# Patient Record
Sex: MALE | Race: WHITE | NOT HISPANIC OR LATINO | URBAN - METROPOLITAN AREA
[De-identification: names, ages, dates, MRNs, and addresses within clinical notes are randomized per-mention and may not be internally consistent; named-entity substitution may affect disease eponyms.]

---

## 2017-01-05 ENCOUNTER — INPATIENT (INPATIENT)
Facility: HOSPITAL | Age: 77
LOS: 6 days | Discharge: NURSING, REHAB OR LONG-TERM CARE | DRG: 177 | End: 2017-01-12
Attending: HOSPITALIST | Admitting: HOSPITALIST
Payer: COMMERCIAL

## 2017-01-05 VITALS
SYSTOLIC BLOOD PRESSURE: 133 MMHG | TEMPERATURE: 101 F | OXYGEN SATURATION: 100 % | DIASTOLIC BLOOD PRESSURE: 91 MMHG | HEART RATE: 99 BPM | RESPIRATION RATE: 17 BRPM

## 2017-01-05 DIAGNOSIS — I63.9 CEREBRAL INFARCTION, UNSPECIFIED: ICD-10-CM

## 2017-01-05 DIAGNOSIS — R33.9 RETENTION OF URINE, UNSPECIFIED: ICD-10-CM

## 2017-01-05 DIAGNOSIS — F03.90 UNSPECIFIED DEMENTIA, UNSPECIFIED SEVERITY, WITHOUT BEHAVIORAL DISTURBANCE, PSYCHOTIC DISTURBANCE, MOOD DISTURBANCE, AND ANXIETY: ICD-10-CM

## 2017-01-05 DIAGNOSIS — E87.0 HYPEROSMOLALITY AND HYPERNATREMIA: ICD-10-CM

## 2017-01-05 DIAGNOSIS — Z29.9 ENCOUNTER FOR PROPHYLACTIC MEASURES, UNSPECIFIED: ICD-10-CM

## 2017-01-05 DIAGNOSIS — A41.9 SEPSIS, UNSPECIFIED ORGANISM: ICD-10-CM

## 2017-01-05 DIAGNOSIS — K59.00 CONSTIPATION, UNSPECIFIED: ICD-10-CM

## 2017-01-05 DIAGNOSIS — I48.91 UNSPECIFIED ATRIAL FIBRILLATION: ICD-10-CM

## 2017-01-05 DIAGNOSIS — I10 ESSENTIAL (PRIMARY) HYPERTENSION: ICD-10-CM

## 2017-01-05 DIAGNOSIS — N39.0 URINARY TRACT INFECTION, SITE NOT SPECIFIED: ICD-10-CM

## 2017-01-05 DIAGNOSIS — J18.9 PNEUMONIA, UNSPECIFIED ORGANISM: ICD-10-CM

## 2017-01-05 LAB
ALBUMIN SERPL ELPH-MCNC: 1.9 G/DL — LOW (ref 3.3–5)
ALP SERPL-CCNC: 63 U/L — SIGNIFICANT CHANGE UP (ref 40–120)
ALT FLD-CCNC: 36 U/L — SIGNIFICANT CHANGE UP (ref 12–78)
ANION GAP SERPL CALC-SCNC: 7 MMOL/L — SIGNIFICANT CHANGE UP (ref 5–17)
APPEARANCE UR: CLEAR — SIGNIFICANT CHANGE UP
APTT BLD: 28 SEC — SIGNIFICANT CHANGE UP (ref 27.5–37.4)
AST SERPL-CCNC: 35 U/L — SIGNIFICANT CHANGE UP (ref 15–37)
BASE EXCESS BLDA CALC-SCNC: 1.6 MMOL/L — SIGNIFICANT CHANGE UP (ref -2–2)
BILIRUB SERPL-MCNC: 0.6 MG/DL — SIGNIFICANT CHANGE UP (ref 0.2–1.2)
BILIRUB UR-MCNC: NEGATIVE — SIGNIFICANT CHANGE UP
BLOOD GAS COMMENTS ARTERIAL: SIGNIFICANT CHANGE UP
BUN SERPL-MCNC: 35 MG/DL — HIGH (ref 7–23)
CALCIUM SERPL-MCNC: 8.9 MG/DL — SIGNIFICANT CHANGE UP (ref 8.5–10.1)
CHLORIDE SERPL-SCNC: 120 MMOL/L — HIGH (ref 96–108)
CK MB BLD-MCNC: 0.3 % — SIGNIFICANT CHANGE UP (ref 0–3.5)
CK MB CFR SERPL CALC: 0.6 NG/ML — SIGNIFICANT CHANGE UP (ref 0–3.6)
CK SERPL-CCNC: 193 U/L — SIGNIFICANT CHANGE UP (ref 26–308)
CO2 SERPL-SCNC: 27 MMOL/L — SIGNIFICANT CHANGE UP (ref 22–31)
COLOR SPEC: YELLOW — SIGNIFICANT CHANGE UP
CREAT SERPL-MCNC: 1.2 MG/DL — SIGNIFICANT CHANGE UP (ref 0.5–1.3)
DIFF PNL FLD: ABNORMAL
EOSINOPHIL NFR BLD AUTO: 3 % — SIGNIFICANT CHANGE UP (ref 0–6)
GLUCOSE SERPL-MCNC: 130 MG/DL — HIGH (ref 70–99)
GLUCOSE UR QL: NEGATIVE — SIGNIFICANT CHANGE UP
HCO3 BLDA-SCNC: 26 MMOL/L — SIGNIFICANT CHANGE UP (ref 23–27)
HCT VFR BLD CALC: 50.6 % — HIGH (ref 39–50)
HGB BLD-MCNC: 16.3 G/DL — SIGNIFICANT CHANGE UP (ref 13–17)
HOROWITZ INDEX BLDA+IHG-RTO: 100 — SIGNIFICANT CHANGE UP
INR BLD: 1.7 RATIO — HIGH (ref 0.88–1.16)
KETONES UR-MCNC: NEGATIVE — SIGNIFICANT CHANGE UP
LACTATE SERPL-SCNC: 1.4 MMOL/L — SIGNIFICANT CHANGE UP (ref 0.7–2)
LEUKOCYTE ESTERASE UR-ACNC: ABNORMAL
LYMPHOCYTES # BLD AUTO: 6 % — LOW (ref 13–44)
MCHC RBC-ENTMCNC: 31.6 PG — SIGNIFICANT CHANGE UP (ref 27–34)
MCHC RBC-ENTMCNC: 32.2 GM/DL — SIGNIFICANT CHANGE UP (ref 32–36)
MCV RBC AUTO: 98.1 FL — SIGNIFICANT CHANGE UP (ref 80–100)
MONOCYTES NFR BLD AUTO: 1 % — SIGNIFICANT CHANGE UP (ref 1–9)
NEUTROPHILS NFR BLD AUTO: 90 % — HIGH (ref 43–77)
NITRITE UR-MCNC: NEGATIVE — SIGNIFICANT CHANGE UP
PCO2 BLDA: 34 MMHG — SIGNIFICANT CHANGE UP (ref 32–46)
PH BLDA: 7.48 — HIGH (ref 7.35–7.45)
PH UR: 5 — SIGNIFICANT CHANGE UP (ref 4.8–8)
PLATELET # BLD AUTO: 210 K/UL — SIGNIFICANT CHANGE UP (ref 150–400)
PO2 BLDA: 331 MMHG — HIGH (ref 74–108)
POTASSIUM SERPL-MCNC: 4.3 MMOL/L — SIGNIFICANT CHANGE UP (ref 3.5–5.3)
POTASSIUM SERPL-SCNC: 4.3 MMOL/L — SIGNIFICANT CHANGE UP (ref 3.5–5.3)
PROT SERPL-MCNC: 5.9 G/DL — LOW (ref 6–8.3)
PROT UR-MCNC: 25 MG/DL
PROTHROM AB SERPL-ACNC: 19 SEC — HIGH (ref 10–13.1)
RAPID RVP RESULT: SIGNIFICANT CHANGE UP
RBC # BLD: 5.16 M/UL — SIGNIFICANT CHANGE UP (ref 4.2–5.8)
RBC # FLD: 11.9 % — SIGNIFICANT CHANGE UP (ref 10.3–14.5)
SAO2 % BLDA: 100 % — HIGH (ref 92–96)
SODIUM SERPL-SCNC: 154 MMOL/L — HIGH (ref 135–145)
SP GR SPEC: 1.02 — SIGNIFICANT CHANGE UP (ref 1.01–1.02)
TROPONIN I SERPL-MCNC: 0.02 NG/ML — SIGNIFICANT CHANGE UP (ref 0.01–0.04)
UROBILINOGEN FLD QL: NEGATIVE — SIGNIFICANT CHANGE UP
WBC # BLD: 11.6 K/UL — HIGH (ref 3.8–10.5)
WBC # FLD AUTO: 11.6 K/UL — HIGH (ref 3.8–10.5)

## 2017-01-05 PROCEDURE — 70450 CT HEAD/BRAIN W/O DYE: CPT | Mod: 26

## 2017-01-05 PROCEDURE — 99285 EMERGENCY DEPT VISIT HI MDM: CPT

## 2017-01-05 PROCEDURE — 71010: CPT | Mod: 26

## 2017-01-05 PROCEDURE — 99223 1ST HOSP IP/OBS HIGH 75: CPT | Mod: GC

## 2017-01-05 PROCEDURE — 93010 ELECTROCARDIOGRAM REPORT: CPT

## 2017-01-05 PROCEDURE — 71250 CT THORAX DX C-: CPT | Mod: 26

## 2017-01-05 RX ORDER — VANCOMYCIN HCL 1 G
1000 VIAL (EA) INTRAVENOUS ONCE
Qty: 0 | Refills: 0 | Status: COMPLETED | OUTPATIENT
Start: 2017-01-05 | End: 2017-01-05

## 2017-01-05 RX ORDER — METOPROLOL TARTRATE 50 MG
200 TABLET ORAL DAILY
Qty: 0 | Refills: 0 | Status: DISCONTINUED | OUTPATIENT
Start: 2017-01-05 | End: 2017-01-12

## 2017-01-05 RX ORDER — VANCOMYCIN HCL 1 G
1000 VIAL (EA) INTRAVENOUS EVERY 12 HOURS
Qty: 0 | Refills: 0 | Status: DISCONTINUED | OUTPATIENT
Start: 2017-01-05 | End: 2017-01-08

## 2017-01-05 RX ORDER — AMLODIPINE BESYLATE 2.5 MG/1
5 TABLET ORAL DAILY
Qty: 0 | Refills: 0 | Status: DISCONTINUED | OUTPATIENT
Start: 2017-01-05 | End: 2017-01-06

## 2017-01-05 RX ORDER — WARFARIN SODIUM 2.5 MG/1
3 TABLET ORAL DAILY
Qty: 0 | Refills: 0 | Status: DISCONTINUED | OUTPATIENT
Start: 2017-01-05 | End: 2017-01-06

## 2017-01-05 RX ORDER — SODIUM CHLORIDE 9 MG/ML
1000 INJECTION INTRAMUSCULAR; INTRAVENOUS; SUBCUTANEOUS ONCE
Qty: 0 | Refills: 0 | Status: COMPLETED | OUTPATIENT
Start: 2017-01-05 | End: 2017-01-05

## 2017-01-05 RX ORDER — PIPERACILLIN AND TAZOBACTAM 4; .5 G/20ML; G/20ML
3.38 INJECTION, POWDER, LYOPHILIZED, FOR SOLUTION INTRAVENOUS EVERY 8 HOURS
Qty: 0 | Refills: 0 | Status: DISCONTINUED | OUTPATIENT
Start: 2017-01-06 | End: 2017-01-10

## 2017-01-05 RX ORDER — ACETAMINOPHEN 500 MG
650 TABLET ORAL ONCE
Qty: 0 | Refills: 0 | Status: COMPLETED | OUTPATIENT
Start: 2017-01-05 | End: 2017-01-05

## 2017-01-05 RX ORDER — PIPERACILLIN AND TAZOBACTAM 4; .5 G/20ML; G/20ML
3.38 INJECTION, POWDER, LYOPHILIZED, FOR SOLUTION INTRAVENOUS ONCE
Qty: 0 | Refills: 0 | Status: COMPLETED | OUTPATIENT
Start: 2017-01-05 | End: 2017-01-05

## 2017-01-05 RX ORDER — DOCUSATE SODIUM 100 MG
100 CAPSULE ORAL THREE TIMES A DAY
Qty: 0 | Refills: 0 | Status: DISCONTINUED | OUTPATIENT
Start: 2017-01-06 | End: 2017-01-12

## 2017-01-05 RX ADMIN — SODIUM CHLORIDE 1000 MILLILITER(S): 9 INJECTION INTRAMUSCULAR; INTRAVENOUS; SUBCUTANEOUS at 17:24

## 2017-01-05 RX ADMIN — Medication 250 MILLIGRAM(S): at 19:15

## 2017-01-05 RX ADMIN — PIPERACILLIN AND TAZOBACTAM 200 GRAM(S): 4; .5 INJECTION, POWDER, LYOPHILIZED, FOR SOLUTION INTRAVENOUS at 17:24

## 2017-01-05 RX ADMIN — SODIUM CHLORIDE 1000 MILLILITER(S): 9 INJECTION INTRAMUSCULAR; INTRAVENOUS; SUBCUTANEOUS at 19:16

## 2017-01-05 RX ADMIN — Medication 650 MILLIGRAM(S): at 17:24

## 2017-01-05 NOTE — ED ADULT NURSE REASSESSMENT NOTE - NS ED NURSE REASSESS COMMENT FT1
patient resting, responsive to voice. V/S stable, awaiting bed at this time. Seen by hospitalist for admission.

## 2017-01-05 NOTE — ED PROVIDER NOTE - PROGRESS NOTE DETAILS
discussed case with Dr. Nance, does not think this is anything neurological, wants patient worked up for sepsis discussed case with Dr. Briones discussed case with Dr. Briones waiting for coags and cmp before admitting discussed case with Dr. Reyes, will admit

## 2017-01-05 NOTE — ED ADULT NURSE NOTE - OBJECTIVE STATEMENT
pt comes in via ems or an unresponsive episode at [facility], wife states that her  was not acting like himself all day band during dinner his eyes rolled in his head and he stopped responding top her-- she states that staff plasced an AED on him and sent him to the er-- on arrival pt had response to pain but would not respond to touch or verbal stimuli

## 2017-01-05 NOTE — H&P ADULT. - PMH
Atrial fibrillation    Cerebrovascular accident    Constipation    Dementia    Hypertension    Urinary retention

## 2017-01-05 NOTE — H&P ADULT. - PROBLEM SELECTOR PLAN 4
-?enema induced  -Hold IVF for now 2/2 hypernatremia , pt dose not appears to be dehydrated will sign out to AM team to reevaluated for IVF management.  -repeat cmp am

## 2017-01-05 NOTE — H&P ADULT. - PROBLEM SELECTOR PLAN 9
-fall precaution/ out of bed with assistant  -necator thick pure liquid   -aspiration precaution   -SCD for DVT PPX  -swallow and speech eval  -PT eval -c/w Regulo

## 2017-01-05 NOTE — ED ADULT NURSE REASSESSMENT NOTE - NS ED NURSE REASSESS COMMENT FT1
pt responsive top pain;; responding to verbal stimult--- O2 decreased to 4lnc-- labs heamolized-- lab at bedside to redraw

## 2017-01-05 NOTE — H&P ADULT. - ASSESSMENT
76 y.o M w/PMH of afib( on coumadin , HTN, recent CVA(post CVA underline aphasia and body weakness) , urinery retention( with chronic gore), dementia was sent with EMS by [facility] 2/2 unresponsiveness and lethargy a/w ?HCAP vs aspiration PNA and UTI

## 2017-01-05 NOTE — H&P ADULT. - PROBLEM SELECTOR PLAN 6
-c/w home toprol and coumadine   -repeat AC panel in AM -c/w home toprol and coumadin  -repeat AC panel in AM

## 2017-01-05 NOTE — H&P ADULT. - PROBLEM SELECTOR PLAN 7
-colace for PPX -c/w home toprol, coumadine, zetia, and lotrel  -no equivalent for zetia and lotrel, will sign out to AM team to bring the home zetia and lotrel for pharmacy to verify.  -amlodipine 5mg, consider to d/c amlodipine after pt 's home lotrel was verified

## 2017-01-05 NOTE — H&P ADULT. - HISTORY OF PRESENT ILLNESS
76 y.o M w/PMH of afib, HTN, recent CVA(post CVA underline aphasia and body weakness , urinery retention( with chronic gore), dementia 76 y.o M w/PMH of afib( on coumadin , HTN, recent CVA(post CVA underline aphasia and body weakness) , urinery retention( with chronic gore), dementia was sent with EMS by [facility] 2/2 unresponsiveness and lethargy. Pt had CVA at 12/2016 and was admitted to [facility]. D/C at 12/20/2016 and was sent to [facility] for post stoke rehab. Pt had aphasia and body weakness s/p the stoke. Per Pt 's wife and ED nurse, pt had baseline of answering yes or no questions in [facility]. Pt was not tolerate the food in [facility] and had severe constipation and was giving multiple enemas. Pt  was not able to walk s/p the CVA. Today, at 3:55pm, pt was found to be unresponsiveness to pain stimuli and sternal rub and lethargy in [facility] and pt was sent to [facility] ED by EMS. Upon examination, Pt 's wife had already left, and pt was not able to provide history. Attempt to call pt's wife, no one picking up. The history was provided by [facility] and ED nurse who speeak with the wife befoe     In ED, pt had fever at 101, with other vital signs wnl. Leukocytosis at 11.6. Hypernatremia at 154 and UA + for moderate leukocyte Esterase and many bacteria. CT head negative for acute infract. CXR and chest CT area consistent with possible Lt lower lob PNA. Pt was unresponsiveness upon arrival later improved to responsiveness to pain and verbal stimuli per ED nurser. Dr. Nance (Neuro)saw PT at ED and does not think pt 's symptom was neurological related. Pt received 1xzosyn, 1x vanco, 1x tylenol, 2x bolus ns. 76 y.o M w/PMH of afib( on coumadin) , HTN, recent CVA(post CVA underline aphasia and body weakness) , urinery retention( with chronic gore), dementia was sent with EMS by [facility] 2/2 unresponsiveness and lethargy. Pt had CVA at 12/2016 and was admitted to [facility]. D/C at 12/20/2016 and was sent to [facility] for post stoke rehab. Pt had aphasia and body weakness s/p the stoke. Per Pt 's wife and ED nurse, pt had baseline of answering yes or no questions in [facility]. Pt was not tolerate the food in [facility] and had severe constipation and was giving multiple enemas. Pt  was not able to walk s/p the CVA. Today, at 3:55pm, pt was found to be unresponsiveness to pain stimuli and sternal rub and lethargy in [facility] and pt was sent to [facility] ED by EMS. Upon examination, Pt 's wife had already left, and pt was not able to provide history. Attempt to call pt's wife, no one picking up. The history was provided by [facility] and ED nurse who speeak with the wife befoe     In ED, pt had fever at 101, with other vital signs wnl. Leukocytosis at 11.6. Hypernatremia at 154 and UA + for moderate leukocyte Esterase and many bacteria. CT head negative for acute infract. CXR and chest CT area consistent with possible Lt lower lob PNA. Pt was unresponsiveness upon arrival later improved to responsiveness to pain and verbal stimuli per ED nurser. Dr. Nance (Neuro)saw PT at ED and does not think pt 's symptom was neurological related. Pt received 1xzosyn, 1x vanco, 1x tylenol, 2x bolus ns.

## 2017-01-05 NOTE — H&P ADULT. - PROBLEM SELECTOR PLAN 1
-improved through ED course not meeting SIRS criteria now  -admit to Tele  -c/w zosyn and vanvo  -Hold IVF for now 2/2 hypernatremia , pt dose not appears to be dehydrated will sign out to AM team to reevaluated for IVF management.  -Tylenol PRN for fever  -AM CBC/ CMP  -BCX and UCX pending -improved through ED course not meeting SIRS criteria now  -admit to Tele  -c/w zosyn and vanco  -Hold IVF for now 2/2 hypernatremia , pt dose not appears to be dehydrated will sign out to AM team to reevaluated for IVF management.  -Tylenol PRN for fever  -AM CBC/ CMP  -BCX and UCX pending

## 2017-01-05 NOTE — ED PROVIDER NOTE - CARE PLAN
Principal Discharge DX:	Sepsis, due to unspecified organism Principal Discharge DX:	Sepsis, due to unspecified organism  Secondary Diagnosis:	Pneumonia, unspecified organism Principal Discharge DX:	Sepsis, due to unspecified organism  Secondary Diagnosis:	Pneumonia, unspecified organism  Secondary Diagnosis:	Urinary tract infection, site unspecified

## 2017-01-05 NOTE — H&P ADULT. - PROBLEM SELECTOR PLAN 5
-c/w home toprol and coumadine coumadine -c/w home toprol, coumadine, zetia, and lotrel  -no equivalent for zetia and lotrel, will sign out to AM team to bring the home zetia and lotrel for pharmacy to verify.  -amlodipine 5mg, consider to d/c amlodipine after pt 's home lotrel was verified

## 2017-01-05 NOTE — H&P ADULT. - RS GEN PE MLT RESP DETAILS PC
breath sounds equal/no rales/respirations non-labored/airway patent/no intercostal retractions/no wheezes/normal/no rhonchi

## 2017-01-05 NOTE — H&P ADULT. - PROBLEM SELECTOR PLAN 10
-fall precaution/ out of bed with assistant  -necator pure liquid dysphagia diet  -aspiration precaution   -SCD for DVT PPX  -swallow and speech eval  -PT eval

## 2017-01-06 LAB
ALBUMIN SERPL ELPH-MCNC: 2.1 G/DL — LOW (ref 3.3–5)
ALP SERPL-CCNC: 66 U/L — SIGNIFICANT CHANGE UP (ref 40–120)
ALT FLD-CCNC: 31 U/L — SIGNIFICANT CHANGE UP (ref 12–78)
ANION GAP SERPL CALC-SCNC: 6 MMOL/L — SIGNIFICANT CHANGE UP (ref 5–17)
APTT BLD: 33.6 SEC — SIGNIFICANT CHANGE UP (ref 27.5–37.4)
AST SERPL-CCNC: 33 U/L — SIGNIFICANT CHANGE UP (ref 15–37)
BASOPHILS # BLD AUTO: 0.1 K/UL — SIGNIFICANT CHANGE UP (ref 0–0.2)
BASOPHILS NFR BLD AUTO: 0.8 % — SIGNIFICANT CHANGE UP (ref 0–2)
BILIRUB SERPL-MCNC: 0.6 MG/DL — SIGNIFICANT CHANGE UP (ref 0.2–1.2)
BUN SERPL-MCNC: 34 MG/DL — HIGH (ref 7–23)
CALCIUM SERPL-MCNC: 9.4 MG/DL — SIGNIFICANT CHANGE UP (ref 8.5–10.1)
CHLORIDE SERPL-SCNC: 118 MMOL/L — HIGH (ref 96–108)
CO2 SERPL-SCNC: 29 MMOL/L — SIGNIFICANT CHANGE UP (ref 22–31)
CREAT SERPL-MCNC: 1.1 MG/DL — SIGNIFICANT CHANGE UP (ref 0.5–1.3)
CULTURE RESULTS: SIGNIFICANT CHANGE UP
EOSINOPHIL # BLD AUTO: 0.1 K/UL — SIGNIFICANT CHANGE UP (ref 0–0.5)
EOSINOPHIL NFR BLD AUTO: 0.8 % — SIGNIFICANT CHANGE UP (ref 0–6)
GLUCOSE SERPL-MCNC: 96 MG/DL — SIGNIFICANT CHANGE UP (ref 70–99)
GRAM STN FLD: SIGNIFICANT CHANGE UP
HCT VFR BLD CALC: 41.5 % — SIGNIFICANT CHANGE UP (ref 39–50)
HGB BLD-MCNC: 13.7 G/DL — SIGNIFICANT CHANGE UP (ref 13–17)
INR BLD: 1.68 RATIO — HIGH (ref 0.88–1.16)
LYMPHOCYTES # BLD AUTO: 1 K/UL — SIGNIFICANT CHANGE UP (ref 1–3.3)
LYMPHOCYTES # BLD AUTO: 11.7 % — LOW (ref 13–44)
MCHC RBC-ENTMCNC: 31.7 PG — SIGNIFICANT CHANGE UP (ref 27–34)
MCHC RBC-ENTMCNC: 32.9 GM/DL — SIGNIFICANT CHANGE UP (ref 32–36)
MCV RBC AUTO: 96.3 FL — SIGNIFICANT CHANGE UP (ref 80–100)
MONOCYTES # BLD AUTO: 0.5 K/UL — SIGNIFICANT CHANGE UP (ref 0–0.9)
MONOCYTES NFR BLD AUTO: 5.6 % — SIGNIFICANT CHANGE UP (ref 1–9)
NEUTROPHILS # BLD AUTO: 6.6 K/UL — SIGNIFICANT CHANGE UP (ref 1.8–7.4)
NEUTROPHILS NFR BLD AUTO: 80.9 % — HIGH (ref 43–77)
PLATELET # BLD AUTO: 170 K/UL — SIGNIFICANT CHANGE UP (ref 150–400)
POTASSIUM SERPL-MCNC: 4.3 MMOL/L — SIGNIFICANT CHANGE UP (ref 3.5–5.3)
POTASSIUM SERPL-SCNC: 4.3 MMOL/L — SIGNIFICANT CHANGE UP (ref 3.5–5.3)
PROT SERPL-MCNC: 6.3 G/DL — SIGNIFICANT CHANGE UP (ref 6–8.3)
PROTHROM AB SERPL-ACNC: 18.8 SEC — HIGH (ref 10–13.1)
RBC # BLD: 4.31 M/UL — SIGNIFICANT CHANGE UP (ref 4.2–5.8)
RBC # FLD: 12 % — SIGNIFICANT CHANGE UP (ref 10.3–14.5)
SODIUM SERPL-SCNC: 153 MMOL/L — HIGH (ref 135–145)
SPECIMEN SOURCE: SIGNIFICANT CHANGE UP
WBC # BLD: 8.2 K/UL — SIGNIFICANT CHANGE UP (ref 3.8–10.5)
WBC # FLD AUTO: 8.2 K/UL — SIGNIFICANT CHANGE UP (ref 3.8–10.5)

## 2017-01-06 PROCEDURE — 99233 SBSQ HOSP IP/OBS HIGH 50: CPT

## 2017-01-06 PROCEDURE — 93010 ELECTROCARDIOGRAM REPORT: CPT

## 2017-01-06 RX ORDER — ENOXAPARIN SODIUM 100 MG/ML
40 INJECTION SUBCUTANEOUS ONCE
Qty: 0 | Refills: 0 | Status: COMPLETED | OUTPATIENT
Start: 2017-01-06 | End: 2017-01-06

## 2017-01-06 RX ORDER — WARFARIN SODIUM 2.5 MG/1
3 TABLET ORAL DAILY
Qty: 0 | Refills: 0 | Status: DISCONTINUED | OUTPATIENT
Start: 2017-01-06 | End: 2017-01-06

## 2017-01-06 RX ORDER — SODIUM CHLORIDE 9 MG/ML
1000 INJECTION, SOLUTION INTRAVENOUS
Qty: 0 | Refills: 0 | Status: DISCONTINUED | OUTPATIENT
Start: 2017-01-06 | End: 2017-01-06

## 2017-01-06 RX ORDER — ENOXAPARIN SODIUM 100 MG/ML
65 INJECTION SUBCUTANEOUS
Qty: 0 | Refills: 0 | Status: DISCONTINUED | OUTPATIENT
Start: 2017-01-06 | End: 2017-01-08

## 2017-01-06 RX ORDER — ENOXAPARIN SODIUM 100 MG/ML
40 INJECTION SUBCUTANEOUS DAILY
Qty: 0 | Refills: 0 | Status: DISCONTINUED | OUTPATIENT
Start: 2017-01-06 | End: 2017-01-06

## 2017-01-06 RX ORDER — EZETIMIBE 10 MG/1
10 TABLET ORAL DAILY
Qty: 0 | Refills: 0 | Status: DISCONTINUED | OUTPATIENT
Start: 2017-01-06 | End: 2017-01-12

## 2017-01-06 RX ORDER — ACETAMINOPHEN 500 MG
650 TABLET ORAL EVERY 6 HOURS
Qty: 0 | Refills: 0 | Status: DISCONTINUED | OUTPATIENT
Start: 2017-01-06 | End: 2017-01-12

## 2017-01-06 RX ORDER — ENOXAPARIN SODIUM 100 MG/ML
65 INJECTION SUBCUTANEOUS
Qty: 0 | Refills: 0 | Status: DISCONTINUED | OUTPATIENT
Start: 2017-01-06 | End: 2017-01-06

## 2017-01-06 RX ORDER — SODIUM CHLORIDE 9 MG/ML
1000 INJECTION, SOLUTION INTRAVENOUS
Qty: 0 | Refills: 0 | Status: DISCONTINUED | OUTPATIENT
Start: 2017-01-06 | End: 2017-01-07

## 2017-01-06 RX ADMIN — Medication 200 MILLIGRAM(S): at 05:57

## 2017-01-06 RX ADMIN — ENOXAPARIN SODIUM 65 MILLIGRAM(S): 100 INJECTION SUBCUTANEOUS at 17:53

## 2017-01-06 RX ADMIN — SODIUM CHLORIDE 75 MILLILITER(S): 9 INJECTION, SOLUTION INTRAVENOUS at 10:23

## 2017-01-06 RX ADMIN — PIPERACILLIN AND TAZOBACTAM 25 GRAM(S): 4; .5 INJECTION, POWDER, LYOPHILIZED, FOR SOLUTION INTRAVENOUS at 19:07

## 2017-01-06 RX ADMIN — Medication 250 MILLIGRAM(S): at 17:52

## 2017-01-06 RX ADMIN — ENOXAPARIN SODIUM 40 MILLIGRAM(S): 100 INJECTION SUBCUTANEOUS at 02:53

## 2017-01-06 RX ADMIN — Medication 100 MILLIGRAM(S): at 21:48

## 2017-01-06 RX ADMIN — Medication 100 MILLIGRAM(S): at 13:22

## 2017-01-06 RX ADMIN — AMLODIPINE BESYLATE 5 MILLIGRAM(S): 2.5 TABLET ORAL at 05:57

## 2017-01-06 RX ADMIN — Medication 250 MILLIGRAM(S): at 05:57

## 2017-01-06 RX ADMIN — PIPERACILLIN AND TAZOBACTAM 25 GRAM(S): 4; .5 INJECTION, POWDER, LYOPHILIZED, FOR SOLUTION INTRAVENOUS at 02:02

## 2017-01-06 RX ADMIN — SODIUM CHLORIDE 75 MILLILITER(S): 9 INJECTION, SOLUTION INTRAVENOUS at 12:56

## 2017-01-06 RX ADMIN — PIPERACILLIN AND TAZOBACTAM 25 GRAM(S): 4; .5 INJECTION, POWDER, LYOPHILIZED, FOR SOLUTION INTRAVENOUS at 09:19

## 2017-01-06 RX ADMIN — Medication 100 MILLIGRAM(S): at 05:57

## 2017-01-06 NOTE — SWALLOW BEDSIDE ASSESSMENT ADULT - ORAL PHASE
Decreased anterior-posterior movement of the bolus Delayed oral transit time/Decreased anterior-posterior movement of the bolus

## 2017-01-06 NOTE — DIETITIAN INITIAL EVALUATION ADULT. - PROBLEM SELECTOR PLAN 7
-c/w home toprol, coumadine, zetia, and lotrel  -no equivalent for zetia and lotrel, will sign out to AM team to bring the home zetia and lotrel for pharmacy to verify.  -amlodipine 5mg, consider to d/c amlodipine after pt 's home lotrel was verified

## 2017-01-06 NOTE — PHYSICAL THERAPY INITIAL EVALUATION ADULT - PERTINENT HX OF CURRENT PROBLEM, REHAB EVAL
Pt had a stroke 12/16  and was at [facility] at [facility] before he became unresponsive and transferred to [place].. Pt diagnosed with UTI, sepsis and PNA Pt had a stroke 12/16 1st went to [facility] and then was at [facility] at [facility] before he became unresponsive and transferred to [place].. Pt diagnosed with UTI, sepsis and PNA, h/o dementia

## 2017-01-06 NOTE — DIETITIAN INITIAL EVALUATION ADULT. - OTHER INFO
Unable to interview patient due to altered mental status post CVA. Pt admitted from [facility] rehab . Pt with PNA, septic. As per EMR, pt not tolerating food well at rehab with severe constipation and multiple enemas. Pt on coumadin, but not addition to medication regimen. +Bowel sounds. No edema or skin breakage. Pt seen with breakfast tray at bedside, food cut up but not eaten at all. Awaiting SLP evaluation 1/7 for further instruction in regards to diet. Pt presents with MOLST but no directive for TF. Consider TF if intake does not improve and patient is not cleared for oral diet by SLP

## 2017-01-06 NOTE — SWALLOW BEDSIDE ASSESSMENT ADULT - ASR SWALLOW ASPIRATION MONITOR
cough/change of breathing pattern/position upright (90Y)/pneumonia/fever/oral hygiene/upper respiratory infection/throat clearing/gurgly voice

## 2017-01-06 NOTE — PHYSICAL THERAPY INITIAL EVALUATION ADULT - ADDITIONAL COMMENTS
According to  ( from [facility] that pt attends) who was visiting. Pt was walking 70 feet with assist . Pt not very verbal at this time.

## 2017-01-06 NOTE — DIETITIAN INITIAL EVALUATION ADULT. - PROBLEM SELECTOR PLAN 1
-improved through ED course not meeting SIRS criteria now  -admit to Tele  -c/w zosyn and vanco  -Hold IVF for now 2/2 hypernatremia , pt dose not appears to be dehydrated will sign out to AM team to reevaluated for IVF management.  -Tylenol PRN for fever  -AM CBC/ CMP  -BCX and UCX pending

## 2017-01-06 NOTE — PATIENT PROFILE ADULT. - FALL HARM RISK
bones(Osteoporosis,prev fx,steroid use,metastatic bone ca/coagulation(Bleeding disorder R/T clinical cond/anti-coags)

## 2017-01-06 NOTE — PHYSICAL THERAPY INITIAL EVALUATION ADULT - CRITERIA FOR SKILLED THERAPEUTIC INTERVENTIONS
risk reduction/prevention/rehab potential risk reduction/prevention/rehab potential/therapy frequency

## 2017-01-06 NOTE — PATIENT PROFILE ADULT. - NS PRO REFERRAL CMGT
Lacks capacity and has no surrogate decision maker/Medical equipment needed/Significantly altered cognitive/functional ability

## 2017-01-06 NOTE — DIETITIAN INITIAL EVALUATION ADULT. - FACTORS AFF FOOD INTAKE
As per EMR, pt with CVA 12/2016 & persistent constipation (received multiple enemas in rehab)/change in mental status/difficulty feeding self/persistent constipation

## 2017-01-06 NOTE — SWALLOW BEDSIDE ASSESSMENT ADULT - COMMENTS
77 y/o male with recent h/o CVA (12/2016) admitted from rehab after being found unresponsive, found to have PNA - HCAP vs Asp. Pt seen for clinical swallow eval, A+Ox0. Pt with oral-pharyngeal dysphagia with reduced oral grading, bite reflex, significant oral holding with weak lingual manipulation of bolus, suspected premature loss and swallow delay, laryngeal excursion WFL.

## 2017-01-06 NOTE — PHYSICAL THERAPY INITIAL EVALUATION ADULT - MUSCLE TONE ASSESSMENT, REHAB EVAL
moderately increased tone/bilateral upper extremities moderately increased tone/distally more then proximally/bilateral upper extremities

## 2017-01-06 NOTE — DIETITIAN INITIAL EVALUATION ADULT. - PHYSICAL APPEARANCE
Pt appears thin; No height recorded at this time and pt unable to interview due to mental status post CVA; BMI 21.9 at estimated height of 5'10"

## 2017-01-07 LAB
ANION GAP SERPL CALC-SCNC: 8 MMOL/L — SIGNIFICANT CHANGE UP (ref 5–17)
BUN SERPL-MCNC: 21 MG/DL — SIGNIFICANT CHANGE UP (ref 7–23)
CALCIUM SERPL-MCNC: 9.3 MG/DL — SIGNIFICANT CHANGE UP (ref 8.5–10.1)
CHLORIDE SERPL-SCNC: 112 MMOL/L — HIGH (ref 96–108)
CO2 SERPL-SCNC: 31 MMOL/L — SIGNIFICANT CHANGE UP (ref 22–31)
CREAT SERPL-MCNC: 1.1 MG/DL — SIGNIFICANT CHANGE UP (ref 0.5–1.3)
GLUCOSE SERPL-MCNC: 91 MG/DL — SIGNIFICANT CHANGE UP (ref 70–99)
GRAM STN FLD: SIGNIFICANT CHANGE UP
GRAM STN FLD: SIGNIFICANT CHANGE UP
HCT VFR BLD CALC: 42.2 % — SIGNIFICANT CHANGE UP (ref 39–50)
HGB BLD-MCNC: 13.8 G/DL — SIGNIFICANT CHANGE UP (ref 13–17)
INR BLD: 2.59 RATIO — HIGH (ref 0.88–1.16)
MAGNESIUM SERPL-MCNC: 2.5 MG/DL — HIGH (ref 1.8–2.4)
MCHC RBC-ENTMCNC: 31.6 PG — SIGNIFICANT CHANGE UP (ref 27–34)
MCHC RBC-ENTMCNC: 32.7 GM/DL — SIGNIFICANT CHANGE UP (ref 32–36)
MCV RBC AUTO: 96.7 FL — SIGNIFICANT CHANGE UP (ref 80–100)
PHOSPHATE SERPL-MCNC: 2.2 MG/DL — LOW (ref 2.5–4.5)
PLATELET # BLD AUTO: 164 K/UL — SIGNIFICANT CHANGE UP (ref 150–400)
POTASSIUM SERPL-MCNC: 3.2 MMOL/L — LOW (ref 3.5–5.3)
POTASSIUM SERPL-SCNC: 3.2 MMOL/L — LOW (ref 3.5–5.3)
PROTHROM AB SERPL-ACNC: 29 SEC — HIGH (ref 10–13.1)
RBC # BLD: 4.37 M/UL — SIGNIFICANT CHANGE UP (ref 4.2–5.8)
RBC # FLD: 11.4 % — SIGNIFICANT CHANGE UP (ref 10.3–14.5)
SODIUM SERPL-SCNC: 151 MMOL/L — HIGH (ref 135–145)
VANCOMYCIN TROUGH SERPL-MCNC: 16.6 UG/ML — SIGNIFICANT CHANGE UP (ref 10–20)
WBC # BLD: 7.4 K/UL — SIGNIFICANT CHANGE UP (ref 3.8–10.5)
WBC # FLD AUTO: 7.4 K/UL — SIGNIFICANT CHANGE UP (ref 3.8–10.5)

## 2017-01-07 PROCEDURE — 99233 SBSQ HOSP IP/OBS HIGH 50: CPT

## 2017-01-07 RX ORDER — POTASSIUM CHLORIDE 20 MEQ
40 PACKET (EA) ORAL ONCE
Qty: 0 | Refills: 0 | Status: COMPLETED | OUTPATIENT
Start: 2017-01-07 | End: 2017-01-07

## 2017-01-07 RX ORDER — DEXTROSE MONOHYDRATE, SODIUM CHLORIDE, AND POTASSIUM CHLORIDE 50; .745; 4.5 G/1000ML; G/1000ML; G/1000ML
1000 INJECTION, SOLUTION INTRAVENOUS
Qty: 0 | Refills: 0 | Status: DISCONTINUED | OUTPATIENT
Start: 2017-01-07 | End: 2017-01-09

## 2017-01-07 RX ORDER — WARFARIN SODIUM 2.5 MG/1
3 TABLET ORAL ONCE
Qty: 0 | Refills: 0 | Status: COMPLETED | OUTPATIENT
Start: 2017-01-07 | End: 2017-01-07

## 2017-01-07 RX ORDER — WARFARIN SODIUM 2.5 MG/1
5 TABLET ORAL ONCE
Qty: 0 | Refills: 0 | Status: DISCONTINUED | OUTPATIENT
Start: 2017-01-07 | End: 2017-01-07

## 2017-01-07 RX ORDER — SODIUM,POTASSIUM PHOSPHATES 278-250MG
1 POWDER IN PACKET (EA) ORAL
Qty: 0 | Refills: 0 | Status: COMPLETED | OUTPATIENT
Start: 2017-01-07 | End: 2017-01-09

## 2017-01-07 RX ADMIN — Medication 100 MILLIGRAM(S): at 05:49

## 2017-01-07 RX ADMIN — PIPERACILLIN AND TAZOBACTAM 25 GRAM(S): 4; .5 INJECTION, POWDER, LYOPHILIZED, FOR SOLUTION INTRAVENOUS at 23:44

## 2017-01-07 RX ADMIN — Medication 100 MILLIGRAM(S): at 13:32

## 2017-01-07 RX ADMIN — ENOXAPARIN SODIUM 65 MILLIGRAM(S): 100 INJECTION SUBCUTANEOUS at 05:49

## 2017-01-07 RX ADMIN — ENOXAPARIN SODIUM 65 MILLIGRAM(S): 100 INJECTION SUBCUTANEOUS at 17:39

## 2017-01-07 RX ADMIN — Medication 1 TABLET(S): at 21:16

## 2017-01-07 RX ADMIN — Medication 200 MILLIGRAM(S): at 05:49

## 2017-01-07 RX ADMIN — DEXTROSE MONOHYDRATE, SODIUM CHLORIDE, AND POTASSIUM CHLORIDE 75 MILLILITER(S): 50; .745; 4.5 INJECTION, SOLUTION INTRAVENOUS at 18:32

## 2017-01-07 RX ADMIN — PIPERACILLIN AND TAZOBACTAM 25 GRAM(S): 4; .5 INJECTION, POWDER, LYOPHILIZED, FOR SOLUTION INTRAVENOUS at 00:26

## 2017-01-07 RX ADMIN — Medication 40 MILLIEQUIVALENT(S): at 11:55

## 2017-01-07 RX ADMIN — Medication 250 MILLIGRAM(S): at 05:49

## 2017-01-07 RX ADMIN — PIPERACILLIN AND TAZOBACTAM 25 GRAM(S): 4; .5 INJECTION, POWDER, LYOPHILIZED, FOR SOLUTION INTRAVENOUS at 21:16

## 2017-01-07 RX ADMIN — SODIUM CHLORIDE 75 MILLILITER(S): 9 INJECTION, SOLUTION INTRAVENOUS at 13:35

## 2017-01-07 RX ADMIN — PIPERACILLIN AND TAZOBACTAM 25 GRAM(S): 4; .5 INJECTION, POWDER, LYOPHILIZED, FOR SOLUTION INTRAVENOUS at 10:50

## 2017-01-07 RX ADMIN — Medication 250 MILLIGRAM(S): at 17:39

## 2017-01-07 RX ADMIN — EZETIMIBE 10 MILLIGRAM(S): 10 TABLET ORAL at 11:55

## 2017-01-07 RX ADMIN — WARFARIN SODIUM 3 MILLIGRAM(S): 2.5 TABLET ORAL at 21:16

## 2017-01-07 RX ADMIN — Medication 100 MILLIGRAM(S): at 21:16

## 2017-01-07 RX ADMIN — Medication 1 TABLET(S): at 17:39

## 2017-01-08 LAB
ANION GAP SERPL CALC-SCNC: 8 MMOL/L — SIGNIFICANT CHANGE UP (ref 5–17)
BUN SERPL-MCNC: 17 MG/DL — SIGNIFICANT CHANGE UP (ref 7–23)
CALCIUM SERPL-MCNC: 9.2 MG/DL — SIGNIFICANT CHANGE UP (ref 8.5–10.1)
CHLORIDE SERPL-SCNC: 110 MMOL/L — HIGH (ref 96–108)
CO2 SERPL-SCNC: 27 MMOL/L — SIGNIFICANT CHANGE UP (ref 22–31)
CREAT SERPL-MCNC: 1.1 MG/DL — SIGNIFICANT CHANGE UP (ref 0.5–1.3)
CULTURE RESULTS: SIGNIFICANT CHANGE UP
GLUCOSE SERPL-MCNC: 86 MG/DL — SIGNIFICANT CHANGE UP (ref 70–99)
INR BLD: 2.68 RATIO — HIGH (ref 0.88–1.16)
PHOSPHATE SERPL-MCNC: 2.9 MG/DL — SIGNIFICANT CHANGE UP (ref 2.5–4.5)
POTASSIUM SERPL-MCNC: 3.5 MMOL/L — SIGNIFICANT CHANGE UP (ref 3.5–5.3)
POTASSIUM SERPL-SCNC: 3.5 MMOL/L — SIGNIFICANT CHANGE UP (ref 3.5–5.3)
PROTHROM AB SERPL-ACNC: 30.1 SEC — HIGH (ref 10–13.1)
SODIUM SERPL-SCNC: 145 MMOL/L — SIGNIFICANT CHANGE UP (ref 135–145)
SPECIMEN SOURCE: SIGNIFICANT CHANGE UP

## 2017-01-08 PROCEDURE — 99233 SBSQ HOSP IP/OBS HIGH 50: CPT

## 2017-01-08 RX ORDER — WARFARIN SODIUM 2.5 MG/1
1 TABLET ORAL ONCE
Qty: 0 | Refills: 0 | Status: COMPLETED | OUTPATIENT
Start: 2017-01-08 | End: 2017-01-08

## 2017-01-08 RX ORDER — TOBRAMYCIN 0.3 %
1 DROPS OPHTHALMIC (EYE) THREE TIMES A DAY
Qty: 0 | Refills: 0 | Status: DISCONTINUED | OUTPATIENT
Start: 2017-01-08 | End: 2017-01-12

## 2017-01-08 RX ORDER — WARFARIN SODIUM 2.5 MG/1
3 TABLET ORAL ONCE
Qty: 0 | Refills: 0 | Status: DISCONTINUED | OUTPATIENT
Start: 2017-01-08 | End: 2017-01-08

## 2017-01-08 RX ADMIN — Medication 1 TABLET(S): at 11:48

## 2017-01-08 RX ADMIN — Medication 100 MILLIGRAM(S): at 05:13

## 2017-01-08 RX ADMIN — PIPERACILLIN AND TAZOBACTAM 25 GRAM(S): 4; .5 INJECTION, POWDER, LYOPHILIZED, FOR SOLUTION INTRAVENOUS at 08:16

## 2017-01-08 RX ADMIN — EZETIMIBE 10 MILLIGRAM(S): 10 TABLET ORAL at 11:48

## 2017-01-08 RX ADMIN — Medication 200 MILLIGRAM(S): at 05:13

## 2017-01-08 RX ADMIN — DEXTROSE MONOHYDRATE, SODIUM CHLORIDE, AND POTASSIUM CHLORIDE 75 MILLILITER(S): 50; .745; 4.5 INJECTION, SOLUTION INTRAVENOUS at 11:09

## 2017-01-08 RX ADMIN — WARFARIN SODIUM 1 MILLIGRAM(S): 2.5 TABLET ORAL at 23:06

## 2017-01-08 RX ADMIN — Medication 250 MILLIGRAM(S): at 17:47

## 2017-01-08 RX ADMIN — Medication 1 TABLET(S): at 08:16

## 2017-01-08 RX ADMIN — Medication 100 MILLIGRAM(S): at 20:57

## 2017-01-08 RX ADMIN — Medication 100 MILLIGRAM(S): at 13:03

## 2017-01-08 RX ADMIN — ENOXAPARIN SODIUM 65 MILLIGRAM(S): 100 INJECTION SUBCUTANEOUS at 05:13

## 2017-01-08 RX ADMIN — Medication 1 TABLET(S): at 20:56

## 2017-01-08 RX ADMIN — PIPERACILLIN AND TAZOBACTAM 25 GRAM(S): 4; .5 INJECTION, POWDER, LYOPHILIZED, FOR SOLUTION INTRAVENOUS at 20:56

## 2017-01-08 RX ADMIN — Medication 250 MILLIGRAM(S): at 05:13

## 2017-01-08 RX ADMIN — Medication 1 DROP(S): at 20:56

## 2017-01-08 RX ADMIN — Medication 1 TABLET(S): at 17:47

## 2017-01-09 LAB
ANION GAP SERPL CALC-SCNC: 7 MMOL/L — SIGNIFICANT CHANGE UP (ref 5–17)
BUN SERPL-MCNC: 20 MG/DL — SIGNIFICANT CHANGE UP (ref 7–23)
CALCIUM SERPL-MCNC: 8.8 MG/DL — SIGNIFICANT CHANGE UP (ref 8.5–10.1)
CHLORIDE SERPL-SCNC: 107 MMOL/L — SIGNIFICANT CHANGE UP (ref 96–108)
CO2 SERPL-SCNC: 31 MMOL/L — SIGNIFICANT CHANGE UP (ref 22–31)
CREAT SERPL-MCNC: 1.6 MG/DL — HIGH (ref 0.5–1.3)
GLUCOSE SERPL-MCNC: 100 MG/DL — HIGH (ref 70–99)
HCT VFR BLD CALC: 41.4 % — SIGNIFICANT CHANGE UP (ref 39–50)
HGB BLD-MCNC: 14.4 G/DL — SIGNIFICANT CHANGE UP (ref 13–17)
INR BLD: 2.25 RATIO — HIGH (ref 0.88–1.16)
MCHC RBC-ENTMCNC: 32.2 PG — SIGNIFICANT CHANGE UP (ref 27–34)
MCHC RBC-ENTMCNC: 34.8 GM/DL — SIGNIFICANT CHANGE UP (ref 32–36)
MCV RBC AUTO: 92.6 FL — SIGNIFICANT CHANGE UP (ref 80–100)
PLATELET # BLD AUTO: 169 K/UL — SIGNIFICANT CHANGE UP (ref 150–400)
POTASSIUM SERPL-MCNC: 4 MMOL/L — SIGNIFICANT CHANGE UP (ref 3.5–5.3)
POTASSIUM SERPL-SCNC: 4 MMOL/L — SIGNIFICANT CHANGE UP (ref 3.5–5.3)
PROTHROM AB SERPL-ACNC: 25.2 SEC — HIGH (ref 10–13.1)
RBC # BLD: 4.47 M/UL — SIGNIFICANT CHANGE UP (ref 4.2–5.8)
RBC # FLD: 11.6 % — SIGNIFICANT CHANGE UP (ref 10.3–14.5)
SODIUM SERPL-SCNC: 145 MMOL/L — SIGNIFICANT CHANGE UP (ref 135–145)
WBC # BLD: 8.2 K/UL — SIGNIFICANT CHANGE UP (ref 3.8–10.5)
WBC # FLD AUTO: 8.2 K/UL — SIGNIFICANT CHANGE UP (ref 3.8–10.5)

## 2017-01-09 PROCEDURE — 99232 SBSQ HOSP IP/OBS MODERATE 35: CPT | Mod: GC

## 2017-01-09 RX ORDER — SODIUM CHLORIDE 9 MG/ML
1000 INJECTION, SOLUTION INTRAVENOUS
Qty: 0 | Refills: 0 | Status: DISCONTINUED | OUTPATIENT
Start: 2017-01-09 | End: 2017-01-10

## 2017-01-09 RX ORDER — WARFARIN SODIUM 2.5 MG/1
2 TABLET ORAL ONCE
Qty: 0 | Refills: 0 | Status: COMPLETED | OUTPATIENT
Start: 2017-01-09 | End: 2017-01-09

## 2017-01-09 RX ORDER — INFLUENZA VIRUS VACCINE 15; 15; 15; 15 UG/.5ML; UG/.5ML; UG/.5ML; UG/.5ML
0.5 SUSPENSION INTRAMUSCULAR ONCE
Qty: 0 | Refills: 0 | Status: COMPLETED | OUTPATIENT
Start: 2017-01-09 | End: 2017-01-09

## 2017-01-09 RX ADMIN — Medication 100 MILLIGRAM(S): at 15:13

## 2017-01-09 RX ADMIN — PIPERACILLIN AND TAZOBACTAM 25 GRAM(S): 4; .5 INJECTION, POWDER, LYOPHILIZED, FOR SOLUTION INTRAVENOUS at 01:21

## 2017-01-09 RX ADMIN — Medication 200 MILLIGRAM(S): at 05:48

## 2017-01-09 RX ADMIN — Medication 1 DROP(S): at 05:48

## 2017-01-09 RX ADMIN — Medication 1 TABLET(S): at 15:13

## 2017-01-09 RX ADMIN — PIPERACILLIN AND TAZOBACTAM 25 GRAM(S): 4; .5 INJECTION, POWDER, LYOPHILIZED, FOR SOLUTION INTRAVENOUS at 16:59

## 2017-01-09 RX ADMIN — Medication 1 DROP(S): at 15:12

## 2017-01-09 RX ADMIN — Medication 100 MILLIGRAM(S): at 21:14

## 2017-01-09 RX ADMIN — Medication 1 DROP(S): at 21:14

## 2017-01-09 RX ADMIN — SODIUM CHLORIDE 75 MILLILITER(S): 9 INJECTION, SOLUTION INTRAVENOUS at 15:36

## 2017-01-09 RX ADMIN — DEXTROSE MONOHYDRATE, SODIUM CHLORIDE, AND POTASSIUM CHLORIDE 75 MILLILITER(S): 50; .745; 4.5 INJECTION, SOLUTION INTRAVENOUS at 01:21

## 2017-01-09 RX ADMIN — PIPERACILLIN AND TAZOBACTAM 25 GRAM(S): 4; .5 INJECTION, POWDER, LYOPHILIZED, FOR SOLUTION INTRAVENOUS at 09:26

## 2017-01-09 RX ADMIN — WARFARIN SODIUM 2 MILLIGRAM(S): 2.5 TABLET ORAL at 21:14

## 2017-01-09 RX ADMIN — Medication 100 MILLIGRAM(S): at 05:47

## 2017-01-09 RX ADMIN — EZETIMIBE 10 MILLIGRAM(S): 10 TABLET ORAL at 12:26

## 2017-01-09 RX ADMIN — Medication 1 TABLET(S): at 10:18

## 2017-01-10 LAB
-  AMPICILLIN/SULBACTAM: SIGNIFICANT CHANGE UP
-  CEFAZOLIN: SIGNIFICANT CHANGE UP
-  CIPROFLOXACIN: SIGNIFICANT CHANGE UP
-  CLINDAMYCIN: SIGNIFICANT CHANGE UP
-  ERYTHROMYCIN: SIGNIFICANT CHANGE UP
-  GENTAMICIN: SIGNIFICANT CHANGE UP
-  LEVOFLOXACIN: SIGNIFICANT CHANGE UP
-  MOXIFLOXACIN(AEROBIC): SIGNIFICANT CHANGE UP
-  OXACILLIN: SIGNIFICANT CHANGE UP
-  PENICILLIN: SIGNIFICANT CHANGE UP
-  RIFAMPIN: SIGNIFICANT CHANGE UP
-  TETRACYCLINE: SIGNIFICANT CHANGE UP
-  TRIMETHOPRIM/SULFAMETHOXAZOLE: SIGNIFICANT CHANGE UP
-  VANCOMYCIN: SIGNIFICANT CHANGE UP
ANION GAP SERPL CALC-SCNC: 5 MMOL/L — SIGNIFICANT CHANGE UP (ref 5–17)
BUN SERPL-MCNC: 23 MG/DL — SIGNIFICANT CHANGE UP (ref 7–23)
CALCIUM SERPL-MCNC: 8.7 MG/DL — SIGNIFICANT CHANGE UP (ref 8.5–10.1)
CHLORIDE SERPL-SCNC: 107 MMOL/L — SIGNIFICANT CHANGE UP (ref 96–108)
CHLORIDE UR-SCNC: 41 MMOL/L — SIGNIFICANT CHANGE UP
CO2 SERPL-SCNC: 31 MMOL/L — SIGNIFICANT CHANGE UP (ref 22–31)
CREAT ?TM UR-MCNC: 30 MG/DL — SIGNIFICANT CHANGE UP
CREAT SERPL-MCNC: 1.5 MG/DL — HIGH (ref 0.5–1.3)
CULTURE RESULTS: SIGNIFICANT CHANGE UP
GLUCOSE SERPL-MCNC: 85 MG/DL — SIGNIFICANT CHANGE UP (ref 70–99)
HCT VFR BLD CALC: 43.8 % — SIGNIFICANT CHANGE UP (ref 39–50)
HGB BLD-MCNC: 14.4 G/DL — SIGNIFICANT CHANGE UP (ref 13–17)
INR BLD: 1.56 RATIO — HIGH (ref 0.88–1.16)
MCHC RBC-ENTMCNC: 31.3 PG — SIGNIFICANT CHANGE UP (ref 27–34)
MCHC RBC-ENTMCNC: 32.8 GM/DL — SIGNIFICANT CHANGE UP (ref 32–36)
MCV RBC AUTO: 95.6 FL — SIGNIFICANT CHANGE UP (ref 80–100)
METHOD TYPE: SIGNIFICANT CHANGE UP
ORGANISM # SPEC MICROSCOPIC CNT: SIGNIFICANT CHANGE UP
ORGANISM # SPEC MICROSCOPIC CNT: SIGNIFICANT CHANGE UP
PLATELET # BLD AUTO: 170 K/UL — SIGNIFICANT CHANGE UP (ref 150–400)
POTASSIUM SERPL-MCNC: 3.9 MMOL/L — SIGNIFICANT CHANGE UP (ref 3.5–5.3)
POTASSIUM SERPL-SCNC: 3.9 MMOL/L — SIGNIFICANT CHANGE UP (ref 3.5–5.3)
PROT ?TM UR-MCNC: 8 MG/DL — SIGNIFICANT CHANGE UP (ref 0–12)
PROT/CREAT UR-RTO: 0.3 RATIO — HIGH (ref 0–0.2)
PROTHROM AB SERPL-ACNC: 17.4 SEC — HIGH (ref 10–13.1)
RBC # BLD: 4.58 M/UL — SIGNIFICANT CHANGE UP (ref 4.2–5.8)
RBC # FLD: 11.7 % — SIGNIFICANT CHANGE UP (ref 10.3–14.5)
SODIUM SERPL-SCNC: 143 MMOL/L — SIGNIFICANT CHANGE UP (ref 135–145)
SODIUM UR-SCNC: 62 MMOL/L — SIGNIFICANT CHANGE UP
SPECIMEN SOURCE: SIGNIFICANT CHANGE UP
WBC # BLD: 6.8 K/UL — SIGNIFICANT CHANGE UP (ref 3.8–10.5)
WBC # FLD AUTO: 6.8 K/UL — SIGNIFICANT CHANGE UP (ref 3.8–10.5)

## 2017-01-10 PROCEDURE — 99232 SBSQ HOSP IP/OBS MODERATE 35: CPT

## 2017-01-10 RX ORDER — WARFARIN SODIUM 2.5 MG/1
2 TABLET ORAL ONCE
Qty: 0 | Refills: 0 | Status: COMPLETED | OUTPATIENT
Start: 2017-01-10 | End: 2017-01-10

## 2017-01-10 RX ADMIN — EZETIMIBE 10 MILLIGRAM(S): 10 TABLET ORAL at 11:49

## 2017-01-10 RX ADMIN — Medication 1 DROP(S): at 05:36

## 2017-01-10 RX ADMIN — WARFARIN SODIUM 2 MILLIGRAM(S): 2.5 TABLET ORAL at 21:39

## 2017-01-10 RX ADMIN — Medication 1 DROP(S): at 21:39

## 2017-01-10 RX ADMIN — Medication 1 DROP(S): at 14:55

## 2017-01-10 RX ADMIN — Medication 100 MILLIGRAM(S): at 14:55

## 2017-01-10 RX ADMIN — Medication 100 MILLIGRAM(S): at 21:39

## 2017-01-10 RX ADMIN — PIPERACILLIN AND TAZOBACTAM 25 GRAM(S): 4; .5 INJECTION, POWDER, LYOPHILIZED, FOR SOLUTION INTRAVENOUS at 00:54

## 2017-01-10 RX ADMIN — Medication 100 MILLIGRAM(S): at 05:36

## 2017-01-10 RX ADMIN — Medication 200 MILLIGRAM(S): at 05:36

## 2017-01-10 RX ADMIN — Medication 1 TABLET(S): at 17:03

## 2017-01-10 RX ADMIN — PIPERACILLIN AND TAZOBACTAM 25 GRAM(S): 4; .5 INJECTION, POWDER, LYOPHILIZED, FOR SOLUTION INTRAVENOUS at 08:51

## 2017-01-11 LAB
ANION GAP SERPL CALC-SCNC: 9 MMOL/L — SIGNIFICANT CHANGE UP (ref 5–17)
BUN SERPL-MCNC: 24 MG/DL — HIGH (ref 7–23)
CALCIUM SERPL-MCNC: 8.9 MG/DL — SIGNIFICANT CHANGE UP (ref 8.5–10.1)
CHLORIDE SERPL-SCNC: 110 MMOL/L — HIGH (ref 96–108)
CO2 SERPL-SCNC: 24 MMOL/L — SIGNIFICANT CHANGE UP (ref 22–31)
CREAT SERPL-MCNC: 1.2 MG/DL — SIGNIFICANT CHANGE UP (ref 0.5–1.3)
EOSINOPHIL NFR URNS MANUAL: NEGATIVE — SIGNIFICANT CHANGE UP
GLUCOSE SERPL-MCNC: 90 MG/DL — SIGNIFICANT CHANGE UP (ref 70–99)
HCT VFR BLD CALC: 39.9 % — SIGNIFICANT CHANGE UP (ref 39–50)
HGB BLD-MCNC: 13.5 G/DL — SIGNIFICANT CHANGE UP (ref 13–17)
INR BLD: 1.45 RATIO — HIGH (ref 0.88–1.16)
MCHC RBC-ENTMCNC: 30.9 PG — SIGNIFICANT CHANGE UP (ref 27–34)
MCHC RBC-ENTMCNC: 33.8 GM/DL — SIGNIFICANT CHANGE UP (ref 32–36)
MCV RBC AUTO: 91.3 FL — SIGNIFICANT CHANGE UP (ref 80–100)
PLATELET # BLD AUTO: 185 K/UL — SIGNIFICANT CHANGE UP (ref 150–400)
POTASSIUM SERPL-MCNC: 3.9 MMOL/L — SIGNIFICANT CHANGE UP (ref 3.5–5.3)
POTASSIUM SERPL-SCNC: 3.9 MMOL/L — SIGNIFICANT CHANGE UP (ref 3.5–5.3)
PROTHROM AB SERPL-ACNC: 16.1 SEC — HIGH (ref 10–13.1)
RBC # BLD: 4.37 M/UL — SIGNIFICANT CHANGE UP (ref 4.2–5.8)
RBC # FLD: 11.4 % — SIGNIFICANT CHANGE UP (ref 10.3–14.5)
SODIUM SERPL-SCNC: 143 MMOL/L — SIGNIFICANT CHANGE UP (ref 135–145)
WBC # BLD: 8.5 K/UL — SIGNIFICANT CHANGE UP (ref 3.8–10.5)
WBC # FLD AUTO: 8.5 K/UL — SIGNIFICANT CHANGE UP (ref 3.8–10.5)

## 2017-01-11 PROCEDURE — 99232 SBSQ HOSP IP/OBS MODERATE 35: CPT

## 2017-01-11 RX ORDER — ENOXAPARIN SODIUM 100 MG/ML
65 INJECTION SUBCUTANEOUS
Qty: 0 | Refills: 0 | COMMUNITY
Start: 2017-01-11

## 2017-01-11 RX ORDER — WARFARIN SODIUM 2.5 MG/1
1 TABLET ORAL
Qty: 0 | Refills: 0 | COMMUNITY
Start: 2017-01-11

## 2017-01-11 RX ORDER — TOBRAMYCIN 0.3 %
1 DROPS OPHTHALMIC (EYE)
Qty: 0 | Refills: 0 | COMMUNITY
Start: 2017-01-11

## 2017-01-11 RX ORDER — DOCUSATE SODIUM 100 MG
1 CAPSULE ORAL
Qty: 0 | Refills: 0 | COMMUNITY
Start: 2017-01-11

## 2017-01-11 RX ORDER — ENOXAPARIN SODIUM 100 MG/ML
65 INJECTION SUBCUTANEOUS
Qty: 0 | Refills: 0 | Status: DISCONTINUED | OUTPATIENT
Start: 2017-01-11 | End: 2017-01-12

## 2017-01-11 RX ORDER — WARFARIN SODIUM 2.5 MG/1
4 TABLET ORAL ONCE
Qty: 0 | Refills: 0 | Status: COMPLETED | OUTPATIENT
Start: 2017-01-11 | End: 2017-01-11

## 2017-01-11 RX ORDER — ACETAMINOPHEN 500 MG
2 TABLET ORAL
Qty: 0 | Refills: 0 | COMMUNITY
Start: 2017-01-11

## 2017-01-11 RX ADMIN — ENOXAPARIN SODIUM 65 MILLIGRAM(S): 100 INJECTION SUBCUTANEOUS at 18:59

## 2017-01-11 RX ADMIN — Medication 100 MILLIGRAM(S): at 21:17

## 2017-01-11 RX ADMIN — Medication 1 TABLET(S): at 18:59

## 2017-01-11 RX ADMIN — Medication 1 DROP(S): at 21:18

## 2017-01-11 RX ADMIN — Medication 100 MILLIGRAM(S): at 05:37

## 2017-01-11 RX ADMIN — Medication 100 MILLIGRAM(S): at 13:25

## 2017-01-11 RX ADMIN — Medication 1 TABLET(S): at 05:37

## 2017-01-11 RX ADMIN — Medication 1 DROP(S): at 13:25

## 2017-01-11 RX ADMIN — WARFARIN SODIUM 4 MILLIGRAM(S): 2.5 TABLET ORAL at 22:36

## 2017-01-11 RX ADMIN — EZETIMIBE 10 MILLIGRAM(S): 10 TABLET ORAL at 11:53

## 2017-01-11 RX ADMIN — Medication 1 DROP(S): at 05:37

## 2017-01-11 NOTE — DISCHARGE NOTE ADULT - MEDICATION SUMMARY - MEDICATIONS TO TAKE
I will START or STAY ON the medications listed below when I get home from the hospital:    acetaminophen 325 mg oral tablet  -- 2 tab(s) by mouth every 6 hours, As needed, For Temp greater than 38 C (100.4 F)  -- Indication: For fever    enoxaparin  -- 65 milligram(s) subcutaneous 2 times a day. Stop once INR therapeutic  -- Indication: For Atrial fibrillation    Coumadin 3 mg oral tablet  -- 1 tab(s) by mouth once a day.  -- Indication: For Atrial fibrillation    Zetia 10 mg oral tablet  -- 2 tab(s) by mouth once a day  -- Indication: For Hyperliipidemia    Lotrel 5 mg-20 mg oral capsule  -- 1 cap(s) by mouth once a day  -- Indication: For Hypertension    Toprol- mg oral tablet, extended release  -- 1 tab(s) by mouth once a day  -- Indication: For Hypertension    docusate sodium 100 mg oral capsule  -- 1 cap(s) by mouth 3 times a day  -- Indication: For Constipation    tobramycin 0.3% ophthalmic solution  -- 1 drop(s) to each affected eye 3 times a day  -- Indication: For Home medications    amoxicillin-clavulanate 875 mg-125 mg oral tablet  -- 1 tab(s) by mouth 2 times a day  -- Indication: For Pneumonia, unspecified organism I will START or STAY ON the medications listed below when I get home from the hospital:    acetaminophen 325 mg oral tablet  -- 2 tab(s) by mouth every 6 hours, As needed, For Temp greater than 38 C (100.4 F)  -- Indication: For fever    Coumadin 3 mg oral tablet  -- 1 tab(s) by mouth once a day.  -- Indication: For Atrial fibrillation    enoxaparin  -- 65 milligram(s) subcutaneous 2 times a day. Stop once INR therapeutic with goal of 2-3.  -- Indication: For Atrial fibrillation    Zetia 10 mg oral tablet  -- 2 tab(s) by mouth once a day  -- Indication: For Hyperliipidemia    Lotrel 5 mg-20 mg oral capsule  -- 1 cap(s) by mouth once a day  -- Indication: For Hypertension    Toprol- mg oral tablet, extended release  -- 1 tab(s) by mouth once a day  -- Indication: For Hypertension    docusate sodium 100 mg oral capsule  -- 1 cap(s) by mouth 3 times a day, As Needed - for congestion, for constipation   -- Indication: For Constipation    tobramycin 0.3% ophthalmic solution  -- 1 drop(s) to each affected eye 3 times a day  -- Indication: For Home medications    amoxicillin-clavulanate 875 mg-125 mg oral tablet  -- 1 tab(s) by mouth 2 times a day  -- Indication: For Pneumonia, unspecified organism

## 2017-01-11 NOTE — DISCHARGE NOTE ADULT - NS AS DC FOLLOWUP STROKE INST
Coumadin/Warfarin Coumadin/Warfarin/Influenza vaccination (VIS Pub Date: August 19, 2014)/Smoking Cessation

## 2017-01-11 NOTE — DISCHARGE NOTE ADULT - CARE PLAN
Principal Discharge DX:	Pneumonia, unspecified organism  Goal:	Continue on Augmentin  Instructions for follow-up, activity and diet:	Acute.   Continue Augmentin 875mg BID for 4 more days (with total of 5 days).  Follow up with PCP, Dr Krueger, within 3 days.  Secondary Diagnosis:	Bacteremia  Instructions for follow-up, activity and diet:	Blood clx staph gram postive. Most likely contamination.  Continue on Augmentin 875mg for 4 more days.  Secondary Diagnosis:	Hypernatremia  Instructions for follow-up, activity and diet:	Acute, now resolved. Na+ 143.  Follow up with nephrology  Secondary Diagnosis:	Dysphagia  Instructions for follow-up, activity and diet:	Continue on dysphagia 1 purred nectar consistency fluid diet.  Secondary Diagnosis:	Atrial fibrillation  Instructions for follow-up, activity and diet:	Chronic.  INR subtherapeutic at 1.45 today.   Continue Lovenox 65mg BID until INR therapeutic with goal of 2-3.  Give 4mg coumadin today.    Continue to check INR to determine dosing of coumadin every.  INR goal 2-3.  Pt usual dose is Coumadin 3mg.  Secondary Diagnosis:	Cerebrovascular accident  Instructions for follow-up, activity and diet:	History of CVA in 12/2016 with residual aphasia, body weakness, and urinary retention with chronic gore.   Continue on dysphagia diet.  Continue gore for urinary retention.  Continue zetia.  Secondary Diagnosis:	Hypertension  Instructions for follow-up, activity and diet:	Chronic.  Continue lotrel with hold parameter of SBP <110 and metoprolol with hold paramenters of SBP <110 and HR  <60. Principal Discharge DX:	Pneumonia, unspecified organism  Goal:	Continue on Augmentin  Instructions for follow-up, activity and diet:	Acute.   Continue Augmentin 875mg BID for 4 more days (with total of 5 days).  Continue aspiration precautions.  Follow up with PCP, Dr Krueger, within 3 days.  Secondary Diagnosis:	Bacteremia  Instructions for follow-up, activity and diet:	Blood clx staph gram postive. Most likely contamination.  Continue on Augmentin 875mg for 4 more days.  Secondary Diagnosis:	Hypernatremia  Instructions for follow-up, activity and diet:	Acute, now resolved. Na+ 143.  Secondary Diagnosis:	Dysphagia  Instructions for follow-up, activity and diet:	Continue on dysphagia 1 purred nectar consistency fluid diet.  Secondary Diagnosis:	Atrial fibrillation  Instructions for follow-up, activity and diet:	Chronic.  INR subtherapeutic at 1.45 today.   Continue Lovenox 65mg BID until INR therapeutic with goal of 2-3.  Give 4mg coumadin today.    Continue to check INR to determine dosing of coumadin every.  INR goal 2-3.  Pt usual dose is Coumadin 3mg.  Secondary Diagnosis:	Cerebrovascular accident  Instructions for follow-up, activity and diet:	History of CVA in 12/2016 with residual aphasia, body weakness, and urinary retention with chronic gore.   Continue on dysphagia diet.  Continue gore for urinary retention.  Continue zetia.  Secondary Diagnosis:	Hypertension  Instructions for follow-up, activity and diet:	Chronic.  Continue lotrel with hold parameter of SBP <110 and metoprolol with hold paramenters of SBP <110 and HR  <60. Principal Discharge DX:	Pneumonia, unspecified organism  Goal:	Continue on Augmentin  Instructions for follow-up, activity and diet:	Acute.   Continue Augmentin 875mg BID for 4 more days (with total of 5 days).  Continue aspiration precautions.  Follow up with PCP, Dr Krueger, within 3 days.  Secondary Diagnosis:	Bacteremia  Instructions for follow-up, activity and diet:	Blood clx staph gram postive. Most likely contamination.  Continue on Augmentin 875mg for 4 more days.  Secondary Diagnosis:	Hypernatremia  Instructions for follow-up, activity and diet:	Acute, now resolved. Na+ 143.  Secondary Diagnosis:	Dysphagia  Instructions for follow-up, activity and diet:	Continue on dysphagia 1 purred nectar consistency fluid diet.  Secondary Diagnosis:	Atrial fibrillation  Instructions for follow-up, activity and diet:	Chronic.  INR subtherapeutic at 1.45 today.   Continue Lovenox 65mg BID until INR therapeutic with goal of 2-3.  Given 4mg coumadin at hospital today.  Continue to check INR to determine dosing of coumadin every.  INR goal 2-3.  Pt usual dose is Coumadin 3mg.  Secondary Diagnosis:	Cerebrovascular accident  Instructions for follow-up, activity and diet:	History of CVA in 12/2016 with residual aphasia, body weakness, and urinary retention with chronic gore.   Continue on dysphagia diet.  Continue gore for urinary retention.  Continue zetia.  Secondary Diagnosis:	Hypertension  Instructions for follow-up, activity and diet:	Chronic.  Continue lotrel with hold parameter of SBP <110 and metoprolol with hold paramenters of SBP <110 and HR  <60. Principal Discharge DX:	Pneumonia, unspecified organism  Goal:	Continue on Augmentin  Instructions for follow-up, activity and diet:	Acute.   Continue Augmentin 875mg BID for 3 more days (with total of 5 days).  Continue aspiration precautions.  Follow up with PCP, Dr Krueger, within 3 days.  Secondary Diagnosis:	Bacteremia  Instructions for follow-up, activity and diet:	Blood clx staph gram positive. Most likely contamination.  Continue on Augmentin 875mg for 4 more days.  Secondary Diagnosis:	Hypernatremia  Instructions for follow-up, activity and diet:	Acute, now resolved. Na+ 143.  Secondary Diagnosis:	Dysphagia  Instructions for follow-up, activity and diet:	Continue on dysphagia 1 purred nectar consistency fluid diet.  Secondary Diagnosis:	Atrial fibrillation  Instructions for follow-up, activity and diet:	Chronic.  INR subtherapeutic at 1.45 today.   Continue Lovenox 65mg BID until INR therapeutic with goal of 2-3.  Given 4mg coumadin at hospital today.  Continue to check INR to determine dosing of coumadin every.  INR goal 2-3.  Pt usual dose is Coumadin 3mg.  Secondary Diagnosis:	Cerebrovascular accident  Instructions for follow-up, activity and diet:	History of CVA in 12/2016 with residual aphasia, body weakness, and urinary retention with chronic gore.   Continue on dysphagia diet.  Continue gore for urinary retention.  Continue zetia.  Secondary Diagnosis:	Hypertension  Instructions for follow-up, activity and diet:	Chronic.  Continue lotrel with hold parameter of SBP <110 and metoprolol with hold paramenters of SBP <110 and HR  <60. Principal Discharge DX:	Pneumonia, unspecified organism  Goal:	Continue on Augmentin  Instructions for follow-up, activity and diet:	Acute.   Continue Augmentin 875mg BID for 3 more days (with total of 5 days).  Continue aspiration precautions.  Follow up with PCP, Dr Krueger, within 3 days.  Secondary Diagnosis:	Bacteremia  Instructions for follow-up, activity and diet:	Blood clx staph gram positive. Most likely contamination.  Continue on Augmentin 875mg for 4 more days.  Secondary Diagnosis:	Hypernatremia  Instructions for follow-up, activity and diet:	Acute, now resolved. Na+ 143.  Secondary Diagnosis:	Dysphagia  Instructions for follow-up, activity and diet:	Continue on dysphagia 1 purred nectar consistency fluid diet.  Secondary Diagnosis:	Atrial fibrillation  Instructions for follow-up, activity and diet:	Chronic.  INR subtherapeutic.  Continue Lovenox 65mg BID until INR therapeutic with goal of 2-3.  Given 4mg coumadin at hospital today.  Continue to check INR to determine dosing of coumadin every.  INR goal 2-3.  Pt usual dose is Coumadin 3mg.  Secondary Diagnosis:	Cerebrovascular accident  Instructions for follow-up, activity and diet:	History of CVA in 12/2016 with residual aphasia, body weakness, and urinary retention with chronic gore.   Continue on dysphagia diet.  Continue gore for urinary retention.  Continue zetia.  Secondary Diagnosis:	Hypertension  Instructions for follow-up, activity and diet:	Chronic.  Continue lotrel with hold parameter of SBP <110 and metoprolol with hold paramenters of SBP <110 and HR  <60. Principal Discharge DX:	Pneumonia, unspecified organism  Goal:	Continue on Augmentin  Instructions for follow-up, activity and diet:	Acute.   Continue Augmentin 875mg BID for 3 more days (with total of 5 days).  Continue aspiration precautions.  Follow up with PCP, Dr Krueger, within 3 days.  Secondary Diagnosis:	Bacteremia  Instructions for follow-up, activity and diet:	Blood clx staph gram positive. Most likely contamination.  Continue on Augmentin 875mg for 4 more days.  Secondary Diagnosis:	Hypernatremia  Instructions for follow-up, activity and diet:	Acute, now resolved. Na+ 143.  Secondary Diagnosis:	Dysphagia  Instructions for follow-up, activity and diet:	Continue on dysphagia 1 purred nectar consistency fluid diet.  Secondary Diagnosis:	Atrial fibrillation  Instructions for follow-up, activity and diet:	Chronic.  INR subtherapeutic at 1.39.  Continue Lovenox 65mg BID until INR therapeutic with goal of 2-3.  Pt given 5mg coumadin at hospital today.  Continue to check INR to determine dosing of coumadin every.  INR goal 2-3.  Pt usual dose is Coumadin 3mg.  Secondary Diagnosis:	Cerebrovascular accident  Instructions for follow-up, activity and diet:	History of CVA in 12/2016 with residual aphasia, body weakness, and urinary retention with chronic gore.   Continue on dysphagia diet.  Continue gore for urinary retention.  Continue zetia.  Secondary Diagnosis:	Hypertension  Instructions for follow-up, activity and diet:	Chronic.  Continue lotrel with hold parameter of SBP <110 and metoprolol with hold paramenters of SBP <110 and HR  <60.

## 2017-01-11 NOTE — DISCHARGE NOTE ADULT - NS AS DC VTE INSTRUCTION
Coumadin/Warfarin - Potential for adverse drug reactions and interactions/Coumadin/Warfarin - Follow-up monitoring.../Coumadin/Warfarin - Compliance.../Coumadin/Warfarin - Dietary Advice...

## 2017-01-11 NOTE — DISCHARGE NOTE ADULT - PATIENT PORTAL LINK FT
“You can access the [employer] Patient Portal, offered by [facility], by registering with the following website: [email or web address]”

## 2017-01-11 NOTE — DISCHARGE NOTE ADULT - PLAN OF CARE
Continue on Augmentin Acute.   Continue Augmentin 875mg BID for 4 more days (with total of 5 days).  Follow up with PCP, Dr Krueger, within 3 days. Blood clx staph gram postive. Most likely contamination.  Continue on Augmentin 875mg for 4 more days. Acute, now resolved. Na+ 143.  Follow up with nephrology Continue on dysphagia 1 purred nectar consistency fluid diet. Chronic.  INR subtherapeutic at 1.45 today.   Continue Lovenox 65mg BID until INR therapeutic with goal of 2-3.  Give 4mg coumadin today.    Continue to check INR to determine dosing of coumadin every.  INR goal 2-3.  Pt usual dose is Coumadin 3mg. 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   Continue on dysphagia diet.  Continue gore for urinary retention.  Continue zetia. Chronic.  Continue lotrel with hold parameter of SBP <110 and metoprolol with hold paramenters of SBP <110 and HR  <60. Acute.   Continue Augmentin 875mg BID for 4 more days (with total of 5 days).  Continue aspiration precautions.  Follow up with PCP, Dr Krueger, within 3 days. Acute, now resolved. Na+ 143. Chronic.  INR subtherapeutic at 1.45 today.   Continue Lovenox 65mg BID until INR therapeutic with goal of 2-3.  Given 4mg coumadin at hospital today.  Continue to check INR to determine dosing of coumadin every.  INR goal 2-3.  Pt usual dose is Coumadin 3mg. Blood clx staph gram positive. Most likely contamination.  Continue on Augmentin 875mg for 4 more days. Acute.   Continue Augmentin 875mg BID for 3 more days (with total of 5 days).  Continue aspiration precautions.  Follow up with PCP, Dr Krueger, within 3 days. Chronic.  INR subtherapeutic.  Continue Lovenox 65mg BID until INR therapeutic with goal of 2-3.  Given 4mg coumadin at hospital today.  Continue to check INR to determine dosing of coumadin every.  INR goal 2-3.  Pt usual dose is Coumadin 3mg. Chronic.  INR subtherapeutic at 1.39.  Continue Lovenox 65mg BID until INR therapeutic with goal of 2-3.  Pt given 5mg coumadin at hospital today.  Continue to check INR to determine dosing of coumadin every.  INR goal 2-3.  Pt usual dose is Coumadin 3mg.

## 2017-01-11 NOTE — DISCHARGE NOTE ADULT - HOSPITAL COURSE
76 y.o M w/PMH of afib on coumadin, HTN, recent CVA with residual aphsia and body weakness, urinery retention with chronic gore, dementia was sent with EMS by [facility] 2/2 unresponsiveness and lethargy. Pt had CVA at 12/2016 and was admitted to [facility]. D/C at 12/20/2016 and was sent to [facility] for post stoke rehab. Pt had aphasia and body weakness s/p the stoke. Per Pt 's wife and ED nurse, pt had baseline of answering yes or no questions in [facility]. Pt was not tolerate the food in [facility] and had severe constipation and was giving multiple enemas. Pt  was not able to walk s/p the CVA. Today, at 3:55pm, pt was found to be unresponsiveness to pain stimuli and sternal rub and lethargy in [facility] and pt was sent to [facility] ED by EMS. Upon examination, Pt 's wife had already left, and pt was not able to provide history. Attempt to call pt's wife, no one picking up. The history was provided by Charts and ED nurse who speeak with the wife before     In ED, pt had fever at 101, with other vital signs wnl. Leukocytosis at 11.6. Hypernatremia at 154 and UA + for moderate leukocyte Esterase and many bacteria. CT head negative for acute infract. CXR and chest CT area consistent with possible Lt lower lob PNA. Pt was unresponsiveness upon arrival later improved to responsiveness to pain and verbal stimuli per ED nurser. Dr. Nance (Neuro)saw PT at ED and does not think pt 's symptom was neurological related. Pt received 1xzosyn, 1x vanco, 1x tylenol, 2x bolus ns.     As per speech and swallow, keep pt NPO at this time and can crush medications into apple sauce. Will reevaluate in 1-2 days to see if pt is more alert.  As pt can take swollow coumadin, pt given full dose lovenox for A.fib until reeval from speech and swallow.  Pt is hypernatremic at 154 and started on D5 Half normal saline. Hyperkalemic at 3.2 and fluids changed to D5W with potassium.  Pt more alert after receiving fluids and put on a dysphagia I diet.  Pt at baseline and answering yes/no questions. Pt able to resume Coumadin.  Na+ improved and fluids changed to D5 half normal saline.  Pt continued on IV abx for PNA likely aspiration PNA v HAP.  Urine clx negative. Pt does not have UTI.  Blood clx + for gram positive cocci in clusters.  Pt continued on zosyn.  Blood clx most likely contaminated.  Discontinued IV abx and changed to augmentin 875mg for 5 days.  Pt has bacteria conjunctiva in L eye and put on tobramycin eye drops.      Pt seen and examined at bedside.  Pt at baseline with yes/no answers. 76 y.o M w/PMH of afib on coumadin, HTN, recent CVA with residual aphsia and body weakness, urinery retention with chronic gore, dementia was sent with EMS by [facility] 2/2 unresponsiveness and lethargy. Pt had CVA at 12/2016 and was admitted to [facility]. D/C at 12/20/2016 and was sent to [facility] for post stoke rehab. Pt had aphasia and body weakness s/p the stoke. Per Pt 's wife and ED nurse, pt had baseline of answering yes or no questions in [facility]. Pt was not tolerate the food in [facility] and had severe constipation and was giving multiple enemas. Pt  was not able to walk s/p the CVA. Today, at 3:55pm, pt was found to be unresponsiveness to pain stimuli and sternal rub and lethargy in [facility] and pt was sent to [facility] ED by EMS. Upon examination, Pt 's wife had already left, and pt was not able to provide history. Attempt to call pt's wife, no one picking up. The history was provided by Charts and ED nurse who speeak with the wife before     In ED, pt had fever at 101, with other vital signs wnl. Leukocytosis at 11.6. Hypernatremia at 154 and UA + for moderate leukocyte Esterase and many bacteria. CT head negative for acute infract. CXR and chest CT area consistent with possible Lt lower lob PNA. Pt was unresponsiveness upon arrival later improved to responsiveness to pain and verbal stimuli per ED nurser. Dr. Nance (Neuro)saw PT at ED and does not think pt 's symptom was neurological related. Pt received 1xzosyn, 1x vanco, 1x tylenol, 2x bolus ns.     As per speech and swallow, keep pt NPO at this time and can crush medications into apple sauce. Will reevaluate in 1-2 days to see if pt is more alert.  As pt can take swollow coumadin, pt given full dose lovenox for A.fib until reeval from speech and swallow.  Pt is hypernatremic at 154 and started on D5 Half normal saline. Hyperkalemic at 3.2 and fluids changed to D5W with potassium.  Pt more alert after receiving fluids and put on a dysphagia I diet.  Pt at baseline and answering yes/no questions. Pt able to resume Coumadin.  Na+ improved and fluids changed to D5 half normal saline.  Pt continued on IV abx for PNA likely aspiration PNA v HAP.  Urine clx negative. Pt does not have UTI.  Blood clx + for gram positive cocci in clusters.  Pt continued on zosyn.  Blood clx most likely contaminated.  Discontinued IV abx and changed to augmentin 875mg for 5 days.  Pt has bacteria conjunctiva in L eye and put on tobramycin eye drops.      Pt seen and examined at bedside.  Pt at baseline with yes/no answers.  No complaints. Wife at baseline and states pt is more alert today.  PE Vitals: 98.3F, HR 61, 100/75, RR17, O2 97% on RA  Gen: NAD, baseline yes/no answers  Cardio: +S1/S2  Resp: CTA B/L   Abd: mild distension, NT, +BS  Ext: Trance edema L>R    Pt is medically optimized for discharge to [facility]. 76 y.o M w/PMH of afib on coumadin, HTN, recent CVA with residual aphsia and body weakness, urinery retention with chronic gore, dementia was sent with EMS by [facility] 2/2 unresponsiveness and lethargy. Pt had CVA at 12/2016 and was admitted to [facility]. D/C at 12/20/2016 and was sent to [facility] for post stoke rehab. Pt had aphasia and body weakness s/p the stoke. Per Pt 's wife and ED nurse, pt had baseline of answering yes or no questions in [facility]. Pt was not tolerate the food in [facility] and had severe constipation and was giving multiple enemas. Pt  was not able to walk s/p the CVA. Today, at 3:55pm, pt was found to be unresponsiveness to pain stimuli and sternal rub and lethargy in [facility] and pt was sent to [facility] ED by EMS. Upon examination, Pt 's wife had already left, and pt was not able to provide history. Attempt to call pt's wife, no one picking up. The history was provided by Charts and ED nurse who speeak with the wife before     In ED, pt had fever at 101, with other vital signs wnl. Leukocytosis at 11.6. Hypernatremia at 154 and UA + for moderate leukocyte Esterase and many bacteria. CT head negative for acute infract. CXR and chest CT area consistent with possible Lt lower lob PNA. Pt was unresponsiveness upon arrival later improved to responsiveness to pain and verbal stimuli per ED nurser. Dr. Nance (Neuro)saw PT at ED and does not think pt 's symptom was neurological related. Pt received 1xzosyn, 1x vanco, 1x tylenol, 2x bolus ns.     As per speech and swallow, keep pt NPO at this time and can crush medications into apple sauce. Will reevaluate in 1-2 days to see if pt is more alert.  As pt can take swollow coumadin, pt given full dose lovenox for A.fib until reeval from speech and swallow.  Pt is hypernatremic at 154 and started on D5 Half normal saline. Hyperkalemic at 3.2 and fluids changed to D5W with potassium.  Pt more alert after receiving fluids and put on a dysphagia I diet.  Pt at baseline and answering yes/no questions. Pt able to resume Coumadin.  Na+ improved and fluids changed to D5 half normal saline.  Pt continued on IV abx for PNA likely aspiration PNA v HAP.  Urine clx negative. Pt does not have UTI.  Blood clx + for gram positive cocci in clusters.  Pt continued on zosyn.  Blood clx most likely contaminated.  Discontinued IV abx and changed to augmentin 875mg for 5 days.  Pt has bacteria conjunctiva in L eye and put on tobramycin eye drops.  INR subtheraputic.  Pt started on full dose AC lovenox BID until reach INR goal 2-3.  Pt given coumadin 5mg today before discharge.    Pt seen and examined at bedside.  Pt at baseline with yes/no answers.  No complaints. Wife at baseline and states pt is more alert today.  PE Vitals: 98.1, HR 64, 102/71, R 17, 95%O2 on RA  Gen: NAD, baseline yes/no answers  Cardio: +S1/S2  Resp: CTA B/L   Abd: soft, NT, +BS  Ext: Trance edema    Pt is medically optimized for discharge to [facility].

## 2017-01-11 NOTE — DISCHARGE NOTE ADULT - ADDITIONAL INSTRUCTIONS
Continue Augmentin 875mg BID for 4 more days (with total of 5 days) for PNA. Continue aspiration precautions.  INR subtherapeutic at 1.45 today. Continue Lovenox 65mg BID until INR therapeutic with goal of 2-3. Give 4mg coumadin today.  Continue to check INR to determine dosing of coumadin every.  INR goal 2-3.  Pt usual dose is Coumadin 3mg. Follow up with PCP, Dr Krueger, within 2 days.  Continue tobramycin 0.3% TID in L eye for bacterial conjunctivitis until redness in eye has improved.   Continue colace 100mg TID with meals PRN for constipation. Continue Augmentin 875mg BID for 4 more days (with total of 5 days) for PNA. Continue aspiration precautions.  INR subtherapeutic at 1.45 today. Continue Lovenox 65mg BID until INR therapeutic with goal of 2-3. Give 4mg coumadin today.  Continue to check INR to determine dosing of coumadin every.  INR goal 2-3.  Pt usual dose is Coumadin 3mg. Follow up with PCP, Dr Krueger, within 2 days.  Continue tobramycin 0.3% TID in L eye for bacterial conjunctivitis until redness in eye has improved.   Continue colace 100mg TID with meals PRN for constipation. Monitor BMs.    If pt experience any CP, SOB, worsening of any symptoms, come back to ER. Continue Augmentin 875mg BID for 3 more days (with total of 5 days) for PNA. Continue aspiration precautions.  INR subtherapeutic at 1.45 today. Continue Lovenox 65mg BID until INR therapeutic with goal of 2-3. Give 4mg coumadin today.  Continue to check INR to determine dosing of coumadin every.  INR goal 2-3.  Pt usual dose is Coumadin 3mg. Follow up with PCP, Dr Krueger, within 2 days.  Continue tobramycin 0.3% TID in L eye for bacterial conjunctivitis until redness in eye has improved.   Continue colace 100mg TID with meals PRN for constipation. Monitor BMs.    If pt experience any CP, SOB, worsening of any symptoms, come back to ER. Continue Augmentin 875mg BID for 3 more days (with total of 5 days) for PNA. Continue aspiration precautions.  INR subtherapeutic at 1.39 today. Continue Lovenox 65mg BID until INR therapeutic with goal of 2-3. Pt was given 5mg coumadin at hospital today.  Continue to check INR to determine dosing of coumadin every.  INR goal 2-3.  Pt usual dose is Coumadin 3mg. Follow up with PCP, Dr Krueger, within 2 days.  Continue tobramycin 0.3% TID in L eye for bacterial conjunctivitis until redness in eye has improved.   Continue colace 100mg TID with meals PRN for constipation. Monitor BMs.    If pt experience any CP, SOB, worsening of any symptoms, come back to ER.

## 2017-01-12 VITALS
HEART RATE: 64 BPM | TEMPERATURE: 98 F | OXYGEN SATURATION: 95 % | SYSTOLIC BLOOD PRESSURE: 102 MMHG | DIASTOLIC BLOOD PRESSURE: 71 MMHG | RESPIRATION RATE: 17 BRPM

## 2017-01-12 LAB
INR BLD: 1.39 RATIO — HIGH (ref 0.88–1.16)
PROTHROM AB SERPL-ACNC: 15.5 SEC — HIGH (ref 10–13.1)

## 2017-01-12 PROCEDURE — 80048 BASIC METABOLIC PNL TOTAL CA: CPT

## 2017-01-12 PROCEDURE — 97530 THERAPEUTIC ACTIVITIES: CPT

## 2017-01-12 PROCEDURE — 97116 GAIT TRAINING THERAPY: CPT

## 2017-01-12 PROCEDURE — 99285 EMERGENCY DEPT VISIT HI MDM: CPT | Mod: 25

## 2017-01-12 PROCEDURE — 80053 COMPREHEN METABOLIC PANEL: CPT

## 2017-01-12 PROCEDURE — 82553 CREATINE MB FRACTION: CPT

## 2017-01-12 PROCEDURE — 96372 THER/PROPH/DIAG INJ SC/IM: CPT | Mod: XU

## 2017-01-12 PROCEDURE — 80202 ASSAY OF VANCOMYCIN: CPT

## 2017-01-12 PROCEDURE — 83735 ASSAY OF MAGNESIUM: CPT

## 2017-01-12 PROCEDURE — 84100 ASSAY OF PHOSPHORUS: CPT

## 2017-01-12 PROCEDURE — 96367 TX/PROPH/DG ADDL SEQ IV INF: CPT

## 2017-01-12 PROCEDURE — 96365 THER/PROPH/DIAG IV INF INIT: CPT

## 2017-01-12 PROCEDURE — 84145 PROCALCITONIN (PCT): CPT

## 2017-01-12 PROCEDURE — 84484 ASSAY OF TROPONIN QUANT: CPT

## 2017-01-12 PROCEDURE — 71250 CT THORAX DX C-: CPT

## 2017-01-12 PROCEDURE — 84156 ASSAY OF PROTEIN URINE: CPT

## 2017-01-12 PROCEDURE — 87486 CHLMYD PNEUM DNA AMP PROBE: CPT

## 2017-01-12 PROCEDURE — 85730 THROMBOPLASTIN TIME PARTIAL: CPT

## 2017-01-12 PROCEDURE — 87798 DETECT AGENT NOS DNA AMP: CPT

## 2017-01-12 PROCEDURE — 87205 SMEAR GRAM STAIN: CPT

## 2017-01-12 PROCEDURE — 36600 WITHDRAWAL OF ARTERIAL BLOOD: CPT

## 2017-01-12 PROCEDURE — 87633 RESP VIRUS 12-25 TARGETS: CPT

## 2017-01-12 PROCEDURE — 87186 SC STD MICRODIL/AGAR DIL: CPT

## 2017-01-12 PROCEDURE — 93005 ELECTROCARDIOGRAM TRACING: CPT

## 2017-01-12 PROCEDURE — 81001 URINALYSIS AUTO W/SCOPE: CPT

## 2017-01-12 PROCEDURE — 85610 PROTHROMBIN TIME: CPT

## 2017-01-12 PROCEDURE — 99239 HOSP IP/OBS DSCHRG MGMT >30: CPT

## 2017-01-12 PROCEDURE — 96366 THER/PROPH/DIAG IV INF ADDON: CPT

## 2017-01-12 PROCEDURE — 85027 COMPLETE CBC AUTOMATED: CPT

## 2017-01-12 PROCEDURE — 87086 URINE CULTURE/COLONY COUNT: CPT

## 2017-01-12 PROCEDURE — 82436 ASSAY OF URINE CHLORIDE: CPT

## 2017-01-12 PROCEDURE — 97163 PT EVAL HIGH COMPLEX 45 MIN: CPT

## 2017-01-12 PROCEDURE — 83605 ASSAY OF LACTIC ACID: CPT

## 2017-01-12 PROCEDURE — 70450 CT HEAD/BRAIN W/O DYE: CPT

## 2017-01-12 PROCEDURE — 87581 M.PNEUMON DNA AMP PROBE: CPT

## 2017-01-12 PROCEDURE — 82550 ASSAY OF CK (CPK): CPT

## 2017-01-12 PROCEDURE — 71045 X-RAY EXAM CHEST 1 VIEW: CPT

## 2017-01-12 PROCEDURE — 84300 ASSAY OF URINE SODIUM: CPT

## 2017-01-12 PROCEDURE — 87040 BLOOD CULTURE FOR BACTERIA: CPT

## 2017-01-12 PROCEDURE — 82803 BLOOD GASES ANY COMBINATION: CPT

## 2017-01-12 PROCEDURE — 97110 THERAPEUTIC EXERCISES: CPT

## 2017-01-12 RX ORDER — WARFARIN SODIUM 2.5 MG/1
5 TABLET ORAL ONCE
Qty: 0 | Refills: 0 | Status: COMPLETED | OUTPATIENT
Start: 2017-01-12 | End: 2017-01-12

## 2017-01-12 RX ADMIN — WARFARIN SODIUM 5 MILLIGRAM(S): 2.5 TABLET ORAL at 13:58

## 2017-01-12 RX ADMIN — ENOXAPARIN SODIUM 65 MILLIGRAM(S): 100 INJECTION SUBCUTANEOUS at 05:09

## 2017-01-12 RX ADMIN — Medication 200 MILLIGRAM(S): at 05:11

## 2017-01-12 RX ADMIN — Medication 1 DROP(S): at 05:08

## 2017-01-12 RX ADMIN — Medication 100 MILLIGRAM(S): at 05:10

## 2017-01-12 RX ADMIN — EZETIMIBE 10 MILLIGRAM(S): 10 TABLET ORAL at 12:02

## 2017-01-12 RX ADMIN — Medication 1 TABLET(S): at 05:09

## 2017-01-13 LAB
CULTURE RESULTS: SIGNIFICANT CHANGE UP
SPECIMEN SOURCE: SIGNIFICANT CHANGE UP

## 2017-01-17 DIAGNOSIS — J69.0 PNEUMONITIS DUE TO INHALATION OF FOOD AND VOMIT: ICD-10-CM

## 2017-01-17 DIAGNOSIS — R33.9 RETENTION OF URINE, UNSPECIFIED: ICD-10-CM

## 2017-01-17 DIAGNOSIS — I69.359 HEMIPLEGIA AND HEMIPARESIS FOLLOWING CEREBRAL INFARCTION AFFECTING UNSPECIFIED SIDE: ICD-10-CM

## 2017-01-17 DIAGNOSIS — Z28.21 IMMUNIZATION NOT CARRIED OUT BECAUSE OF PATIENT REFUSAL: ICD-10-CM

## 2017-01-17 DIAGNOSIS — K59.00 CONSTIPATION, UNSPECIFIED: ICD-10-CM

## 2017-01-17 DIAGNOSIS — H10.89 OTHER CONJUNCTIVITIS: ICD-10-CM

## 2017-01-17 DIAGNOSIS — E86.0 DEHYDRATION: ICD-10-CM

## 2017-01-17 DIAGNOSIS — N17.9 ACUTE KIDNEY FAILURE, UNSPECIFIED: ICD-10-CM

## 2017-01-17 DIAGNOSIS — I69.320 APHASIA FOLLOWING CEREBRAL INFARCTION: ICD-10-CM

## 2017-01-17 DIAGNOSIS — I48.91 UNSPECIFIED ATRIAL FIBRILLATION: ICD-10-CM

## 2017-01-17 DIAGNOSIS — E87.0 HYPEROSMOLALITY AND HYPERNATREMIA: ICD-10-CM

## 2017-01-17 DIAGNOSIS — Z79.01 LONG TERM (CURRENT) USE OF ANTICOAGULANTS: ICD-10-CM

## 2017-01-17 DIAGNOSIS — E87.6 HYPOKALEMIA: ICD-10-CM

## 2017-01-17 DIAGNOSIS — R13.10 DYSPHAGIA, UNSPECIFIED: ICD-10-CM

## 2017-01-17 DIAGNOSIS — I10 ESSENTIAL (PRIMARY) HYPERTENSION: ICD-10-CM

## 2017-01-17 DIAGNOSIS — F03.90 UNSPECIFIED DEMENTIA, UNSPECIFIED SEVERITY, WITHOUT BEHAVIORAL DISTURBANCE, PSYCHOTIC DISTURBANCE, MOOD DISTURBANCE, AND ANXIETY: ICD-10-CM

## 2017-01-17 DIAGNOSIS — I69.391 DYSPHAGIA FOLLOWING CEREBRAL INFARCTION: ICD-10-CM

## 2017-01-17 DIAGNOSIS — G93.41 METABOLIC ENCEPHALOPATHY: ICD-10-CM

## 2017-02-15 ENCOUNTER — INPATIENT (INPATIENT)
Facility: HOSPITAL | Age: 77
LOS: 6 days | Discharge: NURSING, REHAB OR LONG-TERM CARE | DRG: 698 | End: 2017-02-22
Attending: INTERNAL MEDICINE | Admitting: INTERNAL MEDICINE
Payer: COMMERCIAL

## 2017-02-15 VITALS
HEART RATE: 78 BPM | RESPIRATION RATE: 16 BRPM | OXYGEN SATURATION: 96 % | TEMPERATURE: 97 F | DIASTOLIC BLOOD PRESSURE: 69 MMHG | SYSTOLIC BLOOD PRESSURE: 93 MMHG

## 2017-02-15 DIAGNOSIS — F19.90 OTHER PSYCHOACTIVE SUBSTANCE USE, UNSPECIFIED, UNCOMPLICATED: ICD-10-CM

## 2017-02-15 DIAGNOSIS — R41.82 ALTERED MENTAL STATUS, UNSPECIFIED: ICD-10-CM
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
## 2017-02-15 DIAGNOSIS — N39.0 URINARY TRACT INFECTION, SITE NOT SPECIFIED: ICD-10-CM

## 2017-02-15 PROBLEM — F03.90 UNSPECIFIED DEMENTIA, UNSPECIFIED SEVERITY, WITHOUT BEHAVIORAL DISTURBANCE, PSYCHOTIC DISTURBANCE, MOOD DISTURBANCE, AND ANXIETY: Chronic | Status: ACTIVE | Noted: 2017-01-05

## 2017-02-15 PROBLEM — K59.00 CONSTIPATION, UNSPECIFIED: Chronic | Status: ACTIVE | Noted: 2017-01-05

## 2017-02-15 PROBLEM — R33.9 RETENTION OF URINE, UNSPECIFIED: Chronic | Status: ACTIVE | Noted: 2017-01-05

## 2017-02-15 PROBLEM — I63.9 CEREBRAL INFARCTION, UNSPECIFIED: Chronic | Status: ACTIVE | Noted: 2017-01-05

## 2017-02-15 LAB
ALBUMIN SERPL ELPH-MCNC: 2.8 G/DL — LOW (ref 3.3–5)
ALP SERPL-CCNC: 58 U/L — SIGNIFICANT CHANGE UP (ref 40–120)
ALT FLD-CCNC: 26 U/L — SIGNIFICANT CHANGE UP (ref 12–78)
ANION GAP SERPL CALC-SCNC: 9 MMOL/L — SIGNIFICANT CHANGE UP (ref 5–17)
APPEARANCE UR: CLEAR — SIGNIFICANT CHANGE UP
APTT BLD: 44.4 SEC — HIGH (ref 27.5–37.4)
AST SERPL-CCNC: 22 U/L — SIGNIFICANT CHANGE UP (ref 15–37)
BACTERIA # UR AUTO: ABNORMAL
BASOPHILS # BLD AUTO: 0 K/UL — SIGNIFICANT CHANGE UP (ref 0–0.2)
BASOPHILS NFR BLD AUTO: 0.3 % — SIGNIFICANT CHANGE UP (ref 0–2)
BILIRUB SERPL-MCNC: 0.7 MG/DL — SIGNIFICANT CHANGE UP (ref 0.2–1.2)
BILIRUB UR-MCNC: ABNORMAL
BUN SERPL-MCNC: 21 MG/DL — SIGNIFICANT CHANGE UP (ref 7–23)
CALCIUM SERPL-MCNC: 10.1 MG/DL — SIGNIFICANT CHANGE UP (ref 8.5–10.1)
CHLORIDE SERPL-SCNC: 103 MMOL/L — SIGNIFICANT CHANGE UP (ref 96–108)
CO2 SERPL-SCNC: 28 MMOL/L — SIGNIFICANT CHANGE UP (ref 22–31)
COD CRY URNS QL: ABNORMAL
COLOR SPEC: YELLOW — SIGNIFICANT CHANGE UP
CREAT SERPL-MCNC: 1.1 MG/DL — SIGNIFICANT CHANGE UP (ref 0.5–1.3)
DIFF PNL FLD: ABNORMAL
EOSINOPHIL # BLD AUTO: 0 K/UL — SIGNIFICANT CHANGE UP (ref 0–0.5)
EOSINOPHIL NFR BLD AUTO: 0.5 % — SIGNIFICANT CHANGE UP (ref 0–6)
EPI CELLS # UR: SIGNIFICANT CHANGE UP
GLUCOSE SERPL-MCNC: 128 MG/DL — HIGH (ref 70–99)
GLUCOSE UR QL: NEGATIVE — SIGNIFICANT CHANGE UP
HCT VFR BLD CALC: 41.4 % — SIGNIFICANT CHANGE UP (ref 39–50)
HGB BLD-MCNC: 13.7 G/DL — SIGNIFICANT CHANGE UP (ref 13–17)
INR BLD: 4.7 RATIO — HIGH (ref 0.88–1.16)
KETONES UR-MCNC: NEGATIVE — SIGNIFICANT CHANGE UP
LACTATE SERPL-SCNC: 1.9 MMOL/L — SIGNIFICANT CHANGE UP (ref 0.7–2)
LEUKOCYTE ESTERASE UR-ACNC: ABNORMAL
LYMPHOCYTES # BLD AUTO: 0.9 K/UL — LOW (ref 1–3.3)
LYMPHOCYTES # BLD AUTO: 11.2 % — LOW (ref 13–44)
MCHC RBC-ENTMCNC: 30.9 PG — SIGNIFICANT CHANGE UP (ref 27–34)
MCHC RBC-ENTMCNC: 33.1 GM/DL — SIGNIFICANT CHANGE UP (ref 32–36)
MCV RBC AUTO: 93.4 FL — SIGNIFICANT CHANGE UP (ref 80–100)
MONOCYTES # BLD AUTO: 0.7 K/UL — SIGNIFICANT CHANGE UP (ref 0–0.9)
MONOCYTES NFR BLD AUTO: 8.7 % — SIGNIFICANT CHANGE UP (ref 1–9)
NEUTROPHILS # BLD AUTO: 6.5 K/UL — SIGNIFICANT CHANGE UP (ref 1.8–7.4)
NEUTROPHILS NFR BLD AUTO: 79.2 % — HIGH (ref 43–77)
NITRITE UR-MCNC: POSITIVE
PH UR: 6 — SIGNIFICANT CHANGE UP (ref 4.8–8)
PLATELET # BLD AUTO: 255 K/UL — SIGNIFICANT CHANGE UP (ref 150–400)
POTASSIUM SERPL-MCNC: 4.2 MMOL/L — SIGNIFICANT CHANGE UP (ref 3.5–5.3)
POTASSIUM SERPL-SCNC: 4.2 MMOL/L — SIGNIFICANT CHANGE UP (ref 3.5–5.3)
PROT SERPL-MCNC: 7.8 G/DL — SIGNIFICANT CHANGE UP (ref 6–8.3)
PROT UR-MCNC: 25 MG/DL
PROTHROM AB SERPL-ACNC: 53 SEC — HIGH (ref 10–13.1)
RBC # BLD: 4.43 M/UL — SIGNIFICANT CHANGE UP (ref 4.2–5.8)
RBC # FLD: 12.3 % — SIGNIFICANT CHANGE UP (ref 10.3–14.5)
RBC CASTS # UR COMP ASSIST: ABNORMAL /HPF (ref 0–4)
SODIUM SERPL-SCNC: 140 MMOL/L — SIGNIFICANT CHANGE UP (ref 135–145)
SP GR SPEC: 1.02 — SIGNIFICANT CHANGE UP (ref 1.01–1.02)
TSH SERPL-MCNC: 2.07 UIU/ML — SIGNIFICANT CHANGE UP (ref 0.36–3.74)
UROBILINOGEN FLD QL: 4
WBC # BLD: 8.2 K/UL — SIGNIFICANT CHANGE UP (ref 3.8–10.5)
WBC # FLD AUTO: 8.2 K/UL — SIGNIFICANT CHANGE UP (ref 3.8–10.5)
WBC UR QL: >50

## 2017-02-15 PROCEDURE — 70450 CT HEAD/BRAIN W/O DYE: CPT | Mod: 26

## 2017-02-15 PROCEDURE — 93010 ELECTROCARDIOGRAM REPORT: CPT

## 2017-02-15 PROCEDURE — 99285 EMERGENCY DEPT VISIT HI MDM: CPT

## 2017-02-15 PROCEDURE — 71010: CPT | Mod: 26

## 2017-02-15 RX ORDER — CEFTRIAXONE 500 MG/1
1 INJECTION, POWDER, FOR SOLUTION INTRAMUSCULAR; INTRAVENOUS ONCE
Qty: 0 | Refills: 0 | Status: COMPLETED | OUTPATIENT
Start: 2017-02-15 | End: 2017-02-15

## 2017-02-15 RX ORDER — ACETAMINOPHEN 500 MG
650 TABLET ORAL EVERY 6 HOURS
Qty: 0 | Refills: 0 | Status: DISCONTINUED | OUTPATIENT
Start: 2017-02-15 | End: 2017-02-22

## 2017-02-15 RX ORDER — METOPROLOL TARTRATE 50 MG
25 TABLET ORAL DAILY
Qty: 0 | Refills: 0 | Status: DISCONTINUED | OUTPATIENT
Start: 2017-02-15 | End: 2017-02-17

## 2017-02-15 RX ORDER — CEFTRIAXONE 500 MG/1
1 INJECTION, POWDER, FOR SOLUTION INTRAMUSCULAR; INTRAVENOUS EVERY 24 HOURS
Qty: 0 | Refills: 0 | Status: COMPLETED | OUTPATIENT
Start: 2017-02-15 | End: 2017-02-19

## 2017-02-15 RX ORDER — ASPIRIN/CALCIUM CARB/MAGNESIUM 324 MG
325 TABLET ORAL ONCE
Qty: 0 | Refills: 0 | Status: COMPLETED | OUTPATIENT
Start: 2017-02-15 | End: 2017-02-15

## 2017-02-15 RX ORDER — SODIUM CHLORIDE 9 MG/ML
1000 INJECTION INTRAMUSCULAR; INTRAVENOUS; SUBCUTANEOUS
Qty: 0 | Refills: 0 | Status: COMPLETED | OUTPATIENT
Start: 2017-02-15 | End: 2017-02-15

## 2017-02-15 RX ORDER — SIMVASTATIN 20 MG/1
10 TABLET, FILM COATED ORAL AT BEDTIME
Qty: 0 | Refills: 0 | Status: DISCONTINUED | OUTPATIENT
Start: 2017-02-15 | End: 2017-02-22

## 2017-02-15 RX ORDER — ASPIRIN/CALCIUM CARB/MAGNESIUM 324 MG
81 TABLET ORAL DAILY
Qty: 0 | Refills: 0 | Status: DISCONTINUED | OUTPATIENT
Start: 2017-02-15 | End: 2017-02-17

## 2017-02-15 RX ADMIN — Medication 325 MILLIGRAM(S): at 19:15

## 2017-02-15 RX ADMIN — SODIUM CHLORIDE 1000 MILLILITER(S): 9 INJECTION INTRAMUSCULAR; INTRAVENOUS; SUBCUTANEOUS at 15:45

## 2017-02-15 RX ADMIN — SODIUM CHLORIDE 1000 MILLILITER(S): 9 INJECTION INTRAMUSCULAR; INTRAVENOUS; SUBCUTANEOUS at 16:45

## 2017-02-15 RX ADMIN — CEFTRIAXONE 100 GRAM(S): 500 INJECTION, POWDER, FOR SOLUTION INTRAMUSCULAR; INTRAVENOUS at 21:10

## 2017-02-15 NOTE — H&P ADULT. - ASSESSMENT
75 yo M p/w found inc lethargic today, some inc confusion from baseline. NO recent trauma / fall . NO evid of pain. No vomiting / diarrhea. Pt unable to give hx. No other hx avail.

## 2017-02-15 NOTE — ED ADULT NURSE NOTE - NIH STROKE SCALE: 5A. MOTOR ARM, LEFT, QM
(1) Drift; limb holds 90 (or 45) degrees, but drifts down before full 10 seconds; does not hit bed or other support

## 2017-02-15 NOTE — ED PROVIDER NOTE - CONSTITUTIONAL, MLM
normal... Well appearing, well nourished, awake, alert, oriented to person only and in no apparent distress.

## 2017-02-15 NOTE — ED ADULT NURSE NOTE - NIH STROKE SCALE: 9. BEST LANGUAGE, QM
(2) Severe aphasia; all communication is through fragmentary expression; great need for inference, questioning, and guessing by the listener.  Range of information that can be exchanged is limited; listener carries burden of communication.  Examiner cannot identify materials provided from patient response. (1) Mild-to-moderate aphasia; some obvious loss of fluency or facility of comprehension, w/o significant limitation on ideas expressed or form of expression. Reduction of speech and/or comprehension, however, makes conversation about provided material difficult or impossible.  For example, in conversation about provided materials, examiner can identify picture or naming card content from patient's response.

## 2017-02-15 NOTE — ED PROVIDER NOTE - CARE PLAN
Principal Discharge DX:	Altered mental status, unspecified altered mental status type Principal Discharge DX:	Altered mental status, unspecified altered mental status type  Goal:	ro cva

## 2017-02-15 NOTE — ED PROVIDER NOTE - PROGRESS NOTE DETAILS
Pt seen by Dr Nance, agree with admit, MRI in am. Dr Flex moncada. Dw Dr JACKIE Mccord, will see pt to admit.

## 2017-02-15 NOTE — ED ADULT NURSE NOTE - OBJECTIVE STATEMENT
76 year old male presents to the ED with c/o altered mental status. PT's wife states that she found him unresponsive following his nap. Pt was not responding to her and pale. Pt has a PMH of CVA x 2, most recent in december of last year. Pt just got out of rehab last week. PT lethargic, inappropriate speech, PERRLA, grimaces to pain, respirations even and unlabored. No abdominal guarding noted. No abdominal distention noted. Pt's wife states that his last BM was 2/9/17. Pt has a gore catheter in place for urinary retention. Pt was supposed to have it removed tomorrow.

## 2017-02-15 NOTE — ED ADULT NURSE NOTE - FALL HARM RISK
other/age(85 years old or older)/bones(Osteoporosis,prev fx,steroid use,metastatic bone ca/coagulation(Bleeding disorder R/T clinical cond/anti-coags)

## 2017-02-15 NOTE — ED PROVIDER NOTE - OBJECTIVE STATEMENT
77 yo M p/w found inc lethargic today, some inc confusion from baseline. NO recent trauma / fall . NO evid of pain. No vomiting / diarrhea. Pt unable to give hx. No other hx avail.

## 2017-02-15 NOTE — H&P ADULT. - PROBLEM SELECTOR PLAN 1
admit  admit to tele  aspirin  neuro check q4h  MRI/MRA brain  carotid u/s  2d echo  lipid profile  physical therapy  speech and swallow eval  neuro consult

## 2017-02-16 LAB
ANION GAP SERPL CALC-SCNC: 9 MMOL/L — SIGNIFICANT CHANGE UP (ref 5–17)
BUN SERPL-MCNC: 17 MG/DL — SIGNIFICANT CHANGE UP (ref 7–23)
CALCIUM SERPL-MCNC: 9.8 MG/DL — SIGNIFICANT CHANGE UP (ref 8.5–10.1)
CHLORIDE SERPL-SCNC: 108 MMOL/L — SIGNIFICANT CHANGE UP (ref 96–108)
CHOLEST SERPL-MCNC: 129 MG/DL — SIGNIFICANT CHANGE UP (ref 10–199)
CO2 SERPL-SCNC: 25 MMOL/L — SIGNIFICANT CHANGE UP (ref 22–31)
CREAT SERPL-MCNC: 0.71 MG/DL — SIGNIFICANT CHANGE UP (ref 0.5–1.3)
GLUCOSE SERPL-MCNC: 87 MG/DL — SIGNIFICANT CHANGE UP (ref 70–99)
HCT VFR BLD CALC: 35.3 % — LOW (ref 39–50)
HDLC SERPL-MCNC: 36 MG/DL — LOW (ref 40–125)
HGB BLD-MCNC: 12 G/DL — LOW (ref 13–17)
INR BLD: 5.55 RATIO — CRITICAL HIGH (ref 0.88–1.16)
LIPID PNL WITH DIRECT LDL SERPL: 81 MG/DL — SIGNIFICANT CHANGE UP
MAGNESIUM SERPL-MCNC: 2.4 MG/DL — SIGNIFICANT CHANGE UP (ref 1.8–2.4)
MCHC RBC-ENTMCNC: 30.8 PG — SIGNIFICANT CHANGE UP (ref 27–34)
MCHC RBC-ENTMCNC: 33.9 GM/DL — SIGNIFICANT CHANGE UP (ref 32–36)
MCV RBC AUTO: 91 FL — SIGNIFICANT CHANGE UP (ref 80–100)
PLATELET # BLD AUTO: 209 K/UL — SIGNIFICANT CHANGE UP (ref 150–400)
POTASSIUM SERPL-MCNC: 3.6 MMOL/L — SIGNIFICANT CHANGE UP (ref 3.5–5.3)
POTASSIUM SERPL-SCNC: 3.6 MMOL/L — SIGNIFICANT CHANGE UP (ref 3.5–5.3)
PROTHROM AB SERPL-ACNC: 62.7 SEC — HIGH (ref 10–13.1)
RBC # BLD: 3.88 M/UL — LOW (ref 4.2–5.8)
RBC # FLD: 12.1 % — SIGNIFICANT CHANGE UP (ref 10.3–14.5)
SODIUM SERPL-SCNC: 142 MMOL/L — SIGNIFICANT CHANGE UP (ref 135–145)
T3 SERPL-MCNC: 97 NG/DL — SIGNIFICANT CHANGE UP (ref 80–200)
T4 AB SER-ACNC: 8 UG/DL — SIGNIFICANT CHANGE UP (ref 4.6–12)
TOTAL CHOLESTEROL/HDL RATIO MEASUREMENT: 3.6 RATIO — SIGNIFICANT CHANGE UP (ref 3.4–9.6)
TRIGL SERPL-MCNC: 60 MG/DL — SIGNIFICANT CHANGE UP (ref 10–149)
WBC # BLD: 5.8 K/UL — SIGNIFICANT CHANGE UP (ref 3.8–10.5)
WBC # FLD AUTO: 5.8 K/UL — SIGNIFICANT CHANGE UP (ref 3.8–10.5)

## 2017-02-16 PROCEDURE — 93880 EXTRACRANIAL BILAT STUDY: CPT | Mod: 26

## 2017-02-16 PROCEDURE — 70551 MRI BRAIN STEM W/O DYE: CPT | Mod: 26

## 2017-02-16 RX ORDER — DOCUSATE SODIUM 100 MG
100 CAPSULE ORAL THREE TIMES A DAY
Qty: 0 | Refills: 0 | Status: DISCONTINUED | OUTPATIENT
Start: 2017-02-16 | End: 2017-02-22

## 2017-02-16 RX ORDER — POLYETHYLENE GLYCOL 3350 17 G/17G
17 POWDER, FOR SOLUTION ORAL
Qty: 0 | Refills: 0 | Status: DISCONTINUED | OUTPATIENT
Start: 2017-02-16 | End: 2017-02-17

## 2017-02-16 RX ORDER — FINASTERIDE 5 MG/1
5 TABLET, FILM COATED ORAL DAILY
Qty: 0 | Refills: 0 | Status: DISCONTINUED | OUTPATIENT
Start: 2017-02-16 | End: 2017-02-22

## 2017-02-16 RX ORDER — TAMSULOSIN HYDROCHLORIDE 0.4 MG/1
0.4 CAPSULE ORAL AT BEDTIME
Qty: 0 | Refills: 0 | Status: DISCONTINUED | OUTPATIENT
Start: 2017-02-16 | End: 2017-02-18

## 2017-02-16 RX ORDER — SENNA PLUS 8.6 MG/1
2 TABLET ORAL AT BEDTIME
Qty: 0 | Refills: 0 | Status: DISCONTINUED | OUTPATIENT
Start: 2017-02-16 | End: 2017-02-22

## 2017-02-16 RX ADMIN — POLYETHYLENE GLYCOL 3350 17 GRAM(S): 17 POWDER, FOR SOLUTION ORAL at 14:22

## 2017-02-16 RX ADMIN — SENNA PLUS 2 TABLET(S): 8.6 TABLET ORAL at 21:53

## 2017-02-16 RX ADMIN — TAMSULOSIN HYDROCHLORIDE 0.4 MILLIGRAM(S): 0.4 CAPSULE ORAL at 21:53

## 2017-02-16 RX ADMIN — SIMVASTATIN 10 MILLIGRAM(S): 20 TABLET, FILM COATED ORAL at 21:53

## 2017-02-16 RX ADMIN — Medication 100 MILLIGRAM(S): at 14:21

## 2017-02-16 RX ADMIN — CEFTRIAXONE 100 GRAM(S): 500 INJECTION, POWDER, FOR SOLUTION INTRAMUSCULAR; INTRAVENOUS at 21:42

## 2017-02-16 RX ADMIN — SIMVASTATIN 10 MILLIGRAM(S): 20 TABLET, FILM COATED ORAL at 00:01

## 2017-02-16 RX ADMIN — Medication 25 MILLIGRAM(S): at 09:55

## 2017-02-16 RX ADMIN — Medication 81 MILLIGRAM(S): at 12:53

## 2017-02-16 RX ADMIN — FINASTERIDE 5 MILLIGRAM(S): 5 TABLET, FILM COATED ORAL at 14:21

## 2017-02-16 RX ADMIN — Medication 100 MILLIGRAM(S): at 21:53

## 2017-02-16 NOTE — DISCHARGE NOTE ADULT - MEDICATION SUMMARY - MEDICATIONS TO TAKE
I will START or STAY ON the medications listed below when I get home from the hospital:    finasteride 5 mg oral tablet  -- 1 tab(s) by mouth once a day  -- Indication: For Prostate    acetaminophen 325 mg oral tablet  -- 2 tab(s) by mouth every 6 hours, As needed, Mild Pain (1 - 3)  -- Indication: For Pain    aspirin 81 mg oral delayed release tablet  -- 1 tab(s) by mouth once a day  -- Indication: For Supplement    tamsulosin 0.4 mg oral capsule  -- 1 cap(s) by mouth once a day (at bedtime)  -- Indication: For prostate    Coumadin 3 mg oral tablet  -- 1 tab(s) by mouth once a day (at bedtime)  -- Indication: For A fib    Zetia 10 mg oral tablet  -- 1 tab(s) by mouth once a day  -- Indication: For cholesterol    haloperidol  -- 1 milligram(s) intramuscular once a day (at bedtime), As Needed, agitation  -- Indication: For Agitation    metoprolol tartrate 25 mg oral tablet  -- 1 tab(s) by mouth every 12 hours  -- Indication: For high bp    cefuroxime 500 mg oral tablet  -- 1 tab(s) by mouth every 12 hours x 7 days  -- Indication: For UTI (urinary tract infection)    docusate sodium 100 mg oral capsule  -- 1 cap(s) by mouth 3 times a day  -- Indication: For constipation    polyethylene glycol 3350 oral powder for reconstitution  -- 17 gram(s) by mouth once a day  -- Indication: For constipation    senna oral tablet  -- 2 tab(s) by mouth once a day (at bedtime)  -- Indication: For constipation    lactobacillus acidophilus oral capsule  -- 1 cap(s) by mouth 3 times a day  -- Indication: For supplement

## 2017-02-16 NOTE — DISCHARGE NOTE ADULT - CARE PLAN
Principal Discharge DX:	UTI (urinary tract infection)  Goal:	.  Instructions for follow-up, activity and diet:	finish course of antibiotics  Secondary Diagnosis:	Atrial fibrillation  Instructions for follow-up, activity and diet:	dose coumadin daily

## 2017-02-16 NOTE — DISCHARGE NOTE ADULT - NS AS DC VTE INSTRUCTION
Coumadin/Warfarin - Potential for adverse drug reactions and interactions/Coumadin/Warfarin - Dietary Advice.../Coumadin/Warfarin - Follow-up monitoring.../Coumadin/Warfarin - Compliance...

## 2017-02-16 NOTE — PROVIDER CONTACT NOTE (OTHER) - ACTION/TREATMENT ORDERED:
Dr. castellanos was notified and he will contact Dr. Morrison (Cardiologist) for further evaluation.

## 2017-02-16 NOTE — DISCHARGE NOTE ADULT - CARE PROVIDER_API CALL
Lisa Trimble ([facility]), Internal Medicine  [address] [place] [address]  Phone: [phone]  Fax: [phone]    Coy Monroe), Cardiovascular Disease; Nuclear Cardiology  [address] [place] [address]  Phone: [phone]  Fax: [phone]

## 2017-02-16 NOTE — SWALLOW BEDSIDE ASSESSMENT ADULT - ORAL PREPARATORY PHASE
Reduced oral grading/Decreased mastication ability Refuses to accept bolus into oral cavity/Reduced oral grading/Decreased mastication ability

## 2017-02-16 NOTE — SWALLOW BEDSIDE ASSESSMENT ADULT - ORAL PHASE
Decreased anterior-posterior movement of the bolus/Within functional limits/Delayed oral transit time Decreased anterior-posterior movement of the bolus/Delayed oral transit time/Lingual stasis

## 2017-02-16 NOTE — SWALLOW BEDSIDE ASSESSMENT ADULT - SWALLOW EVAL: RECOMMENDED FEEDING/EATING TECHNIQUES
check mouth frequently for oral residue/pocketing/position upright (90 degrees)/alternate food with liquid/oral hygiene/maintain upright posture during/after eating for 30 mins/allow for swallow between intakes/crush medication (when feasible)

## 2017-02-16 NOTE — DISCHARGE NOTE ADULT - HOSPITAL COURSE
76 M came with sudden change in mental status at home (unresponsive). CT x 2 negative. MRI negative for CVA. + catheter associated UTI (e coli). ECHO: EF 55-60%. Failed voiding trial. Going to rehab with gore and po abx. Also had an episode of vasovagal syncope in hospital from low BP. Lopressor dose decreased. Patient has underlying vascular dementia, may be worsening, with acute metabolic encephalopathy from UTI (which improved). Carotid u/s: no TESSA.

## 2017-02-16 NOTE — DISCHARGE NOTE ADULT - NS AS DC STROKE ED MATERIALS
advanced age is a risk factor for Stroke/Stroke Warning Signs and Symptoms/Call 911 for Stroke/Risk Factors for Stroke/Prescribed Medications/Need for Followup After Discharge/Stroke Education Booklet advanced age is a risk factor for Stroke

## 2017-02-16 NOTE — DISCHARGE NOTE ADULT - MEDICATION SUMMARY - MEDICATIONS TO STOP TAKING
I will STOP taking the medications listed below when I get home from the hospital:    Toprol- mg oral tablet, extended release  -- 1 tab(s) by mouth once a day    Lotrel 5 mg-20 mg oral capsule  -- 1 cap(s) by mouth once a day    acetaminophen 325 mg oral tablet  -- 2 tab(s) by mouth every 6 hours, As needed, For Temp greater than 38 C (100.4 F)    tobramycin 0.3% ophthalmic solution  -- 1 drop(s) to each affected eye 3 times a day    enoxaparin  -- 65 milligram(s) subcutaneous 2 times a day. Stop once INR therapeutic with goal of 2-3.    amoxicillin-clavulanate 875 mg-125 mg oral tablet  -- 1 tab(s) by mouth 2 times a day    Toprol-XL 50 mg oral tablet, extended release  -- 1 tab(s) by mouth once a day

## 2017-02-16 NOTE — SWALLOW BEDSIDE ASSESSMENT ADULT - COMMENTS
77 yo M p/w found inc lethargic today, some inc confusion from baseline. NO recent trauma / fall . NO evid of pain. No vomiting / diarrhea. Pt unable to give hx. No other hx avail.  Pt c reduced mastication of solids and soft solids c delayed a-p lingual transport c timely trigger of swallow

## 2017-02-16 NOTE — DIETITIAN INITIAL EVALUATION ADULT. - OTHER INFO
Pt states that he has been tolerating diet, no complaints (eating well, pta). Speech and swallow eval completed today and rx dysphagia 2 nectar thick liquids.

## 2017-02-17 LAB
-  AMIKACIN: SIGNIFICANT CHANGE UP
-  AMPICILLIN/SULBACTAM: SIGNIFICANT CHANGE UP
-  AMPICILLIN: SIGNIFICANT CHANGE UP
-  AZTREONAM: SIGNIFICANT CHANGE UP
-  CEFAZOLIN: SIGNIFICANT CHANGE UP
-  CEFEPIME: SIGNIFICANT CHANGE UP
-  CEFOXITIN: SIGNIFICANT CHANGE UP
-  CEFTAZIDIME: SIGNIFICANT CHANGE UP
-  CEFTRIAXONE: SIGNIFICANT CHANGE UP
-  CIPROFLOXACIN: SIGNIFICANT CHANGE UP
-  ERTAPENEM: SIGNIFICANT CHANGE UP
-  GENTAMICIN: SIGNIFICANT CHANGE UP
-  IMIPENEM: SIGNIFICANT CHANGE UP
-  LEVOFLOXACIN: SIGNIFICANT CHANGE UP
-  MEROPENEM: SIGNIFICANT CHANGE UP
-  NITROFURANTOIN: SIGNIFICANT CHANGE UP
-  PIPERACILLIN/TAZOBACTAM: SIGNIFICANT CHANGE UP
-  TOBRAMYCIN: SIGNIFICANT CHANGE UP
-  TRIMETHOPRIM/SULFAMETHOXAZOLE: SIGNIFICANT CHANGE UP
ANION GAP SERPL CALC-SCNC: 9 MMOL/L — SIGNIFICANT CHANGE UP (ref 5–17)
BUN SERPL-MCNC: 18 MG/DL — SIGNIFICANT CHANGE UP (ref 7–23)
CALCIUM SERPL-MCNC: 10.1 MG/DL — SIGNIFICANT CHANGE UP (ref 8.5–10.1)
CHLORIDE SERPL-SCNC: 102 MMOL/L — SIGNIFICANT CHANGE UP (ref 96–108)
CK SERPL-CCNC: 26 U/L — SIGNIFICANT CHANGE UP (ref 26–308)
CO2 SERPL-SCNC: 29 MMOL/L — SIGNIFICANT CHANGE UP (ref 22–31)
CREAT SERPL-MCNC: 0.95 MG/DL — SIGNIFICANT CHANGE UP (ref 0.5–1.3)
CULTURE RESULTS: SIGNIFICANT CHANGE UP
GLUCOSE SERPL-MCNC: 90 MG/DL — SIGNIFICANT CHANGE UP (ref 70–99)
HCT VFR BLD CALC: 37.8 % — LOW (ref 39–50)
HGB BLD-MCNC: 12.5 G/DL — LOW (ref 13–17)
INR BLD: 2.83 RATIO — HIGH (ref 0.88–1.16)
MAGNESIUM SERPL-MCNC: 2.3 MG/DL — SIGNIFICANT CHANGE UP (ref 1.8–2.4)
MCHC RBC-ENTMCNC: 30.8 PG — SIGNIFICANT CHANGE UP (ref 27–34)
MCHC RBC-ENTMCNC: 33 GM/DL — SIGNIFICANT CHANGE UP (ref 32–36)
MCV RBC AUTO: 93.4 FL — SIGNIFICANT CHANGE UP (ref 80–100)
METHOD TYPE: SIGNIFICANT CHANGE UP
ORGANISM # SPEC MICROSCOPIC CNT: SIGNIFICANT CHANGE UP
ORGANISM # SPEC MICROSCOPIC CNT: SIGNIFICANT CHANGE UP
PLATELET # BLD AUTO: 216 K/UL — SIGNIFICANT CHANGE UP (ref 150–400)
POTASSIUM SERPL-MCNC: 3.9 MMOL/L — SIGNIFICANT CHANGE UP (ref 3.5–5.3)
POTASSIUM SERPL-SCNC: 3.9 MMOL/L — SIGNIFICANT CHANGE UP (ref 3.5–5.3)
PROTHROM AB SERPL-ACNC: 31.7 SEC — HIGH (ref 10–13.1)
RBC # BLD: 4.04 M/UL — LOW (ref 4.2–5.8)
RBC # FLD: 12.4 % — SIGNIFICANT CHANGE UP (ref 10.3–14.5)
SODIUM SERPL-SCNC: 140 MMOL/L — SIGNIFICANT CHANGE UP (ref 135–145)
SPECIMEN SOURCE: SIGNIFICANT CHANGE UP
TROPONIN I SERPL-MCNC: <.015 NG/ML — SIGNIFICANT CHANGE UP (ref 0.01–0.04)
TSH SERPL-MCNC: 1.49 UIU/ML — SIGNIFICANT CHANGE UP (ref 0.36–3.74)
WBC # BLD: 5.4 K/UL — SIGNIFICANT CHANGE UP (ref 3.8–10.5)
WBC # FLD AUTO: 5.4 K/UL — SIGNIFICANT CHANGE UP (ref 3.8–10.5)

## 2017-02-17 RX ORDER — WARFARIN SODIUM 2.5 MG/1
1 TABLET ORAL ONCE
Qty: 0 | Refills: 0 | Status: COMPLETED | OUTPATIENT
Start: 2017-02-17 | End: 2017-02-17

## 2017-02-17 RX ORDER — METOPROLOL TARTRATE 50 MG
25 TABLET ORAL
Qty: 0 | Refills: 0 | Status: DISCONTINUED | OUTPATIENT
Start: 2017-02-17 | End: 2017-02-19

## 2017-02-17 RX ORDER — POLYETHYLENE GLYCOL 3350 17 G/17G
17 POWDER, FOR SOLUTION ORAL DAILY
Qty: 0 | Refills: 0 | Status: DISCONTINUED | OUTPATIENT
Start: 2017-02-17 | End: 2017-02-22

## 2017-02-17 RX ORDER — SODIUM CHLORIDE 9 MG/ML
1000 INJECTION INTRAMUSCULAR; INTRAVENOUS; SUBCUTANEOUS
Qty: 0 | Refills: 0 | Status: DISCONTINUED | OUTPATIENT
Start: 2017-02-17 | End: 2017-02-22

## 2017-02-17 RX ORDER — ASPIRIN/CALCIUM CARB/MAGNESIUM 324 MG
81 TABLET ORAL DAILY
Qty: 0 | Refills: 0 | Status: DISCONTINUED | OUTPATIENT
Start: 2017-02-17 | End: 2017-02-22

## 2017-02-17 RX ADMIN — SIMVASTATIN 10 MILLIGRAM(S): 20 TABLET, FILM COATED ORAL at 22:08

## 2017-02-17 RX ADMIN — WARFARIN SODIUM 1 MILLIGRAM(S): 2.5 TABLET ORAL at 22:08

## 2017-02-17 RX ADMIN — Medication 25 MILLIGRAM(S): at 05:32

## 2017-02-17 RX ADMIN — CEFTRIAXONE 100 GRAM(S): 500 INJECTION, POWDER, FOR SOLUTION INTRAMUSCULAR; INTRAVENOUS at 22:05

## 2017-02-17 RX ADMIN — Medication 25 MILLIGRAM(S): at 17:58

## 2017-02-17 RX ADMIN — Medication 100 MILLIGRAM(S): at 22:08

## 2017-02-17 RX ADMIN — POLYETHYLENE GLYCOL 3350 17 GRAM(S): 17 POWDER, FOR SOLUTION ORAL at 13:20

## 2017-02-17 RX ADMIN — SODIUM CHLORIDE 80 MILLILITER(S): 9 INJECTION INTRAMUSCULAR; INTRAVENOUS; SUBCUTANEOUS at 22:04

## 2017-02-17 RX ADMIN — SENNA PLUS 2 TABLET(S): 8.6 TABLET ORAL at 22:08

## 2017-02-17 RX ADMIN — SODIUM CHLORIDE 80 MILLILITER(S): 9 INJECTION INTRAMUSCULAR; INTRAVENOUS; SUBCUTANEOUS at 11:40

## 2017-02-17 RX ADMIN — Medication 81 MILLIGRAM(S): at 11:40

## 2017-02-17 RX ADMIN — FINASTERIDE 5 MILLIGRAM(S): 5 TABLET, FILM COATED ORAL at 11:40

## 2017-02-17 RX ADMIN — Medication 100 MILLIGRAM(S): at 05:32

## 2017-02-17 RX ADMIN — POLYETHYLENE GLYCOL 3350 17 GRAM(S): 17 POWDER, FOR SOLUTION ORAL at 05:32

## 2017-02-17 RX ADMIN — TAMSULOSIN HYDROCHLORIDE 0.4 MILLIGRAM(S): 0.4 CAPSULE ORAL at 22:08

## 2017-02-17 NOTE — PHYSICAL THERAPY INITIAL EVALUATION ADULT - PERTINENT HX OF CURRENT PROBLEM, REHAB EVAL
77 y/o male adm 2/15 with altered mental status and lethargy. CT head: no acute findings, + chronic lacunar infarcts. MRI brain: no acute findings, + atrophy of mid brain. Carotid dopplers: negative stenosis.

## 2017-02-17 NOTE — PHYSICAL THERAPY INITIAL EVALUATION ADULT - ADDITIONAL COMMENTS
Pt lives with his spouse in a house, no steps. Pt was d/c home for 1 week from rehab. Pt ambulates minimal distances with rolling walker and assist and spouse assist for all ADLs. Information obtained from spouse at bedside. Pt is a poor historian.

## 2017-02-18 LAB
INR BLD: 1.94 RATIO — HIGH (ref 0.88–1.16)
PROTHROM AB SERPL-ACNC: 21.7 SEC — HIGH (ref 10–13.1)

## 2017-02-18 RX ORDER — CEFUROXIME AXETIL 250 MG
500 TABLET ORAL EVERY 12 HOURS
Qty: 0 | Refills: 0 | Status: DISCONTINUED | OUTPATIENT
Start: 2017-02-20 | End: 2017-02-22

## 2017-02-18 RX ORDER — WARFARIN SODIUM 2.5 MG/1
2 TABLET ORAL ONCE
Qty: 0 | Refills: 0 | Status: COMPLETED | OUTPATIENT
Start: 2017-02-18 | End: 2017-02-18

## 2017-02-18 RX ORDER — TAMSULOSIN HYDROCHLORIDE 0.4 MG/1
0.8 CAPSULE ORAL AT BEDTIME
Qty: 0 | Refills: 0 | Status: DISCONTINUED | OUTPATIENT
Start: 2017-02-18 | End: 2017-02-21

## 2017-02-18 RX ORDER — HALOPERIDOL DECANOATE 100 MG/ML
1 INJECTION INTRAMUSCULAR EVERY 6 HOURS
Qty: 0 | Refills: 0 | Status: DISCONTINUED | OUTPATIENT
Start: 2017-02-18 | End: 2017-02-21

## 2017-02-18 RX ADMIN — FINASTERIDE 5 MILLIGRAM(S): 5 TABLET, FILM COATED ORAL at 11:59

## 2017-02-18 RX ADMIN — Medication 25 MILLIGRAM(S): at 17:53

## 2017-02-18 RX ADMIN — WARFARIN SODIUM 2 MILLIGRAM(S): 2.5 TABLET ORAL at 22:02

## 2017-02-18 RX ADMIN — POLYETHYLENE GLYCOL 3350 17 GRAM(S): 17 POWDER, FOR SOLUTION ORAL at 11:59

## 2017-02-18 RX ADMIN — TAMSULOSIN HYDROCHLORIDE 0.8 MILLIGRAM(S): 0.4 CAPSULE ORAL at 22:02

## 2017-02-18 RX ADMIN — Medication 100 MILLIGRAM(S): at 05:01

## 2017-02-18 RX ADMIN — SIMVASTATIN 10 MILLIGRAM(S): 20 TABLET, FILM COATED ORAL at 22:02

## 2017-02-18 RX ADMIN — Medication 25 MILLIGRAM(S): at 05:01

## 2017-02-18 RX ADMIN — HALOPERIDOL DECANOATE 1 MILLIGRAM(S): 100 INJECTION INTRAMUSCULAR at 18:28

## 2017-02-18 RX ADMIN — CEFTRIAXONE 100 GRAM(S): 500 INJECTION, POWDER, FOR SOLUTION INTRAMUSCULAR; INTRAVENOUS at 20:18

## 2017-02-18 RX ADMIN — Medication 100 MILLIGRAM(S): at 13:18

## 2017-02-18 RX ADMIN — SENNA PLUS 2 TABLET(S): 8.6 TABLET ORAL at 22:02

## 2017-02-18 RX ADMIN — Medication 81 MILLIGRAM(S): at 12:00

## 2017-02-18 RX ADMIN — Medication 100 MILLIGRAM(S): at 22:02

## 2017-02-19 LAB
INR BLD: 1.51 RATIO — HIGH (ref 0.88–1.16)
PROTHROM AB SERPL-ACNC: 16.8 SEC — HIGH (ref 10–13.1)

## 2017-02-19 RX ORDER — METOPROLOL TARTRATE 50 MG
25 TABLET ORAL THREE TIMES A DAY
Qty: 0 | Refills: 0 | Status: DISCONTINUED | OUTPATIENT
Start: 2017-02-19 | End: 2017-02-20

## 2017-02-19 RX ORDER — WARFARIN SODIUM 2.5 MG/1
4 TABLET ORAL ONCE
Qty: 0 | Refills: 0 | Status: COMPLETED | OUTPATIENT
Start: 2017-02-19 | End: 2017-02-19

## 2017-02-19 RX ORDER — LACTOBACILLUS ACIDOPHILUS 100MM CELL
1 CAPSULE ORAL
Qty: 0 | Refills: 0 | Status: DISCONTINUED | OUTPATIENT
Start: 2017-02-19 | End: 2017-02-22

## 2017-02-19 RX ADMIN — Medication 25 MILLIGRAM(S): at 06:43

## 2017-02-19 RX ADMIN — CEFTRIAXONE 100 GRAM(S): 500 INJECTION, POWDER, FOR SOLUTION INTRAMUSCULAR; INTRAVENOUS at 21:45

## 2017-02-19 RX ADMIN — FINASTERIDE 5 MILLIGRAM(S): 5 TABLET, FILM COATED ORAL at 12:59

## 2017-02-19 RX ADMIN — Medication 100 MILLIGRAM(S): at 13:02

## 2017-02-19 RX ADMIN — SENNA PLUS 2 TABLET(S): 8.6 TABLET ORAL at 22:25

## 2017-02-19 RX ADMIN — TAMSULOSIN HYDROCHLORIDE 0.8 MILLIGRAM(S): 0.4 CAPSULE ORAL at 22:25

## 2017-02-19 RX ADMIN — Medication 1 TABLET(S): at 17:46

## 2017-02-19 RX ADMIN — POLYETHYLENE GLYCOL 3350 17 GRAM(S): 17 POWDER, FOR SOLUTION ORAL at 12:58

## 2017-02-19 RX ADMIN — Medication 25 MILLIGRAM(S): at 17:46

## 2017-02-19 RX ADMIN — SIMVASTATIN 10 MILLIGRAM(S): 20 TABLET, FILM COATED ORAL at 22:25

## 2017-02-19 RX ADMIN — WARFARIN SODIUM 4 MILLIGRAM(S): 2.5 TABLET ORAL at 22:24

## 2017-02-19 RX ADMIN — Medication 100 MILLIGRAM(S): at 22:24

## 2017-02-19 RX ADMIN — Medication 100 MILLIGRAM(S): at 06:43

## 2017-02-19 RX ADMIN — Medication 1 TABLET(S): at 12:59

## 2017-02-19 RX ADMIN — Medication 81 MILLIGRAM(S): at 12:59

## 2017-02-20 LAB
ANION GAP SERPL CALC-SCNC: 7 MMOL/L — SIGNIFICANT CHANGE UP (ref 5–17)
BUN SERPL-MCNC: 18 MG/DL — SIGNIFICANT CHANGE UP (ref 7–23)
CALCIUM SERPL-MCNC: 9.2 MG/DL — SIGNIFICANT CHANGE UP (ref 8.5–10.1)
CHLORIDE SERPL-SCNC: 107 MMOL/L — SIGNIFICANT CHANGE UP (ref 96–108)
CK SERPL-CCNC: 44 U/L — SIGNIFICANT CHANGE UP (ref 26–308)
CO2 SERPL-SCNC: 26 MMOL/L — SIGNIFICANT CHANGE UP (ref 22–31)
CREAT SERPL-MCNC: 1.1 MG/DL — SIGNIFICANT CHANGE UP (ref 0.5–1.3)
CULTURE RESULTS: SIGNIFICANT CHANGE UP
CULTURE RESULTS: SIGNIFICANT CHANGE UP
GLUCOSE SERPL-MCNC: 133 MG/DL — HIGH (ref 70–99)
HCT VFR BLD CALC: 39.2 % — SIGNIFICANT CHANGE UP (ref 39–50)
HGB BLD-MCNC: 13 G/DL — SIGNIFICANT CHANGE UP (ref 13–17)
INR BLD: 1.44 RATIO — HIGH (ref 0.88–1.16)
MAGNESIUM SERPL-MCNC: 2.3 MG/DL — SIGNIFICANT CHANGE UP (ref 1.8–2.4)
MCHC RBC-ENTMCNC: 30.7 PG — SIGNIFICANT CHANGE UP (ref 27–34)
MCHC RBC-ENTMCNC: 33.1 GM/DL — SIGNIFICANT CHANGE UP (ref 32–36)
MCV RBC AUTO: 92.6 FL — SIGNIFICANT CHANGE UP (ref 80–100)
PHOSPHATE SERPL-MCNC: 2.3 MG/DL — LOW (ref 2.5–4.5)
PLATELET # BLD AUTO: 214 K/UL — SIGNIFICANT CHANGE UP (ref 150–400)
POTASSIUM SERPL-MCNC: 4.4 MMOL/L — SIGNIFICANT CHANGE UP (ref 3.5–5.3)
POTASSIUM SERPL-SCNC: 4.4 MMOL/L — SIGNIFICANT CHANGE UP (ref 3.5–5.3)
PROTHROM AB SERPL-ACNC: 16 SEC — HIGH (ref 10–13.1)
RBC # BLD: 4.23 M/UL — SIGNIFICANT CHANGE UP (ref 4.2–5.8)
RBC # FLD: 12.2 % — SIGNIFICANT CHANGE UP (ref 10.3–14.5)
SODIUM SERPL-SCNC: 140 MMOL/L — SIGNIFICANT CHANGE UP (ref 135–145)
SPECIMEN SOURCE: SIGNIFICANT CHANGE UP
SPECIMEN SOURCE: SIGNIFICANT CHANGE UP
TROPONIN I SERPL-MCNC: <.015 NG/ML — SIGNIFICANT CHANGE UP (ref 0.01–0.04)
WBC # BLD: 5.7 K/UL — SIGNIFICANT CHANGE UP (ref 3.8–10.5)
WBC # FLD AUTO: 5.7 K/UL — SIGNIFICANT CHANGE UP (ref 3.8–10.5)

## 2017-02-20 PROCEDURE — 93010 ELECTROCARDIOGRAM REPORT: CPT

## 2017-02-20 PROCEDURE — 70450 CT HEAD/BRAIN W/O DYE: CPT | Mod: 26

## 2017-02-20 RX ORDER — METOPROLOL TARTRATE 50 MG
25 TABLET ORAL EVERY 12 HOURS
Qty: 0 | Refills: 0 | Status: DISCONTINUED | OUTPATIENT
Start: 2017-02-20 | End: 2017-02-22

## 2017-02-20 RX ORDER — WARFARIN SODIUM 2.5 MG/1
5 TABLET ORAL ONCE
Qty: 0 | Refills: 0 | Status: COMPLETED | OUTPATIENT
Start: 2017-02-20 | End: 2017-02-20

## 2017-02-20 RX ORDER — SODIUM CHLORIDE 9 MG/ML
1000 INJECTION INTRAMUSCULAR; INTRAVENOUS; SUBCUTANEOUS
Qty: 0 | Refills: 0 | Status: DISCONTINUED | OUTPATIENT
Start: 2017-02-20 | End: 2017-02-22

## 2017-02-20 RX ORDER — SODIUM CHLORIDE 9 MG/ML
500 INJECTION INTRAMUSCULAR; INTRAVENOUS; SUBCUTANEOUS ONCE
Qty: 0 | Refills: 0 | Status: COMPLETED | OUTPATIENT
Start: 2017-02-20 | End: 2017-02-20

## 2017-02-20 RX ADMIN — SODIUM CHLORIDE 75 MILLILITER(S): 9 INJECTION INTRAMUSCULAR; INTRAVENOUS; SUBCUTANEOUS at 13:45

## 2017-02-20 RX ADMIN — WARFARIN SODIUM 5 MILLIGRAM(S): 2.5 TABLET ORAL at 21:14

## 2017-02-20 RX ADMIN — Medication 1 TABLET(S): at 17:53

## 2017-02-20 RX ADMIN — Medication 81 MILLIGRAM(S): at 13:45

## 2017-02-20 RX ADMIN — Medication 100 MILLIGRAM(S): at 13:45

## 2017-02-20 RX ADMIN — SIMVASTATIN 10 MILLIGRAM(S): 20 TABLET, FILM COATED ORAL at 21:14

## 2017-02-20 RX ADMIN — Medication 1 TABLET(S): at 13:45

## 2017-02-20 RX ADMIN — SENNA PLUS 2 TABLET(S): 8.6 TABLET ORAL at 21:14

## 2017-02-20 RX ADMIN — FINASTERIDE 5 MILLIGRAM(S): 5 TABLET, FILM COATED ORAL at 13:45

## 2017-02-20 RX ADMIN — Medication 100 MILLIGRAM(S): at 07:04

## 2017-02-20 RX ADMIN — Medication 500 MILLIGRAM(S): at 07:03

## 2017-02-20 RX ADMIN — Medication 100 MILLIGRAM(S): at 21:15

## 2017-02-20 RX ADMIN — TAMSULOSIN HYDROCHLORIDE 0.8 MILLIGRAM(S): 0.4 CAPSULE ORAL at 21:15

## 2017-02-20 RX ADMIN — SODIUM CHLORIDE 1000 MILLILITER(S): 9 INJECTION INTRAMUSCULAR; INTRAVENOUS; SUBCUTANEOUS at 13:44

## 2017-02-20 RX ADMIN — Medication 25 MILLIGRAM(S): at 07:04

## 2017-02-20 RX ADMIN — Medication 500 MILLIGRAM(S): at 17:53

## 2017-02-20 RX ADMIN — POLYETHYLENE GLYCOL 3350 17 GRAM(S): 17 POWDER, FOR SOLUTION ORAL at 14:00

## 2017-02-20 RX ADMIN — Medication 25 MILLIGRAM(S): at 17:53

## 2017-02-21 LAB
INR BLD: 1.59 RATIO — HIGH (ref 0.88–1.16)
PROTHROM AB SERPL-ACNC: 17.7 SEC — HIGH (ref 10–13.1)

## 2017-02-21 RX ORDER — WARFARIN SODIUM 2.5 MG/1
5 TABLET ORAL ONCE
Qty: 0 | Refills: 0 | Status: COMPLETED | OUTPATIENT
Start: 2017-02-21 | End: 2017-02-21

## 2017-02-21 RX ORDER — HALOPERIDOL DECANOATE 100 MG/ML
1 INJECTION INTRAMUSCULAR AT BEDTIME
Qty: 0 | Refills: 0 | Status: DISCONTINUED | OUTPATIENT
Start: 2017-02-21 | End: 2017-02-22

## 2017-02-21 RX ORDER — TAMSULOSIN HYDROCHLORIDE 0.4 MG/1
0.4 CAPSULE ORAL AT BEDTIME
Qty: 0 | Refills: 0 | Status: DISCONTINUED | OUTPATIENT
Start: 2017-02-21 | End: 2017-02-22

## 2017-02-21 RX ADMIN — FINASTERIDE 5 MILLIGRAM(S): 5 TABLET, FILM COATED ORAL at 11:49

## 2017-02-21 RX ADMIN — SODIUM CHLORIDE 75 MILLILITER(S): 9 INJECTION INTRAMUSCULAR; INTRAVENOUS; SUBCUTANEOUS at 17:28

## 2017-02-21 RX ADMIN — SODIUM CHLORIDE 75 MILLILITER(S): 9 INJECTION INTRAMUSCULAR; INTRAVENOUS; SUBCUTANEOUS at 03:44

## 2017-02-21 RX ADMIN — Medication 81 MILLIGRAM(S): at 11:49

## 2017-02-21 RX ADMIN — POLYETHYLENE GLYCOL 3350 17 GRAM(S): 17 POWDER, FOR SOLUTION ORAL at 11:49

## 2017-02-21 RX ADMIN — TAMSULOSIN HYDROCHLORIDE 0.4 MILLIGRAM(S): 0.4 CAPSULE ORAL at 21:32

## 2017-02-21 RX ADMIN — Medication 25 MILLIGRAM(S): at 17:28

## 2017-02-21 RX ADMIN — SIMVASTATIN 10 MILLIGRAM(S): 20 TABLET, FILM COATED ORAL at 21:32

## 2017-02-21 RX ADMIN — Medication 25 MILLIGRAM(S): at 05:20

## 2017-02-21 RX ADMIN — Medication 1 TABLET(S): at 17:28

## 2017-02-21 RX ADMIN — WARFARIN SODIUM 5 MILLIGRAM(S): 2.5 TABLET ORAL at 21:32

## 2017-02-21 RX ADMIN — Medication 500 MILLIGRAM(S): at 05:20

## 2017-02-21 RX ADMIN — Medication 100 MILLIGRAM(S): at 05:20

## 2017-02-21 RX ADMIN — SENNA PLUS 2 TABLET(S): 8.6 TABLET ORAL at 21:32

## 2017-02-21 RX ADMIN — Medication 100 MILLIGRAM(S): at 21:32

## 2017-02-21 RX ADMIN — Medication 1 TABLET(S): at 11:49

## 2017-02-21 RX ADMIN — Medication 1 TABLET(S): at 08:37

## 2017-02-21 RX ADMIN — Medication 500 MILLIGRAM(S): at 17:28

## 2017-02-22 VITALS
RESPIRATION RATE: 16 BRPM | HEART RATE: 82 BPM | TEMPERATURE: 98 F | DIASTOLIC BLOOD PRESSURE: 79 MMHG | OXYGEN SATURATION: 68 % | SYSTOLIC BLOOD PRESSURE: 115 MMHG

## 2017-02-22 LAB
INR BLD: 1.8 RATIO — HIGH (ref 0.88–1.16)
PROTHROM AB SERPL-ACNC: 20.1 SEC — HIGH (ref 10–13.1)

## 2017-02-22 PROCEDURE — 82553 CREATINE MB FRACTION: CPT

## 2017-02-22 PROCEDURE — 84480 ASSAY TRIIODOTHYRONINE (T3): CPT

## 2017-02-22 PROCEDURE — 87040 BLOOD CULTURE FOR BACTERIA: CPT

## 2017-02-22 PROCEDURE — 87086 URINE CULTURE/COLONY COUNT: CPT

## 2017-02-22 PROCEDURE — 96365 THER/PROPH/DIAG IV INF INIT: CPT

## 2017-02-22 PROCEDURE — 85610 PROTHROMBIN TIME: CPT

## 2017-02-22 PROCEDURE — 85027 COMPLETE CBC AUTOMATED: CPT

## 2017-02-22 PROCEDURE — 94760 N-INVAS EAR/PLS OXIMETRY 1: CPT

## 2017-02-22 PROCEDURE — 97161 PT EVAL LOW COMPLEX 20 MIN: CPT

## 2017-02-22 PROCEDURE — 80061 LIPID PANEL: CPT

## 2017-02-22 PROCEDURE — 83605 ASSAY OF LACTIC ACID: CPT

## 2017-02-22 PROCEDURE — 82550 ASSAY OF CK (CPK): CPT

## 2017-02-22 PROCEDURE — 93306 TTE W/DOPPLER COMPLETE: CPT

## 2017-02-22 PROCEDURE — 70551 MRI BRAIN STEM W/O DYE: CPT

## 2017-02-22 PROCEDURE — 85730 THROMBOPLASTIN TIME PARTIAL: CPT

## 2017-02-22 PROCEDURE — 93880 EXTRACRANIAL BILAT STUDY: CPT

## 2017-02-22 PROCEDURE — 93005 ELECTROCARDIOGRAM TRACING: CPT

## 2017-02-22 PROCEDURE — 71045 X-RAY EXAM CHEST 1 VIEW: CPT

## 2017-02-22 PROCEDURE — 84436 ASSAY OF TOTAL THYROXINE: CPT

## 2017-02-22 PROCEDURE — 80048 BASIC METABOLIC PNL TOTAL CA: CPT

## 2017-02-22 PROCEDURE — 87186 SC STD MICRODIL/AGAR DIL: CPT

## 2017-02-22 PROCEDURE — 70450 CT HEAD/BRAIN W/O DYE: CPT

## 2017-02-22 PROCEDURE — 80053 COMPREHEN METABOLIC PANEL: CPT

## 2017-02-22 PROCEDURE — 81001 URINALYSIS AUTO W/SCOPE: CPT

## 2017-02-22 PROCEDURE — 97116 GAIT TRAINING THERAPY: CPT

## 2017-02-22 PROCEDURE — 84443 ASSAY THYROID STIM HORMONE: CPT

## 2017-02-22 PROCEDURE — 97530 THERAPEUTIC ACTIVITIES: CPT

## 2017-02-22 PROCEDURE — 84100 ASSAY OF PHOSPHORUS: CPT

## 2017-02-22 PROCEDURE — 84484 ASSAY OF TROPONIN QUANT: CPT

## 2017-02-22 PROCEDURE — 83735 ASSAY OF MAGNESIUM: CPT

## 2017-02-22 PROCEDURE — 99285 EMERGENCY DEPT VISIT HI MDM: CPT | Mod: 25

## 2017-02-22 RX ORDER — TAMSULOSIN HYDROCHLORIDE 0.4 MG/1
1 CAPSULE ORAL
Qty: 0 | Refills: 0 | DISCHARGE
Start: 2017-02-22

## 2017-02-22 RX ORDER — FINASTERIDE 5 MG/1
1 TABLET, FILM COATED ORAL
Qty: 0 | Refills: 0 | DISCHARGE
Start: 2017-02-22

## 2017-02-22 RX ORDER — METOPROLOL TARTRATE 50 MG
1 TABLET ORAL
Qty: 0 | Refills: 0 | COMMUNITY

## 2017-02-22 RX ORDER — POLYETHYLENE GLYCOL 3350 17 G/17G
17 POWDER, FOR SOLUTION ORAL
Qty: 0 | Refills: 0 | COMMUNITY
Start: 2017-02-22

## 2017-02-22 RX ORDER — METOPROLOL TARTRATE 50 MG
1 TABLET ORAL
Qty: 0 | Refills: 0 | COMMUNITY
Start: 2017-02-22

## 2017-02-22 RX ORDER — ASPIRIN/CALCIUM CARB/MAGNESIUM 324 MG
1 TABLET ORAL
Qty: 0 | Refills: 0 | COMMUNITY
Start: 2017-02-22

## 2017-02-22 RX ORDER — HALOPERIDOL DECANOATE 100 MG/ML
1 INJECTION INTRAMUSCULAR
Qty: 0 | Refills: 0 | COMMUNITY
Start: 2017-02-22

## 2017-02-22 RX ORDER — SENNA PLUS 8.6 MG/1
2 TABLET ORAL
Qty: 0 | Refills: 0 | COMMUNITY
Start: 2017-02-22

## 2017-02-22 RX ORDER — LACTOBACILLUS ACIDOPHILUS 100MM CELL
1 CAPSULE ORAL
Qty: 0 | Refills: 0 | COMMUNITY
Start: 2017-02-22

## 2017-02-22 RX ORDER — METOPROLOL TARTRATE 50 MG
50 TABLET ORAL
Qty: 0 | Refills: 0 | COMMUNITY
Start: 2017-02-22

## 2017-02-22 RX ORDER — ACETAMINOPHEN 500 MG
2 TABLET ORAL
Qty: 0 | Refills: 0 | COMMUNITY
Start: 2017-02-22

## 2017-02-22 RX ORDER — DOCUSATE SODIUM 100 MG
1 CAPSULE ORAL
Qty: 0 | Refills: 0 | COMMUNITY
Start: 2017-02-22

## 2017-02-22 RX ORDER — CEFUROXIME AXETIL 250 MG
1 TABLET ORAL
Qty: 0 | Refills: 0 | COMMUNITY
Start: 2017-02-22

## 2017-02-22 RX ADMIN — FINASTERIDE 5 MILLIGRAM(S): 5 TABLET, FILM COATED ORAL at 11:43

## 2017-02-22 RX ADMIN — Medication 500 MILLIGRAM(S): at 05:05

## 2017-02-22 RX ADMIN — Medication 100 MILLIGRAM(S): at 05:05

## 2017-02-22 RX ADMIN — POLYETHYLENE GLYCOL 3350 17 GRAM(S): 17 POWDER, FOR SOLUTION ORAL at 11:43

## 2017-02-22 RX ADMIN — Medication 1 TABLET(S): at 11:43

## 2017-02-22 RX ADMIN — SODIUM CHLORIDE 75 MILLILITER(S): 9 INJECTION INTRAMUSCULAR; INTRAVENOUS; SUBCUTANEOUS at 05:05

## 2017-02-22 RX ADMIN — Medication 81 MILLIGRAM(S): at 11:43

## 2017-02-22 RX ADMIN — Medication 1 TABLET(S): at 08:25

## 2017-02-22 RX ADMIN — Medication 25 MILLIGRAM(S): at 05:05

## 2017-02-28 DIAGNOSIS — R33.9 RETENTION OF URINE, UNSPECIFIED: ICD-10-CM

## 2017-02-28 DIAGNOSIS — I10 ESSENTIAL (PRIMARY) HYPERTENSION: ICD-10-CM

## 2017-02-28 DIAGNOSIS — F01.50 VASCULAR DEMENTIA WITHOUT BEHAVIORAL DISTURBANCE: ICD-10-CM

## 2017-02-28 DIAGNOSIS — E86.0 DEHYDRATION: ICD-10-CM

## 2017-02-28 DIAGNOSIS — I48.91 UNSPECIFIED ATRIAL FIBRILLATION: ICD-10-CM

## 2017-02-28 DIAGNOSIS — G93.41 METABOLIC ENCEPHALOPATHY: ICD-10-CM

## 2017-02-28 DIAGNOSIS — R01.1 CARDIAC MURMUR, UNSPECIFIED: ICD-10-CM

## 2017-02-28 DIAGNOSIS — R13.10 DYSPHAGIA, UNSPECIFIED: ICD-10-CM

## 2017-02-28 DIAGNOSIS — I69.320 APHASIA FOLLOWING CEREBRAL INFARCTION: ICD-10-CM

## 2017-02-28 DIAGNOSIS — T83.511A INFECTION AND INFLAMMATORY REACTION DUE TO INDWELLING URETHRAL CATHETER, INITIAL ENCOUNTER: ICD-10-CM

## 2017-02-28 DIAGNOSIS — Z79.01 LONG TERM (CURRENT) USE OF ANTICOAGULANTS: ICD-10-CM

## 2017-02-28 DIAGNOSIS — B96.20 UNSPECIFIED ESCHERICHIA COLI [E. COLI] AS THE CAUSE OF DISEASES CLASSIFIED ELSEWHERE: ICD-10-CM

## 2017-02-28 DIAGNOSIS — Z91.81 HISTORY OF FALLING: ICD-10-CM

## 2017-02-28 DIAGNOSIS — Z79.891 LONG TERM (CURRENT) USE OF OPIATE ANALGESIC: ICD-10-CM

## 2017-02-28 DIAGNOSIS — N39.0 URINARY TRACT INFECTION, SITE NOT SPECIFIED: ICD-10-CM

## 2017-02-28 DIAGNOSIS — D68.9 COAGULATION DEFECT, UNSPECIFIED: ICD-10-CM

## 2017-02-28 DIAGNOSIS — K59.00 CONSTIPATION, UNSPECIFIED: ICD-10-CM

## 2017-02-28 DIAGNOSIS — Z87.891 PERSONAL HISTORY OF NICOTINE DEPENDENCE: ICD-10-CM

## 2017-02-28 DIAGNOSIS — R55 SYNCOPE AND COLLAPSE: ICD-10-CM

## 2017-02-28 DIAGNOSIS — Z79.2 LONG TERM (CURRENT) USE OF ANTIBIOTICS: ICD-10-CM

## 2017-02-28 DIAGNOSIS — N40.0 BENIGN PROSTATIC HYPERPLASIA WITHOUT LOWER URINARY TRACT SYMPTOMS: ICD-10-CM
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
## 2017-03-02 DIAGNOSIS — T45.515A ADVERSE EFFECT OF ANTICOAGULANTS, INITIAL ENCOUNTER: ICD-10-CM

## 2017-11-28 ENCOUNTER — INPATIENT (INPATIENT)
Facility: HOSPITAL | Age: 77
LOS: 1 days | Discharge: HOME | DRG: 312 | End: 2017-11-30
Attending: FAMILY MEDICINE | Admitting: INTERNAL MEDICINE
Payer: COMMERCIAL

## 2017-11-28 VITALS
RESPIRATION RATE: 16 BRPM | OXYGEN SATURATION: 94 % | HEART RATE: 68 BPM | WEIGHT: 160.06 LBS | DIASTOLIC BLOOD PRESSURE: 93 MMHG | TEMPERATURE: 98 F | SYSTOLIC BLOOD PRESSURE: 128 MMHG

## 2017-11-28 DIAGNOSIS — R55 SYNCOPE AND COLLAPSE: ICD-10-CM

## 2017-11-28 DIAGNOSIS — R33.9 RETENTION OF URINE, UNSPECIFIED: ICD-10-CM

## 2017-11-28 DIAGNOSIS — G45.9 TRANSIENT CEREBRAL ISCHEMIC ATTACK, UNSPECIFIED: ICD-10-CM

## 2017-11-28 DIAGNOSIS — F03.90 UNSPECIFIED DEMENTIA WITHOUT BEHAVIORAL DISTURBANCE: ICD-10-CM

## 2017-11-28 DIAGNOSIS — N40.0 BENIGN PROSTATIC HYPERPLASIA WITHOUT LOWER URINARY TRACT SYMPTOMS: ICD-10-CM

## 2017-11-28 DIAGNOSIS — Z29.9 ENCOUNTER FOR PROPHYLACTIC MEASURES, UNSPECIFIED: ICD-10-CM

## 2017-11-28 DIAGNOSIS — I48.91 UNSPECIFIED ATRIAL FIBRILLATION: ICD-10-CM

## 2017-11-28 DIAGNOSIS — I10 ESSENTIAL (PRIMARY) HYPERTENSION: ICD-10-CM

## 2017-11-28 LAB
ANION GAP SERPL CALC-SCNC: 7 MMOL/L — SIGNIFICANT CHANGE UP (ref 5–17)
APTT BLD: 23.3 SEC — LOW (ref 27.5–37.4)
BUN SERPL-MCNC: 21 MG/DL — SIGNIFICANT CHANGE UP (ref 7–23)
CALCIUM SERPL-MCNC: 9.4 MG/DL — SIGNIFICANT CHANGE UP (ref 8.5–10.1)
CHLORIDE SERPL-SCNC: 108 MMOL/L — SIGNIFICANT CHANGE UP (ref 96–108)
CO2 SERPL-SCNC: 26 MMOL/L — SIGNIFICANT CHANGE UP (ref 22–31)
CREAT SERPL-MCNC: 1.1 MG/DL — SIGNIFICANT CHANGE UP (ref 0.5–1.3)
GLUCOSE SERPL-MCNC: 107 MG/DL — HIGH (ref 70–99)
HCT VFR BLD CALC: 48.2 % — SIGNIFICANT CHANGE UP (ref 39–50)
HGB BLD-MCNC: 16.4 G/DL — SIGNIFICANT CHANGE UP (ref 13–17)
INR BLD: 2.08 RATIO — HIGH (ref 0.88–1.16)
MCHC RBC-ENTMCNC: 32.4 PG — SIGNIFICANT CHANGE UP (ref 27–34)
MCHC RBC-ENTMCNC: 34 GM/DL — SIGNIFICANT CHANGE UP (ref 32–36)
MCV RBC AUTO: 95.3 FL — SIGNIFICANT CHANGE UP (ref 80–100)
PLATELET # BLD AUTO: 110 K/UL — LOW (ref 150–400)
POTASSIUM SERPL-MCNC: 4 MMOL/L — SIGNIFICANT CHANGE UP (ref 3.5–5.3)
POTASSIUM SERPL-SCNC: 4 MMOL/L — SIGNIFICANT CHANGE UP (ref 3.5–5.3)
PROTHROM AB SERPL-ACNC: 23 SEC — HIGH (ref 9.8–12.7)
RBC # BLD: 5.05 M/UL — SIGNIFICANT CHANGE UP (ref 4.2–5.8)
RBC # FLD: 12.3 % — SIGNIFICANT CHANGE UP (ref 10.3–14.5)
SODIUM SERPL-SCNC: 141 MMOL/L — SIGNIFICANT CHANGE UP (ref 135–145)
WBC # BLD: 7.2 K/UL — SIGNIFICANT CHANGE UP (ref 3.8–10.5)
WBC # FLD AUTO: 7.2 K/UL — SIGNIFICANT CHANGE UP (ref 3.8–10.5)

## 2017-11-28 PROCEDURE — 99285 EMERGENCY DEPT VISIT HI MDM: CPT

## 2017-11-28 PROCEDURE — 99223 1ST HOSP IP/OBS HIGH 75: CPT | Mod: AI,GC

## 2017-11-28 PROCEDURE — 93010 ELECTROCARDIOGRAM REPORT: CPT

## 2017-11-28 PROCEDURE — 70450 CT HEAD/BRAIN W/O DYE: CPT | Mod: 26

## 2017-11-28 RX ORDER — SODIUM CHLORIDE 9 MG/ML
1000 INJECTION INTRAMUSCULAR; INTRAVENOUS; SUBCUTANEOUS ONCE
Qty: 0 | Refills: 0 | Status: COMPLETED | OUTPATIENT
Start: 2017-11-28 | End: 2017-11-28

## 2017-11-28 RX ORDER — TAMSULOSIN HYDROCHLORIDE 0.4 MG/1
0.4 CAPSULE ORAL AT BEDTIME
Qty: 0 | Refills: 0 | Status: DISCONTINUED | OUTPATIENT
Start: 2017-11-28 | End: 2017-11-28

## 2017-11-28 RX ORDER — SERTRALINE 25 MG/1
50 TABLET, FILM COATED ORAL DAILY
Qty: 0 | Refills: 0 | Status: DISCONTINUED | OUTPATIENT
Start: 2017-11-28 | End: 2017-11-29

## 2017-11-28 RX ORDER — WARFARIN SODIUM 2.5 MG/1
3 TABLET ORAL ONCE
Qty: 0 | Refills: 0 | Status: COMPLETED | OUTPATIENT
Start: 2017-11-28 | End: 2017-11-28

## 2017-11-28 RX ORDER — FINASTERIDE 5 MG/1
5 TABLET, FILM COATED ORAL DAILY
Qty: 0 | Refills: 0 | Status: DISCONTINUED | OUTPATIENT
Start: 2017-11-28 | End: 2017-11-30

## 2017-11-28 RX ORDER — SENNA PLUS 8.6 MG/1
2 TABLET ORAL AT BEDTIME
Qty: 0 | Refills: 0 | Status: DISCONTINUED | OUTPATIENT
Start: 2017-11-28 | End: 2017-11-30

## 2017-11-28 RX ORDER — METOPROLOL TARTRATE 50 MG
25 TABLET ORAL EVERY 12 HOURS
Qty: 0 | Refills: 0 | Status: DISCONTINUED | OUTPATIENT
Start: 2017-11-28 | End: 2017-11-30

## 2017-11-28 RX ORDER — FINASTERIDE 5 MG/1
1 TABLET, FILM COATED ORAL
Qty: 0 | Refills: 0 | COMMUNITY

## 2017-11-28 RX ORDER — WARFARIN SODIUM 2.5 MG/1
1 TABLET ORAL
Qty: 0 | Refills: 0 | COMMUNITY

## 2017-11-28 RX ORDER — TAMSULOSIN HYDROCHLORIDE 0.4 MG/1
0.4 CAPSULE ORAL AT BEDTIME
Qty: 0 | Refills: 0 | Status: DISCONTINUED | OUTPATIENT
Start: 2017-11-28 | End: 2017-11-30

## 2017-11-28 RX ADMIN — WARFARIN SODIUM 3 MILLIGRAM(S): 2.5 TABLET ORAL at 23:17

## 2017-11-28 RX ADMIN — TAMSULOSIN HYDROCHLORIDE 0.4 MILLIGRAM(S): 0.4 CAPSULE ORAL at 23:17

## 2017-11-28 RX ADMIN — SODIUM CHLORIDE 2000 MILLILITER(S): 9 INJECTION INTRAMUSCULAR; INTRAVENOUS; SUBCUTANEOUS at 16:01

## 2017-11-28 NOTE — H&P ADULT - PROBLEM SELECTOR PLAN 2
pt has hx of multiple TIAs   unclear if this is the etiology of current incident  f/u neuro (Goyo) recs

## 2017-11-28 NOTE — H&P ADULT - HISTORY OF PRESENT ILLNESS
77y M PMH Afib (on coumadin), CVA (2012, 2016), dementia, HTN, urinary retention, and multiple TIAs presents after witnessed syncopal episode lasting <1 minute.  Per wife, home health aid was with patient at time of incident.  Saw eyes start to roll back in head, pt became pale and unresponsive. Aid reported that pt wasn't breathing so started chest compressions, at which time pt regained consciousness.  HHA denies head trauma or prodromal symptoms.  Wife feels pt is still slightly lethargic and altered.  History limited 2/2 pt's baseline mental status.  Unable to obtain ROS.      ED Course:  Vitals on presentation: T97.8F, HR 68, /93, RR 16, SpO2 94% on RA.  EKG: A fib @ 80 bpm.  CBC, CMP unremarkable.  INR 2.08.  CT Head: no hemorrhage, contusion, or infarction.  Pt received NS 1L bolus x1.

## 2017-11-28 NOTE — ED ADULT NURSE NOTE - OBJECTIVE STATEMENT
pt to er by ambulance sent for evaluation family states pt had syncapal episode today at home witnessed by aide upon arrival is awake and alert resp even unlabored placed on cardiac monitor ekg done iv started 20 angio right arm wife at bedside with pt

## 2017-11-28 NOTE — H&P ADULT - PROBLEM SELECTOR PLAN 6
per wife,  is a "5" on dementia scale per his psychiatrist  pt still slightly altered DVT PPx: pt is therapeutically anticoagulated on warfarin

## 2017-11-28 NOTE — H&P ADULT - PROBLEM SELECTOR PLAN 5
chronic, stable  continue home finasteride, tamsulosin per wife,  is a "5" on dementia scale per his psychiatrist  pt still slightly altered

## 2017-11-28 NOTE — ED PROVIDER NOTE - CONSTITUTIONAL, MLM
normal... Well appearing, elderly white male, well nourished, awake, alert, oriented to person, in no apparent distress.

## 2017-11-28 NOTE — H&P ADULT - PROBLEM SELECTOR PLAN 3
chronic, rate controlled  INR 2.08 at admission, HR 68  continue home metoprolol with hold parameters  continue home warfarin (3mg daily)

## 2017-11-28 NOTE — H&P ADULT - NSHPPHYSICALEXAM_GEN_ALL_CORE
Physical Exam:  General: Well developed, well nourished, NAD  HEENT: NCAT, PERRLA, EOMI bl, moist mucous membranes   Neck: Supple, nontender, no mass  Neurology: A&Ox1, slight L. sided facial droop with smiling  Respiratory: CTA B/L, No W/R/R  CV: RRR, +S1/S2, no murmurs, rubs or gallops  Abdominal: Soft, NT, ND +BSx4  Extremities: No C/C/E, + peripheral pulses  MSK: no joint erythema or warmth, no joint swelling   Skin: warm, dry, normal color, no rash or abnormal lesions

## 2017-11-28 NOTE — H&P ADULT - ATTENDING COMMENTS
pt is 77 year old male with h/o dementia , atrial fibb admitted for  syncope , r/o tia r/o arrythmia //seizure   CT head is negative for any acute events   will monitor in tele for any arrythmia   trend and f/u cardiac enzymes   cardiology consult   neurology consult   fall precaution   seizure precaution

## 2017-11-28 NOTE — H&P ADULT - PROBLEM SELECTOR PLAN 1
admit to Tele  likely 2/2 TIA vs. cardiac/ACS vs. fatigue/dehydration  cardio (Panella) aware  neuro (Goyo) to see pt tonight  Star (-) x1; trend 0000, 0600  low dose ASA if Star become (+)  f/u UA  fall, seizure, safety precautions

## 2017-11-28 NOTE — H&P ADULT - ASSESSMENT
77y M PMH Afib (on coumadin), CVA (2012, 2016), dementia, HTN, urinary retention, and multiple TIAs admitted after syncopal episode.  Evaluate for cardiac arrhythmia, TIA. 77y M PMH Afib (on coumadin), CVA (2012, 2016), dementia, HTN, , and multiple TIAs admitted after syncopal episode.  Evaluate for cardiac arrhythmia, TIA ??seizure

## 2017-11-28 NOTE — ED PROVIDER NOTE - OBJECTIVE STATEMENT
78 yo white male with brief 1-minute episode of altered mental status at home witnessed by aide now here via EMS for evaluation. Per wife who is with patient at this time, patient is back to his normal baseline mental status. Previous episodes like this in the past. No additional history available at this time.

## 2017-11-29 ENCOUNTER — TRANSCRIPTION ENCOUNTER (OUTPATIENT)
Age: 77
End: 2017-11-29

## 2017-11-29 LAB
ANION GAP SERPL CALC-SCNC: 7 MMOL/L — SIGNIFICANT CHANGE UP (ref 5–17)
BASOPHILS # BLD AUTO: 0 K/UL — SIGNIFICANT CHANGE UP (ref 0–0.2)
BASOPHILS NFR BLD AUTO: 0.5 % — SIGNIFICANT CHANGE UP (ref 0–2)
BUN SERPL-MCNC: 16 MG/DL — SIGNIFICANT CHANGE UP (ref 7–23)
CALCIUM SERPL-MCNC: 9.2 MG/DL — SIGNIFICANT CHANGE UP (ref 8.5–10.1)
CHLORIDE SERPL-SCNC: 109 MMOL/L — HIGH (ref 96–108)
CK MB BLD-MCNC: 2.4 % — SIGNIFICANT CHANGE UP (ref 0–3.5)
CK MB BLD-MCNC: 2.5 % — SIGNIFICANT CHANGE UP (ref 0–3.5)
CK MB CFR SERPL CALC: 0.8 NG/ML — SIGNIFICANT CHANGE UP (ref 0–3.6)
CK MB CFR SERPL CALC: 0.8 NG/ML — SIGNIFICANT CHANGE UP (ref 0–3.6)
CK SERPL-CCNC: 32 U/L — SIGNIFICANT CHANGE UP (ref 26–308)
CK SERPL-CCNC: 33 U/L — SIGNIFICANT CHANGE UP (ref 26–308)
CO2 SERPL-SCNC: 25 MMOL/L — SIGNIFICANT CHANGE UP (ref 22–31)
CREAT SERPL-MCNC: 0.96 MG/DL — SIGNIFICANT CHANGE UP (ref 0.5–1.3)
EOSINOPHIL # BLD AUTO: 0.1 K/UL — SIGNIFICANT CHANGE UP (ref 0–0.5)
EOSINOPHIL NFR BLD AUTO: 1.9 % — SIGNIFICANT CHANGE UP (ref 0–6)
GLUCOSE SERPL-MCNC: 67 MG/DL — LOW (ref 70–99)
HCT VFR BLD CALC: 43.4 % — SIGNIFICANT CHANGE UP (ref 39–50)
HGB BLD-MCNC: 14.5 G/DL — SIGNIFICANT CHANGE UP (ref 13–17)
INR BLD: 2.42 RATIO — HIGH (ref 0.88–1.16)
LYMPHOCYTES # BLD AUTO: 1.9 K/UL — SIGNIFICANT CHANGE UP (ref 1–3.3)
LYMPHOCYTES # BLD AUTO: 33.1 % — SIGNIFICANT CHANGE UP (ref 13–44)
MCHC RBC-ENTMCNC: 32.1 PG — SIGNIFICANT CHANGE UP (ref 27–34)
MCHC RBC-ENTMCNC: 33.5 GM/DL — SIGNIFICANT CHANGE UP (ref 32–36)
MCV RBC AUTO: 96 FL — SIGNIFICANT CHANGE UP (ref 80–100)
MONOCYTES # BLD AUTO: 0.4 K/UL — SIGNIFICANT CHANGE UP (ref 0–0.9)
MONOCYTES NFR BLD AUTO: 7.6 % — SIGNIFICANT CHANGE UP (ref 1–9)
NEUTROPHILS # BLD AUTO: 3.2 K/UL — SIGNIFICANT CHANGE UP (ref 1.8–7.4)
NEUTROPHILS NFR BLD AUTO: 56.8 % — SIGNIFICANT CHANGE UP (ref 43–77)
PLATELET # BLD AUTO: 125 K/UL — LOW (ref 150–400)
POTASSIUM SERPL-MCNC: 3.9 MMOL/L — SIGNIFICANT CHANGE UP (ref 3.5–5.3)
POTASSIUM SERPL-SCNC: 3.9 MMOL/L — SIGNIFICANT CHANGE UP (ref 3.5–5.3)
PROTHROM AB SERPL-ACNC: 26.9 SEC — HIGH (ref 9.8–12.7)
RBC # BLD: 4.52 M/UL — SIGNIFICANT CHANGE UP (ref 4.2–5.8)
RBC # FLD: 12.4 % — SIGNIFICANT CHANGE UP (ref 10.3–14.5)
SODIUM SERPL-SCNC: 141 MMOL/L — SIGNIFICANT CHANGE UP (ref 135–145)
TROPONIN I SERPL-MCNC: <.015 NG/ML — SIGNIFICANT CHANGE UP (ref 0.01–0.04)
TROPONIN I SERPL-MCNC: <.015 NG/ML — SIGNIFICANT CHANGE UP (ref 0.01–0.04)
WBC # BLD: 5.7 K/UL — SIGNIFICANT CHANGE UP (ref 3.8–10.5)
WBC # FLD AUTO: 5.7 K/UL — SIGNIFICANT CHANGE UP (ref 3.8–10.5)

## 2017-11-29 PROCEDURE — 99223 1ST HOSP IP/OBS HIGH 75: CPT

## 2017-11-29 PROCEDURE — 99233 SBSQ HOSP IP/OBS HIGH 50: CPT

## 2017-11-29 RX ORDER — SERTRALINE 25 MG/1
75 TABLET, FILM COATED ORAL DAILY
Qty: 0 | Refills: 0 | Status: DISCONTINUED | OUTPATIENT
Start: 2017-11-29 | End: 2017-11-30

## 2017-11-29 RX ORDER — EZETIMIBE 10 MG/1
10 TABLET ORAL AT BEDTIME
Qty: 0 | Refills: 0 | Status: DISCONTINUED | OUTPATIENT
Start: 2017-11-29 | End: 2017-11-30

## 2017-11-29 RX ORDER — WARFARIN SODIUM 2.5 MG/1
3 TABLET ORAL ONCE
Qty: 0 | Refills: 0 | Status: COMPLETED | OUTPATIENT
Start: 2017-11-29 | End: 2017-11-29

## 2017-11-29 RX ADMIN — FINASTERIDE 5 MILLIGRAM(S): 5 TABLET, FILM COATED ORAL at 11:49

## 2017-11-29 RX ADMIN — SERTRALINE 50 MILLIGRAM(S): 25 TABLET, FILM COATED ORAL at 11:49

## 2017-11-29 RX ADMIN — Medication 25 MILLIGRAM(S): at 17:35

## 2017-11-29 RX ADMIN — Medication 25 MILLIGRAM(S): at 06:22

## 2017-11-29 NOTE — DISCHARGE NOTE ADULT - NS AS DC STROKE ED MATERIALS
Risk Factors for Stroke/Prescribed Medications/Need for Followup After Discharge/Stroke Education Booklet/Stroke Warning Signs and Symptoms/Call 911 for Stroke/Atrial Fibrillation is a risk factor for Strokes Please read stroke Education. Atrial Fibrillation is a risk factor for Strokes Please read stroke Education.

## 2017-11-29 NOTE — DISCHARGE NOTE ADULT - PLAN OF CARE
no further episodes Follow up with cardiology and your primary care physician.  Avoid moving from lying to sitting or from sitting on bed to transfer to wheelchair to rapidly. Continue Metoprolol  Continue Coumadin Continue home medications Supportive care.  Continue medication for mood.

## 2017-11-29 NOTE — CONSULT NOTE ADULT - ASSESSMENT
77y M PMHx of atrial fibrillation (on coumadin), CVA (2012/16), dementia, HTN, multiple TIAs admitted for syncopal episode.     - No evidence of acute ischemia, troponin I negative x 2 sets. Patient with no anginal symptoms.  - No evidence of any meaningful volume overload  - EKG showed Atrial fibrillation at 80 bpm with no acute changes when compared to prior study, monitor on telemetry  - Previous echocardiogram in 02/17 shows normal LV function with EF: 55-60%; moderate MR  - BP well controlled, continue home BP meds, monitor routine hemodynamics  - continue with lopressor 25 mg q12hrs; INR within therapeutic range. Dose coumadin with goal INR 2-3  - monitor and replete lytes, keep K>4, Mg>2  - Other cardiovascular workup will depend on clinical course.  - All other workup per primary team  - Will continue to follow with you 77y M PMHx of atrial fibrillation (on coumadin), CVA (2012/16), dementia, HTN, multiple TIAs admitted for syncopal episode.     - No evidence of acute ischemia, troponin I negative x 2 sets. Patient with no anginal symptoms.  - No evidence of any meaningful volume overload  - EKG showed Atrial fibrillation at 80 bpm with no acute changes when compared to prior study, monitor on telemetry  - Previous echocardiogram in 02/17 shows normal LV function with EF: 55-60%; moderate MR  - BP well controlled, continue home BP meds, monitor routine hemodynamics  - Please check orthostatics.  - continue with lopressor 25 mg q12hrs; INR within therapeutic range. Dose coumadin with goal INR 2-3  - monitor and replete lytes, keep K>4, Mg>2  - Other cardiovascular workup will depend on clinical course.  - No clear cardiac etiology of his syncope.  - All other workup per primary team  - Will continue to follow with you

## 2017-11-29 NOTE — ED ADULT NURSE REASSESSMENT NOTE - NS ED NURSE REASSESS COMMENT FT1
called tele charge RN to give report, didn't like the bed that was assigned will call me  back. waited awhile called them back to have charge Rn tell me to call AIDE Willis, phone busy
sleeping at this time
no distress at this time.  awaiting bed assignment
received report from melo gaston.  admitted to hospital.  awaiting bed at this time
sleeping at this time
sleeping, easily arousable

## 2017-11-29 NOTE — DISCHARGE NOTE ADULT - PATIENT PORTAL LINK FT
“You can access the [facility] Patient Portal, offered by [facility], by registering with the following website: [email or web address]”

## 2017-11-29 NOTE — DISCHARGE NOTE ADULT - MEDICATION SUMMARY - MEDICATIONS TO TAKE
I will START or STAY ON the medications listed below when I get home from the hospital:    finasteride 5 mg oral tablet  -- 1 tab(s) by mouth once a day  -- Indication: For BPH (benign prostatic hyperplasia)    tamsulosin 0.4 mg oral capsule  -- 1 cap(s) by mouth once a day (at bedtime)  -- Indication: For BPH (benign prostatic hyperplasia)    warfarin 3 mg oral tablet  -- 1 tab(s) by mouth once a day  -- Indication: For Atrial fibrillation    sertraline 50 mg oral tablet  -- 1 tab(s) by mouth once a day  -- Indication: For Mood disorder    Zetia 10 mg oral tablet  -- 1 tab(s) by mouth once a day  -- Indication: For Hld    metoprolol tartrate 25 mg oral tablet  -- 1 tab(s) by mouth every 12 hours  -- Indication: For Htn    senna oral tablet  -- 2 tab(s) by mouth once a day (at bedtime)  -- Indication: For constipation

## 2017-11-29 NOTE — DISCHARGE NOTE ADULT - CARE PROVIDER_API CALL
Lisa Trimble (CITL[facility]Y), Internal Medicine  [address] [place] [address]  Phone: [phone]  Fax: [phone]    Lucila Leo), Cardiovascular Disease; Internal Medicine  [address] [place] [address]  Phone: [phone]  Fax: [phone]    Carlo Nance (CITL[facility]Y), Neurology  [address] [place] [address]  Phone: [phone]  Fax: [phone]

## 2017-11-29 NOTE — DISCHARGE NOTE ADULT - CARE PLAN
Principal Discharge DX:	Vasovagal syncope  Goal:	no further episodes  Instructions for follow-up, activity and diet:	Follow up with cardiology and your primary care physician.  Avoid moving from lying to sitting or from sitting on bed to transfer to wheelchair to rapidly.  Secondary Diagnosis:	Atrial fibrillation  Instructions for follow-up, activity and diet:	Continue Metoprolol  Continue Coumadin  Secondary Diagnosis:	BPH (benign prostatic hyperplasia)  Instructions for follow-up, activity and diet:	Continue home medications  Secondary Diagnosis:	Dementia  Instructions for follow-up, activity and diet:	Supportive care.  Continue medication for mood.  Secondary Diagnosis:	Hypertension  Instructions for follow-up, activity and diet:	Continue home medications

## 2017-11-29 NOTE — CONSULT NOTE ADULT - ATTENDING COMMENTS
Seen/examined. Agree with the above.  Unclear etiology of his syncope, but presumably vagal. Can repeat his echocardiogram to evaluate his mitral regurgitation.  Check orthostatics. Will follow with you

## 2017-11-29 NOTE — CONSULT NOTE ADULT - SUBJECTIVE AND OBJECTIVE BOX
[facility]         Rogelio Robb, Flor, Sandi, Amie, Matilda Cabrera        [phone] (office)    CHIEF COMPLAINT: Patient is a 77y old  Male who presents with a chief complaint of syncope (28 Nov 2017 18:20)    PMD: Dr. Trimble  Cardio: Dr. Leo  Neuro: Dr. Nance    HPI:  77y M PMH Afib (on coumadin), CVA (2012, 2016), dementia, HTN, urinary retention, and multiple TIAs presents after witnessed syncopal episode lasting <1 minute.  Per wife, home health aid was with patient at time of incident.  Saw eyes start to roll back in head, pt became pale and unresponsive. Aid reported that pt wasn't breathing so started chest compressions, at which time pt regained consciousness.  HHA denies head trauma or prodromal symptoms.  Wife feels pt is still slightly lethargic and altered.  History limited 2/2 pt's baseline mental status.  Unable to obtain ROS.      ED Course:  Vitals on presentation: T97.8F, HR 68, /93, RR 16, SpO2 94% on RA.  EKG: A fib @ 80 bpm.  CBC, CMP unremarkable.  INR 2.08.  CT Head: no hemorrhage, contusion, or infarction.  Pt received NS 1L bolus x1. (28 Nov 2017 18:20)    Interval HPI: Patient seen and examined at bedside. Appears confused. Unsure of the events of the day prior to admission. Denies chest pain, shortness of breath.   PAST MEDICAL & SURGICAL HISTORY:  Constipation  Dementia  Urinary retention  Cerebrovascular accident  Hypertension  Atrial fibrillation  No significant past surgical history      SOCIAL HISTORY: No active tobacco, alcohol or illicit drug use    FAMILY HISTORY:  No pertinent family history in first degree relatives      Outpatient medications:    MEDICATIONS  (STANDING):  finasteride 5 milliGRAM(s) Oral daily  metoprolol     tartrate 25 milliGRAM(s) Oral every 12 hours  senna 2 Tablet(s) Oral at bedtime  sertraline 50 milliGRAM(s) Oral daily  tamsulosin 0.4 milliGRAM(s) Oral at bedtime    MEDICATIONS  (PRN):      Allergies    No Known Allergies    Intolerances        REVIEW OF SYSTEMS: Is negative for eye, ENT, GI, , allergic, dermatologic, musculoskeletal and neurologic, except as described above.    VITAL SIGNS:   Vital Signs Last 24 Hrs  T(C): 36.7 (29 Nov 2017 06:23), Max: 36.7 (28 Nov 2017 19:45)  T(F): 98 (29 Nov 2017 06:23), Max: 98.1 (29 Nov 2017 03:47)  HR: 69 (29 Nov 2017 07:23) (68 - 69)  BP: 158/97 (29 Nov 2017 07:23) (122/80 - 158/97)  BP(mean): --  RR: 13 (29 Nov 2017 07:23) (13 - 17)  SpO2: 99% (29 Nov 2017 07:23) (94% - 100%)    I&O's Summary      PHYSICAL EXAM:    Constitutional: elderly male in NAD, awake and alert, well-developed  Eyes:  EOMI, no oral cyanosis, conjunctivae clear, anicteric.  Pulmonary: Non-labored, breath sounds are clear bilaterally, no wheezing, rales or rhonchi  Cardiovascular:  irregular, soft S1 and S2. No murmur.  No rubs, gallops or clicks  Gastrointestinal: Bowel Sounds present, soft, nontender.   Lymph: No peripheral edema.   Neurological: AAOX1 (to self), mild L facial droop, strength and sensitivity are grossly intact  Skin: No obvious lesions/rashes.   Psych:  Mood & affect appropriate .    LABS: All Labs Reviewed:                        16.4   7.2   )-----------( 110      ( 28 Nov 2017 15:59 )             48.2     28 Nov 2017 15:59    141    |  108    |  21     ----------------------------<  107    4.0     |  26     |  1.10     Ca    9.4        28 Nov 2017 15:59      PT/INR - ( 28 Nov 2017 15:59 )   PT: 23.0 sec;   INR: 2.08 ratio         PTT - ( 28 Nov 2017 15:59 )  PTT:23.3 sec  CARDIAC MARKERS ( 29 Nov 2017 00:29 )  <.015 ng/mL / x     / 33 U/L / x     / 0.8 ng/mL  CARDIAC MARKERS ( 28 Nov 2017 15:59 )  <.015 ng/mL / x     / 49 U/L / x     / 0.9 ng/mL      Blood Culture:         RADIOLOGY:   CT Head No Cont (11.28.17 @ 16:11)  IMPRESSION:   Extensive chronic small vessel ischemic changes. No parenchymal contusion, hemorrhage or extra-axial collection. No CT evidence of an acute territorial infarction.      EKG: Atrial fibrillation at 80 bpm [facility]         Rogelio Robb, Flor, Sandi, Amie, Matilda Cabrera        [phone] (office)    CHIEF COMPLAINT: Patient is a 77y old  Male who presents with a chief complaint of syncope (28 Nov 2017 18:20)    PMD: Dr. Trimble  Cardio: Dr. Leo  Neuro: Dr. Nance    HPI:  77y M PMH Afib (on coumadin), CVA (2012, 2016), dementia, HTN, urinary retention, and multiple TIAs presents after witnessed syncopal episode lasting <1 minute.  Per wife, home health aid was with patient at time of incident.  Saw eyes start to roll back in head, pt became pale and unresponsive. Aid reported that pt wasn't breathing so started chest compressions, at which time pt regained consciousness.  HHA denies head trauma or prodromal symptoms.  Wife feels pt is still slightly lethargic and altered.  History limited 2/2 pt's baseline mental status.  Unable to obtain ROS.      ED Course:  Vitals on presentation: T97.8F, HR 68, /93, RR 16, SpO2 94% on RA.  EKG: A fib @ 80 bpm.  CBC, CMP unremarkable.  INR 2.08.  CT Head: no hemorrhage, contusion, or infarction.  Pt received NS 1L bolus x1. (28 Nov 2017 18:20)    Interval HPI: Patient seen and examined at bedside. Appears confused. Unsure of the events of the day prior to admission. States that he cannot recall what brought him into the hospital. Denies chest pain, shortness of breath, headache, blurry vision, n/v/d.    PAST MEDICAL & SURGICAL HISTORY:  Constipation  Dementia  Urinary retention  Cerebrovascular accident  Hypertension  Atrial fibrillation  No significant past surgical history      SOCIAL HISTORY: No active tobacco, alcohol or illicit drug use    FAMILY HISTORY:  No pertinent family history in first degree relatives      Outpatient medications:    MEDICATIONS  (STANDING):  finasteride 5 milliGRAM(s) Oral daily  metoprolol     tartrate 25 milliGRAM(s) Oral every 12 hours  senna 2 Tablet(s) Oral at bedtime  sertraline 50 milliGRAM(s) Oral daily  tamsulosin 0.4 milliGRAM(s) Oral at bedtime    MEDICATIONS  (PRN):      Allergies    No Known Allergies    Intolerances        REVIEW OF SYSTEMS: Is negative for eye, ENT, GI, , allergic, dermatologic, musculoskeletal and neurologic, except as described above.    VITAL SIGNS:   Vital Signs Last 24 Hrs  T(C): 36.7 (29 Nov 2017 06:23), Max: 36.7 (28 Nov 2017 19:45)  T(F): 98 (29 Nov 2017 06:23), Max: 98.1 (29 Nov 2017 03:47)  HR: 69 (29 Nov 2017 07:23) (68 - 69)  BP: 158/97 (29 Nov 2017 07:23) (122/80 - 158/97)  BP(mean): --  RR: 13 (29 Nov 2017 07:23) (13 - 17)  SpO2: 99% (29 Nov 2017 07:23) (94% - 100%)    I&O's Summary      PHYSICAL EXAM:    Constitutional: elderly male in NAD, awake and alert, well-developed  Eyes:  EOMI, no oral cyanosis, conjunctivae clear, anicteric.  Pulmonary: Non-labored, breath sounds are clear bilaterally, no wheezing, rales or rhonchi  Cardiovascular:  irregular, soft S1 and S2. No murmur.  No rubs, gallops or clicks  Gastrointestinal: Bowel Sounds present, soft, nontender.   Lymph: No peripheral edema.   Neurological: AAOX1 (to self), mild L facial droop, strength and sensitivity are grossly intact  Skin: No obvious lesions/rashes.   Psych:  Mood & affect appropriate; confused    LABS: All Labs Reviewed:                        16.4   7.2   )-----------( 110      ( 28 Nov 2017 15:59 )             48.2     28 Nov 2017 15:59    141    |  108    |  21     ----------------------------<  107    4.0     |  26     |  1.10     Ca    9.4        28 Nov 2017 15:59      PT/INR - ( 28 Nov 2017 15:59 )   PT: 23.0 sec;   INR: 2.08 ratio         PTT - ( 28 Nov 2017 15:59 )  PTT:23.3 sec  CARDIAC MARKERS ( 29 Nov 2017 00:29 )  <.015 ng/mL / x     / 33 U/L / x     / 0.8 ng/mL  CARDIAC MARKERS ( 28 Nov 2017 15:59 )  <.015 ng/mL / x     / 49 U/L / x     / 0.9 ng/mL      Blood Culture:     RADIOLOGY:   CT Head No Cont (11.28.17 @ 16:11)  IMPRESSION:   Extensive chronic small vessel ischemic changes. No parenchymal contusion, hemorrhage or extra-axial collection. No CT evidence of an acute territorial infarction.    EKG: Atrial fibrillation at 80 bpm

## 2017-11-29 NOTE — DISCHARGE NOTE ADULT - HOSPITAL COURSE
77y M PMH Afib (on coumadin), CVA (2012, 2016), dementia, HTN, urinary retention, and multiple TIAs presented after witnessed syncopal episode lasting <1 minute.  Per wife, home health aid was with patient at time of incident.  Saw eyes start to roll back in head, pt became pale and unresponsive. Aid reported that pt wasn't breathing so started chest compressions, at which time pt regained consciousness.  HHA denies head trauma or prodromal symptoms.    ED Course:  Vitals on presentation: T97.8F, HR 68, /93, RR 16, SpO2 94% on RA.  EKG: A fib @ 80 bpm.  CBC, CMP unremarkable.  INR 2.08.  CT Head: no hemorrhage, contusion, or infarction.  Pt received NS 1L bolus x1. (28 Nov 2017 18:20)  Patient was admitted to telemetry for monitoring. He had no events on telemetry. Vital signs were negative for orthostasis. He was evaluated by Neurology Dr. Nance who did not feel that this was due to a CNS event. He was evaluated by Cardiology Dr. Grey/ and it was not felt this was a cardiac event either. He did not have events on telemetry monitoring. He had a TTE which showed......    Patient stable for discharge home and outpatient followup. Will notify his PCP Dr. Trimble. 77y M PMH Afib (on coumadin), CVA (2012, 2016), dementia, HTN, urinary retention, and multiple TIAs presented after witnessed syncopal episode lasting <1 minute.  Per wife, home health aid was with patient at time of incident.  Saw eyes start to roll back in head, pt became pale and unresponsive. Aid reported that pt wasn't breathing so started chest compressions, at which time pt regained consciousness.  HHA denies head trauma or prodromal symptoms.    ED Course:  Vitals on presentation: T97.8F, HR 68, /93, RR 16, SpO2 94% on RA.  EKG: A fib @ 80 bpm.  CBC, CMP unremarkable.  INR 2.08.  CT Head: no hemorrhage, contusion, or infarction.  Pt received NS 1L bolus x1. (28 Nov 2017 18:20)  Patient was admitted to telemetry for monitoring. He had no events on telemetry. Vital signs were negative for orthostasis. He was evaluated by Neurology Dr. Nance who did not feel that this was due to a CNS event. He was evaluated by Cardiology Dr. Grey/ and it was not felt this was a cardiac event either. He did not have events on telemetry monitoring. He had a TTE which showed normal LV function. It it felt that his syncopal episode was vasovagal in nature. He is cleared for discharge by cardiology and neurology.     Patient stable for discharge home and outpatient followup. Will notify his PCP Dr. Trimble.

## 2017-11-30 VITALS
TEMPERATURE: 98 F | HEART RATE: 86 BPM | DIASTOLIC BLOOD PRESSURE: 99 MMHG | OXYGEN SATURATION: 97 % | SYSTOLIC BLOOD PRESSURE: 137 MMHG | RESPIRATION RATE: 16 BRPM

## 2017-11-30 LAB
ANION GAP SERPL CALC-SCNC: 6 MMOL/L — SIGNIFICANT CHANGE UP (ref 5–17)
BUN SERPL-MCNC: 15 MG/DL — SIGNIFICANT CHANGE UP (ref 7–23)
CALCIUM SERPL-MCNC: 9.1 MG/DL — SIGNIFICANT CHANGE UP (ref 8.5–10.1)
CHLORIDE SERPL-SCNC: 106 MMOL/L — SIGNIFICANT CHANGE UP (ref 96–108)
CO2 SERPL-SCNC: 29 MMOL/L — SIGNIFICANT CHANGE UP (ref 22–31)
CREAT SERPL-MCNC: 1 MG/DL — SIGNIFICANT CHANGE UP (ref 0.5–1.3)
GLUCOSE SERPL-MCNC: 84 MG/DL — SIGNIFICANT CHANGE UP (ref 70–99)
HCT VFR BLD CALC: 42.9 % — SIGNIFICANT CHANGE UP (ref 39–50)
HGB BLD-MCNC: 14.4 G/DL — SIGNIFICANT CHANGE UP (ref 13–17)
INR BLD: 2.36 RATIO — HIGH (ref 0.88–1.16)
MCHC RBC-ENTMCNC: 32.1 PG — SIGNIFICANT CHANGE UP (ref 27–34)
MCHC RBC-ENTMCNC: 33.4 GM/DL — SIGNIFICANT CHANGE UP (ref 32–36)
MCV RBC AUTO: 96 FL — SIGNIFICANT CHANGE UP (ref 80–100)
PLATELET # BLD AUTO: 132 K/UL — LOW (ref 150–400)
POTASSIUM SERPL-MCNC: 3.7 MMOL/L — SIGNIFICANT CHANGE UP (ref 3.5–5.3)
POTASSIUM SERPL-SCNC: 3.7 MMOL/L — SIGNIFICANT CHANGE UP (ref 3.5–5.3)
PROTHROM AB SERPL-ACNC: 26.2 SEC — HIGH (ref 9.8–12.7)
RBC # BLD: 4.47 M/UL — SIGNIFICANT CHANGE UP (ref 4.2–5.8)
RBC # FLD: 12.2 % — SIGNIFICANT CHANGE UP (ref 10.3–14.5)
SODIUM SERPL-SCNC: 141 MMOL/L — SIGNIFICANT CHANGE UP (ref 135–145)
WBC # BLD: 6 K/UL — SIGNIFICANT CHANGE UP (ref 3.8–10.5)
WBC # FLD AUTO: 6 K/UL — SIGNIFICANT CHANGE UP (ref 3.8–10.5)

## 2017-11-30 PROCEDURE — 93306 TTE W/DOPPLER COMPLETE: CPT | Mod: 26

## 2017-11-30 PROCEDURE — 99239 HOSP IP/OBS DSCHRG MGMT >30: CPT

## 2017-11-30 PROCEDURE — 99233 SBSQ HOSP IP/OBS HIGH 50: CPT

## 2017-11-30 RX ORDER — WARFARIN SODIUM 2.5 MG/1
3 TABLET ORAL ONCE
Qty: 0 | Refills: 0 | Status: DISCONTINUED | OUTPATIENT
Start: 2017-11-30 | End: 2017-11-30

## 2017-11-30 RX ADMIN — WARFARIN SODIUM 3 MILLIGRAM(S): 2.5 TABLET ORAL at 00:05

## 2017-11-30 RX ADMIN — Medication 25 MILLIGRAM(S): at 07:17

## 2017-11-30 RX ADMIN — SERTRALINE 75 MILLIGRAM(S): 25 TABLET, FILM COATED ORAL at 14:42

## 2017-11-30 RX ADMIN — TAMSULOSIN HYDROCHLORIDE 0.4 MILLIGRAM(S): 0.4 CAPSULE ORAL at 00:04

## 2017-11-30 RX ADMIN — FINASTERIDE 5 MILLIGRAM(S): 5 TABLET, FILM COATED ORAL at 14:42

## 2017-11-30 NOTE — PROGRESS NOTE ADULT - SUBJECTIVE AND OBJECTIVE BOX
[facility] -- Rogelio Robb, Sandi Ray, Rick Holloway Savella  Office # [phone]      Follow Up:  CVA, AF    Subjective/Observations: Patient seen and examined. Events noted. Resting comfortably in bed. Not answering questions.       REVIEW OF SYSTEMS: Limited 2/2 comorbidities     PAST MEDICAL & SURGICAL HISTORY:  Constipation  Dementia  Urinary retention  Cerebrovascular accident  Hypertension  Atrial fibrillation  No significant past surgical history      MEDICATIONS  (STANDING):  ezetimibe 10 milliGRAM(s) Oral at bedtime  finasteride 5 milliGRAM(s) Oral daily  metoprolol     tartrate 25 milliGRAM(s) Oral every 12 hours  senna 2 Tablet(s) Oral at bedtime  sertraline 75 milliGRAM(s) Oral daily  tamsulosin 0.4 milliGRAM(s) Oral at bedtime  warfarin 3 milliGRAM(s) Oral once    MEDICATIONS  (PRN):      Allergies    No Known Allergies    Intolerances            Vital Signs Last 24 Hrs  T(C): 36.8 (30 Nov 2017 15:59), Max: 36.9 (30 Nov 2017 00:03)  T(F): 98.3 (30 Nov 2017 15:59), Max: 98.4 (30 Nov 2017 00:03)  HR: 86 (30 Nov 2017 15:59) (68 - 93)  BP: 137/99 (30 Nov 2017 15:59) (129/85 - 165/89)  BP(mean): --  RR: 16 (30 Nov 2017 15:59) (15 - 18)  SpO2: 97% (30 Nov 2017 15:59) (95% - 99%)    I&O's Summary    29 Nov 2017 07:01  -  30 Nov 2017 07:00  --------------------------------------------------------  IN: 0 mL / OUT: 1 mL / NET: -1 mL      Weight (kg): 70.7 (11-29 @ 18:55)    PHYSICAL EXAM:  TELE: AF 80s  Constitutional: NAD, awake    HEENT: Moist Mucous Membranes, Anicteric  Pulmonary: Decreased breath sounds b/l.   Cardiovascular: IRRR, S1 and S2, No murmurs, rubs, gallops or clicks  Gastrointestinal: Bowel Sounds present, soft, nontender.   Lymph: No peripheral edema. No lymphadenopathy.  Skin: No visible rashes or ulcers.       LABS: All Labs Reviewed:                        14.4   6.0   )-----------( 132      ( 30 Nov 2017 05:57 )             42.9                         14.5   5.7   )-----------( 125      ( 29 Nov 2017 08:06 )             43.4                         16.4   7.2   )-----------( 110      ( 28 Nov 2017 15:59 )             48.2     30 Nov 2017 05:57    141    |  106    |  15     ----------------------------<  84     3.7     |  29     |  1.00   29 Nov 2017 08:06    141    |  109    |  16     ----------------------------<  67     3.9     |  25     |  0.96   28 Nov 2017 15:59    141    |  108    |  21     ----------------------------<  107    4.0     |  26     |  1.10     Ca    9.1        30 Nov 2017 05:57  Ca    9.2        29 Nov 2017 08:06  Ca    9.4        28 Nov 2017 15:59      PT/INR - ( 30 Nov 2017 05:57 )   PT: 26.2 sec;   INR: 2.36 ratio           CARDIAC MARKERS ( 29 Nov 2017 08:06 )  <.015 ng/mL / x     / 32 U/L / x     / 0.8 ng/mL  CARDIAC MARKERS ( 29 Nov 2017 00:29 )  <.015 ng/mL / x     / 33 U/L / x     / 0.8 ng/mL       prelim TTE : TDS. echo with normal LV fxn. enlarged LA mild MR
HPI:  77y M PMH Afib (on coumadin), CVA (2012, 2016), dementia, HTN, urinary retention, and multiple TIAs presents after witnessed syncopal episode lasting <1 minute.      INTERVAL EVENTS: Patient seen and examined at bedside. Sitting in bed comfortably, eating breakfast. No further episodes of syncope/near-syncope reported. Back to baseline per wife. Patient unable to voice complaints. Denies chest pain, SOB, abdominal pain when asked, but notably, is a poor historian.     REVIEW OF SYSTEMS:  unable to obtain secondary to dementia      VITAL SIGNS:  Vital Signs Last 24 Hrs  T(C): 36.9 (30 Nov 2017 07:40), Max: 36.9 (30 Nov 2017 00:03)  T(F): 98.4 (30 Nov 2017 07:40), Max: 98.4 (30 Nov 2017 00:03)  HR: 72 (30 Nov 2017 07:40) (68 - 93)  BP: 152/100 (30 Nov 2017 07:40) (129/85 - 165/89)  BP(mean): --  RR: 17 (30 Nov 2017 07:40) (14 - 18)  SpO2: 95% (30 Nov 2017 07:40) (95% - 99%)    PHYSICAL EXAM:     GENERAL: no acute distress  HEENT: NC/AT, EOMI, neck supple, MMM  RESPIRATORY: LCTAB/L, no rhonchi, rales, or wheezing  CARDIOVASCULAR: irregular, no murmurs, gallops, rubs  ABDOMINAL: soft, non-tender, non-distended, positive bowel sounds   EXTREMITIES: no clubbing, cyanosis, or edema  NEUROLOGICAL: awake, alert, pleasantly demented, follows commands, no gross focal deficits.  SKIN: no rashes or lesions   MUSCULOSKELETAL: no gross joint deformity                          14.4   6.0   )-----------( 132      ( 30 Nov 2017 05:57 )             42.9   11-30    141  |  106  |  15  ----------------------------<  84  3.7   |  29  |  1.00    Ca    9.1      30 Nov 2017 05:57    PT/INR - ( 30 Nov 2017 05:57 )   PT: 26.2 sec;   INR: 2.36 ratio         PTT - ( 28 Nov 2017 15:59 )  PTT:23.3 sec    MEDICATIONS  (STANDING):  ezetimibe 10 milliGRAM(s) Oral at bedtime  finasteride 5 milliGRAM(s) Oral daily  metoprolol     tartrate 25 milliGRAM(s) Oral every 12 hours  senna 2 Tablet(s) Oral at bedtime  sertraline 75 milliGRAM(s) Oral daily  tamsulosin 0.4 milliGRAM(s) Oral at bedtime
Neurology Follow up note    JOSÉ MIGUEL COOPER77yMale    HPI:  77y M PMH Afib (on coumadin), CVA (2012, 2016), dementia, HTN, urinary retention, and multiple TIAs presents after witnessed syncopal episode lasting <1 minute.  Per wife, home health aid was with patient at time of incident.  Saw eyes start to roll back in head, pt became pale and unresponsive. Aid reported that pt wasn't breathing so started chest compressions, at which time pt regained consciousness.  HHA denies head trauma or prodromal symptoms.  Wife feels pt is still slightly lethargic and altered.  History limited 2/2 pt's baseline mental status.  Unable to obtain ROS.      ED Course:  Vitals on presentation: T97.8F, HR 68, /93, RR 16, SpO2 94% on RA.  EKG: A fib @ 80 bpm.  CBC, CMP unremarkable.  INR 2.08.  CT Head: no hemorrhage, contusion, or infarction.  Pt received NS 1L bolus x1. (28 Nov 2017 18:20)      Interval History - doing better.    Patient is seen, chart was reviewed and case was discussed with the treatment team.  Pt is not in any distress.   Lying on bed comfortably.   No events reported overnight.   No clinical seizure was reported.  Sitting on chair bed comfortably.    is at bedside.    Vital Signs Last 24 Hrs  T(C): 36.9 (30 Nov 2017 07:40), Max: 36.9 (30 Nov 2017 00:03)  T(F): 98.4 (30 Nov 2017 07:40), Max: 98.4 (30 Nov 2017 00:03)  HR: 72 (30 Nov 2017 07:40) (68 - 93)  BP: 152/100 (30 Nov 2017 07:40) (129/85 - 165/89)  BP(mean): --  RR: 17 (30 Nov 2017 07:40) (15 - 18)  SpO2: 95% (30 Nov 2017 07:40) (95% - 99%)    Height (cm): 172.72 (11-29 @ 18:55)  Weight (kg): 70.7 (11-29 @ 18:55)  BMI (kg/m2): 23.7 (11-29 @ 18:55)    REVIEW OF SYSTEMS:      On Neurological Examination:    Mental Status - Pt is alert, awake, confused. following simple command.    Speech -  Normal. Pt has no aphasia/dysarthria.    Cranial Nerves - Pupils 3 mm equal and reactive to light, extraocular eye movements intact.   Pt has no r facial asymmetry. Facial sensation is intact. Tongue - is in midline.    Muscle tone -increased tone..      Motor Exam - UE 4/5.  LE 3/5.    Sensory Exam . Pt withdraws all extremities equally on stimulation. No asymmetry seen. No complaints of tingling, numbness.    Deep tendon Reflexes - 2 plus all over.          Neck Supple -  Yes.     MEDICATIONS    ezetimibe 10 milliGRAM(s) Oral at bedtime  finasteride 5 milliGRAM(s) Oral daily  metoprolol     tartrate 25 milliGRAM(s) Oral every 12 hours  senna 2 Tablet(s) Oral at bedtime  sertraline 75 milliGRAM(s) Oral daily  tamsulosin 0.4 milliGRAM(s) Oral at bedtime  warfarin 3 milliGRAM(s) Oral once      Allergies    No Known Allergies    Intolerances        LABS:  CBC Full  -  ( 30 Nov 2017 05:57 )  WBC Count : 6.0 K/uL  Hemoglobin : 14.4 g/dL  Hematocrit : 42.9 %  Platelet Count - Automated : 132 K/uL  Mean Cell Volume : 96.0 fl  Mean Cell Hemoglobin : 32.1 pg  Mean Cell Hemoglobin Concentration : 33.4 gm/dL      11-30    141  |  106  |  15  ----------------------------<  84  3.7   |  29  |  1.00    Ca    9.1      30 Nov 2017 05:57      Hemoglobin A1C:     Vitamin B12     RADIOLOGY  < from: CT Head No Cont (11.28.17 @ 16:11) >    EXAM:  CT BRAIN                            PROCEDURE DATE:  11/28/2017          INTERPRETATION:      CLINICAL INFORMATION:   Altered consciousness     TECHNIQUE: Contiguous non contrast axial images of brain from skull base   to vertex sagittal andcoronal reformations.   This scan was performed   using automatic exposure control (radiation dose reduction software) to   obtain a diagnostic image quality scan with patient dose as low as   reasonably achievable.      COMPARISON: Prior study of 2/20/17    FINDINGS:       There is prominence of the ventricles and cortical sulci. Midline   structures are intact. There are patchy hypodensities in periventricular   distribution consistent with chronic small vessel ischemic changes. There   is no CT evidence of acute territorial infarction.  There is no   hemorrhage, contusion or extraaxial collection. There is no acute   hydrocephalus. The visualized posterior fossa structures are intact.   There are scattered intracranial arterial calcification.  The visualized   paranasal sinuses are clear.    IMPRESSION:       Extensive chronic small vessel ischemic changes.    No parenchymal contusion, hemorrhage or extra-axial collection.    No CT evidence of an acute territorial infarction.                YVAN LOPEZ M.D., ATTENDING RADIOLOGIST  This document has been electronically signed. Nov 28 2017  4:15PM          ASSESSMENT AND PLAN:      SYNCOPE DOUBT TIA/SEIZURE.  HX OF CVA.  VASCULAR DEMETIA.  DEPRESSION.    INCREASED ZOLOFT TO 75 MG DAILY.  CONTINUE AC.  NEURO LAND STABLE FOR DC.  Physical therapy evaluation.  OOB to chair/ambulation with assistance only.  Plan of care was discussed with family. Questions answered.  Would continue to follow.
neuro cons dict.  likely syncope.  doubt tia/sz.
HPI:  77y M PMH Afib (on coumadin), CVA (2012, 2016), dementia, HTN, urinary retention, and multiple TIAs presents after witnessed syncopal episode lasting <1 minute.      INTERVAL EVENTS: Patient seen earlier with wife at bedside. No further symptoms. Back to baseline. Patient unable to voice complaints. Denies chest pain, SOB, abdominal pain, but notably, is a poor historian.     REVIEW OF SYSTEMS:  unable to obtain secondary to dementia      VITAL SIGNS:  Vital Signs Last 24 Hrs  T(C): 36.8 (29 Nov 2017 18:55), Max: 36.8 (29 Nov 2017 18:55)  T(F): 98.3 (29 Nov 2017 18:55), Max: 98.3 (29 Nov 2017 18:55)  HR: 76 (29 Nov 2017 18:55) (69 - 93)  BP: 129/85 (29 Nov 2017 18:55) (122/80 - 158/97)  BP(mean): --  RR: 18 (29 Nov 2017 18:55) (13 - 18)  SpO2: 97% (29 Nov 2017 18:55) (97% - 100%)      PHYSICAL EXAM:     GENERAL: no acute distress  HEENT: NC/AT, EOMI, neck supple, MMM  RESPIRATORY: LCTAB/L, no rhonchi, rales, or wheezing  CARDIOVASCULAR: irregular, no murmurs, gallops, rubs  ABDOMINAL: soft, non-tender, non-distended, positive bowel sounds   EXTREMITIES: no clubbing, cyanosis, or edema  NEUROLOGICAL: awake, alert, pleasantly demented, follows commands, no gross focal deficits.  SKIN: no rashes or lesions   MUSCULOSKELETAL: no gross joint deformity                          14.5   5.7   )-----------( 125      ( 29 Nov 2017 08:06 )             43.4     11-29    141  |  109<H>  |  16  ----------------------------<  67<L>  3.9   |  25  |  0.96    Ca    9.2      29 Nov 2017 08:06    PT/INR - ( 29 Nov 2017 11:50 )   PT: 26.9 sec;   INR: 2.42 ratio         PTT - ( 28 Nov 2017 15:59 )  PTT:23.3 sec    MEDICATIONS  (STANDING):  ezetimibe 10 milliGRAM(s) Oral at bedtime  finasteride 5 milliGRAM(s) Oral daily  metoprolol     tartrate 25 milliGRAM(s) Oral every 12 hours  senna 2 Tablet(s) Oral at bedtime  sertraline 75 milliGRAM(s) Oral daily  tamsulosin 0.4 milliGRAM(s) Oral at bedtime

## 2017-11-30 NOTE — PROGRESS NOTE ADULT - PROBLEM SELECTOR PLAN 4
chronic, stable  continue home metoprolol with hold parameters
chronic, stable  continue home metoprolol with hold parameters

## 2017-11-30 NOTE — PROGRESS NOTE ADULT - PROBLEM SELECTOR PLAN 1
Likely vasovagal  Neuro not suspecting seizure activity or TIA/CVA.  Orthostatic vitals negative today.  Cardio Savella following.  Troponins negative x3  Echo pending.  fall, seizure, safety precautions
Likely vasovagal  Neuro not suspecting seizure activity or TIA/CVA.  Orthostatic vitals negative.  Cardio Savella following.  Troponins negative x3  Echo pending. If nothing of consequence found, will discharge home later today.  fall, seizure, safety precautions

## 2017-11-30 NOTE — PROGRESS NOTE ADULT - PROBLEM SELECTOR PLAN 3
chronic, rate controlled  continue home metoprolol with hold parameters  continue home warfarin (3mg daily)
chronic, rate controlled  continue home metoprolol with hold parameters  continue home warfarin (3mg daily)

## 2017-11-30 NOTE — PROGRESS NOTE ADULT - ASSESSMENT
77y M PMH Afib (on coumadin), CVA (2012, 2016), dementia, HTN, , and multiple TIAs admitted after syncopal episode.  Evaluate for cardiac arrhythmia, TIA, seizure activity.
77y M PMHx of atrial fibrillation (on coumadin), CVA (2012/16), dementia, HTN, multiple TIAs admitted for syncopal episode.     - No evidence of acute ischemia, troponin I negative x 2 sets. Patient with no anginal symptoms.  - No evidence of any meaningful volume overload  - EKG showed Atrial fibrillation at 80 bpm with no acute changes when compared to prior study, monitor on telemetry  - prelim echo not changed from previous  - continue with lopressor 25 mg q12hrs; INR within therapeutic range. Dose coumadin with goal INR 2-3  - monitor and replete lytes, keep K>4, Mg>2  - Other cardiovascular workup will depend on clinical course.  - No clear cardiac etiology of his syncope.  - All other workup per primary team  - Will continue to follow with you   DC planning per primary team.
77y M PMH Afib (on coumadin), CVA (2012, 2016), dementia, HTN, , and multiple TIAs admitted after syncopal episode.  Evaluate for cardiac arrhythmia, TIA, seizure activity.

## 2017-11-30 NOTE — PROGRESS NOTE ADULT - PROBLEM SELECTOR PLAN 2
history of multiple TIAs, but per neuro not likely the cause of this episode.  Continue anticoagulation, statin, BP control.
history of multiple TIAs, but per neuro not likely the cause of this episode.  Continue anticoagulation, statin, BP control.

## 2017-12-19 PROCEDURE — 99285 EMERGENCY DEPT VISIT HI MDM: CPT | Mod: 25

## 2017-12-19 PROCEDURE — 93005 ELECTROCARDIOGRAM TRACING: CPT

## 2017-12-19 PROCEDURE — 85027 COMPLETE CBC AUTOMATED: CPT

## 2017-12-19 PROCEDURE — 93306 TTE W/DOPPLER COMPLETE: CPT

## 2017-12-19 PROCEDURE — 85730 THROMBOPLASTIN TIME PARTIAL: CPT

## 2017-12-19 PROCEDURE — 85610 PROTHROMBIN TIME: CPT

## 2017-12-19 PROCEDURE — 82553 CREATINE MB FRACTION: CPT

## 2017-12-19 PROCEDURE — 82550 ASSAY OF CK (CPK): CPT

## 2017-12-19 PROCEDURE — 84484 ASSAY OF TROPONIN QUANT: CPT

## 2017-12-19 PROCEDURE — 70450 CT HEAD/BRAIN W/O DYE: CPT

## 2017-12-19 PROCEDURE — 80048 BASIC METABOLIC PNL TOTAL CA: CPT

## 2018-03-21 ENCOUNTER — APPOINTMENT (OUTPATIENT)
Dept: CARDIOLOGY | Facility: CLINIC | Age: 78
End: 2018-03-21

## 2018-07-30 NOTE — DIETITIAN INITIAL EVALUATION ADULT. - PROBLEM SELECTOR PROBLEM 2
Refill Request  Last appointment:02/19/18  Last Labs:labs to be done prior to his upcoming appt.  Next Appointment: 08/22/18   UTI (urinary tract infection)

## 2018-10-23 ENCOUNTER — EMERGENCY (EMERGENCY)
Facility: HOSPITAL | Age: 78
LOS: 1 days | Discharge: HOME | End: 2018-10-23
Attending: EMERGENCY MEDICINE
Payer: MEDICARE

## 2018-10-23 VITALS
HEART RATE: 62 BPM | WEIGHT: 166.89 LBS | DIASTOLIC BLOOD PRESSURE: 74 MMHG | TEMPERATURE: 98 F | SYSTOLIC BLOOD PRESSURE: 131 MMHG | RESPIRATION RATE: 17 BRPM | HEIGHT: 69 IN | OXYGEN SATURATION: 93 %

## 2018-10-23 LAB
ALBUMIN SERPL ELPH-MCNC: 3.4 G/DL — SIGNIFICANT CHANGE UP (ref 3.3–5)
ALP SERPL-CCNC: 72 U/L — SIGNIFICANT CHANGE UP (ref 40–120)
ALT FLD-CCNC: 11 U/L — LOW (ref 12–78)
ANION GAP SERPL CALC-SCNC: 6 MMOL/L — SIGNIFICANT CHANGE UP (ref 5–17)
APPEARANCE UR: ABNORMAL
APTT BLD: 44 SEC — HIGH (ref 27.5–37.4)
AST SERPL-CCNC: 26 U/L — SIGNIFICANT CHANGE UP (ref 15–37)
BASOPHILS # BLD AUTO: 0.02 K/UL — SIGNIFICANT CHANGE UP (ref 0–0.2)
BASOPHILS NFR BLD AUTO: 0.3 % — SIGNIFICANT CHANGE UP (ref 0–2)
BILIRUB SERPL-MCNC: 0.5 MG/DL — SIGNIFICANT CHANGE UP (ref 0.2–1.2)
BILIRUB UR-MCNC: NEGATIVE — SIGNIFICANT CHANGE UP
BUN SERPL-MCNC: 19 MG/DL — SIGNIFICANT CHANGE UP (ref 7–23)
CALCIUM SERPL-MCNC: 9.4 MG/DL — SIGNIFICANT CHANGE UP (ref 8.5–10.1)
CHLORIDE SERPL-SCNC: 108 MMOL/L — SIGNIFICANT CHANGE UP (ref 96–108)
CO2 SERPL-SCNC: 27 MMOL/L — SIGNIFICANT CHANGE UP (ref 22–31)
COLOR SPEC: YELLOW — SIGNIFICANT CHANGE UP
CREAT SERPL-MCNC: 1.1 MG/DL — SIGNIFICANT CHANGE UP (ref 0.5–1.3)
DIFF PNL FLD: ABNORMAL
EOSINOPHIL # BLD AUTO: 0.05 K/UL — SIGNIFICANT CHANGE UP (ref 0–0.5)
EOSINOPHIL NFR BLD AUTO: 0.6 % — SIGNIFICANT CHANGE UP (ref 0–6)
GLUCOSE SERPL-MCNC: 96 MG/DL — SIGNIFICANT CHANGE UP (ref 70–99)
GLUCOSE UR QL: NEGATIVE — SIGNIFICANT CHANGE UP
HCT VFR BLD CALC: 47.2 % — SIGNIFICANT CHANGE UP (ref 39–50)
HGB BLD-MCNC: 16.1 G/DL — SIGNIFICANT CHANGE UP (ref 13–17)
IMM GRANULOCYTES NFR BLD AUTO: 0.9 % — SIGNIFICANT CHANGE UP (ref 0–1.5)
INR BLD: 2.6 RATIO — HIGH (ref 0.88–1.16)
KETONES UR-MCNC: ABNORMAL
LEUKOCYTE ESTERASE UR-ACNC: ABNORMAL
LYMPHOCYTES # BLD AUTO: 1.03 K/UL — SIGNIFICANT CHANGE UP (ref 1–3.3)
LYMPHOCYTES # BLD AUTO: 13 % — SIGNIFICANT CHANGE UP (ref 13–44)
MCHC RBC-ENTMCNC: 32.1 PG — SIGNIFICANT CHANGE UP (ref 27–34)
MCHC RBC-ENTMCNC: 34.1 GM/DL — SIGNIFICANT CHANGE UP (ref 32–36)
MCV RBC AUTO: 94.2 FL — SIGNIFICANT CHANGE UP (ref 80–100)
MONOCYTES # BLD AUTO: 0.57 K/UL — SIGNIFICANT CHANGE UP (ref 0–0.9)
MONOCYTES NFR BLD AUTO: 7.2 % — SIGNIFICANT CHANGE UP (ref 2–14)
NEUTROPHILS # BLD AUTO: 6.21 K/UL — SIGNIFICANT CHANGE UP (ref 1.8–7.4)
NEUTROPHILS NFR BLD AUTO: 78 % — HIGH (ref 43–77)
NITRITE UR-MCNC: NEGATIVE — SIGNIFICANT CHANGE UP
PH UR: 5 — SIGNIFICANT CHANGE UP (ref 5–8)
PLATELET # BLD AUTO: 138 K/UL — LOW (ref 150–400)
POTASSIUM SERPL-MCNC: 4.5 MMOL/L — SIGNIFICANT CHANGE UP (ref 3.5–5.3)
POTASSIUM SERPL-SCNC: 4.5 MMOL/L — SIGNIFICANT CHANGE UP (ref 3.5–5.3)
PROT SERPL-MCNC: 8.2 G/DL — SIGNIFICANT CHANGE UP (ref 6–8.3)
PROT UR-MCNC: 75 MG/DL
PROTHROM AB SERPL-ACNC: 28.9 SEC — HIGH (ref 9.8–12.7)
RBC # BLD: 5.01 M/UL — SIGNIFICANT CHANGE UP (ref 4.2–5.8)
RBC # FLD: 13.4 % — SIGNIFICANT CHANGE UP (ref 10.3–14.5)
SODIUM SERPL-SCNC: 141 MMOL/L — SIGNIFICANT CHANGE UP (ref 135–145)
SP GR SPEC: 1.02 — SIGNIFICANT CHANGE UP (ref 1.01–1.02)
UROBILINOGEN FLD QL: NEGATIVE — SIGNIFICANT CHANGE UP
WBC # BLD: 7.95 K/UL — SIGNIFICANT CHANGE UP (ref 3.8–10.5)
WBC # FLD AUTO: 7.95 K/UL — SIGNIFICANT CHANGE UP (ref 3.8–10.5)

## 2018-10-23 PROCEDURE — 93005 ELECTROCARDIOGRAM TRACING: CPT

## 2018-10-23 PROCEDURE — 85610 PROTHROMBIN TIME: CPT

## 2018-10-23 PROCEDURE — 93010 ELECTROCARDIOGRAM REPORT: CPT

## 2018-10-23 PROCEDURE — 87186 SC STD MICRODIL/AGAR DIL: CPT

## 2018-10-23 PROCEDURE — 81001 URINALYSIS AUTO W/SCOPE: CPT

## 2018-10-23 PROCEDURE — 80053 COMPREHEN METABOLIC PANEL: CPT

## 2018-10-23 PROCEDURE — 36415 COLL VENOUS BLD VENIPUNCTURE: CPT

## 2018-10-23 PROCEDURE — 85730 THROMBOPLASTIN TIME PARTIAL: CPT

## 2018-10-23 PROCEDURE — 71045 X-RAY EXAM CHEST 1 VIEW: CPT

## 2018-10-23 PROCEDURE — 71045 X-RAY EXAM CHEST 1 VIEW: CPT | Mod: 26

## 2018-10-23 PROCEDURE — 85027 COMPLETE CBC AUTOMATED: CPT

## 2018-10-23 PROCEDURE — 96374 THER/PROPH/DIAG INJ IV PUSH: CPT

## 2018-10-23 PROCEDURE — 87086 URINE CULTURE/COLONY COUNT: CPT

## 2018-10-23 PROCEDURE — 99284 EMERGENCY DEPT VISIT MOD MDM: CPT | Mod: 25

## 2018-10-23 PROCEDURE — 99284 EMERGENCY DEPT VISIT MOD MDM: CPT

## 2018-10-23 RX ORDER — CEFPODOXIME PROXETIL 100 MG
1 TABLET ORAL
Qty: 10 | Refills: 0 | OUTPATIENT
Start: 2018-10-23 | End: 2018-10-27

## 2018-10-23 RX ORDER — CEFTRIAXONE 500 MG/1
1 INJECTION, POWDER, FOR SOLUTION INTRAMUSCULAR; INTRAVENOUS ONCE
Qty: 0 | Refills: 0 | Status: COMPLETED | OUTPATIENT
Start: 2018-10-23 | End: 2018-10-23

## 2018-10-23 RX ORDER — CARBIDOPA AND LEVODOPA 25; 100 MG/1; MG/1
1 TABLET ORAL ONCE
Qty: 0 | Refills: 0 | Status: DISCONTINUED | OUTPATIENT
Start: 2018-10-23 | End: 2018-10-27

## 2018-10-23 RX ADMIN — CEFTRIAXONE 100 GRAM(S): 500 INJECTION, POWDER, FOR SOLUTION INTRAMUSCULAR; INTRAVENOUS at 15:32

## 2018-10-23 NOTE — ED PROVIDER NOTE - OBJECTIVE STATEMENT
8y M hx of afib on coumadin, CVA x2, dementia, wc bound, minimally verbal, full assist w ADLs, syncope here with episode of vomiting and syncope. Pt not verbal at baseline. WIfe states pt in usual state of health this AM, fed him and shaved him then while seated at table he started gagging and vomited up his food, no blood. States no cough with feeding this AM, eats thickened liquid and reg food. States after vomiting, he became pale, sweaty, head slumped over, defecated. Episode lasted ~30 sec. Then started breathing rapidly for ~30 sec. then returned to baseline. States no recent fever, illness. No constipation, Had BM this AM prior to episode. Wife states hx of syncope multiple times in past, especially in context of UTI. Of note, wife states pt usually on metoprolol tartrate 25mg BID and just notced that he has been receiving 50mg BID instead of usual 25mg. Unsure of how long. ALso states low BP on EMS arrival, 90 systolic. 78y M hx of afib on coumadin, CVA x2, dementia, wc bound, minimally verbal, full assist w ADLs, syncope here with episode of vomiting and syncope. Pt not verbal at baseline. WIfe states pt in usual state of health this AM, fed him and shaved him then while seated at table he started gagging and vomited up his food, no blood. States no cough with feeding this AM, eats thickened liquid and reg food. States after vomiting, he became pale, sweaty, head slumped over, defecated. Episode lasted ~30 sec. Then started breathing rapidly for ~30 sec. then returned to baseline. States no recent fever, illness. No constipation, Had BM this AM prior to episode. Wife states hx of syncope multiple times in past, especially in context of UTI. Of note, wife states pt usually on metoprolol tartrate 25mg BID and just notced that he has been receiving 50mg BID instead of usual 25mg. Unsure of how long. ALso states low BP on EMS arrival, 90 systolic.

## 2018-10-23 NOTE — ED ADULT NURSE NOTE - OBJECTIVE STATEMENT
Pt is 78y M, came to ED following syncopal episode, pt has CVA hx, lung sounds clear, abd soft nontender, advised spouse on plan of care, verbalized understanding.

## 2018-10-23 NOTE — ED PROVIDER NOTE - PHYSICAL EXAMINATION
Gen: WNWD NAD  HEENT: NCAT PERRL EOMI refuses to open mouth clenches teeth   Neck: supple  CV: RRR, no murmur  Lung: CTA BL Bibasilar crackles   Abd: +BS softly distended no grr, wife states baseline   Ext: wwp, palp pulses, FROMx4, no cce  Neuro: Awake, non verbal, CN grossly intact, baslein per wife

## 2018-10-23 NOTE — ED PROVIDER NOTE - PROGRESS NOTE DETAILS
Pt with UTI. Continues to act at baseline per wife. Wife would like to attempt DC home with oral abx and FU with PCP. Return precautions discussed including fever, vomiting, change in mental status.

## 2018-10-23 NOTE — ED PROVIDER NOTE - PMH
Atrial fibrillation    Cerebrovascular accident    Constipation    Dementia    Hypertension    Parkinson disease    Syncope    Urinary retention

## 2018-10-23 NOTE — ED ADULT TRIAGE NOTE - CHIEF COMPLAINT QUOTE
from home family called 911 after pt vomited once then passed out x 20 seconds.    BS = 179 as per EMS

## 2018-10-23 NOTE — ED PROVIDER NOTE - MEDICAL DECISION MAKING DETAILS
78y M hx of afib on coumadin, CVA x2, dementia, wc bound, minimally verbal, full assist w ADLs, syncope here with episode of vomiting and syncope. Now at baseline. Labs, EKG, CXR, UA. Discussed CT head w wife at bedside, per wife pt at baseline, therefore will defer CT at present. NO trauma or falls. No new deficits. Hx of CVA but AC on Coumadin at this point, would not change mgmt. Do not suspect ICH as pt had syncope and returned to baseline.

## 2018-10-23 NOTE — ED ADULT NURSE NOTE - CHPI ED NUR SYMPTOMS NEG
no fever/no diaphoresis/no congestion/no vomiting/no dizziness/no shortness of breath/no chest pain/no nausea/no back pain/no chills

## 2018-10-23 NOTE — ED ADULT NURSE NOTE - PMH
Atrial fibrillation    Cerebrovascular accident    Constipation    Dementia    Hypertension    Parkinson disease    Urinary retention

## 2018-11-14 ENCOUNTER — INPATIENT (INPATIENT)
Facility: HOSPITAL | Age: 78
LOS: 2 days | Discharge: HOME | DRG: 689 | End: 2018-11-17
Attending: FAMILY MEDICINE | Admitting: INTERNAL MEDICINE
Payer: MEDICARE

## 2018-11-14 VITALS
OXYGEN SATURATION: 95 % | RESPIRATION RATE: 18 BRPM | TEMPERATURE: 98 F | SYSTOLIC BLOOD PRESSURE: 142 MMHG | WEIGHT: 158.07 LBS | HEART RATE: 78 BPM | DIASTOLIC BLOOD PRESSURE: 76 MMHG

## 2018-11-14 DIAGNOSIS — I10 ESSENTIAL (PRIMARY) HYPERTENSION: ICD-10-CM

## 2018-11-14 DIAGNOSIS — N26.1 ATROPHY OF KIDNEY (TERMINAL): ICD-10-CM

## 2018-11-14 DIAGNOSIS — G20 PARKINSON'S DISEASE: ICD-10-CM

## 2018-11-14 DIAGNOSIS — I48.91 UNSPECIFIED ATRIAL FIBRILLATION: ICD-10-CM

## 2018-11-14 DIAGNOSIS — F32.9 MAJOR DEPRESSIVE DISORDER, SINGLE EPISODE, UNSPECIFIED: ICD-10-CM

## 2018-11-14 DIAGNOSIS — N39.0 URINARY TRACT INFECTION, SITE NOT SPECIFIED: ICD-10-CM

## 2018-11-14 DIAGNOSIS — R33.9 RETENTION OF URINE, UNSPECIFIED: ICD-10-CM

## 2018-11-14 DIAGNOSIS — I63.9 CEREBRAL INFARCTION, UNSPECIFIED: ICD-10-CM

## 2018-11-14 DIAGNOSIS — Z29.9 ENCOUNTER FOR PROPHYLACTIC MEASURES, UNSPECIFIED: ICD-10-CM

## 2018-11-14 DIAGNOSIS — G93.40 ENCEPHALOPATHY, UNSPECIFIED: ICD-10-CM

## 2018-11-14 PROBLEM — R55 SYNCOPE AND COLLAPSE: Chronic | Status: ACTIVE | Noted: 2018-10-23

## 2018-11-14 LAB
ALBUMIN SERPL ELPH-MCNC: 3.3 G/DL — SIGNIFICANT CHANGE UP (ref 3.3–5)
ALP SERPL-CCNC: 71 U/L — SIGNIFICANT CHANGE UP (ref 40–120)
ALT FLD-CCNC: 9 U/L — LOW (ref 12–78)
ANION GAP SERPL CALC-SCNC: 7 MMOL/L — SIGNIFICANT CHANGE UP (ref 5–17)
APPEARANCE UR: ABNORMAL
APTT BLD: 42.2 SEC — HIGH (ref 28.5–37)
AST SERPL-CCNC: 27 U/L — SIGNIFICANT CHANGE UP (ref 15–37)
BACTERIA # UR AUTO: ABNORMAL
BASOPHILS # BLD AUTO: 0.02 K/UL — SIGNIFICANT CHANGE UP (ref 0–0.2)
BASOPHILS NFR BLD AUTO: 0.4 % — SIGNIFICANT CHANGE UP (ref 0–2)
BILIRUB SERPL-MCNC: 0.5 MG/DL — SIGNIFICANT CHANGE UP (ref 0.2–1.2)
BILIRUB UR-MCNC: NEGATIVE — SIGNIFICANT CHANGE UP
BUN SERPL-MCNC: 18 MG/DL — SIGNIFICANT CHANGE UP (ref 7–23)
CALCIUM SERPL-MCNC: 9 MG/DL — SIGNIFICANT CHANGE UP (ref 8.5–10.1)
CHLORIDE SERPL-SCNC: 108 MMOL/L — SIGNIFICANT CHANGE UP (ref 96–108)
CO2 SERPL-SCNC: 26 MMOL/L — SIGNIFICANT CHANGE UP (ref 22–31)
COLOR SPEC: YELLOW — SIGNIFICANT CHANGE UP
COMMENT - URINE: SIGNIFICANT CHANGE UP
COMMENT - URINE: SIGNIFICANT CHANGE UP
CREAT SERPL-MCNC: 1.2 MG/DL — SIGNIFICANT CHANGE UP (ref 0.5–1.3)
DIFF PNL FLD: ABNORMAL
EOSINOPHIL # BLD AUTO: 0.11 K/UL — SIGNIFICANT CHANGE UP (ref 0–0.5)
EOSINOPHIL NFR BLD AUTO: 1.9 % — SIGNIFICANT CHANGE UP (ref 0–6)
EPI CELLS # UR: SIGNIFICANT CHANGE UP
GLUCOSE SERPL-MCNC: 96 MG/DL — SIGNIFICANT CHANGE UP (ref 70–99)
GLUCOSE UR QL: NEGATIVE — SIGNIFICANT CHANGE UP
HCT VFR BLD CALC: 45 % — SIGNIFICANT CHANGE UP (ref 39–50)
HGB BLD-MCNC: 15.4 G/DL — SIGNIFICANT CHANGE UP (ref 13–17)
IMM GRANULOCYTES NFR BLD AUTO: 0.4 % — SIGNIFICANT CHANGE UP (ref 0–1.5)
INR BLD: 2.47 RATIO — HIGH (ref 0.88–1.16)
KETONES UR-MCNC: ABNORMAL
LACTATE SERPL-SCNC: 1.8 MMOL/L — SIGNIFICANT CHANGE UP (ref 0.7–2)
LEUKOCYTE ESTERASE UR-ACNC: ABNORMAL
LIDOCAIN IGE QN: 104 U/L — SIGNIFICANT CHANGE UP (ref 73–393)
LYMPHOCYTES # BLD AUTO: 1.48 K/UL — SIGNIFICANT CHANGE UP (ref 1–3.3)
LYMPHOCYTES # BLD AUTO: 26 % — SIGNIFICANT CHANGE UP (ref 13–44)
MCHC RBC-ENTMCNC: 32.1 PG — SIGNIFICANT CHANGE UP (ref 27–34)
MCHC RBC-ENTMCNC: 34.2 GM/DL — SIGNIFICANT CHANGE UP (ref 32–36)
MCV RBC AUTO: 93.8 FL — SIGNIFICANT CHANGE UP (ref 80–100)
MONOCYTES # BLD AUTO: 0.52 K/UL — SIGNIFICANT CHANGE UP (ref 0–0.9)
MONOCYTES NFR BLD AUTO: 9.1 % — SIGNIFICANT CHANGE UP (ref 2–14)
NEUTROPHILS # BLD AUTO: 3.54 K/UL — SIGNIFICANT CHANGE UP (ref 1.8–7.4)
NEUTROPHILS NFR BLD AUTO: 62.2 % — SIGNIFICANT CHANGE UP (ref 43–77)
NITRITE UR-MCNC: NEGATIVE — SIGNIFICANT CHANGE UP
PH UR: 5 — SIGNIFICANT CHANGE UP (ref 5–8)
PLATELET # BLD AUTO: 171 K/UL — SIGNIFICANT CHANGE UP (ref 150–400)
POTASSIUM SERPL-MCNC: 4.4 MMOL/L — SIGNIFICANT CHANGE UP (ref 3.5–5.3)
POTASSIUM SERPL-SCNC: 4.4 MMOL/L — SIGNIFICANT CHANGE UP (ref 3.5–5.3)
PROT SERPL-MCNC: 8 G/DL — SIGNIFICANT CHANGE UP (ref 6–8.3)
PROT UR-MCNC: 75 MG/DL
PROTHROM AB SERPL-ACNC: 28.9 SEC — HIGH (ref 10–12.9)
RAPID RVP RESULT: SIGNIFICANT CHANGE UP
RBC # BLD: 4.8 M/UL — SIGNIFICANT CHANGE UP (ref 4.2–5.8)
RBC # FLD: 13.2 % — SIGNIFICANT CHANGE UP (ref 10.3–14.5)
RBC CASTS # UR COMP ASSIST: ABNORMAL /HPF (ref 0–4)
SODIUM SERPL-SCNC: 141 MMOL/L — SIGNIFICANT CHANGE UP (ref 135–145)
SP GR SPEC: 1.02 — SIGNIFICANT CHANGE UP (ref 1.01–1.02)
TROPONIN I SERPL-MCNC: <.015 NG/ML — SIGNIFICANT CHANGE UP (ref 0.01–0.04)
TROPONIN I SERPL-MCNC: <.015 NG/ML — SIGNIFICANT CHANGE UP (ref 0.01–0.04)
UROBILINOGEN FLD QL: NEGATIVE — SIGNIFICANT CHANGE UP
WBC # BLD: 5.69 K/UL — SIGNIFICANT CHANGE UP (ref 3.8–10.5)
WBC # FLD AUTO: 5.69 K/UL — SIGNIFICANT CHANGE UP (ref 3.8–10.5)
WBC UR QL: >50

## 2018-11-14 PROCEDURE — 99223 1ST HOSP IP/OBS HIGH 75: CPT

## 2018-11-14 PROCEDURE — 70450 CT HEAD/BRAIN W/O DYE: CPT | Mod: 26

## 2018-11-14 PROCEDURE — 99285 EMERGENCY DEPT VISIT HI MDM: CPT

## 2018-11-14 PROCEDURE — 71045 X-RAY EXAM CHEST 1 VIEW: CPT | Mod: 26

## 2018-11-14 PROCEDURE — 74176 CT ABD & PELVIS W/O CONTRAST: CPT | Mod: 26

## 2018-11-14 PROCEDURE — 93010 ELECTROCARDIOGRAM REPORT: CPT

## 2018-11-14 PROCEDURE — 99223 1ST HOSP IP/OBS HIGH 75: CPT | Mod: AI,GC

## 2018-11-14 RX ORDER — SODIUM CHLORIDE 9 MG/ML
1000 INJECTION INTRAMUSCULAR; INTRAVENOUS; SUBCUTANEOUS ONCE
Qty: 0 | Refills: 0 | Status: COMPLETED | OUTPATIENT
Start: 2018-11-14 | End: 2018-11-14

## 2018-11-14 RX ORDER — CEFTRIAXONE 500 MG/1
1 INJECTION, POWDER, FOR SOLUTION INTRAMUSCULAR; INTRAVENOUS ONCE
Qty: 0 | Refills: 0 | Status: DISCONTINUED | OUTPATIENT
Start: 2018-11-14 | End: 2018-11-14

## 2018-11-14 RX ORDER — METOPROLOL TARTRATE 50 MG
50 TABLET ORAL
Qty: 0 | Refills: 0 | Status: DISCONTINUED | OUTPATIENT
Start: 2018-11-14 | End: 2018-11-14

## 2018-11-14 RX ORDER — CARBIDOPA AND LEVODOPA 25; 100 MG/1; MG/1
1 TABLET ORAL DAILY
Qty: 0 | Refills: 0 | Status: DISCONTINUED | OUTPATIENT
Start: 2018-11-14 | End: 2018-11-17

## 2018-11-14 RX ORDER — CARBIDOPA AND LEVODOPA 25; 100 MG/1; MG/1
1 TABLET ORAL ONCE
Qty: 0 | Refills: 0 | Status: COMPLETED | OUTPATIENT
Start: 2018-11-14 | End: 2018-11-14

## 2018-11-14 RX ORDER — FINASTERIDE 5 MG/1
5 TABLET, FILM COATED ORAL DAILY
Qty: 0 | Refills: 0 | Status: DISCONTINUED | OUTPATIENT
Start: 2018-11-14 | End: 2018-11-17

## 2018-11-14 RX ORDER — CARBIDOPA AND LEVODOPA 25; 100 MG/1; MG/1
1.5 TABLET ORAL THREE TIMES A DAY
Qty: 0 | Refills: 0 | Status: DISCONTINUED | OUTPATIENT
Start: 2018-11-14 | End: 2018-11-14

## 2018-11-14 RX ORDER — METOPROLOL TARTRATE 50 MG
50 TABLET ORAL
Qty: 0 | Refills: 0 | Status: DISCONTINUED | OUTPATIENT
Start: 2018-11-14 | End: 2018-11-15

## 2018-11-14 RX ORDER — ACETAMINOPHEN 500 MG
650 TABLET ORAL EVERY 6 HOURS
Qty: 0 | Refills: 0 | Status: DISCONTINUED | OUTPATIENT
Start: 2018-11-14 | End: 2018-11-16

## 2018-11-14 RX ORDER — WARFARIN SODIUM 2.5 MG/1
3 TABLET ORAL ONCE
Qty: 0 | Refills: 0 | Status: COMPLETED | OUTPATIENT
Start: 2018-11-14 | End: 2018-11-14

## 2018-11-14 RX ORDER — SERTRALINE 25 MG/1
0 TABLET, FILM COATED ORAL
Qty: 0 | Refills: 0 | COMMUNITY

## 2018-11-14 RX ORDER — SODIUM CHLORIDE 9 MG/ML
3 INJECTION INTRAMUSCULAR; INTRAVENOUS; SUBCUTANEOUS ONCE
Qty: 0 | Refills: 0 | Status: COMPLETED | OUTPATIENT
Start: 2018-11-14 | End: 2018-11-14

## 2018-11-14 RX ORDER — TAMSULOSIN HYDROCHLORIDE 0.4 MG/1
0.4 CAPSULE ORAL AT BEDTIME
Qty: 0 | Refills: 0 | Status: DISCONTINUED | OUTPATIENT
Start: 2018-11-14 | End: 2018-11-17

## 2018-11-14 RX ORDER — AMPICILLIN SODIUM AND SULBACTAM SODIUM 250; 125 MG/ML; MG/ML
3 INJECTION, POWDER, FOR SUSPENSION INTRAMUSCULAR; INTRAVENOUS EVERY 6 HOURS
Qty: 0 | Refills: 0 | Status: DISCONTINUED | OUTPATIENT
Start: 2018-11-14 | End: 2018-11-16

## 2018-11-14 RX ORDER — CARBIDOPA AND LEVODOPA 25; 100 MG/1; MG/1
1.5 TABLET ORAL
Qty: 0 | Refills: 0 | Status: DISCONTINUED | OUTPATIENT
Start: 2018-11-15 | End: 2018-11-17

## 2018-11-14 RX ORDER — ONDANSETRON 8 MG/1
4 TABLET, FILM COATED ORAL ONCE
Qty: 0 | Refills: 0 | Status: COMPLETED | OUTPATIENT
Start: 2018-11-14 | End: 2018-11-14

## 2018-11-14 RX ORDER — SERTRALINE 25 MG/1
50 TABLET, FILM COATED ORAL AT BEDTIME
Qty: 0 | Refills: 0 | Status: DISCONTINUED | OUTPATIENT
Start: 2018-11-14 | End: 2018-11-17

## 2018-11-14 RX ORDER — AMPICILLIN SODIUM AND SULBACTAM SODIUM 250; 125 MG/ML; MG/ML
3 INJECTION, POWDER, FOR SUSPENSION INTRAMUSCULAR; INTRAVENOUS ONCE
Qty: 0 | Refills: 0 | Status: COMPLETED | OUTPATIENT
Start: 2018-11-14 | End: 2018-11-14

## 2018-11-14 RX ADMIN — Medication 50 MILLIGRAM(S): at 18:00

## 2018-11-14 RX ADMIN — AMPICILLIN SODIUM AND SULBACTAM SODIUM 200 GRAM(S): 250; 125 INJECTION, POWDER, FOR SUSPENSION INTRAMUSCULAR; INTRAVENOUS at 15:19

## 2018-11-14 RX ADMIN — AMPICILLIN SODIUM AND SULBACTAM SODIUM 3 GRAM(S): 250; 125 INJECTION, POWDER, FOR SUSPENSION INTRAMUSCULAR; INTRAVENOUS at 17:50

## 2018-11-14 RX ADMIN — SODIUM CHLORIDE 1000 MILLILITER(S): 9 INJECTION INTRAMUSCULAR; INTRAVENOUS; SUBCUTANEOUS at 13:58

## 2018-11-14 RX ADMIN — SERTRALINE 50 MILLIGRAM(S): 25 TABLET, FILM COATED ORAL at 22:14

## 2018-11-14 RX ADMIN — SODIUM CHLORIDE 1000 MILLILITER(S): 9 INJECTION INTRAMUSCULAR; INTRAVENOUS; SUBCUTANEOUS at 13:07

## 2018-11-14 RX ADMIN — CARBIDOPA AND LEVODOPA 1 TABLET(S): 25; 100 TABLET ORAL at 15:57

## 2018-11-14 RX ADMIN — TAMSULOSIN HYDROCHLORIDE 0.4 MILLIGRAM(S): 0.4 CAPSULE ORAL at 22:14

## 2018-11-14 RX ADMIN — CARBIDOPA AND LEVODOPA 1 TABLET(S): 25; 100 TABLET ORAL at 13:04

## 2018-11-14 RX ADMIN — Medication 650 MILLIGRAM(S): at 19:00

## 2018-11-14 RX ADMIN — CARBIDOPA AND LEVODOPA 1 TABLET(S): 25; 100 TABLET ORAL at 18:00

## 2018-11-14 RX ADMIN — Medication 650 MILLIGRAM(S): at 21:28

## 2018-11-14 RX ADMIN — WARFARIN SODIUM 3 MILLIGRAM(S): 2.5 TABLET ORAL at 23:48

## 2018-11-14 RX ADMIN — SODIUM CHLORIDE 3 MILLILITER(S): 9 INJECTION INTRAMUSCULAR; INTRAVENOUS; SUBCUTANEOUS at 11:57

## 2018-11-14 RX ADMIN — ONDANSETRON 4 MILLIGRAM(S): 8 TABLET, FILM COATED ORAL at 16:01

## 2018-11-14 NOTE — CONSULT NOTE ADULT - SUBJECTIVE AND OBJECTIVE BOX
HPI:  78yo M PMH of Afib (on coumadin), CVA x 2, Parkinson's Dementia, HTN, BPH urinary retention,   nephrolithiasis and frequent UTIs brought to the hospital with CC of unresponsiveness with n/v.   Per wife pt had no fevers. Recently was in ED 3 weeks ago for syncopal episode diagnosed with   UTI sent home on Ceftin though ucx grew E faecalis. Pt unable to provide history. No rash. In ED  UA positive though no leukocytosis or fevers. CXR clear.    Infectious Disease consult was called to evaluate pt.    Past Medical & Surgical Hx:  PAST MEDICAL & SURGICAL HISTORY:  Syncope  Parkinson disease  Constipation  Dementia  Urinary retention  Cerebrovascular accident  Hypertension  Atrial fibrillation  No significant past surgical history    Social History--  EtOH: denies  Tobacco: denies  Drug Use: denies   Lives with wife    FAMILY HISTORY:  No pertinent family history in first degree relatives      No Known Allergies    Home Medications:  finasteride 5 mg oral tablet: 1 tab(s) orally once a day (14 Nov 2018 16:34)  Metoprolol Tartrate 50 mg oral tablet: 1 tab(s) orally 2 times a day (14 Nov 2018 16:34)  sertraline 50 mg oral tablet: 1 tab(s) orally once a day (14 Nov 2018 16:34)  Sinemet 25 mg-100 mg oral tablet: 1.5 tab(s) orally at 9am, 12pm, 3pm  1 tab orally at 6pm (14 Nov 2018 16:34)  tamsulosin 0.4 mg oral capsule: 1 cap(s) orally once a day (at bedtime) (14 Nov 2018 16:34)  warfarin 3 mg oral tablet: 1 tab(s) orally once a day (14 Nov 2018 16:34)  Zetia 10 mg oral tablet: 1 tab(s) orally once a day (14 Nov 2018 16:34)    Current Inpatient Medications :    ANTIBIOTICS:   ampicillin/sulbactam  IVPB 3 Gram(s) IV Intermittent every 6 hours      OTHER RELEVANT MEDICATIONS :  carbidopa/levodopa  25/100 1 Tablet(s) Oral daily  finasteride 5 milliGRAM(s) Oral daily  metoprolol tartrate 50 milliGRAM(s) Oral two times a day  sertraline 50 milliGRAM(s) Oral at bedtime  tamsulosin 0.4 milliGRAM(s) Oral at bedtime  warfarin 3 milliGRAM(s) Oral once      ROS:  Unable to obtain due to : Nonverbal      Physical Exam:  Vital Signs Last 24 Hrs  T(C): 36.3 (14 Nov 2018 16:10), Max: 36.8 (14 Nov 2018 11:02)  T(F): 97.3 (14 Nov 2018 16:10), Max: 98.3 (14 Nov 2018 11:02)  HR: 86 (14 Nov 2018 16:10) (71 - 99)  BP: 162/74 (14 Nov 2018 16:10) (132/67 - 199/92)  RR: 14 (14 Nov 2018 16:10) (14 - 18)  SpO2: 99% (14 Nov 2018 16:10) (95% - 100%)  Weight (kg): 71.7 (11-14 @ 11:02)    General:  no acute distress  Eyes: sclera anicteric, pupils equal and reactive to light  ENMT: buccal mucosa moist, pharynx not injected  Neck: supple, trachea midline  Lungs: Decreased, no wheeze/rhonchi  Cardiovascular: regular rate and rhythm, S1 S2  Abdomen: soft, nontender, Distended, bowel sounds normal  Neurological:  awake nonverbal right sided weakness  Skin: no increased ecchymosis/petechiae/purpura  Lymph Nodes: no palpable cervical/supraclavicular lymph nodes enlargements  Extremities: +edema    Labs:                         15.4   5.69  )-----------( 171      ( 14 Nov 2018 11:51 )             45.0   11-14    141  |  108  |  18  ----------------------------<  96  4.4   |  26  |  1.20    Ca    9.0      14 Nov 2018 11:51    TPro  8.0  /  Alb  3.3  /  TBili  0.5  /  DBili  x   /  AST  27  /  ALT  9<L>  /  AlkPhos  71  11-14      RECENT CULTURES:  PENDING      RADIOLOGY & ADDITIONAL STUDIES:    EXAM:  CT RENAL STONE HUNT                            PROCEDURE DATE:  11/14/2018          INTERPRETATION:  Exam Type:  CT RENAL STONE HUNT   Date and Time: 11/14/2018 2:34 PM  Indication: Hematuria  Compared to: CT abdomen 5/12/2014  Technique: CTof the ABDOMEN and PELVIS:  No intravenous contrast was   administered at physician request. This limits sensitivity for certain   processes. Oral contrast was not administered.    COMMENTS:    LOWER LUNGS AND PLEURA: Left atrial enlargement. Coronary vascular   calcification. Hiatal hernia.    LIVER: Multiple hepatic cysts unchanged.  BILE DUCTS: normal caliber.  GALLBLADDER: Gallstones.           PANCREAS: within normal limits.  SPLEEN: within normal limits.  ADRENALS: within normal limits.    KIDNEYS, URETERS AND BLADDER: Marked atrophy of the left kidney with   marked cortical scarring and parenchymal thinning involving the lateral   midpole. No hydronephrosis bilaterally. Multiple calculi within the left   renal pelvis extending into theanterior left lower pole renal calyces.   Mild left parapelvic infiltrative changes. Punctate nonobstructive right   intrarenal calculi. No definite renal masses bilaterally. No perinephric   collections. The ureters are unremarkable. The bladder appears within   normal limits.    PELVIS: Prostate is mildly enlarged with dense prostatic calcifications.   Subcentimeter pelvic lymph nodes. Minimal presacral stranding.No pelvic   lymphadenopathy.    BOWEL: The unopacified bowel is of normal course and caliber without   evidence of obstruction or bowel wall thickening. A normal appendix is   visualized. Calcifications along the pylorus unchanged dating back to   2014.    PERITONEUM: no ascites or free air, no fluid collection.  RETROPERITONEUM: within normal limits.      VESSELS: atherosclerotic changes.     IVC filter in place.  ABDOMINAL WALL: Small fat-containing umbilical hernia. Small   fat-containing right inguinal hernia.  BONES: Degenerative changes.    IMPRESSION:     Left renal pelvic calculi extending into the left lower pole calyces with   left peripelvic infiltrative changes without significant hydronephrosis.      Assessment :   78yo M PMH of Afib (on coumadin), CVA x 2, Parkinson's Dementia, HTN, BPH urinary retention,   nephrolithiasis and frequent UTIs brought to the hospital with CC of unresponsiveness with n/v  noted to have positive UA  Rule out UTI though pt afebrile and with no leukocytosis  Most recent Ucx with E faecalis   CT AP noted    Plan :   Empiric Unasyn for now  Fu cultures  Trend temps and wbc  Asp precautions  D/w Dr Pedro Alvarez    Continue with present regime .  Approptiate use of antibiotics and adverse effects reviewed.      I have discussed the above plan of care with patient's family in detail. They expressed understanding of the treatment plan . Risks, benefits and alternatives discussed in detail. I have asked if they have any questions or concerns and appropriately addressed them to the best of my ability .      > 45 minutes spent in direct patient care reviewing  the notes, lab data/ imaging , discussion with multidisciplinary team. All questions were addressed and answered to the best of my capacity .    Thank you for allowing me to participate in the care of your patient .      Sanjeev Cole MD  Infectious Disease  [phone]

## 2018-11-14 NOTE — H&P ADULT - PROBLEM SELECTOR PLAN 6
-chronic  -c/w sinemet 1.5tabs orally at 9am, 12pm, 3pm and 1tab orally at 6pm -stable, chronic  -c/w metoprolol tart ate 50mg BID with holding parameters -stable, chronic, rate controlled  -c/w metoprolol tart ate 50mg BID with holding parameters   -warfarin dosage daily  -Cardio consulted- recs appreciated, unlikely cardiac cause of symptoms will continue to follow -stable, chronic, rate controlled  -c/w metoprolol tartrate 50mg BID with holding parameters   -warfarin dosage daily  -Cardio consulted- recs appreciated, unlikely cardiac cause of symptoms will continue to follow

## 2018-11-14 NOTE — ED PROVIDER NOTE - ATTENDING CONTRIBUTION TO CARE
pt with hx dementia, parkinsons, cva, a fib, htn, minimally verbal at baseline bib ems for unrepsonsive episode. pt had vomited, then was staring , not responding to family, right arm stiffened., now bck to baseline. pt answers no to pain, butnot able to provide reliable hx.  chronically ill male, nad, perrl, mmm, s1s2 rrr, lungs cta, abd soft, nt, ext nt, + rigidity, awake alert, sensation intact, grossly moves ext x 4, but limited rom

## 2018-11-14 NOTE — CONSULT NOTE ADULT - SUBJECTIVE AND OBJECTIVE BOX
[facility] - Rogelio Robb, Flor, Sandi, Rick Holloway  Office Number: [phone]    Initial Consult Note    CHIEF COMPLAINT: Patient is a 78y old  Male who presents with a chief complaint of AMS (14 Nov 2018 15:29)      HPI:  77y M PMH Afib (on coumadin), CVA (2012, 2016), Parkinson's disease, dementia, HTN, urinary retention, and multiple TIAs BIBEMS for altered mental status.  Patient was treated for UTI on 10/24 with ceftin.    In the ED, T 98.3, HR 78, /76, RR 18, SpO2 95% on RA.  Labs signigicant for CBC wnl, PT/INR 28.9/2.47, CMP wnl, positive UA. Aldo negative x1, lipase wnl.  Patient noted to have positiave UCx from 10/23 growing >100K Enterococcals faecalis, treated with ceftin.  In the ED, patient received Unazyn x1, carbidopa/levodopa, and 1L NS bolus.  CXR showed no acute lung pathology.  CT stone fisher showed Left renal pelvic calculi extending into the left lower pole calyces with left peripelvic infiltrative changes without significant hydronephrosis.  CT head showed No acute or focal brain pathology. Stable brain appearance. Bilateral maxillary sinus disease. (14 Nov 2018 15:29)    I last saw him in 11/2017 because of an episode of syncope that sounded orthostatic in origin.  Today, the wife has brought him in for altered mental status.  No fevers or chills.  Had been evaluated for seizures at [facility], but no seizure activity found.       PAST MEDICAL & SURGICAL HISTORY:  Syncope  Parkinson disease  Constipation  Dementia  Urinary retention  Cerebrovascular accident  Hypertension  Atrial fibrillation  No significant past surgical history      SOCIAL HISTORY:  No tobacco, ethanol, or drug abuse.    FAMILY HISTORY:  No pertinent family history in first degree relatives    No family history of acute MI or sudden cardiac death.    MEDICATIONS  (STANDING):    MEDICATIONS  (PRN):      Allergies    No Known Allergies    Intolerances        REVIEW OF SYSTEMS:    Unable to obtain ROS given patient's mental status.    VITAL SIGNS:   Vital Signs Last 24 Hrs  T(C): 36.2 (14 Nov 2018 15:28), Max: 36.8 (14 Nov 2018 11:02)  T(F): 97.2 (14 Nov 2018 15:28), Max: 98.3 (14 Nov 2018 11:02)  HR: 99 (14 Nov 2018 15:28) (71 - 99)  BP: 199/92 (14 Nov 2018 15:28) (132/67 - 199/92)  BP(mean): --  RR: 14 (14 Nov 2018 15:28) (14 - 18)  SpO2: 100% (14 Nov 2018 15:28) (95% - 100%)    I&O's Summary      On Exam:    Constitutional: NAD, lethargic  Lungs:  Non-labored, decreased bs bilaterally  Cardiovascular: irregular, S1 and S2 positive.  No murmurs, rubs, gallops or clicks  Gastrointestinal: Bowel Sounds present, soft, nontender.   Lymph: Trace peripheral edema. No cervical lymphadenopathy.  Neurological: unable to assess, stiff UE  Skin: No rashes or ulcers   Psych:  lethargic    LABS: All Labs Reviewed:                        15.4   5.69  )-----------( 171      ( 14 Nov 2018 11:51 )             45.0     14 Nov 2018 11:51    141    |  108    |  18     ----------------------------<  96     4.4     |  26     |  1.20     Ca    9.0        14 Nov 2018 11:51    TPro  8.0    /  Alb  3.3    /  TBili  0.5    /  DBili  x      /  AST  27     /  ALT  9      /  AlkPhos  71     14 Nov 2018 11:51    PT/INR - ( 14 Nov 2018 11:51 )   PT: 28.9 sec;   INR: 2.47 ratio         PTT - ( 14 Nov 2018 11:51 )  PTT:42.2 sec  CARDIAC MARKERS ( 14 Nov 2018 11:51 )  <.015 ng/mL / x     / x     / x     / x          Blood Culture:         RADIOLOGY:    EKG: AF

## 2018-11-14 NOTE — H&P ADULT - PROBLEM SELECTOR PLAN 7
-c/w zoloft 50mg QD -chronic  -c/w sinemet 1.5tabs orally at 9am, 12pm, 3pm and 1tab orally at 6pm -stable, chronic  -c/w metoprolol tart ate 50mg BID with holding parameters -c/w metoprolol tartrate 50mg BID with holding parameters

## 2018-11-14 NOTE — H&P ADULT - PROBLEM SELECTOR PLAN 2
-patient on home meds finasteride 5mg QD and flomax 0.4mg qhs  -will continue -Patient has blank staring episodes lasting a few minutes, had episode this morning that lasted about 10 minutes associated with vomiting  -Patient had workup done at [facility] for 72 hour EEG where he had one of the staring episodes and it was not attributed to seizure like activity  -Cardio saw patient, symptoms not likely due to cardiac abnormalities -Patient has blank staring episodes lasting a few minutes, had episode this morning that lasted about 10 minutes associated with vomiting  -Patient had workup done at another hospital including 72 hour EEG where he had one of the staring episodes and it was not attributed to seizure like activity  -Cardio saw patient, symptoms not likely due to cardiac abnormalities but will monitor on tele for now -Patient has blank staring episodes lasting a few minutes, had episode this morning that lasted about 10 minutes associated with vomiting  -suspect acute metabolic encephalopathy in setting of infection.  -Patient had workup done at another hospital including 72-hour video EEG where he had one of the staring episodes and it was not attributed to seizure like activity  -Cardio saw patient, symptoms not likely due to cardiac abnormalities but will monitor on tele for now

## 2018-11-14 NOTE — ED PROVIDER NOTE - CONSTITUTIONAL MANNER
appropriate for situation
Unknown baseline  - BUN/Cr 46/2; ratio 20  - Trial of IVFs x 24 hours  ***F/u urine lytes and renal US

## 2018-11-14 NOTE — ED PROVIDER NOTE - PROGRESS NOTE DETAILS
pt wife at bedside, reports pt had episode of "unresponsiveness", had eyes open and vomited. once ems arrived pt was at baseline. wife reports pt with previous episodes in the past that was a uti. reports pt has hx of aspiration. pt vomits at baseline, especially in cars. pt wife reports pt simet was increased last week. pmd: masslex, neuro: nesha. labs reviewed. will ct abd/pelvis to eval for hematuria. consulted dr jeffries neuro who advised pt been worked up in the past for syncopal episodes, had eval for seizures without acute findings. advised pt improved at baseline. will re-eval will admit for failed out pt tx for uti.

## 2018-11-14 NOTE — H&P ADULT - PROBLEM SELECTOR PLAN 5
-stable, chronic  -c/w metoprolol tart ate 50mg BID with holding parameters -stable, chronic, rate controlled  -c/w metoprolol tart ate 50mg BID with holding parameters   -warfarin dosage daily  -Cardio consulted- recs appreciated, unlikely cardiac cause of symptoms will continue to follow -s/p CVA in 2012 and 2016, residual left sided weakness and dementia  -c/w metoprolol tart ate 50mg BID with holding parameters  -patient on zetia 10mg QD at home, do not have at hospital, will tell patient's wife to bring from home -s/p CVA in 2012 and 2016, residual left sided weakness and dementia  -patient on zetia 10mg QD at home, do not have at hospital, will tell patient's wife to bring from home

## 2018-11-14 NOTE — H&P ADULT - NSHPREVIEWOFSYSTEMS_GEN_ALL_CORE
Constitutional: denies fever, chills, diaphoresis   HEENT: denies blurry vision, difficulty hearing  Respiratory: denies SOB, EDGAR, cough, sputum production, wheezing, hemoptysis  Cardiovascular: denies chest pain, palpitations, edema  Gastrointestinal: denies nausea, vomiting, diarrhea, constipation, abdominal pain, melena, hematochezia   Genitourinary: denies dysuria, frequency, urgency, hematuria   Skin/Breast: denies rash, itching  Musculoskeletal: denies myalgias, joint swelling, muscle weakness  Neurologic: denies headache, weakness, dizziness, paresthesias, numbness/tingling  Psychiatric: denies feeling anxious, depressed, suicidal, homicidal thoughts  Hematology/Oncology: denies bruising, tender or enlarged lymph nodes   ROS negative except as noted above unable to obtain 2/2 mental status unable to obtain since pt is non-verbal.

## 2018-11-14 NOTE — H&P ADULT - NSHPSOCIALHISTORY_GEN_ALL_CORE
Pt lives with wife. Requires walker at baseline. HHA for ~4hrs/day x7days/wk Pt lives with wife, uses wheelchair at baseline, is able to ambulate with walker with 1 person assist with Physical therapist (comes for 1 hour 2x/week)  Has home aid who comes for 1 hour nightly to help transfer to bed

## 2018-11-14 NOTE — H&P ADULT - ATTENDING COMMENTS
Pt seen + examined. Case discussed with intern. Agree with assessment and plan above (edited) with following addendum:  76yo M PMH of Afib (on coumadin), CVA (2012, 2016, nearly non-verbal), Parkinson's disease, dementia (nearly non-verbal and very limited mobility), HTN, BPH w/ hx of urinary retention (now on flomax/proscar without gore), hx of unresponsive episodes and hx of frequent UTIs, BIBEMS for unresponsive episode a/w acute encephalopathy suspected due to UTI.  -patient had positive UCx for Enterococcus faecalis on 10/24 and was empirically prescribed ceftin, which E. faecalis is not susceptible to so pt did not receive treatment yet  -CT [place] showed Left renal pelvic calculi extending into the left lower pole calyces with left peripelvic infiltrative changes without significant hydronephrosis  -s/p unasyn x1 in the ED, will continue with unasyn  -UA positive, WBC wnl, but unable to obtain symptoms from patient due to baseline dementia and nonverbal  -f/u UCx, f/u BCx  -F/u ID recs, Dr. Cole -- rec unasyn  -F/u urology recs- Dr. Quiñonez  -now spiked a fever -- likely due to UTI, if there is element of aspiration PNA after vomiting episode, unasyn should cover that, as well.  -Patient has blank staring episodes lasting a few minutes, had episode this morning that lasted about 10 minutes associated with vomiting  -suspect acute metabolic encephalopathy in setting of infection.  -Patient had workup done at another hospital including 72-hour video EEG where he had one of the staring episodes and it was not attributed to seizure like activity  -Cardio saw patient, symptoms not likely due to cardiac abnormalities but will monitor on tele for now    Discussed admission with PMD, Dr. Trimble.

## 2018-11-14 NOTE — H&P ADULT - NSHPPHYSICALEXAM_GEN_ALL_CORE
Physical Exam:  General: Well developed, well nourished, NAD  HEENT: NCAT, PERRLA, EOMI bl, moist mucous membranes   Neck: Supple, nontender, no mass  Neurology: A&Ox3, nonfocal, CN II-XII grossly intact, sensation intact, no gait abnormalities   Respiratory: CTA B/L, No W/R/R  CV: RRR, +S1/S2, no murmurs, rubs or gallops  Abdominal: Soft, NT, ND +BSx4  Extremities: No C/C/E, + peripheral pulses  MSK: Normal ROM, no joint erythema or warmth, no joint swelling   Skin: warm, dry, normal color, no rash or abnormal lesions Physical Exam:  General: resting tremor, nonverbal, minimally responsive  HEENT: NCAT, PERRLA, EOMI bl, moist mucous membranes   Neck: Supple, nontender, no mass  Respiratory: CTA B/L, No W/R/R  CV: irregularly irregular, no murmurs  Abdominal: Soft, NT, slightly distended, +BSx4  Extremities: No C/C/E, + peripheral pulses, clamy Physical Exam:  General: resting tremor, nonverbal, minimally responsive  HEENT: NC/AT, anicteric, moist mucous membranes   Neck: Supple, nontender  Respiratory: grossly CTA B/L, no wheezes or rales, though exam limited as pt not taking deep breaths  CV: irregularly irregular at 92bpm, S1, S2, no murmurs appreciated  Abdominal: Soft, NT, slightly distended (baseline as per wife), +BSx4  Extremities: No cyanosis or edema  Back: no CVA tenderness   Lymph: no cervical lymphadenopathy  Psych: appears normal affect

## 2018-11-14 NOTE — H&P ADULT - PROBLEM SELECTOR PLAN 8
-patient already on daily warfarin -c/w zoloft 50mg QD -chronic  -c/w sinemet 1.5tabs orally at 9am, 12pm, 3pm and 1tab orally at 6pm -c/w sinemet 1.5tabs orally at 9am, 12pm, 3pm and 1tab orally at 6pm as per home regimen

## 2018-11-14 NOTE — H&P ADULT - PROBLEM SELECTOR PLAN 3
-s/p CVA in 2012 and 2016, residual left sided weakness and dementia  -c/w metoprolol tart ate 50mg BID with holding parameters  -patient on zetia 10mg QD at home, do not have at hospital, will tell patient's wife to bring from home -patient on home meds finasteride 5mg QD and flomax 0.4mg qhs  -will continue -Seen on CT renal stone hunt  -CT stone hunt revealed stone in left kidney with atrophic kidney, no evidence of hydronephrosis  -Possible source of recurrent UTI  -F/u urology recs, Dr. Quiñonez -Seen on CT renal stone hunt  -CT stone hunt revealed stones in left kidney with atrophic kidney, no evidence of hydronephrosis currently   -renal calculi, possible source of recurrent UTI, but no data yet to show recurrence of same organism despite appropriate antibiotic treatment   -F/u urology recs, Dr. Quiñonez

## 2018-11-14 NOTE — H&P ADULT - ASSESSMENT
77y M PMH Afib (on coumadin), CVA (2012, 2016), Parkinson's disease, dementia, HTN, urinary retention, and multiple TIAs BIBEMS for altered mental status possibly 2/2 UTI with failed outpatient abx. 78yo M PMH of Afib (on coumadin), CVA (2012, 2016, nearly non-verbal), Parkinson's disease, dementia (nearly non-verbal and very limited mobility), HTN, BPH w/ hx of urinary retention (now on flomax/proscar without gore), hx of unresponsive episodes and hx of frequent UTIs, BIBEMS for unresponsive episode a/w acute encephalopathy suspected due to UTI.

## 2018-11-14 NOTE — ED PROVIDER NOTE - OBJECTIVE STATEMENT
pt is a 77yo male with pmhx of CVA, afib on coumadin, parkinson's, dementia bib ems for AMS. no family at bedside. pt nonverbal at baseline, full assist with adls, wheelchair bound. per ems, pt tx for uti on 10/24 with ceftin.

## 2018-11-14 NOTE — H&P ADULT - NSHPLABSRESULTS_GEN_ALL_CORE
CXR (personally reviewed):  no acute lung pathology.    CT stone fisher showed Left renal pelvic calculi extending into the left lower pole calyces with left peripelvic infiltrative changes without significant hydronephrosis.    CT head showed No acute or focal brain pathology. Stable brain appearance. Bilateral maxillary sinus disease.

## 2018-11-14 NOTE — H&P ADULT - HISTORY OF PRESENT ILLNESS
77y M PMH Afib (on coumadin), CVA (2012, 2016), Parkinson's disease, dementia, HTN, urinary retention, and multiple TIAs BIBEMS for altered mental status.  Patient was treated for UTI on 10/24 with ceftin.    charting in progress    In the ED, T 98.3, HR 78, /76, RR 18, SpO2 95% on RA.  Labs signigicant for CBC wnl, PT/INR 28.9/2.47, CMP wnl, positive UA. Aldo negative x1, lipase wnl.  Patient noted to have positiave UCx from 10/23 growing >100K Enterococcals faecalis, treated with ceftin.  In the ED, patient received Unazyn x1, carbidopa/levodopa, and 1L NS bolus.  CXR showed no acute lung pathology.  CT stone fisher showed Left renal pelvic calculi extending into the left lower pole calyces with left peripelvic infiltrative changes without significant hydronephrosis.  CT head showed No acute or focal brain pathology. Stable brain appearance. Bilateral maxillary sinus disease. 77y M PMH Afib (on coumadin), CVA (2012, 2016), Parkinson's disease, dementia, HTN, urinary retention, and multiple TIAs BIBEMS for altered mental status.  Patient was treated for UTI on 10/24 with ceftin.  Patient is nonverbal and wife at bedside.  Wife stated that patient has had episodes of blank stares in the past that last a few minutes, recently had one this morning for about 10 minutes and was vomiting at the same time.  Patient was at baseline this morning, had breakfast, however, after breakfast sat down and had blank stare episode  Wife called ambulance and patient was brought to ED.  Wife states that patient had workup done at [facility] for 72 hour EEG where he had one of the staring episodes and it was not attributed to seizure like activity.  Patient is not able to relay much information, all information obtained from wife.  Patient had left sided residual weakness and imbalance after stroke in 2012, and had dementia after stroke in 2016.  Patient is seen by PT at home 2 times a week for 1 hour, and has a nightly aide to help transfer to bed for 1 hour. Wife denies any recent fevers chills, diarrhea, constipation.  Patient is not able to relay any information regarding pain.        In the ED, T 98.3, HR 78, /76, RR 18, SpO2 95% on RA.  Labs significant for CBC wnl, PT/INR 28.9/2.47, CMP wnl, positive UA. Aldo negative x1, lipase wnl.  Patient noted to have positive UCx from 10/23 growing >100K Enterococcals faecalis, treated with ceftin.  In the ED, patient received Unazyn x1, carbidopa/levodopa x2, zofran x1, and 1L NS bolus.  CXR showed no acute lung pathology.  CT stone fisher showed Left renal pelvic calculi extending into the left lower pole calyces with left peripelvic infiltrative changes without significant hydronephrosis.  CT head showed No acute or focal brain pathology. Stable brain appearance. Bilateral maxillary sinus disease. 77y M PMH Afib (on coumadin), CVA (2012, 2016), Parkinson's disease, dementia, HTN, urinary retention, and multiple TIAs BIBEMS for altered mental status.  Patient was treated for UTI on 10/24 with ceftin.  Patient is nonverbal and wife at bedside.  Wife stated that patient has had episodes of blank stares in the past that last a few minutes, recently had one this morning for about 10 minutes and was vomiting at the same time.  Patient was at baseline this morning, had breakfast, however, after breakfast sat down and had blank stare episode  Wife called ambulance and patient was brought to ED.  Wife states that patient had workup done at [facility] for 72 hour EEG where he had one of the staring episodes and it was not attributed to seizure like activity.  Wife states that patient started to cough this morning, but has had cough for a few days.  Had aspiration pneumonia when patient was in rehab.  Eats nectar thick fluid, able to tolerate solid food.  Patient is not able to relay much information, all information obtained from wife.  Patient had left sided residual weakness and imbalance after stroke in 2012, and had dementia after stroke in 2016.  Patient is seen by PT at home 2 times a week for 1 hour, and has a nightly aide to help transfer to bed for 1 hour. Wife denies any recent fevers chills, diarrhea, constipation.  Patient is not able to relay any information regarding pain.      In the ED, T 98.3, HR 78, /76, RR 18, SpO2 95% on RA.  Labs significant for CBC wnl, PT/INR 28.9/2.47, CMP wnl, positive UA. Aldo negative x1, lipase wnl.  Patient noted to have positive UCx from 10/23 growing >100K Enterococcals faecalis, treated with ceftin.  In the ED, patient received Unazyn x1, carbidopa/levodopa x2, zofran x1, and 1L NS bolus.  CXR showed no acute lung pathology.  CT stone fisher showed Left renal pelvic calculi extending into the left lower pole calyces with left peripelvic infiltrative changes without significant hydronephrosis.  CT head showed No acute or focal brain pathology. Stable brain appearance. Bilateral maxillary sinus disease. 76yo M PMH of Afib (on coumadin), CVA (2012, 2016, nearly non-verbal), Parkinson's disease, dementia (nearly non-verbal and very limited mobility), HTN, BPH w/ hx of urinary retention (now on flomax/proscar without gore), hx of unresponsive episodes and hx of frequent UTIs, BIBEMS for unresponsive episode. Patient is virtually nonverbal and wife at bedside provides history.  Wife stated that patient has had episodes of blank stares in the past for short period of time (worst >1yr ago when he had w/up in different hospital). Today, pt has another unresponsive episode lasting ~10 minutes and vomited up all of his breakfast at the same time. Emesis was non-bloody, largely food contents. Patient was at baseline this morning, had breakfast as per usual before the unresponsive episode occurred.  Wife called ambulance and patient was brought to the ED.  Wife and Dr. Nance states that patient had workup done at a different hospital including a 72-hour EEG where he had one of the staring episodes without epileptiform activity on EEG and was deemed not to be seizure related. Wife states that patient started to cough this morning, but has had cough for a few days.  Had aspiration pneumonia when patient was in rehab.  Eats nectar thick fluid, able to tolerate solid food.  Patient is not able to relay much information, all information obtained from wife.  Patient had left sided residual weakness and imbalance after stroke in 2012, and had dementia after stroke in 2016.  Patient is seen by PT at home 2 times a week for 1 hour, and has a nightly aide to help transfer to bed for 1 hour. Wife denies any recent fevers chills, diarrhea, constipation.  Patient is not able to relay any information regarding pain.      In the ED, T 98.3, HR 78, /76, RR 18, SpO2 95% on RA.  Labs significant for CBC wnl, PT/INR 28.9/2.47, CMP wnl, positive UA. Aldo negative x1, lipase wnl.  Patient noted to have positive UCx from 10/23 growing >100K Enterococcals faecalis, treated with ceftin.  In the ED, patient received Unazyn x1, carbidopa/levodopa x2, zofran x1, and 1L NS bolus.  CXR showed no acute lung pathology.  CT stone fisher showed Left renal pelvic calculi extending into the left lower pole calyces with left peripelvic infiltrative changes without significant hydronephrosis.  CT head showed No acute or focal brain pathology. Stable brain appearance. Bilateral maxillary sinus disease. 76yo M PMH of Afib (on coumadin), CVA (2012, 2016, nearly non-verbal), Parkinson's disease, dementia (nearly non-verbal and very limited mobility), HTN, BPH w/ hx of urinary retention (now on flomax/proscar without gore), hx of unresponsive episodes and hx of frequent UTIs, BIBEMS for unresponsive episode. Patient is virtually nonverbal and wife at bedside provides history.  Wife stated that patient has had episodes of blank stares in the past for short period of time (worst >1yr ago when he had w/up in different hospital). Today, pt has another unresponsive episode lasting ~10 minutes and vomited up all of his breakfast at the same time. Emesis was non-bloody, largely food contents. Patient was at baseline this morning, had breakfast as per usual before the unresponsive episode occurred.  Wife called ambulance and patient was brought to the ED.  Wife and Dr. Nance states that patient had workup done at a different hospital including a 72-hour EEG where he had one of the staring episodes without epileptiform activity on EEG and was deemed not to be seizure related. Wife states pt has a chronic periodic cough but had somewhat more after the vomiting episode. Of note, pt eats nectar thick fluid, and solid food as has had aspiration with this liquids.  Patient is not able to relay much information, all information obtained from wife.  Patient had left sided residual weakness and imbalance after stroke in 2012, and had dementia after stroke in 2016.  Patient is seen by PT at home 2 times a week for 1 hour, and has a nightly aide to help transfer to bed for 1 hour. Wife denies any recent fevers, diarrhea, constipation seen in the pt.      In the ED, T 98.3, HR 78, /76, RR 18, SpO2 95% on RA.  Labs significant for CBC wnl, PT/INR 28.9/2.47, CMP wnl, positive UA. Trop negative x1, lipase wnl.  Patient noted to have positive UCx from 10/23 growing >100K Enterococcals faecalis, treated with ceftin.  In the ED, patient received Unasyn x1, carbidopa/levodopa x2, zofran x1, and 1L NS bolus.  CXR showed no acute lung pathology.  CT stone fisher showed Left renal pelvic calculi extending into the left lower pole calyces with left peripelvic infiltrative changes without significant hydronephrosis.  CT head showed No acute or focal brain pathology. Stable brain appearance. Bilateral maxillary sinus disease.

## 2018-11-14 NOTE — ED ADULT NURSE NOTE - NSIMPLEMENTINTERV_GEN_ALL_ED
Implemented All Fall Risk Interventions:  [place] to call system. Call bell, personal items and telephone within reach. Instruct patient to call for assistance. Room bathroom lighting operational. Non-slip footwear when patient is off stretcher. Physically safe environment: no spills, clutter or unnecessary equipment. Stretcher in lowest position, wheels locked, appropriate side rails in place. Provide visual cue, wrist band, yellow gown, etc. Monitor gait and stability. Monitor for mental status changes and reorient to person, place, and time. Review medications for side effects contributing to fall risk. Reinforce activity limits and safety measures with patient and family.

## 2018-11-14 NOTE — H&P ADULT - PROBLEM SELECTOR PLAN 4
-stable, chronic, rate controlled  -c/w metoprolol tart ate 50mg BID with holding parameters   -warfarin dosage daily  -Cardio consulted -stable, chronic, rate controlled  -c/w metoprolol tart ate 50mg BID with holding parameters   -warfarin dosage daily  -Cardio consulted- recs appreciated, unlikely cardiac cause of symptoms will continue to follow -s/p CVA in 2012 and 2016, residual left sided weakness and dementia  -c/w metoprolol tart ate 50mg BID with holding parameters  -patient on zetia 10mg QD at home, do not have at hospital, will tell patient's wife to bring from home -patient on home meds finasteride 5mg QD and flomax 0.4mg qhs  -will continue -chronic  -patient on home meds finasteride 5mg QD and flomax 0.4mg qhs  -will continue with home meds  -CT pelvis did not show distended bladder -chronic  -patient on home meds finasteride 5mg daily and flomax 0.4mg QHS  -will continue with home meds  -CT pelvis did not show distended bladder and pt has been voiding as per wife.

## 2018-11-14 NOTE — H&P ADULT - PROBLEM SELECTOR PLAN 10
-patient already on daily warfarin -VTE ppx: warfarin    IMPROVE VTE Individual Risk Assessment          RISK                                                          Points  [  ] Previous VTE                                                3  [  ] Thrombophilia                                             2  [ x ] Lower limb paralysis                                   2        (unable to hold up >15 seconds)    [  ] Current Cancer                                             2         (within 6 months)  [ x ] Immobilization > 24 hrs                              1  [  ] ICU/CCU stay > 24 hours                             1  [ x ] Age > 60                                                         1    IMPROVE VTE Score: 4

## 2018-11-15 ENCOUNTER — TRANSCRIPTION ENCOUNTER (OUTPATIENT)
Age: 78
End: 2018-11-15

## 2018-11-15 DIAGNOSIS — N20.0 CALCULUS OF KIDNEY: ICD-10-CM

## 2018-11-15 DIAGNOSIS — N30.00 ACUTE CYSTITIS WITHOUT HEMATURIA: ICD-10-CM

## 2018-11-15 DIAGNOSIS — N40.1 BENIGN PROSTATIC HYPERPLASIA WITH LOWER URINARY TRACT SYMPTOMS: ICD-10-CM

## 2018-11-15 LAB
ANION GAP SERPL CALC-SCNC: 8 MMOL/L — SIGNIFICANT CHANGE UP (ref 5–17)
BASOPHILS # BLD AUTO: 0.02 K/UL — SIGNIFICANT CHANGE UP (ref 0–0.2)
BASOPHILS NFR BLD AUTO: 0.2 % — SIGNIFICANT CHANGE UP (ref 0–2)
BUN SERPL-MCNC: 16 MG/DL — SIGNIFICANT CHANGE UP (ref 7–23)
CALCIUM SERPL-MCNC: 8.9 MG/DL — SIGNIFICANT CHANGE UP (ref 8.5–10.1)
CHLORIDE SERPL-SCNC: 110 MMOL/L — HIGH (ref 96–108)
CK SERPL-CCNC: 74 U/L — SIGNIFICANT CHANGE UP (ref 26–308)
CO2 SERPL-SCNC: 26 MMOL/L — SIGNIFICANT CHANGE UP (ref 22–31)
CREAT SERPL-MCNC: 1.1 MG/DL — SIGNIFICANT CHANGE UP (ref 0.5–1.3)
EOSINOPHIL # BLD AUTO: 0.05 K/UL — SIGNIFICANT CHANGE UP (ref 0–0.5)
EOSINOPHIL NFR BLD AUTO: 0.6 % — SIGNIFICANT CHANGE UP (ref 0–6)
GLUCOSE SERPL-MCNC: 84 MG/DL — SIGNIFICANT CHANGE UP (ref 70–99)
HCT VFR BLD CALC: 39.2 % — SIGNIFICANT CHANGE UP (ref 39–50)
HGB BLD-MCNC: 13.5 G/DL — SIGNIFICANT CHANGE UP (ref 13–17)
IMM GRANULOCYTES NFR BLD AUTO: 0.2 % — SIGNIFICANT CHANGE UP (ref 0–1.5)
INR BLD: 3.18 RATIO — HIGH (ref 0.88–1.16)
LYMPHOCYTES # BLD AUTO: 1.41 K/UL — SIGNIFICANT CHANGE UP (ref 1–3.3)
LYMPHOCYTES # BLD AUTO: 17.5 % — SIGNIFICANT CHANGE UP (ref 13–44)
MCHC RBC-ENTMCNC: 32.5 PG — SIGNIFICANT CHANGE UP (ref 27–34)
MCHC RBC-ENTMCNC: 34.4 GM/DL — SIGNIFICANT CHANGE UP (ref 32–36)
MCV RBC AUTO: 94.2 FL — SIGNIFICANT CHANGE UP (ref 80–100)
MONOCYTES # BLD AUTO: 0.64 K/UL — SIGNIFICANT CHANGE UP (ref 0–0.9)
MONOCYTES NFR BLD AUTO: 8 % — SIGNIFICANT CHANGE UP (ref 2–14)
NEUTROPHILS # BLD AUTO: 5.9 K/UL — SIGNIFICANT CHANGE UP (ref 1.8–7.4)
NEUTROPHILS NFR BLD AUTO: 73.5 % — SIGNIFICANT CHANGE UP (ref 43–77)
PLATELET # BLD AUTO: 126 K/UL — LOW (ref 150–400)
POTASSIUM SERPL-MCNC: 4 MMOL/L — SIGNIFICANT CHANGE UP (ref 3.5–5.3)
POTASSIUM SERPL-SCNC: 4 MMOL/L — SIGNIFICANT CHANGE UP (ref 3.5–5.3)
PROTHROM AB SERPL-ACNC: 37.3 SEC — HIGH (ref 10–12.9)
RBC # BLD: 4.16 M/UL — LOW (ref 4.2–5.8)
RBC # FLD: 13.1 % — SIGNIFICANT CHANGE UP (ref 10.3–14.5)
SODIUM SERPL-SCNC: 144 MMOL/L — SIGNIFICANT CHANGE UP (ref 135–145)
TROPONIN I SERPL-MCNC: <.015 NG/ML — SIGNIFICANT CHANGE UP (ref 0.01–0.04)
WBC # BLD: 8.04 K/UL — SIGNIFICANT CHANGE UP (ref 3.8–10.5)
WBC # FLD AUTO: 8.04 K/UL — SIGNIFICANT CHANGE UP (ref 3.8–10.5)

## 2018-11-15 PROCEDURE — 99233 SBSQ HOSP IP/OBS HIGH 50: CPT | Mod: GC

## 2018-11-15 PROCEDURE — 99232 SBSQ HOSP IP/OBS MODERATE 35: CPT

## 2018-11-15 RX ORDER — LACTOBACILLUS ACIDOPHILUS 100MM CELL
1 CAPSULE ORAL
Qty: 0 | Refills: 0 | Status: DISCONTINUED | OUTPATIENT
Start: 2018-11-15 | End: 2018-11-17

## 2018-11-15 RX ORDER — METOPROLOL TARTRATE 50 MG
25 TABLET ORAL
Qty: 0 | Refills: 0 | Status: DISCONTINUED | OUTPATIENT
Start: 2018-11-15 | End: 2018-11-17

## 2018-11-15 RX ADMIN — AMPICILLIN SODIUM AND SULBACTAM SODIUM 200 GRAM(S): 250; 125 INJECTION, POWDER, FOR SUSPENSION INTRAMUSCULAR; INTRAVENOUS at 23:38

## 2018-11-15 RX ADMIN — CARBIDOPA AND LEVODOPA 1 TABLET(S): 25; 100 TABLET ORAL at 18:44

## 2018-11-15 RX ADMIN — TAMSULOSIN HYDROCHLORIDE 0.4 MILLIGRAM(S): 0.4 CAPSULE ORAL at 23:39

## 2018-11-15 RX ADMIN — AMPICILLIN SODIUM AND SULBACTAM SODIUM 200 GRAM(S): 250; 125 INJECTION, POWDER, FOR SUSPENSION INTRAMUSCULAR; INTRAVENOUS at 12:20

## 2018-11-15 RX ADMIN — Medication 25 MILLIGRAM(S): at 17:49

## 2018-11-15 RX ADMIN — Medication 1 TABLET(S): at 17:48

## 2018-11-15 RX ADMIN — Medication 50 MILLIGRAM(S): at 10:11

## 2018-11-15 RX ADMIN — FINASTERIDE 5 MILLIGRAM(S): 5 TABLET, FILM COATED ORAL at 10:11

## 2018-11-15 RX ADMIN — AMPICILLIN SODIUM AND SULBACTAM SODIUM 200 GRAM(S): 250; 125 INJECTION, POWDER, FOR SUSPENSION INTRAMUSCULAR; INTRAVENOUS at 05:05

## 2018-11-15 RX ADMIN — CARBIDOPA AND LEVODOPA 1.5 TABLET(S): 25; 100 TABLET ORAL at 10:11

## 2018-11-15 RX ADMIN — SERTRALINE 50 MILLIGRAM(S): 25 TABLET, FILM COATED ORAL at 23:39

## 2018-11-15 RX ADMIN — AMPICILLIN SODIUM AND SULBACTAM SODIUM 200 GRAM(S): 250; 125 INJECTION, POWDER, FOR SUSPENSION INTRAMUSCULAR; INTRAVENOUS at 00:44

## 2018-11-15 RX ADMIN — CARBIDOPA AND LEVODOPA 1.5 TABLET(S): 25; 100 TABLET ORAL at 12:20

## 2018-11-15 RX ADMIN — CARBIDOPA AND LEVODOPA 1.5 TABLET(S): 25; 100 TABLET ORAL at 15:10

## 2018-11-15 RX ADMIN — AMPICILLIN SODIUM AND SULBACTAM SODIUM 200 GRAM(S): 250; 125 INJECTION, POWDER, FOR SUSPENSION INTRAMUSCULAR; INTRAVENOUS at 17:48

## 2018-11-15 NOTE — DIETITIAN INITIAL EVALUATION ADULT. - PROBLEM SELECTOR PLAN 3
-Seen on CT renal stone hunt  -CT stone hunt revealed stones in left kidney with atrophic kidney, no evidence of hydronephrosis currently   -renal calculi, possible source of recurrent UTI, but no data yet to show recurrence of same organism despite appropriate antibiotic treatment   -F/u urology recs, Dr. Quiñonez

## 2018-11-15 NOTE — PROGRESS NOTE ADULT - SUBJECTIVE AND OBJECTIVE BOX
Neurology Follow up note    JOSÉ MIGUEL [id number]    HPI:  78yo M PMH of Afib (on coumadin), CVA (2012, 2016, nearly non-verbal), Parkinson's disease, dementia (nearly non-verbal and very limited mobility), HTN, BPH w/ hx of urinary retention (now on flomax/proscar without gore), hx of unresponsive episodes and hx of frequent UTIs, BIBEMS for unresponsive episode. Patient is virtually nonverbal and wife at bedside provides history.  Wife stated that patient has had episodes of blank stares in the past for short period of time (worst >1yr ago when he had w/up in different hospital). Today, pt has another unresponsive episode lasting ~10 minutes and vomited up all of his breakfast at the same time. Emesis was non-bloody, largely food contents. Patient was at baseline this morning, had breakfast as per usual before the unresponsive episode occurred.  Wife called ambulance and patient was brought to the ED.  Wife and Dr. Nance states that patient had workup done at a different hospital including a 72-hour EEG where he had one of the staring episodes without epileptiform activity on EEG and was deemed not to be seizure related. Wife states pt has a chronic periodic cough but had somewhat more after the vomiting episode. Of note, pt eats nectar thick fluid, and solid food as has had aspiration with this liquids.  Patient is not able to relay much information, all information obtained from wife.  Patient had left sided residual weakness and imbalance after stroke in , and had dementia after stroke in 2016.  Patient is seen by PT at home 2 times a week for 1 hour, and has a nightly aide to help transfer to bed for 1 hour. Wife denies any recent fevers, diarrhea, constipation seen in the pt.      In the ED, T 98.3, HR 78, /76, RR 18, SpO2 95% on RA.  Labs significant for CBC wnl, PT/INR 28.9/2.47, CMP wnl, positive UA. Trop negative x1, lipase wnl.  Patient noted to have positive UCx from 10/23 growing >100K Enterococcals faecalis, treated with ceftin.  In the ED, patient received Unasyn x1, carbidopa/levodopa x2, zofran x1, and 1L NS bolus.  CXR showed no acute lung pathology.  CT stone fisher showed Left renal pelvic calculi extending into the left lower pole calyces with left peripelvic infiltrative changes without significant hydronephrosis.  CT head showed No acute or focal brain pathology. Stable brain appearance. Bilateral maxillary sinus disease. (2018 15:29)      Interval History - doing better    Patient is seen, chart was reviewed and case was discussed with the treatment team.  Pt is not in any distress.   Lying on bed comfortably.   No events reported overnight.   No clinical seizure was reported.  Sitting on chair bed comfortably.    is at bedside.    Vital Signs Last 24 Hrs  T(C): 36.6 (15 Nov 2018 07:25), Max: 38.3 (2018 18:59)  T(F): 97.9 (15 Nov 2018 07:25), Max: 101 (2018 18:59)  HR: 82 (15 Nov 2018 07:25) (71 - 99)  BP: 122/77 (15 Nov 2018 07:25) (118/83 - 199/92)  BP(mean): --  RR: 16 (15 Nov 2018 07:25) (14 - 17)  SpO2: 96% (15 Nov 2018 07:25) (96% - 100%)            On Neurological Examination:    Mental Status - Pt is alert, awake, Follows some commands    Speech - dysarthria.    Cranial Nerves - Pupils 3 mm equal and reactive to light, extraocular eye movements intact.   Pt has no r facial asymmetry. Facial sensation is intact.Tongue - is in midline.    Muscle tone - cogwheel rigidity,    Motor Exam - 5/5 of UE .  LE 3-4/5.    Sensory Exam . Pt withdraws all extremities equally on stimulation. No asymmetry seen.      Deep tendon Reflexes - 2 plus all over.      Neck Supple -  Yes.     MEDICATIONS    acetaminophen   Tablet .. 650 milliGRAM(s) Oral every 6 hours PRN  ampicillin/sulbactam  IVPB 3 Gram(s) IV Intermittent every 6 hours  carbidopa/levodopa  25/100 1 Tablet(s) Oral daily  carbidopa/levodopa  25/100 1.5 Tablet(s) Oral <User Schedule>  finasteride 5 milliGRAM(s) Oral daily  metoprolol tartrate 25 milliGRAM(s) Oral two times a day  sertraline 50 milliGRAM(s) Oral at bedtime  tamsulosin 0.4 milliGRAM(s) Oral at bedtime      Allergies    No Known Allergies    Intolerances        LABS:  CBC Full  -  ( 15 Nov 2018 07:23 )  WBC Count : 8.04 K/uL  Hemoglobin : 13.5 g/dL  Hematocrit : 39.2 %  Platelet Count - Automated : 126 K/uL  Mean Cell Volume : 94.2 fl  Mean Cell Hemoglobin : 32.5 pg  Mean Cell Hemoglobin Concentration : 34.4 gm/dL  Auto Neutrophil # : 5.90 K/uL  Auto Lymphocyte # : 1.41 K/uL  Auto Monocyte # : 0.64 K/uL      Urinalysis Basic - ( 2018 12:40 )    Color: Yellow / Appearance: Turbid / S.025 / pH: x  Gluc: x / Ketone: Small  / Bili: Negative / Urobili: Negative   Blood: x / Protein: 75 mg/dL / Nitrite: Negative   Leuk Esterase: Moderate / RBC: 11-25 /HPF / WBC >50   Sq Epi: x / Non Sq Epi: Few / Bacteria: Many      11-15    144  |  110<H>  |  16  ----------------------------<  84  4.0   |  26  |  1.10    Ca    8.9      15 Nov 2018 07:23    TPro  8.0  /  Alb  3.3  /  TBili  0.5  /  DBili  x   /  AST  27  /  ALT  9<L>  /  AlkPhos  71  11-14    Hemoglobin A1C:     Vitamin B12     RADIOLOGY    ASSESSMENT AND PLAN:      SYNCOPE DOUBT TIA/SEIZURE.  UTI.  PD,  HX OF CVA,  FOLLOW UP C/S.  CONTINUE SINEMET.  Physical therapy evaluation.  OOB to chair/ambulation with assistance only.  Advanced care planning was discussed with family.  Pain is accessed and addressed.  Plan of care was discussed with family. Questions answered.  Would continue to follow.

## 2018-11-15 NOTE — DIETITIAN INITIAL EVALUATION ADULT. - NS AS NUTRI INTERV MEDICAL AND FOOD SUPPLEMENTS3
Pt's wife declined oral nutrition supplements (Ensure Enlive) at this time, Pt's wife declined oral nutrition supplements (Ensure Enlive) at this time, will trial Ensure pudding with dinner,

## 2018-11-15 NOTE — DISCHARGE NOTE ADULT - PLAN OF CARE
Resolution of symptoms You presented to the ED with altered mental status due to acute infection of your urinary tract. You were treated with IV antibiotics. Continue to take Amoxicillin 500 mg twice a day for 7 days. Take probiotic 3 times a day while on antibiotics. Follow up with your PMD Dr. Trimble within 1 week. Continue Zoloft 50 mg daily. Continue your carbidopa-levodopa at your scheduled times Your medication was adjusted. Continue metoprolol tartrate 25 mg twice a day. Follow up with your cardiologist Dr. Leo within 1 week. Continue coumadin 3 mg daily. Continue metoprolol tartrate 25 mg twice a day. Follow up with your cardiologist. Continue Zetia.

## 2018-11-15 NOTE — PROGRESS NOTE ADULT - ASSESSMENT
77y M PMHx of atrial fibrillation (on coumadin), CVA (2012/16), dementia, HTN, multiple TIAs admitted for syncopal episode.     - No evidence of acute ischemia, troponin I negative x 2 sets. Patient with no anginal symptoms.  - No evidence of any meaningful volume overload  - EKG showed Atrial fibrillation at 80 bpm with no acute changes when compared to prior study, monitor on telemetry  - prelim echo not changed from previous  - continue with lopressor 25 mg q12hrs; INR within therapeutic range. Dose coumadin with goal INR 2-3  - monitor and replete lytes, keep K>4, Mg>2  - Other cardiovascular workup will depend on clinical course.  - No clear cardiac etiology of his syncope.  - All other workup per primary team  - Will continue to follow with you   DC planning per primary team. 77y M PMHx of atrial fibrillation (on coumadin), CVA (2012/16), dementia, HTN, multiple TIAs admitted for syncopal episode.     - No evidence of acute ischemia, troponin I negative x 2 sets. Patient with no anginal symptoms.  - No evidence of any meaningful volume overload  - EKG showed Atrial fibrillation at 80 bpm with no acute changes when compared to prior study, monitor on telemetry  - prelim echo not changed from previous  - continue with lopressor 25 mg q12hrs; INR within therapeutic range. Dose coumadin with goal INR 2-3  - monitor and replete lytes, keep K>4, Mg>2  - Other cardiovascular workup will depend on clinical course.  - No clear cardiac etiology of his syncope.  - All other workup per primary team  - Will continue to follow with you   DC planning per primary team.   Kolton John ANP  Cardiology 77y M PMHx of atrial fibrillation (on coumadin), CVA (2012/16), dementia, HTN, multiple TIAs admitted for syncopal episode.  No evidence of acute ischemia, troponin I negative x 2 sets. Patient with no anginal symptoms. No evidence of any meaningful volume overload  EKG showed Atrial fibrillation at 80 bpm with no acute changes when compared to prior study   prelim echo not changed from previous    - cont tele  - continue with lopressor 25 mg q12hrs;  - INR within therapeutic range. Dose coumadin with goal INR 2-3  - monitor and replete lytes, keep K>4, Mg>2  - Other cardiovascular workup will depend on clinical course.  - No clear cardiac etiology of his syncope.  - All other workup per primary team  - Will continue to follow with you   - DC planning per primary team.   Kolton John ANP  Cardiology

## 2018-11-15 NOTE — PROGRESS NOTE ADULT - PROBLEM SELECTOR PLAN 1
- continue Unasyn 3 grams Q6 hours  - unable to obtain symptoms from patient due to baseline dementia and nonverbal  - f/u UCx, f/u BCx - adjust antibiotic regimen as necessary   - ID Dr. Cole following   - Uro Dr. Quiñonez following - continue Unasyn 3 grams Q6 hours  - unable to obtain symptoms from patient due to baseline dementia and nonverbal  - f/u UCx, f/u BCx - adjust antibiotic regimen as necessary   - ID Dr. Cole following   - Uro Dr. Quiñonez following  - will check procalcitonin

## 2018-11-15 NOTE — DISCHARGE NOTE ADULT - CARE PROVIDERS DIRECT ADDRESSES
,[email or web address]

## 2018-11-15 NOTE — DISCHARGE NOTE ADULT - ADDITIONAL INSTRUCTIONS
Follow up with Dr. Bradley in 1 week. Follow up with Dr. Leo in one week. Follow up with Dr. Trimble in 1 week. Follow up with Dr. Leo in one week.

## 2018-11-15 NOTE — CONSULT NOTE ADULT - SUBJECTIVE AND OBJECTIVE BOX
CHIEF COMPLAINT:    HPI:  76yo M PMH of Afib (on coumadin), CVA (2012, 2016, nearly non-verbal), Parkinson's disease, dementia (nearly non-verbal and very limited mobility), HTN, BPH w/ hx of urinary retention (now on flomax/proscar without gore), K stones, hx of unresponsive episodes and hx of frequent UTIs, BIBEMS for unresponsive episode. Patient is virtually nonverbal and wife at bedside provides history.  Wife stated that patient has had episodes of blank stares in the past for short period of time (worst >1yr ago when he had w/up in different hospital).Pt admitted post unresponsive episode lasting ~10 minutes and vomiting. Emesis was non-bloody, largely food contents. Wife and Dr. Nance states that patient had workup done at a different hospital including a 72-hour EEG where he had one of the staring episodes without epileptiform activity on EEG and was deemed not to be seizure related. Wife states pt has a chronic periodic cough but had somewhat more after the vomiting episode. Of note, pt eats nectar thick fluid, and solid food as has had aspiration with this liquids. Patient had left sided residual weakness and imbalance after stroke in , and had dementia after stroke in 2016.  Patient is seen by PT at home 2 times a week for 1 hour, and has a nightly aide to help transfer to bed for 1 hour. Wife denies any recent fevers, diarrhea, constipation seen in the pt.      In the ED, T 98.3, HR 78, /76, RR 18, SpO2 95% on RA.  Labs significant for CBC wnl, PT/INR 28.9/2.47, CMP wnl, positive UA. Trop negative x1, lipase wnl.  Patient noted to have positive UCx from 10/23 growing >100K Enterococcals faecalis, treated with ceftin.  In the ED, patient received Unasyn x1, carbidopa/levodopa x2, zofran x1, and 1L NS bolus.  CXR showed no acute lung pathology.  CT stone fisher showed Left renal pelvic calculi extending into the left lower pole calyces with left peripelvic infiltrative changes without significant hydronephrosis.  CT head showed No acute or focal brain pathology. Stable brain appearance. Bilateral maxillary sinus disease. (2018 15:29)      PAST MEDICAL & SURGICAL HISTORY:  Syncope  Parkinson disease  Constipation  Dementia  Urinary retention  Cerebrovascular accident  Hypertension  Atrial fibrillation  No significant past surgical history      REVIEW OF SYSTEMS:    CONSTITUTIONAL: No fevers or chills  EYES/ENT: No visual changes;  No vertigo or throat pain   NECK: No pain or stiffness  RESPIRATORY: No cough, wheezing, hemoptysis; No shortness of breath  CARDIOVASCULAR: No chest pain or palpitations  GASTROINTESTINAL: No abdominal or epigastric pain. No diarrhea or constipation. No melena or hematochezia.  GENITOURINARY: No dysuria, frequency or hematuria  NEUROLOGICAL: No numbness   SKIN: No itching, burning, rashes, or lesions   All other review of systems is negative unless indicated above.    MEDICATIONS  (STANDING):  ampicillin/sulbactam  IVPB 3 Gram(s) IV Intermittent every 6 hours  carbidopa/levodopa  25/100 1.5 Tablet(s) Oral <User Schedule>  carbidopa/levodopa  25/100 1 Tablet(s) Oral daily  finasteride 5 milliGRAM(s) Oral daily  metoprolol tartrate 50 milliGRAM(s) Oral two times a day  sertraline 50 milliGRAM(s) Oral at bedtime  tamsulosin 0.4 milliGRAM(s) Oral at bedtime    MEDICATIONS  (PRN):  acetaminophen   Tablet .. 650 milliGRAM(s) Oral every 6 hours PRN Temp greater or equal to 38C (100.4F)      Allergies    No Known Allergies    Intolerances        Social History:  Alcohol: Denied  Smoking: Nonsmoker  Drug Use: Denied  Marital Status:     FAMILY HISTORY:  No pertinent family history in first degree relatives      Vital Signs Last 24 Hrs  T(C): 36.6 (15 Nov 2018 07:25), Max: 38.3 (2018 18:59)  T(F): 97.9 (15 Nov 2018 07:25), Max: 101 (2018 18:59)  HR: 82 (15 Nov 2018 07:25) (71 - 99)  BP: 122/77 (15 Nov 2018 07:25) (118/83 - 199/92)  BP(mean): --  RR: 16 (15 Nov 2018 07:25) (14 - 18)  SpO2: 96% (15 Nov 2018 07:25) (95% - 100%)    PHYSICAL EXAM:    Constitutional: NAD  HEENT: ERICA, EOMI  Neck: No LAD  Back: Normal spine flexure, No CVA tenderness  Respiratory: CTAB   Cardiovascular: S1 and S2  Abd: BS+, soft, NT/ND  : Normal phallus,open meatus,bilateral descended testes, no masses  MARIS: Normal prostate, no masses  Extremities: No peripheral edema  Vascular: 2+ peripheral pulses  Neurological: no focal deficits  Psychiatric: Normal mood, normal affect  Musculoskeletal: 5/5 strength b/l upper and lower extremities  Skin: No rashes    LABS:                        13.5   8.04  )-----------( 126      ( 15 Nov 2018 07:23 )             39.2     11-15    144  |  110<H>  |  16  ----------------------------<  84  4.0   |  26  |  1.10    Ca    8.9      15 Nov 2018 07:23    TPro  8.0  /  Alb  3.3  /  TBili  0.5  /  DBili  x   /  AST  27  /  ALT  9<L>  /  AlkPhos  71  11-14    PT/INR - ( 15 Nov 2018 07:23 )   PT: 37.3 sec;   INR: 3.18 ratio         PTT - ( 2018 11:51 )  PTT:42.2 sec  Urinalysis Basic - ( 2018 12:40 )    Color: Yellow / Appearance: Turbid / S.025 / pH: x  Gluc: x / Ketone: Small  / Bili: Negative / Urobili: Negative   Blood: x / Protein: 75 mg/dL / Nitrite: Negative   Leuk Esterase: Moderate / RBC: 11-25 /HPF / WBC >50   Sq Epi: x / Non Sq Epi: Few / Bacteria: Many      Urine Culture:   Blood Cultures      RADIOLOGY & ADDITIONAL STUDIES: CHIEF COMPLAINT:    HPI:  78yo M PMH of Afib (on coumadin), CVA (2012, 2016, nearly non-verbal), Parkinson's disease, dementia (nearly non-verbal and very limited mobility), HTN, BPH w/ hx of urinary retention (now on flomax/proscar without gore), K stones, hx of unresponsive episodes and hx of frequent UTIs, BIBEMS for unresponsive episode. Patient is virtually nonverbal and wife at bedside provides history.  Wife stated that patient has had episodes of blank stares in the past for short period of time (worst >1yr ago when he had w/up in different hospital).Pt admitted post unresponsive episode lasting ~10 minutes and vomiting. Emesis was non-bloody, largely food contents. Wife and Dr. Nance states that patient had workup done at a different hospital including a 72-hour EEG where he had one of the staring episodes without epileptiform activity on EEG and was deemed not to be seizure related. Wife states pt has a chronic periodic cough but had somewhat more after the vomiting episode. Of note, pt eats nectar thick fluid, and solid food as has had aspiration with this liquids. Patient had left sided residual weakness and imbalance after stroke in , and had dementia after stroke in 2016.  Patient is seen by PT at home 2 times a week for 1 hour, and has a nightly aide to help transfer to bed for 1 hour. Wife denies any recent fevers, diarrhea, constipation seen in the pt.      In the ED, T 98.3, HR 78, /76, RR 18, SpO2 95% on RA.  Labs significant for CBC wnl, PT/INR 28.9/2.47, CMP wnl, positive UA. Trop negative x1, lipase wnl.  Patient noted to have positive UCx from 10/23 growing >100K Enterococcals faecalis, treated with ceftin.  In the ED, patient received Unasyn x1, carbidopa/levodopa x2, zofran x1, and 1L NS bolus.  CXR showed no acute lung pathology.  CT stone fisher showed Left renal pelvic calculi extending into the left lower pole calyces with left peripelvic infiltrative changes without significant hydronephrosis.  CT head showed No acute or focal brain pathology. Stable brain appearance. Bilateral maxillary sinus disease. (2018 15:29)      PAST MEDICAL & SURGICAL HISTORY:  BPH; s/p TURP  K Stones, s/p mult procedures  Rec-UTI  Syncope  Parkinson disease  Constipation  Dementia  Urinary retention  Cerebrovascular accident  Hypertension  Atrial fibrillation  No significant past surgical history      REVIEW OF SYSTEMS:    CONSTITUTIONAL: No fevers or chills  EYES/ENT: No visual changes;  No vertigo or throat pain   NECK: No pain or stiffness  RESPIRATORY: No cough, wheezing, hemoptysis; No shortness of breath  CARDIOVASCULAR: No chest pain or palpitations  GASTROINTESTINAL: No abdominal or epigastric pain. No diarrhea or constipation. No melena or hematochezia.  GENITOURINARY: No dysuria, frequency or hematuria  NEUROLOGICAL: No numbness   SKIN: No itching, burning, rashes, or lesions   All other review of systems is negative unless indicated above.    MEDICATIONS  (STANDING):  ampicillin/sulbactam  IVPB 3 Gram(s) IV Intermittent every 6 hours  carbidopa/levodopa  25/100 1.5 Tablet(s) Oral <User Schedule>  carbidopa/levodopa  25/100 1 Tablet(s) Oral daily  finasteride 5 milliGRAM(s) Oral daily  metoprolol tartrate 50 milliGRAM(s) Oral two times a day  sertraline 50 milliGRAM(s) Oral at bedtime  tamsulosin 0.4 milliGRAM(s) Oral at bedtime    MEDICATIONS  (PRN):  acetaminophen   Tablet .. 650 milliGRAM(s) Oral every 6 hours PRN Temp greater or equal to 38C (100.4F)      Allergies    No Known Allergies    Intolerances        Social History:  Alcohol: Denied  Smoking: Nonsmoker  Drug Use: Denied  Marital Status:     FAMILY HISTORY:  No pertinent family history in first degree relatives      Vital Signs Last 24 Hrs  T(C): 36.6 (15 Nov 2018 07:25), Max: 38.3 (2018 18:59)  T(F): 97.9 (15 Nov 2018 07:25), Max: 101 (2018 18:59)  HR: 82 (15 Nov 2018 07:25) (71 - 99)  BP: 122/77 (15 Nov 2018 07:25) (118/83 - 199/92)  BP(mean): --  RR: 16 (15 Nov 2018 07:25) (14 - 18)  SpO2: 96% (15 Nov 2018 07:25) (95% - 100%)    PHYSICAL EXAM:    Constitutional: NAD  HEENT: ERICA, EOMI  Neck: No LAD  Back: Normal spine flexure, No CVA tenderness  Respiratory: CTAB   Cardiovascular: S1 and S2  Abd: BS+, soft, NT/ND  : Normal phallus,open meatus,bilateral descended testes, no masses  MARIS: Normal prostate, no masses  Extremities: No peripheral edema  Vascular: 2+ peripheral pulses  Neurological: no focal deficits  Psychiatric: Normal mood  Musculoskeletal: 5/5 strength b/l upper and lower extremities  Skin: No rashes    LABS:                        13.5   8.04  )-----------( 126      ( 15 Nov 2018 07:23 )             39.2     11-15    144  |  110<H>  |  16  ----------------------------<  84  4.0   |  26  |  1.10    Ca    8.9      15 Nov 2018 07:23    TPro  8.0  /  Alb  3.3  /  TBili  0.5  /  DBili  x   /  AST  27  /  ALT  9<L>  /  AlkPhos  71  11-14    PT/INR - ( 15 Nov 2018 07:23 )   PT: 37.3 sec;   INR: 3.18 ratio         PTT - ( 2018 11:51 )  PTT:42.2 sec  Urinalysis Basic - ( 2018 12:40 )    Color: Yellow / Appearance: Turbid / S.025 / pH: x  Gluc: x / Ketone: Small  / Bili: Negative / Urobili: Negative   Blood: x / Protein: 75 mg/dL / Nitrite: Negative   Leuk Esterase: Moderate / RBC: 11-25 /HPF / WBC >50   Sq Epi: x / Non Sq Epi: Few / Bacteria: Many      Urine Culture:   Blood Cultures  Culture - Urine (10.23.18 @ 19:50)    -  Ciprofloxacin: R >2    -  Levofloxacin: R >4    -  Nitrofurantoin: S <=32 Should not be used to treat pyelonephritis.    -  Tetra/Doxy: R >8    -  Ampicillin: S <=2 Predicts results to ampicillin/sulbactam, amoxacillin-clavulanate and  piperacillin-tazobactam.    -  Vancomycin: S 2    Specimen Source: .Urine Catheterized    Culture Results:   >100,000 CFU/ml Enterococcus faecalis    Organism Identification: Enterococcus faecalis    Organism: Enterococcus faecalis    Method Type: GRACY        RADIOLOGY & ADDITIONAL STUDIES:  < from: CT Renal Stone Hunt (18 @ 14:34) >  EXAM:  CT RENAL STONE HUNT                            PROCEDURE DATE:  2018          INTERPRETATION:  Exam Type:  CT RENAL STONE HUNT   Date and Time: 2018 2:34 PM  Indication: Hematuria  Compared to: CT abdomen 2014  Technique: CTof the ABDOMEN and PELVIS:  No intravenous contrast was   administered at physician request. This limits sensitivity for certain   processes. Oral contrast was not administered.    COMMENTS:    LOWER LUNGS AND PLEURA: Left atrial enlargement. Coronary vascular   calcification. Hiatal hernia.    LIVER: Multiple hepatic cysts unchanged.  BILE DUCTS: normal caliber.  GALLBLADDER: Gallstones.           PANCREAS: within normal limits.  SPLEEN: within normal limits.  ADRENALS: within normal limits.    KIDNEYS, URETERS AND BLADDER: Marked atrophy of the left kidney with   marked cortical scarring and parenchymal thinning involving the lateral   midpole. No hydronephrosis bilaterally. Multiple calculi within the left   renal pelvis extending into theanterior left lower pole renal calyces.   Mild left parapelvic infiltrative changes. Punctate nonobstructive right   intrarenal calculi. No definite renal masses bilaterally. No perinephric   collections. The ureters are unremarkable. The bladder appears within   normal limits.    PELVIS: Prostate is mildly enlarged with dense prostatic calcifications.   Subcentimeter pelvic lymph nodes. Minimal presacral stranding.No pelvic   lymphadenopathy.    BOWEL: The unopacified bowel is of normal course and caliber without   evidence of obstruction or bowel wall thickening. A normal appendix is   visualized. Calcifications along the pylorus unchanged dating back to   .    PERITONEUM: no ascites or free air, no fluid collection.  RETROPERITONEUM: within normal limits.      VESSELS: atherosclerotic changes.     IVC filter in place.  ABDOMINAL WALL: Small fat-containing umbilical hernia. Small   fat-containing right inguinal hernia.  BONES: Degenerative changes.    IMPRESSION:     Left renal pelvic calculi extending into the left lower pole calyces with   left peripelvic infiltrative changes without significant hydronephrosis.    < end of copied text >

## 2018-11-15 NOTE — PROGRESS NOTE ADULT - ASSESSMENT
78yo M PMH of Afib (on coumadin), CVA (2012, 2016, nearly non-verbal), Parkinson's disease, dementia (nearly non-verbal and very limited mobility), HTN, BPH w/ hx of urinary retention (now on flomax/proscar without gore), hx of unresponsive episodes and hx of frequent UTIs, BIBEMS for unresponsive episode a/w acute encephalopathy suspected due to UTI.

## 2018-11-15 NOTE — DISCHARGE NOTE ADULT - HOSPITAL COURSE
FROM ADMISSION H+P:   HPI:  76yo M PMH of Afib (on coumadin), CVA (2012, 2016, nearly non-verbal), Parkinson's disease, dementia (nearly non-verbal and very limited mobility), HTN, BPH w/ hx of urinary retention (now on flomax/proscar without gore), hx of unresponsive episodes and hx of frequent UTIs, BIBEMS for unresponsive episode. Patient is virtually nonverbal and wife at bedside provides history.  Wife stated that patient has had episodes of blank stares in the past for short period of time (worst >1yr ago when he had w/up in different hospital). Today, pt has another unresponsive episode lasting ~10 minutes and vomited up all of his breakfast at the same time. Emesis was non-bloody, largely food contents. Patient was at baseline this morning, had breakfast as per usual before the unresponsive episode occurred.  Wife called ambulance and patient was brought to the ED.  Wife and Dr. Nance states that patient had workup done at a different hospital including a 72-hour EEG where he had one of the staring episodes without epileptiform activity on EEG and was deemed not to be seizure related. Wife states pt has a chronic periodic cough but had somewhat more after the vomiting episode. Of note, pt eats nectar thick fluid, and solid food as has had aspiration with this liquids.  Patient is not able to relay much information, all information obtained from wife.  Patient had left sided residual weakness and imbalance after stroke in 2012, and had dementia after stroke in 2016.  Patient is seen by PT at home 2 times a week for 1 hour, and has a nightly aide to help transfer to bed for 1 hour. Wife denies any recent fevers, diarrhea, constipation seen in the pt.      In the ED, T 98.3, HR 78, /76, RR 18, SpO2 95% on RA.  Labs significant for CBC wnl, PT/INR 28.9/2.47, CMP wnl, positive UA. Trop negative x1, lipase wnl.  Patient noted to have positive UCx from 10/23 growing >100K Enterococcals faecalis, treated with ceftin.  In the ED, patient received Unasyn x1, carbidopa/levodopa x2, zofran x1, and 1L NS bolus.  CXR showed no acute lung pathology.  CT stone [place] showed Left renal pelvic calculi extending into the left lower pole calyces with left peripelvic infiltrative changes without significant hydronephrosis.  CT head showed No acute or focal brain pathology. Stable brain appearance. Bilateral maxillary sinus disease. (14 Nov 2018 15:29)      ---  HOSPITAL COURSE:   Patient was admitted with unusual syncopal presentation. Pt has chronic h/o similar complaint. There is no obvious trigger. Pt has been worked up from cardio/neuro perspective without obvious contributing factors. Neuro/cardio suggested tele monitoring and outpatient follow up. Pt developed low grade temp. Pt was treated for UTI with unasyn. UTI complicated by nonobstructive nephrolithiasis. Urology evaluated the pt and recommended observation. Previously had enterococcal UTI. Urine Cx grew: _____ - switch to po abx. PT evaluated the patient. D/c home w/ home PT.     ---  CONSULTANTS:   Neuro (Goyo)  Cardio (Flor)  Urology (Olamide)    ---  TIME SPENT:  The total amount of time spent reviewing the hospital notes, laboratory values, imaging findings, assessing/counseling the patient, discussing with consultant physicians, social work, nursing staff took -- minutes    ---  FINAL DISCHARGE DIAGNOSIS LIST:  Please see last daily progress note for final discharge diagnoses    ---  Primary care provider was made aware of plan for discharge:      [  ] NO     [  ] YES FROM ADMISSION H+P:   HPI:  78yo M PMH of Afib (on coumadin), CVA (2012, 2016, nearly non-verbal), Parkinson's disease, dementia (nearly non-verbal and very limited mobility), HTN, BPH w/ hx of urinary retention (now on flomax/proscar without gore), hx of unresponsive episodes and hx of frequent UTIs, BIBEMS for unresponsive episode. Patient is virtually nonverbal and wife at bedside provides history.  Wife stated that patient has had episodes of blank stares in the past for short period of time (worst >1yr ago when he had w/up in different hospital). Today, pt has another unresponsive episode lasting ~10 minutes and vomited up all of his breakfast at the same time. Emesis was non-bloody, largely food contents. Patient was at baseline this morning, had breakfast as per usual before the unresponsive episode occurred.  Wife called ambulance and patient was brought to the ED.  Wife and Dr. Nance states that patient had workup done at a different hospital including a 72-hour EEG where he had one of the staring episodes without epileptiform activity on EEG and was deemed not to be seizure related. Wife states pt has a chronic periodic cough but had somewhat more after the vomiting episode. Of note, pt eats nectar thick fluid, and solid food as has had aspiration with this liquids.  Patient is not able to relay much information, all information obtained from wife.  Patient had left sided residual weakness and imbalance after stroke in 2012, and had dementia after stroke in 2016.  Patient is seen by PT at home 2 times a week for 1 hour, and has a nightly aide to help transfer to bed for 1 hour. Wife denies any recent fevers, diarrhea, constipation seen in the pt.      In the ED, T 98.3, HR 78, /76, RR 18, SpO2 95% on RA.  Labs significant for CBC wnl, PT/INR 28.9/2.47, CMP wnl, positive UA. Trop negative x1, lipase wnl.  Patient noted to have positive UCx from 10/23 growing >100K Enterococcals faecalis, treated with ceftin.  In the ED, patient received Unasyn x1, carbidopa/levodopa x2, zofran x1, and 1L NS bolus.  CXR showed no acute lung pathology.  CT stone fisher showed Left renal pelvic calculi extending into the left lower pole calyces with left peripelvic infiltrative changes without significant hydronephrosis.  CT head showed No acute or focal brain pathology. Stable brain appearance. Bilateral maxillary sinus disease. (14 Nov 2018 15:29)      ---  HOSPITAL COURSE:   Patient was admitted with unusual syncopal presentation. Pt has chronic h/o similar complaint. There is no obvious trigger. Pt has been worked up from cardio/neuro perspective without obvious contributing factors. Neuro/cardio suggested tele monitoring and outpatient follow up. Pt developed low grade temp. Pt was treated for UTI with unasyn. UTI complicated by nonobstructive nephrolithiasis. Urology evaluated the pt and recommended observation. Previously had enterococcal UTI. Urine Cx grew: E. faecalis. Switch to po Amoxicillin 500 mg BID x 7 days. PT evaluated the patient. D/c home w/ home PT.     ---  CONSULTANTS:   Neuro (Goyo)  Cardio (Flor)  Urology (Olamide)    ---  TIME SPENT:  The total amount of time spent reviewing the hospital notes, laboratory values, imaging findings, assessing/counseling the patient, discussing with consultant physicians, social work, nursing staff took -- minutes    ---  FINAL DISCHARGE DIAGNOSIS LIST:  Please see last daily progress note for final discharge diagnoses    ---  Primary care provider was made aware of plan for discharge:      [  ] NO     [  ] YES FROM ADMISSION H+P:   HPI:  78yo M PMH of Afib (on coumadin), CVA (2012, 2016, nearly non-verbal), Parkinson's disease, dementia (nearly non-verbal and very limited mobility), HTN, BPH w/ hx of urinary retention (now on flomax/proscar without gore), hx of unresponsive episodes and hx of frequent UTIs, BIBEMS for unresponsive episode. Patient is virtually nonverbal and wife at bedside provides history.  Wife stated that patient has had episodes of blank stares in the past for short period of time (worst >1yr ago when he had w/up in different hospital). Today, pt has another unresponsive episode lasting ~10 minutes and vomited up all of his breakfast at the same time. Emesis was non-bloody, largely food contents. Patient was at baseline this morning, had breakfast as per usual before the unresponsive episode occurred.  Wife called ambulance and patient was brought to the ED.  Wife and Dr. Nance states that patient had workup done at a different hospital including a 72-hour EEG where he had one of the staring episodes without epileptiform activity on EEG and was deemed not to be seizure related. Wife states pt has a chronic periodic cough but had somewhat more after the vomiting episode. Of note, pt eats nectar thick fluid, and solid food as has had aspiration with this liquids.  Patient is not able to relay much information, all information obtained from wife.  Patient had left sided residual weakness and imbalance after stroke in 2012, and had dementia after stroke in 2016.  Patient is seen by PT at home 2 times a week for 1 hour, and has a nightly aide to help transfer to bed for 1 hour. Wife denies any recent fevers, diarrhea, constipation seen in the pt.      In the ED, T 98.3, HR 78, /76, RR 18, SpO2 95% on RA.  Labs significant for CBC wnl, PT/INR 28.9/2.47, CMP wnl, positive UA. Trop negative x1, lipase wnl.  Patient noted to have positive UCx from 10/23 growing >100K Enterococcals faecalis, treated with ceftin.  In the ED, patient received Unasyn x1, carbidopa/levodopa x2, zofran x1, and 1L NS bolus.  CXR showed no acute lung pathology.  CT stone [place] showed Left renal pelvic calculi extending into the left lower pole calyces with left peripelvic infiltrative changes without significant hydronephrosis.  CT head showed No acute or focal brain pathology. Stable brain appearance. Bilateral maxillary sinus disease. (14 Nov 2018 15:29)      ---  HOSPITAL COURSE:   Patient was admitted with unusual syncopal presentation. Pt has chronic h/o similar complaint. There is no obvious trigger. Pt has been worked up from cardio/neuro perspective without obvious contributing factors. Neuro/cardio suggested tele monitoring and outpatient follow up. Pt developed low grade temp. Pt was treated for UTI with unasyn. UTI complicated by nonobstructive nephrolithiasis. Urology evaluated the pt and recommended observation. Previously had enterococcal UTI. Urine Cx grew: E. faecalis. Switch to po Amoxicillin 500 mg BID x 7 days. PT evaluated the patient. D/c home w/ home PT.     ---  CONSULTANTS:   Neuro (Goyo)  Cardio (Flor)  Urology (Olamide)    ---  Patient seen and examined on day of discharge:  Vital Signs Last 24 Hrs  T(C): 36.6 (17 Nov 2018 07:26), Max: 36.7 (16 Nov 2018 15:50)  T(F): 97.8 (17 Nov 2018 07:26), Max: 98.1 (16 Nov 2018 15:50)  HR: 69 (17 Nov 2018 07:26) (69 - 77)  BP: 150/94 (17 Nov 2018 07:26) (117/73 - 165/96)  BP(mean): --  RR: 19 (17 Nov 2018 07:26) (16 - 19)  SpO2: 96% (17 Nov 2018 07:26) (96% - 99%)    PHYSICAL EXAM:  GENERAL: patient appears well, no acute distress, pleasant, minimally verbal during bedside encounter  EYES: sclera clear, no exudates  ENMT: oropharynx clear without erythema, no exudates, moist mucous membranes  NECK: supple, soft, no thyromegaly noted  LUNGS: clear to auscultation b/l, symmetric breath sounds, no wheezing or rhonchi appreciated  HEART: soft S1/S2, regular rate and rhythm, no murmurs noted, no lower extremity edema  GASTROINTESTINAL: abdomen is soft, nontender, nondistended, normoactive bowel sounds, no palpable masses  INTEGUMENT: good skin turgor, warm, and dry.  MUSCULOSKELETAL: no clubbing or cyanosis, no obvious deformity  NEUROLOGIC: awake, alert, good eye contact, nonverbal    ---  TIME SPENT:  The total amount of time spent reviewing the hospital notes, laboratory values, imaging findings, assessing/counseling the patient, discussing with consultant physicians, social work, nursing staff took 50 minutes    ---  FINAL DISCHARGE DIAGNOSIS LIST:  Please see last daily progress note for final discharge diagnoses  ---    Discharge note to be faxed to PCP Dr. Trimble

## 2018-11-15 NOTE — DIETITIAN INITIAL EVALUATION ADULT. - ETIOLOGY
related to mechanical/motor causes (pt with h/o CVA, Parkinson's) related to increased demand for protein/micronutrients to facilitate wound healing

## 2018-11-15 NOTE — PHYSICAL THERAPY INITIAL EVALUATION ADULT - PERTINENT HX OF CURRENT PROBLEM, REHAB EVAL
78yo M PMH of Afib (on coumadin), CVA (2012, 2016, nearly non-verbal), Parkinson's disease, dementia (nearly non-verbal and very limited mobility), HTN, BPH w/ hx of urinary retention (now on flomax/proscar without gore), hx of unresponsive episodes and hx of frequent UTIs, BIBEMS for unresponsive episode.

## 2018-11-15 NOTE — DIETITIAN INITIAL EVALUATION ADULT. - OTHER INFO
Pt seen for nutrition assessment due to modified diet (dysphagia 2 mechanical soft with nectar liquids). Per chart, pt is 77 Y/O M with PMH of Afib (on coumadin), CVA, Parkinson's, dementia, HTN, BPH, and Pt seen for nutrition assessment due to modified diet (dysphagia 2 mechanical soft with nectar liquids). Of note, pt with episode of emesis PTA. Per chart, pt is 79 Y/O M with PMH of Afib (on coumadin), CVA, Parkinson's, dementia, HTN and BPH admitted for emesis and episode of nonresponsiveness. Pt's wife reports good appetite/intake in house with total assistance (consumed 100% of lunch). UBW of 160 pounds with no recent change in weight. Denied current nausea/vomiting/diarrhea/constipation, last BM prior to admission. NKFA. MOLST noted, no TF requested.

## 2018-11-15 NOTE — DIETITIAN INITIAL EVALUATION ADULT. - PROBLEM SELECTOR PLAN 5
-s/p CVA in 2012 and 2016, residual left sided weakness and dementia  -patient on zetia 10mg QD at home, do not have at hospital, will tell patient's wife to bring from home

## 2018-11-15 NOTE — DIETITIAN INITIAL EVALUATION ADULT. - NS AS NUTRI INTERV MEALS SNACK
Texture-modified diet/Fluid - modified diet/Continue dysphagia 2 mechanical soft with nectar thickened liquids as it remains appropriate at this time. Encourage po intake of nutrient dense meals and snacks. Maintain aspiration precautions, elevate HOB.

## 2018-11-15 NOTE — DIETITIAN INITIAL EVALUATION ADULT. - FACTORS AFF FOOD INTAKE
difficulty feeding self/difficulty swallowing/pt with history of difficulty swallowing, previous admission to [facility] pt was dysphagia 2 mechanical soft with nectar thick liquids./other (specify)

## 2018-11-15 NOTE — DISCHARGE NOTE ADULT - SECONDARY DIAGNOSIS.
UTI (urinary tract infection) Depression Parkinson disease Hypertension Atrial fibrillation Cerebrovascular accident

## 2018-11-15 NOTE — DIETITIAN INITIAL EVALUATION ADULT. - PROBLEM SELECTOR PLAN 4
-chronic  -patient on home meds finasteride 5mg daily and flomax 0.4mg QHS  -will continue with home meds  -CT pelvis did not show distended bladder and pt has been voiding as per wife.

## 2018-11-15 NOTE — DIETITIAN INITIAL EVALUATION ADULT. - NUTRITION INTERVENTION
Meals and Snack Medical Food Supplements/Meals and Snack Meals and Snack/Nutrition Education Vitamin/Nutrition Education/Meals and Snack

## 2018-11-15 NOTE — DIETITIAN INITIAL EVALUATION ADULT. - PROBLEM SELECTOR PLAN 1
-patient had positive UCx for Enterococcus faecalis on 10/24 and was empirically prescribed ceftin, which E. faecalis is not susceptible to so pt did not receive treatment yet  -CT [place] showed Left renal pelvic calculi extending into the left lower pole calyces with left peripelvic infiltrative changes without significant hydronephrosis  -s/p unasyn x1 in the ED, will continue with unasyn  -UA positive, WBC wnl, but unable to obtain symptoms from patient due to baseline dementia and nonverbal  -f/u UCx, f/u BCx  -F/u ID recs, Dr. Cole -- rec unasyn  -F/u urology recs- Dr. Quiñonez

## 2018-11-15 NOTE — DIETITIAN INITIAL EVALUATION ADULT. - NUTRITIONGOAL OUTCOME3
Pt to consume >75% of meals and snacks as tolerated. Pt to consume >75% of meals and snacks provided to meet estimated needs.

## 2018-11-15 NOTE — DIETITIAN INITIAL EVALUATION ADULT. - PROBLEM SELECTOR PLAN 2
-Patient has blank staring episodes lasting a few minutes, had episode this morning that lasted about 10 minutes associated with vomiting  -suspect acute metabolic encephalopathy in setting of infection.  -Patient had workup done at another hospital including 72-hour video EEG where he had one of the staring episodes and it was not attributed to seizure like activity  -Cardio saw patient, symptoms not likely due to cardiac abnormalities but will monitor on tele for now

## 2018-11-15 NOTE — PROGRESS NOTE ADULT - PROBLEM SELECTOR PLAN 10
-VTE ppx: warfarin - hold tonight's dose as INR is supratherapeutic     IMPROVE VTE Individual Risk Assessment          RISK                                                          Points  [  ] Previous VTE                                                3  [  ] Thrombophilia                                             2  [ x ] Lower limb paralysis                                   2        (unable to hold up >15 seconds)    [  ] Current Cancer                                             2         (within 6 months)  [ x ] Immobilization > 24 hrs                              1  [  ] ICU/CCU stay > 24 hours                             1  [ x ] Age > 60                                                         1    IMPROVE VTE Score: 4 -VTE ppx: warfarin - hold tonight's dose as INR is supratherapeutic     IMPROVE VTE Individual Risk Assessment          RISK                                                          Points  [  ] Previous VTE                                                3  [  ] Thrombophilia                                             2  [ x ] Lower limb paralysis                                   2        (unable to hold up >15 seconds)    [  ] Current Cancer                                             2         (within 6 months)  [ x ] Immobilization > 24 hrs                              1  [  ] ICU/CCU stay > 24 hours                             1  [ x ] Age > 60                                                         1    IMPROVE VTE Score: 4  DNR/DNI

## 2018-11-15 NOTE — PROGRESS NOTE ADULT - SUBJECTIVE AND OBJECTIVE BOX
JOSÉ MIGUEL COOPER is a 78yMale , patient examined and chart reviewed.     INTERVAL HPI/ OVERNIGHT EVENTS:   Feeling better today. More awake alert.  Wife at bedside.    PAST MEDICAL & SURGICAL HISTORY:  Syncope  Parkinson disease  Constipation  Dementia  Urinary retention  Cerebrovascular accident  Hypertension  Atrial fibrillation  No significant past surgical history      For details regarding the patient's social history, family history, and other miscellaneous elements, please refer the initial infectious diseases consultation and/or the admitting history and physical examination for this admission.    ROS:  Unable to obtain due to : Dementia    Current inpatient medications :    ANTIBIOTICS/RELEVANT:  ampicillin/sulbactam  IVPB 3 Gram(s) IV Intermittent every 6 hours  lactobacillus acidophilus 1 Tablet(s) Oral three times a day with meals      acetaminophen   Tablet .. 650 milliGRAM(s) Oral every 6 hours PRN  carbidopa/levodopa  25/100 1 Tablet(s) Oral daily  carbidopa/levodopa  25/100 1.5 Tablet(s) Oral <User Schedule>  finasteride 5 milliGRAM(s) Oral daily  metoprolol tartrate 25 milliGRAM(s) Oral two times a day  sertraline 50 milliGRAM(s) Oral at bedtime  tamsulosin 0.4 milliGRAM(s) Oral at bedtime      Objective:    11-15 @ 07:01  -  11-15 @ 17:40  --------------------------------------------------------  IN: 240 mL / OUT: 0 mL / NET: 240 mL      T(C): 36.3 (11-15-18 @ 16:10), Max: 38.3 (18 @ 18:59)  HR: 74 (11-15-18 @ 16:10) (73 - 92)  BP: 147/89 (11-15-18 @ 16:10) (118/83 - 150/84)  RR: 16 (11-15-18 @ 16:10) (15 - 17)  SpO2: 96% (11-15-18 @ 16:10) (96% - 99%)  Wt(kg): --      Physical Exam:  General: in no acute distress  Eyes: sclera anicteric, pupils equal and reactive to light  ENMT: buccal mucosa moist, pharynx not injected  Neck: supple, trachea midline  Lungs: clear, no wheeze/rhonchi  Cardiovascular: regular rate and rhythm, S1 S2  Abdomen: soft, nontender, no organomegaly present, bowel sounds normal  Neurological: alert and oriented x1 right sided weakness  Skin: no increased ecchymosis/petechiae/purpura  Lymph Nodes: no palpable cervical/supraclavicular lymph nodes enlargements  Extremities: no cyanosis/clubbing/edema      LABS:                          13.5   8.04  )-----------( 126      ( 15 Nov 2018 07:23 )             39.2       11-15    144  |  110<H>  |  16  ----------------------------<  84  4.0   |  26  |  1.10    Ca    8.9      15 Nov 2018 07:23    TPro  8.0  /  Alb  3.3  /  TBili  0.5  /  DBili  x   /  AST  27  /  ALT  9<L>  /  AlkPhos  71  11      PT/INR - ( 15 Nov 2018 07:23 )   PT: 37.3 sec;   INR: 3.18 ratio         PTT - ( 2018 11:51 )  PTT:42.2 sec  Urinalysis Basic - ( 2018 12:40 )    Color: Yellow / Appearance: Turbid / S.025 / pH: x  Gluc: x / Ketone: Small  / Bili: Negative / Urobili: Negative   Blood: x / Protein: 75 mg/dL / Nitrite: Negative   Leuk Esterase: Moderate / RBC: 11-25 /HPF / WBC >50   Sq Epi: x / Non Sq Epi: Few / Bacteria: Many      MICROBIOLOGY:  pending      RADIOLOGY & ADDITIONAL STUDIES:    EXAM:  CT RENAL STONE HUNT                            PROCEDURE DATE:  2018          INTERPRETATION:  Exam Type:  CT RENAL STONE HUNT   Date and Time: 2018 2:34 PM  Indication: Hematuria  Compared to: CT abdomen 2014  Technique: CTof the ABDOMEN and PELVIS:  No intravenous contrast was   administered at physician request. This limits sensitivity for certain   processes. Oral contrast was not administered.    COMMENTS:    LOWER LUNGS AND PLEURA: Left atrial enlargement. Coronary vascular   calcification. Hiatal hernia.    LIVER: Multiple hepatic cysts unchanged.  BILE DUCTS: normal caliber.  GALLBLADDER: Gallstones.           PANCREAS: within normal limits.  SPLEEN: within normal limits.  ADRENALS: within normal limits.    KIDNEYS, URETERS AND BLADDER: Marked atrophy of the left kidney with   marked cortical scarring and parenchymal thinning involving the lateral   midpole. No hydronephrosis bilaterally. Multiple calculi within the left   renal pelvis extending into theanterior left lower pole renal calyces.   Mild left parapelvic infiltrative changes. Punctate nonobstructive right   intrarenal calculi. No definite renal masses bilaterally. No perinephric   collections. The ureters are unremarkable. The bladder appears within   normal limits.    PELVIS: Prostate is mildly enlarged with dense prostatic calcifications.   Subcentimeter pelvic lymph nodes. Minimal presacral stranding.No pelvic   lymphadenopathy.    BOWEL: The unopacified bowel is of normal course and caliber without   evidence of obstruction or bowel wall thickening. A normal appendix is   visualized. Calcifications along the pylorus unchanged dating back to   .    PERITONEUM: no ascites or free air, no fluid collection.  RETROPERITONEUM: within normal limits.      VESSELS: atherosclerotic changes.     IVC filter in place.  ABDOMINAL WALL: Small fat-containing umbilical hernia. Small   fat-containing right inguinal hernia.  BONES: Degenerative changes.    IMPRESSION:     Left renal pelvic calculi extending into the left lower pole calyces with   left peripelvic infiltrative changes without significant hydronephrosis.        Assessment :  76yo M PMH of Afib (on coumadin), CVA x 2, Parkinson's Dementia, HTN, BPH urinary retention,   nephrolithiasis and frequent UTIs brought to the hospital with CC of unresponsiveness with n/v  noted to have positive UA  Rule out UTI though pt afebrile and with no leukocytosis  Most recent Ucx with E faecalis   CT AP noted    Plan :   Cont Unasyn for now  Fu cultures  Trend temps and wbc  Asp precautions      Continue with present regiment.  Appropriate use of antibiotics and adverse effects reviewed.      I have discussed the above plan of care with patient/ family in detail. They expressed understanding of the  treatment plan . Risks, benefits and alternatives discussed in detail. I have asked if they have any questions or concerns and appropriately addressed them to the best of my ability .    > 35 minutes were spent in direct patient care reviewing notes, medications ,labs data/ imaging , discussion with multidisciplinary team.    Thank you for allowing me to participate in care of your patient .    Sanjeev Cole MD  Infectious Disease  [phone]

## 2018-11-15 NOTE — DISCHARGE NOTE ADULT - PATIENT PORTAL LINK FT
You can access the [employer] Patient Portal, offered by [facility], by registering with the following website: [email or web address] [place]

## 2018-11-15 NOTE — PROGRESS NOTE ADULT - SUBJECTIVE AND OBJECTIVE BOX
Patient is a 78y old  Male who presents with a chief complaint of episode of unresponsiveness, vomiting (15 Nov 2018 12:29)      FROM ADMISSION H+P:   HPI:  78yo M PMH of Afib (on coumadin), CVA (2012, 2016, nearly non-verbal), Parkinson's disease, dementia (nearly non-verbal and very limited mobility), HTN, BPH w/ hx of urinary retention (now on flomax/proscar without gore), hx of unresponsive episodes and hx of frequent UTIs, BIBEMS for unresponsive episode. Patient is virtually nonverbal and wife at bedside provides history.  Wife stated that patient has had episodes of blank stares in the past for short period of time (worst >1yr ago when he had w/up in different hospital). Today, pt has another unresponsive episode lasting ~10 minutes and vomited up all of his breakfast at the same time. Emesis was non-bloody, largely food contents. Patient was at baseline this morning, had breakfast as per usual before the unresponsive episode occurred.  Wife called ambulance and patient was brought to the ED.  Wife and Dr. Nance states that patient had workup done at a different hospital including a 72-hour EEG where he had one of the staring episodes without epileptiform activity on EEG and was deemed not to be seizure related. Wife states pt has a chronic periodic cough but had somewhat more after the vomiting episode. Of note, pt eats nectar thick fluid, and solid food as has had aspiration with this liquids.  Patient is not able to relay much information, all information obtained from wife.  Patient had left sided residual weakness and imbalance after stroke in , and had dementia after stroke in 2016.  Patient is seen by PT at home 2 times a week for 1 hour, and has a nightly aide to help transfer to bed for 1 hour. Wife denies any recent fevers, diarrhea, constipation seen in the pt.      In the ED, T 98.3, HR 78, /76, RR 18, SpO2 95% on RA.  Labs significant for CBC wnl, PT/INR 28.9/2.47, CMP wnl, positive UA. Trop negative x1, lipase wnl.  Patient noted to have positive UCx from 10/23 growing >100K Enterococcals faecalis, treated with ceftin.  In the ED, patient received Unasyn x1, carbidopa/levodopa x2, zofran x1, and 1L NS bolus.  CXR showed no acute lung pathology.  CT stone fisher showed Left renal pelvic calculi extending into the left lower pole calyces with left peripelvic infiltrative changes without significant hydronephrosis.  CT head showed No acute or focal brain pathology. Stable brain appearance. Bilateral maxillary sinus disease. (2018 15:29)      ----  INTERVAL HPI/OVERNIGHT EVENTS: Pt seen and evaluated at the bedside. No acute overnight events occurred. Patient seen sitting comfortably in chair, wife at bedside. Patient relatively non-verbal, but denied current symptoms.     ----  PAST MEDICAL & SURGICAL HISTORY:  Syncope  Parkinson disease  Constipation  Dementia  Urinary retention  Cerebrovascular accident  Hypertension  Atrial fibrillation  No significant past surgical history      FAMILY HISTORY:  No pertinent family history in first degree relatives      ----  MEDICATIONS  (STANDING):  ampicillin/sulbactam  IVPB 3 Gram(s) IV Intermittent every 6 hours  carbidopa/levodopa  25/100 1 Tablet(s) Oral daily  carbidopa/levodopa  25/100 1.5 Tablet(s) Oral <User Schedule>  finasteride 5 milliGRAM(s) Oral daily  metoprolol tartrate 25 milliGRAM(s) Oral two times a day  sertraline 50 milliGRAM(s) Oral at bedtime  tamsulosin 0.4 milliGRAM(s) Oral at bedtime    MEDICATIONS  (PRN):  acetaminophen   Tablet .. 650 milliGRAM(s) Oral every 6 hours PRN Temp greater or equal to 38C (100.4F)      ----  REVIEW OF SYSTEMS: unable to obtain due to non-verbal status    ----  PHYSICAL EXAM:  GENERAL: patient appears well, no acute distress, pleasant, minimally verbal  EYES: sclera clear, no exudates  ENMT: oropharynx clear without erythema, no exudates, moist mucous membranes  NECK: supple, soft, no thyromegaly noted  LUNGS: good air entry bilaterally, clear to auscultation, symmetric breath sounds, no wheezing or rhonchi appreciated  HEART: soft S1/S2, regular rate and rhythm, no murmurs noted, no lower extremity edema  GASTROINTESTINAL: abdomen is soft, nontender, nondistended, normoactive bowel sounds, no palpable masses  INTEGUMENT: good skin turgor, no lesions noted  MUSCULOSKELETAL: no clubbing or cyanosis, no obvious deformity  NEUROLOGIC: awake, alert, no obvious sensory deficits  PSYCHIATRIC: mood is good  HEME/LYMPH: no palpable supraclavicular nodules, no obvious ecchymosis or petechiae     T(C): 36.6 (11-15-18 @ 12:16), Max: 38.3 (18 @ 18:59)  HR: 73 (11-15-18 @ 12:16) (73 - 99)  BP: 124/89 (11-15-18 @ 12:16) (118/83 - 199/92)  RR: 17 (11-15-18 @ 12:16) (14 - 17)  SpO2: 96% (11-15-18 @ 12:16) (96% - 100%)  Wt(kg): --    ----  I&O's Summary    15 Nov 2018 07:01  -  15 Nov 2018 13:17  --------------------------------------------------------  IN: 240 mL / OUT: 0 mL / NET: 240 mL        LABS:                        13.5   8.04  )-----------( 126      ( 15 Nov 2018 07:23 )             39.2     11-15    144  |  110<H>  |  16  ----------------------------<  84  4.0   |  26  |  1.10    Ca    8.9      15 Nov 2018 07:23    TPro  8.0  /  Alb  3.3  /  TBili  0.5  /  DBili  x   /  AST  27  /  ALT  9<L>  /  AlkPhos  71  11-14    PT/INR - ( 15 Nov 2018 07:23 )   PT: 37.3 sec;   INR: 3.18 ratio         PTT - ( 2018 11:51 )  PTT:42.2 sec  Urinalysis Basic - ( 2018 12:40 )    Color: Yellow / Appearance: Turbid / S.025 / pH: x  Gluc: x / Ketone: Small  / Bili: Negative / Urobili: Negative   Blood: x / Protein: 75 mg/dL / Nitrite: Negative   Leuk Esterase: Moderate / RBC: 11-25 /HPF / WBC >50   Sq Epi: x / Non Sq Epi: Few / Bacteria: Many      CAPILLARY BLOOD GLUCOSE                    ----  Personally reviewed:  Vital sign trends: [ x ] yes    [  ] no     [  ] n/a  Laboratory results: [ x ] yes    [  ] no     [  ] n/a  Radiology results: [ x ] yes    [  ] no     [  ] n/a  Culture results: [  ] yes    [  ] no     [ x ] n/a  Consultant recommendations: [ x ] yes    [  ] no     [  ] n/a Patient is a 78y old  Male who presents with a chief complaint of episode of unresponsiveness, vomiting (15 Nov 2018 12:29)      FROM ADMISSION H+P:   HPI:  76yo M PMH of Afib (on coumadin), CVA (2012, 2016, nearly non-verbal), Parkinson's disease, dementia (nearly non-verbal and very limited mobility), HTN, BPH w/ hx of urinary retention (now on flomax/proscar without gore), hx of unresponsive episodes and hx of frequent UTIs, BIBEMS for unresponsive episode. Patient is virtually nonverbal and wife at bedside provides history.  Wife stated that patient has had episodes of blank stares in the past for short period of time (worst >1yr ago when he had w/up in different hospital). Today, pt has another unresponsive episode lasting ~10 minutes and vomited up all of his breakfast at the same time. Emesis was non-bloody, largely food contents. Patient was at baseline this morning, had breakfast as per usual before the unresponsive episode occurred.  Wife called ambulance and patient was brought to the ED.  Wife and Dr. Nance states that patient had workup done at a different hospital including a 72-hour EEG where he had one of the staring episodes without epileptiform activity on EEG and was deemed not to be seizure related. Wife states pt has a chronic periodic cough but had somewhat more after the vomiting episode. Of note, pt eats nectar thick fluid, and solid food as has had aspiration with this liquids.  Patient is not able to relay much information, all information obtained from wife.  Patient had left sided residual weakness and imbalance after stroke in , and had dementia after stroke in 2016.  Patient is seen by PT at home 2 times a week for 1 hour, and has a nightly aide to help transfer to bed for 1 hour. Wife denies any recent fevers, diarrhea, constipation seen in the pt.      In the ED, T 98.3, HR 78, /76, RR 18, SpO2 95% on RA.  Labs significant for CBC wnl, PT/INR 28.9/2.47, CMP wnl, positive UA. Trop negative x1, lipase wnl.  Patient noted to have positive UCx from 10/23 growing >100K Enterococcals faecalis, treated with ceftin.  In the ED, patient received Unasyn x1, carbidopa/levodopa x2, zofran x1, and 1L NS bolus.  CXR showed no acute lung pathology.  CT stone fisher showed Left renal pelvic calculi extending into the left lower pole calyces with left peripelvic infiltrative changes without significant hydronephrosis.  CT head showed No acute or focal brain pathology. Stable brain appearance. Bilateral maxillary sinus disease. (2018 15:29)      ----  INTERVAL HPI/OVERNIGHT EVENTS: Pt seen and evaluated at the bedside. No acute overnight events occurred. Patient seen sitting comfortably in chair, wife at bedside. Patient relatively non-verbal, but denied current symptoms. Patient DNR/DNI.    ----  PAST MEDICAL & SURGICAL HISTORY:  Syncope  Parkinson disease  Constipation  Dementia  Urinary retention  Cerebrovascular accident  Hypertension  Atrial fibrillation  No significant past surgical history      FAMILY HISTORY:  No pertinent family history in first degree relatives      ----  MEDICATIONS  (STANDING):  ampicillin/sulbactam  IVPB 3 Gram(s) IV Intermittent every 6 hours  carbidopa/levodopa  25/100 1 Tablet(s) Oral daily  carbidopa/levodopa  25/100 1.5 Tablet(s) Oral <User Schedule>  finasteride 5 milliGRAM(s) Oral daily  metoprolol tartrate 25 milliGRAM(s) Oral two times a day  sertraline 50 milliGRAM(s) Oral at bedtime  tamsulosin 0.4 milliGRAM(s) Oral at bedtime    MEDICATIONS  (PRN):  acetaminophen   Tablet .. 650 milliGRAM(s) Oral every 6 hours PRN Temp greater or equal to 38C (100.4F)      ----  REVIEW OF SYSTEMS: unable to obtain due to non-verbal status    ----  PHYSICAL EXAM:  GENERAL: patient appears well, no acute distress, pleasant, minimally verbal  EYES: sclera clear, no exudates  ENMT: oropharynx clear without erythema, no exudates, moist mucous membranes  NECK: supple, soft, no thyromegaly noted  LUNGS: good air entry bilaterally, clear to auscultation, symmetric breath sounds, no wheezing or rhonchi appreciated  HEART: soft S1/S2, regular rate and rhythm, no murmurs noted, no lower extremity edema  GASTROINTESTINAL: abdomen is soft, nontender, nondistended, normoactive bowel sounds, no palpable masses  INTEGUMENT: good skin turgor, no lesions noted  MUSCULOSKELETAL: no clubbing or cyanosis, no obvious deformity  NEUROLOGIC: awake, alert, no obvious sensory deficits  PSYCHIATRIC: mood is good  HEME/LYMPH: no palpable supraclavicular nodules, no obvious ecchymosis or petechiae     T(C): 36.6 (11-15-18 @ 12:16), Max: 38.3 (18 @ 18:59)  HR: 73 (11-15-18 @ 12:16) (73 - 99)  BP: 124/89 (11-15-18 @ 12:16) (118/83 - 199/92)  RR: 17 (11-15-18 @ 12:16) (14 - 17)  SpO2: 96% (11-15-18 @ 12:16) (96% - 100%)  Wt(kg): --    ----  I&O's Summary    15 Nov 2018 07:01  -  15 Nov 2018 13:17  --------------------------------------------------------  IN: 240 mL / OUT: 0 mL / NET: 240 mL        LABS:                        13.5   8.04  )-----------( 126      ( 15 Nov 2018 07:23 )             39.2     -15    144  |  110<H>  |  16  ----------------------------<  84  4.0   |  26  |  1.10    Ca    8.9      15 Nov 2018 07:23    TPro  8.0  /  Alb  3.3  /  TBili  0.5  /  DBili  x   /  AST  27  /  ALT  9<L>  /  AlkPhos  71  11-14    PT/INR - ( 15 Nov 2018 07:23 )   PT: 37.3 sec;   INR: 3.18 ratio         PTT - ( 2018 11:51 )  PTT:42.2 sec  Urinalysis Basic - ( 2018 12:40 )    Color: Yellow / Appearance: Turbid / S.025 / pH: x  Gluc: x / Ketone: Small  / Bili: Negative / Urobili: Negative   Blood: x / Protein: 75 mg/dL / Nitrite: Negative   Leuk Esterase: Moderate / RBC: 11-25 /HPF / WBC >50   Sq Epi: x / Non Sq Epi: Few / Bacteria: Many      CAPILLARY BLOOD GLUCOSE                    ----  Personally reviewed:  Vital sign trends: [ x ] yes    [  ] no     [  ] n/a  Laboratory results: [ x ] yes    [  ] no     [  ] n/a  Radiology results: [ x ] yes    [  ] no     [  ] n/a  Culture results: [  ] yes    [  ] no     [ x ] n/a  Consultant recommendations: [ x ] yes    [  ] no     [  ] n/a Patient is a 78y old  Male who presents with a chief complaint of episode of unresponsiveness, vomiting (15 Nov 2018 12:29)      FROM ADMISSION H+P:   HPI:  78yo M PMH of Afib (on coumadin), CVA (2012, 2016, nearly non-verbal), Parkinson's disease, dementia (nearly non-verbal and very limited mobility), HTN, BPH w/ hx of urinary retention (now on flomax/proscar without gore), hx of unresponsive episodes and hx of frequent UTIs, BIBEMS for unresponsive episode. Patient is virtually nonverbal and wife at bedside provides history.  Wife stated that patient has had episodes of blank stares in the past for short period of time (worst >1yr ago when he had w/up in different hospital). Today, pt has another unresponsive episode lasting ~10 minutes and vomited up all of his breakfast at the same time. Emesis was non-bloody, largely food contents. Patient was at baseline this morning, had breakfast as per usual before the unresponsive episode occurred.  Wife called ambulance and patient was brought to the ED.  Wife and Dr. Nance states that patient had workup done at a different hospital including a 72-hour EEG where he had one of the staring episodes without epileptiform activity on EEG and was deemed not to be seizure related. Wife states pt has a chronic periodic cough but had somewhat more after the vomiting episode. Of note, pt eats nectar thick fluid, and solid food as has had aspiration with this liquids.  Patient is not able to relay much information, all information obtained from wife.  Patient had left sided residual weakness and imbalance after stroke in , and had dementia after stroke in 2016.  Patient is seen by PT at home 2 times a week for 1 hour, and has a nightly aide to help transfer to bed for 1 hour. Wife denies any recent fevers, diarrhea, constipation seen in the pt.      In the ED, T 98.3, HR 78, /76, RR 18, SpO2 95% on RA.  Labs significant for CBC wnl, PT/INR 28.9/2.47, CMP wnl, positive UA. Trop negative x1, lipase wnl.  Patient noted to have positive UCx from 10/23 growing >100K Enterococcals faecalis, treated with ceftin.  In the ED, patient received Unasyn x1, carbidopa/levodopa x2, zofran x1, and 1L NS bolus.  CXR showed no acute lung pathology.  CT stone fisher showed Left renal pelvic calculi extending into the left lower pole calyces with left peripelvic infiltrative changes without significant hydronephrosis.  CT head showed No acute or focal brain pathology. Stable brain appearance. Bilateral maxillary sinus disease. (2018 15:29)      ----  INTERVAL HPI/OVERNIGHT EVENTS: Pt seen and evaluated at the bedside. No acute overnight events occurred. Patient seen sitting comfortably in chair, wife at bedside. Patient relatively non-verbal, but denied current symptoms. Patient DNR/DNI.    ----  PAST MEDICAL & SURGICAL HISTORY:  Syncope  Parkinson disease  Constipation  Dementia  Urinary retention  Cerebrovascular accident  Hypertension  Atrial fibrillation  No significant past surgical history      FAMILY HISTORY:  No pertinent family history in first degree relatives      ----  MEDICATIONS  (STANDING):  ampicillin/sulbactam  IVPB 3 Gram(s) IV Intermittent every 6 hours  carbidopa/levodopa  25/100 1 Tablet(s) Oral daily  carbidopa/levodopa  25/100 1.5 Tablet(s) Oral <User Schedule>  finasteride 5 milliGRAM(s) Oral daily  metoprolol tartrate 25 milliGRAM(s) Oral two times a day  sertraline 50 milliGRAM(s) Oral at bedtime  tamsulosin 0.4 milliGRAM(s) Oral at bedtime    MEDICATIONS  (PRN):  acetaminophen   Tablet .. 650 milliGRAM(s) Oral every 6 hours PRN Temp greater or equal to 38C (100.4F)      ----  REVIEW OF SYSTEMS: unable to obtain due to non-verbal status    ----  PHYSICAL EXAM:  GENERAL: patient appears well, no acute distress, pleasant, nonverbal during my bedside encounter  EYES: sclera clear, no exudates  ENMT: oropharynx clear without erythema, no exudates, moist mucous membranes  NECK: supple, soft, no thyromegaly noted  LUNGS: good air entry bilaterally, clear to auscultation, symmetric breath sounds, no wheezing or rhonchi appreciated  HEART: soft S1/S2, regular rate and rhythm, no murmurs noted, no lower extremity edema  GASTROINTESTINAL: abdomen is soft, nontender, nondistended, normoactive bowel sounds, no palpable masses  INTEGUMENT: good skin turgor, no lesions noted  MUSCULOSKELETAL: no clubbing or cyanosis, no obvious deformity  NEUROLOGIC: awake, alert, good eye contact, nonverbal  HEME/LYMPH: no palpable supraclavicular nodules, no obvious ecchymosis or petechiae     T(C): 36.6 (11-15-18 @ 12:16), Max: 38.3 (18 @ 18:59)  HR: 73 (11-15-18 @ 12:16) (73 - 99)  BP: 124/89 (11-15-18 @ 12:16) (118/83 - 199/92)  RR: 17 (11-15-18 @ 12:16) (14 - 17)  SpO2: 96% (11-15-18 @ 12:16) (96% - 100%)  Wt(kg): --    ----  I&O's Summary    15 Nov 2018 07:01  -  15 Nov 2018 13:17  --------------------------------------------------------  IN: 240 mL / OUT: 0 mL / NET: 240 mL        LABS:                        13.5   8.04  )-----------( 126      ( 15 Nov 2018 07:23 )             39.2     -15    144  |  110<H>  |  16  ----------------------------<  84  4.0   |  26  |  1.10    Ca    8.9      15 Nov 2018 07:23    TPro  8.0  /  Alb  3.3  /  TBili  0.5  /  DBili  x   /  AST  27  /  ALT  9<L>  /  AlkPhos  71  11-14    PT/INR - ( 15 Nov 2018 07:23 )   PT: 37.3 sec;   INR: 3.18 ratio         PTT - ( 2018 11:51 )  PTT:42.2 sec  Urinalysis Basic - ( 2018 12:40 )    Color: Yellow / Appearance: Turbid / S.025 / pH: x  Gluc: x / Ketone: Small  / Bili: Negative / Urobili: Negative   Blood: x / Protein: 75 mg/dL / Nitrite: Negative   Leuk Esterase: Moderate / RBC: 11-25 /HPF / WBC >50   Sq Epi: x / Non Sq Epi: Few / Bacteria: Many      CAPILLARY BLOOD GLUCOSE                    ----  Personally reviewed:  Vital sign trends: [ x ] yes    [  ] no     [  ] n/a  Laboratory results: [ x ] yes    [  ] no     [  ] n/a  Radiology results: [ x ] yes    [  ] no     [  ] n/a  Culture results: [  ] yes    [  ] no     [ x ] n/a  Consultant recommendations: [ x ] yes    [  ] no     [  ] n/a

## 2018-11-15 NOTE — CONSULT NOTE ADULT - ASSESSMENT
Mr. Prasad is a 77 year old male atrial fibrillation (on coumadin), CVA (2012/16), dementia, HTN, multiple TIAs admitted for an episode of altered consciousness.  He is currently lethargic and not answering questions. History has been obtained from wife.     - No evidence of acute ischemia, troponin negative x 1. Patient does not report any chest pain or difficulty breathing.  - No evidence of any meaningful volume overload  - EKG with rate controlled AF.  - I do not suspect an arrhythmogenic cause of his symptoms, though we should watch him on telemetry and monitor for additional symptoms.  - Last echocardiogram in 11/2017 with borderline LVH, EF 65%, moderate MR, normal pulmonary pressures. no need to repeat echocardiogram.  - BP elevated, but he is agitated.  - Metoprolol 50 mg po q12 hrs  - Neuro evaluation  - Infection work up.  - Dose coumadin with goal INR 2-3  - Watch creatinine and electrolytes, Keep K>4, Mg>2  - Will follow with you.
78yo male w/ mult med problems admitted w/ MS change, vomiting, Enterococcal UTI. LK stones on CT w/o hydro.

## 2018-11-15 NOTE — PHYSICAL THERAPY INITIAL EVALUATION ADULT - ADDITIONAL COMMENTS
Pt lives with wife in private home, ramp access.  Pt is dependent for all ADLs, wife pivots patient from bed to wheelchair, and to commode.  Pt is non ambulatory.  Pt has hospital bed, [employer] Lift and commode

## 2018-11-15 NOTE — DIETITIAN INITIAL EVALUATION ADULT. - PT NOT SOURCE
Pt with history of dementia, Parkinson's disease (nearly nonverbal at baseline), all subjective information ./other (specify)

## 2018-11-15 NOTE — DIETITIAN INITIAL EVALUATION ADULT. - ENERGY NEEDS
Wt: 160 pounds, Ht 67 inches (stated, no height in medical chart):, BMI: 24.7 kg/m2, IBW: 148 pounds  +/-10%, %IBW: 108%  No edema noted. Pt with multiple sites of impaired skin integrity (stage 1 pressure injuries present on sacrum, R heel, R/L elbow, R ankle)

## 2018-11-15 NOTE — DIETITIAN INITIAL EVALUATION ADULT. - NS AS NUTRI INTERV ED CONTENT
Wife aware of Coumadin-Vitamin K food drug interaction. Amenable to reviewing and provided with written materials./Other (specify)

## 2018-11-15 NOTE — DIETITIAN INITIAL EVALUATION ADULT. - ORAL INTAKE PTA
Pt's wife reports that patient has had good appetite/intake PTA. States he typically consumes 3 meals per day with thickened liquids. Breakfast: oatbran with applesauce, canned peaches and pears. As per wife, can tolerate "regular" diet texture such as chicken tenders etc. Pt taking Multivitamin prior to admission.

## 2018-11-15 NOTE — DISCHARGE NOTE ADULT - CARE PLAN
Principal Discharge DX:	Acute encephalopathy  Goal:	Resolution of symptoms  Assessment and plan of treatment:	You presented to the ED with altered mental status due to acute infection of your urinary tract. You were treated with IV antibiotics.  Secondary Diagnosis:	UTI (urinary tract infection)  Assessment and plan of treatment:	Continue to take Amoxicillin 500 mg twice a day for 7 days. Take probiotic 3 times a day while on antibiotics. Follow up with your PMD Dr. Trmible within 1 week.  Secondary Diagnosis:	Depression  Assessment and plan of treatment:	Continue Zoloft 50 mg daily.  Secondary Diagnosis:	Parkinson disease  Assessment and plan of treatment:	Continue your carbidopa-levodopa at your scheduled times  Secondary Diagnosis:	Hypertension  Assessment and plan of treatment:	Your medication was adjusted. Continue metoprolol tartrate 25 mg twice a day. Follow up with your cardiologist Dr. Leo within 1 week.  Secondary Diagnosis:	Atrial fibrillation  Assessment and plan of treatment:	Continue coumadin 3 mg daily. Continue metoprolol tartrate 25 mg twice a day. Follow up with your cardiologist.  Secondary Diagnosis:	Cerebrovascular accident  Assessment and plan of treatment:	Continue Zetia.

## 2018-11-15 NOTE — DISCHARGE NOTE ADULT - MEDICATION SUMMARY - MEDICATIONS TO TAKE
I will START or STAY ON the medications listed below when I get home from the hospital:    finasteride 5 mg oral tablet  -- 1 tab(s) by mouth once a day  -- Indication: For Benign prostatic hyperplasia with urinary incontinence    tamsulosin 0.4 mg oral capsule  -- 1 cap(s) by mouth once a day (at bedtime)  -- Indication: For Benign prostatic hyperplasia with urinary incontinence    warfarin 3 mg oral tablet  -- 1 tab(s) by mouth once a day  -- Indication: For Atrial fibrillation    sertraline 50 mg oral tablet  -- 1 tab(s) by mouth once a day  -- Indication: For Depression    ondansetron 4 mg oral tablet, disintegrating  -- 1 tab(s) by mouth every 8 hours, As needed, Nausea and/or Vomiting  -- Indication: For Nausea    Zetia 10 mg oral tablet  -- 1 tab(s) by mouth once a day  -- Indication: For Hyperlipidemia    Sinemet 25 mg-100 mg oral tablet  -- 1.5 tab(s) by mouth at 9am, 12pm, 3pm  1 tab by mouth at 6pm  -- Indication: For Parkinson disease    Metoprolol Tartrate 25 mg oral tablet  -- 1 tab(s) by mouth 2 times a day  -- Indication: For Atrial fibrillation    amoxicillin 500 mg oral capsule  -- 1 cap(s) by mouth 3 times a day  -- Indication: For UTI (urinary tract infection)    Acidophilus oral capsule  -- 1 tab(s) by mouth 3 times a day   -- Indication: For Probiotic

## 2018-11-15 NOTE — DIETITIAN INITIAL EVALUATION ADULT. - SIGNS/SYMPTOMS
as evidenced by need for modified diet consistency with thickened liquids as evidenced by pt with multiple sites of impaired skin integrity (stage 1 pressure injuries)

## 2018-11-15 NOTE — DIETITIAN INITIAL EVALUATION ADULT. - NS AS NUTRI INTERV MEALS SNACK3
Continue dysphagia 2 mechanical soft with nectar thickened liquids. Encourage po intake of high protein/kcal foods. Continue dysphagia 2 mechanical soft with nectar thickened liquids. Encourage po intake of high protein/kcal foods. Discussed high protein sources with wife (chicken, fish, eggs, dairy) and to include adequate amounts in pt's diet to facilitate wound healing.

## 2018-11-15 NOTE — DIETITIAN INITIAL EVALUATION ADULT. - PROBLEM SELECTOR PLAN 10
-VTE ppx: warfarin    IMPROVE VTE Individual Risk Assessment          RISK                                                          Points  [  ] Previous VTE                                                3  [  ] Thrombophilia                                             2  [ x ] Lower limb paralysis                                   2        (unable to hold up >15 seconds)    [  ] Current Cancer                                             2         (within 6 months)  [ x ] Immobilization > 24 hrs                              1  [  ] ICU/CCU stay > 24 hours                             1  [ x ] Age > 60                                                         1    IMPROVE VTE Score: 4

## 2018-11-15 NOTE — PROGRESS NOTE ADULT - SUBJECTIVE AND OBJECTIVE BOX
[facility] -- Rogelio Robb, Flor, Sandi, Rick Holloway Savella  Office # [phone]      Follow Up:  CVA, AF    Subjective/Observations:       REVIEW OF SYSTEMS: All other review of systems is negative unless indicated above    PAST MEDICAL & SURGICAL HISTORY:  Syncope  Parkinson disease  Constipation  Dementia  Urinary retention  Cerebrovascular accident  Hypertension  Atrial fibrillation  No significant past surgical history      MEDICATIONS  (STANDING):  ampicillin/sulbactam  IVPB 3 Gram(s) IV Intermittent every 6 hours  carbidopa/levodopa  25/100 1.5 Tablet(s) Oral <User Schedule>  carbidopa/levodopa  25/100 1 Tablet(s) Oral daily  finasteride 5 milliGRAM(s) Oral daily  metoprolol tartrate 50 milliGRAM(s) Oral two times a day  sertraline 50 milliGRAM(s) Oral at bedtime  tamsulosin 0.4 milliGRAM(s) Oral at bedtime    MEDICATIONS  (PRN):  acetaminophen   Tablet .. 650 milliGRAM(s) Oral every 6 hours PRN Temp greater or equal to 38C (100.4F)      Allergies    No Known Allergies    Intolerances        Vital Signs Last 24 Hrs  T(C): 36.6 (15 Nov 2018 07:25), Max: 38.3 (14 Nov 2018 18:59)  T(F): 97.9 (15 Nov 2018 07:25), Max: 101 (14 Nov 2018 18:59)  HR: 82 (15 Nov 2018 07:25) (71 - 99)  BP: 122/77 (15 Nov 2018 07:25) (118/83 - 199/92)  BP(mean): --  RR: 16 (15 Nov 2018 07:25) (14 - 17)  SpO2: 96% (15 Nov 2018 07:25) (96% - 100%)    I&O's Summary    15 Nov 2018 07:01  -  15 Nov 2018 11:11  --------------------------------------------------------  IN: 240 mL / OUT: 0 mL / NET: 240 mL          PHYSICAL EXAM:  TELE:   Constitutional: NAD, awake and alert, well-developed  HEENT: Moist Mucous Membranes, Anicteric  Pulmonary: Non-labored, breath sounds are clear bilaterally, No wheezing, crackles or rhonchi  Cardiovascular: Regular, S1 and S2 nl, No murmurs, rubs, gallops or clicks  Gastrointestinal: Bowel Sounds present, soft, nontender.   Lymph: No lymphadenopathy. No peripheral edema.  Skin: No visible rashes or ulcers.  Psych:  Mood & affect appropriate    LABS: All Labs Reviewed:                        13.5   8.04  )-----------( 126      ( 15 Nov 2018 07:23 )             39.2                         15.4   5.69  )-----------( 171      ( 14 Nov 2018 11:51 )             45.0     15 Nov 2018 07:23    144    |  110    |  16     ----------------------------<  84     4.0     |  26     |  1.10   14 Nov 2018 11:51    141    |  108    |  18     ----------------------------<  96     4.4     |  26     |  1.20     Ca    8.9        15 Nov 2018 07:23  Ca    9.0        14 Nov 2018 11:51    TPro  8.0    /  Alb  3.3    /  TBili  0.5    /  DBili  x      /  AST  27     /  ALT  9      /  AlkPhos  71     14 Nov 2018 11:51    PT/INR - ( 15 Nov 2018 07:23 )   PT: 37.3 sec;   INR: 3.18 ratio         PTT - ( 14 Nov 2018 11:51 )  PTT:42.2 sec  CARDIAC MARKERS ( 15 Nov 2018 07:23 )  <.015 ng/mL / x     / 74 U/L / x     / x      CARDIAC MARKERS ( 14 Nov 2018 16:39 )  <.015 ng/mL / x     / x     / x     / x      CARDIAC MARKERS ( 14 Nov 2018 11:51 )  <.015 ng/mL / x     / x     / x     / x             ECG:  < from: 12 Lead ECG (10.23.18 @ 11:37) >  Ventricular Rate 68 BPM    Atrial Rate 79 BPM    QRS Duration 86 ms    Q-T Interval 386 ms    QTC Calculation(Bezet) 410 ms    R Axis -8 degrees    T Axis -10 degrees    Diagnosis Line Atrial fibrillation  Abnormal ECG    Confirmed by Moiz Barnes ([id number]) on 10/24/2018 10:48:51 AM    < end of copied text >    Echo:    Radiology:  < from: Xray Chest 1 View- PORTABLE-Routine (11.14.18 @ 11:34) >  EXAM:  XR CHEST PORTABLE ROUTINE 1V                            PROCEDURE DATE:  11/14/2018          INTERPRETATION:  AP chest on November 14, 2018 at 11:22 AM. Patient has   altered mental status.    Heart suggest enlargement.    The lung fields and pleural surfaces are unremarkable.    The chest is similar to October 23.    IMPRESSION: Heart enlargement again noted.                ANDRY SUNSHINE M.D., ATTENDING RADIOLOGIST  This document has been electronically signed. Nov 14 2018 11:46AM          < end of copied text >           Kolton John ANP   Cardiology [facility] -- Rogelio Robb, Sandi Ray, Rick Holloway Savella  Office # [phone]      Follow Up:  CVA, AF    Subjective/Observations:  No events overnight resting comfortably in bed without complaints able to answer yes no questions      REVIEW OF SYSTEMS: All other review of systems is negative unless indicated above    PAST MEDICAL & SURGICAL HISTORY:  Syncope  Parkinson disease  Constipation  Dementia  Urinary retention  Cerebrovascular accident  Hypertension  Atrial fibrillation  No significant past surgical history      MEDICATIONS  (STANDING):  ampicillin/sulbactam  IVPB 3 Gram(s) IV Intermittent every 6 hours  carbidopa/levodopa  25/100 1.5 Tablet(s) Oral <User Schedule>  carbidopa/levodopa  25/100 1 Tablet(s) Oral daily  finasteride 5 milliGRAM(s) Oral daily  metoprolol tartrate 50 milliGRAM(s) Oral two times a day  sertraline 50 milliGRAM(s) Oral at bedtime  tamsulosin 0.4 milliGRAM(s) Oral at bedtime    MEDICATIONS  (PRN):  acetaminophen   Tablet .. 650 milliGRAM(s) Oral every 6 hours PRN Temp greater or equal to 38C (100.4F)      Allergies    No Known Allergies    Intolerances        Vital Signs Last 24 Hrs  T(C): 36.6 (15 Nov 2018 07:25), Max: 38.3 (14 Nov 2018 18:59)  T(F): 97.9 (15 Nov 2018 07:25), Max: 101 (14 Nov 2018 18:59)  HR: 82 (15 Nov 2018 07:25) (71 - 99)  BP: 122/77 (15 Nov 2018 07:25) (118/83 - 199/92)  BP(mean): --  RR: 16 (15 Nov 2018 07:25) (14 - 17)  SpO2: 96% (15 Nov 2018 07:25) (96% - 100%)    I&O's Summary    15 Nov 2018 07:01  -  15 Nov 2018 11:11  --------------------------------------------------------  IN: 240 mL / OUT: 0 mL / NET: 240 mL          PHYSICAL EXAM:  TELE: afib @ 72 No events of tele overnight   Constitutional: NAD, awake and alert , well-developed  HEENT: Moist Mucous Membranes, Anicteric  Pulmonary: Non-labored, breath sounds are clear bilaterally, No wheezing, crackles or rhonchi  Cardiovascular: Irregular, S1 and S2 nl, No murmurs, rubs, gallops or clicks  Gastrointestinal: Bowel Sounds present, soft, nontender.   Lymph: No lymphadenopathy. No peripheral edema.  Skin: No visible rashes or ulcers.      LABS: All Labs Reviewed:                        13.5   8.04  )-----------( 126      ( 15 Nov 2018 07:23 )             39.2                         15.4   5.69  )-----------( 171      ( 14 Nov 2018 11:51 )             45.0     15 Nov 2018 07:23    144    |  110    |  16     ----------------------------<  84     4.0     |  26     |  1.10   14 Nov 2018 11:51    141    |  108    |  18     ----------------------------<  96     4.4     |  26     |  1.20     Ca    8.9        15 Nov 2018 07:23  Ca    9.0        14 Nov 2018 11:51    TPro  8.0    /  Alb  3.3    /  TBili  0.5    /  DBili  x      /  AST  27     /  ALT  9      /  AlkPhos  71     14 Nov 2018 11:51    PT/INR - ( 15 Nov 2018 07:23 )   PT: 37.3 sec;   INR: 3.18 ratio         PTT - ( 14 Nov 2018 11:51 )  PTT:42.2 sec  CARDIAC MARKERS ( 15 Nov 2018 07:23 )  <.015 ng/mL / x     / 74 U/L / x     / x      CARDIAC MARKERS ( 14 Nov 2018 16:39 )  <.015 ng/mL / x     / x     / x     / x      CARDIAC MARKERS ( 14 Nov 2018 11:51 )  <.015 ng/mL / x     / x     / x     / x             ECG:  < from: 12 Lead ECG (10.23.18 @ 11:37) >  Ventricular Rate 68 BPM    Atrial Rate 79 BPM    QRS Duration 86 ms    Q-T Interval 386 ms    QTC Calculation(Bezet) 410 ms    R Axis -8 degrees    T Axis -10 degrees    Diagnosis Line Atrial fibrillation  Abnormal ECG    Confirmed by Moiz Barnes ([id number]) on 10/24/2018 10:48:51 AM    < end of copied text >    Echo:    Radiology:  < from: Xray Chest 1 View- PORTABLE-Routine (11.14.18 @ 11:34) >  EXAM:  XR CHEST PORTABLE ROUTINE 1V                            PROCEDURE DATE:  11/14/2018          INTERPRETATION:  AP chest on November 14, 2018 at 11:22 AM. Patient has   altered mental status.    Heart suggest enlargement.    The lung fields and pleural surfaces are unremarkable.    The chest is similar to October 23.    IMPRESSION: Heart enlargement again noted.                ANDRY SUNSHINE M.D., ATTENDING RADIOLOGIST  This document has been electronically signed. Nov 14 2018 11:46AM          < end of copied text >           Kolton John ANP   Cardiology [facility] -- Rogelio Robb, Sandi Rya, Rick Holloway Savella  Office # [phone]      Follow Up:  CVA, AF    Subjective/Observations:  No events overnight resting comfortably in bed without complaints able to answer yes/no questions      REVIEW OF SYSTEMS: All other review of systems is negative unless indicated above    PAST MEDICAL & SURGICAL HISTORY:  Syncope  Parkinson disease  Constipation  Dementia  Urinary retention  Cerebrovascular accident  Hypertension  Atrial fibrillation  No significant past surgical history      MEDICATIONS  (STANDING):  ampicillin/sulbactam  IVPB 3 Gram(s) IV Intermittent every 6 hours  carbidopa/levodopa  25/100 1.5 Tablet(s) Oral <User Schedule>  carbidopa/levodopa  25/100 1 Tablet(s) Oral daily  finasteride 5 milliGRAM(s) Oral daily  metoprolol tartrate 50 milliGRAM(s) Oral two times a day  sertraline 50 milliGRAM(s) Oral at bedtime  tamsulosin 0.4 milliGRAM(s) Oral at bedtime    MEDICATIONS  (PRN):  acetaminophen   Tablet .. 650 milliGRAM(s) Oral every 6 hours PRN Temp greater or equal to 38C (100.4F)      Allergies    No Known Allergies    Intolerances        Vital Signs Last 24 Hrs  T(C): 36.6 (15 Nov 2018 07:25), Max: 38.3 (14 Nov 2018 18:59)  T(F): 97.9 (15 Nov 2018 07:25), Max: 101 (14 Nov 2018 18:59)  HR: 82 (15 Nov 2018 07:25) (71 - 99)  BP: 122/77 (15 Nov 2018 07:25) (118/83 - 199/92)  BP(mean): --  RR: 16 (15 Nov 2018 07:25) (14 - 17)  SpO2: 96% (15 Nov 2018 07:25) (96% - 100%)    I&O's Summary    15 Nov 2018 07:01  -  15 Nov 2018 11:11  --------------------------------------------------------  IN: 240 mL / OUT: 0 mL / NET: 240 mL          PHYSICAL EXAM:  TELE: afib @ 72 No events of tele overnight   Constitutional: NAD, awake , able to answer yes/ no questions well-developed  HEENT: Moist Mucous Membranes, Anicteric  Pulmonary: Non-labored, breath sounds are clear bilaterally, No wheezing, crackles or rhonchi  Cardiovascular: Irregular, S1 and S2 nl, No murmurs, rubs, gallops or clicks  Gastrointestinal: Bowel Sounds present, soft, nontender.   Lymph: No lymphadenopathy. No peripheral edema.  Skin: No visible rashes or ulcers.      LABS: All Labs Reviewed:                        13.5   8.04  )-----------( 126      ( 15 Nov 2018 07:23 )             39.2                         15.4   5.69  )-----------( 171      ( 14 Nov 2018 11:51 )             45.0     15 Nov 2018 07:23    144    |  110    |  16     ----------------------------<  84     4.0     |  26     |  1.10   14 Nov 2018 11:51    141    |  108    |  18     ----------------------------<  96     4.4     |  26     |  1.20     Ca    8.9        15 Nov 2018 07:23  Ca    9.0        14 Nov 2018 11:51    TPro  8.0    /  Alb  3.3    /  TBili  0.5    /  DBili  x      /  AST  27     /  ALT  9      /  AlkPhos  71     14 Nov 2018 11:51    PT/INR - ( 15 Nov 2018 07:23 )   PT: 37.3 sec;   INR: 3.18 ratio         PTT - ( 14 Nov 2018 11:51 )  PTT:42.2 sec  CARDIAC MARKERS ( 15 Nov 2018 07:23 )  <.015 ng/mL / x     / 74 U/L / x     / x      CARDIAC MARKERS ( 14 Nov 2018 16:39 )  <.015 ng/mL / x     / x     / x     / x      CARDIAC MARKERS ( 14 Nov 2018 11:51 )  <.015 ng/mL / x     / x     / x     / x             ECG:  < from: 12 Lead ECG (10.23.18 @ 11:37) >  Ventricular Rate 68 BPM    Atrial Rate 79 BPM    QRS Duration 86 ms    Q-T Interval 386 ms    QTC Calculation(Bezet) 410 ms    R Axis -8 degrees    T Axis -10 degrees    Diagnosis Line Atrial fibrillation  Abnormal ECG    Confirmed by Moiz Barnes ([id number]) on 10/24/2018 10:48:51 AM    < end of copied text >    Echo:    Radiology:  < from: Xray Chest 1 View- PORTABLE-Routine (11.14.18 @ 11:34) >  EXAM:  XR CHEST PORTABLE ROUTINE 1V                            PROCEDURE DATE:  11/14/2018          INTERPRETATION:  AP chest on November 14, 2018 at 11:22 AM. Patient has   altered mental status.    Heart suggest enlargement.    The lung fields and pleural surfaces are unremarkable.    The chest is similar to October 23.    IMPRESSION: Heart enlargement again noted.                ANDRY SUNSHINE M.D., ATTENDING RADIOLOGIST  This document has been electronically signed. Nov 14 2018 11:46AM          < end of copied text >           Kolton John ANP   Cardiology

## 2018-11-15 NOTE — DISCHARGE NOTE ADULT - CARE PROVIDER_API CALL
Lisa Trimble), Internal Medicine  [address] [place] [address]  Phone: [phone]  Fax: [phone]    Carlo Nance), Neurology  [address] [place] [address]  Phone: [phone]  Fax: [phone] Lisa Trimble), Internal Medicine  [address] [place] [address]  Phone: [phone]  Fax: [phone]    Carlo Nance), Neurology  [address] [place] [address]  Phone: [phone]  Fax: [phone]    Lucila Leo), Cardiovascular Disease; Internal Medicine  [address] [place] [address]  Phone: [phone]  Fax: [phone]

## 2018-11-15 NOTE — DIETITIAN INITIAL EVALUATION ADULT. - PROBLEM SELECTOR PLAN 6
-stable, chronic, rate controlled  -c/w metoprolol tartrate 50mg BID with holding parameters   -warfarin dosage daily  -Cardio consulted- recs appreciated, unlikely cardiac cause of symptoms will continue to follow

## 2018-11-15 NOTE — DISCHARGE NOTE ADULT - PROVIDER TOKENS
TOKEN:'[id number]',TOKEN:'[id number]' TOKEN:'[id number]:'[id number]',TOKEN:'[id number]'

## 2018-11-16 LAB
-  AMPICILLIN: SIGNIFICANT CHANGE UP
-  CIPROFLOXACIN: SIGNIFICANT CHANGE UP
-  COAGULASE NEGATIVE STAPHYLOCOCCUS: SIGNIFICANT CHANGE UP
-  LEVOFLOXACIN: SIGNIFICANT CHANGE UP
-  NITROFURANTOIN: SIGNIFICANT CHANGE UP
-  TETRACYCLINE: SIGNIFICANT CHANGE UP
-  VANCOMYCIN: SIGNIFICANT CHANGE UP
ANION GAP SERPL CALC-SCNC: 9 MMOL/L — SIGNIFICANT CHANGE UP (ref 5–17)
BASOPHILS # BLD AUTO: 0 K/UL — SIGNIFICANT CHANGE UP (ref 0–0.2)
BASOPHILS NFR BLD AUTO: 0 % — SIGNIFICANT CHANGE UP (ref 0–2)
BUN SERPL-MCNC: 15 MG/DL — SIGNIFICANT CHANGE UP (ref 7–23)
CALCIUM SERPL-MCNC: 8.9 MG/DL — SIGNIFICANT CHANGE UP (ref 8.5–10.1)
CHLORIDE SERPL-SCNC: 109 MMOL/L — HIGH (ref 96–108)
CO2 SERPL-SCNC: 26 MMOL/L — SIGNIFICANT CHANGE UP (ref 22–31)
CREAT SERPL-MCNC: 0.97 MG/DL — SIGNIFICANT CHANGE UP (ref 0.5–1.3)
CULTURE RESULTS: SIGNIFICANT CHANGE UP
EOSINOPHIL # BLD AUTO: 0.12 K/UL — SIGNIFICANT CHANGE UP (ref 0–0.5)
EOSINOPHIL NFR BLD AUTO: 2 % — SIGNIFICANT CHANGE UP (ref 0–6)
GLUCOSE SERPL-MCNC: 78 MG/DL — SIGNIFICANT CHANGE UP (ref 70–99)
GRAM STN FLD: SIGNIFICANT CHANGE UP
HCT VFR BLD CALC: 38.4 % — LOW (ref 39–50)
HGB BLD-MCNC: 13.4 G/DL — SIGNIFICANT CHANGE UP (ref 13–17)
INR BLD: 2.66 RATIO — HIGH (ref 0.88–1.16)
LYMPHOCYTES # BLD AUTO: 1.3 K/UL — SIGNIFICANT CHANGE UP (ref 1–3.3)
LYMPHOCYTES # BLD AUTO: 22 % — SIGNIFICANT CHANGE UP (ref 13–44)
MAGNESIUM SERPL-MCNC: 2.2 MG/DL — SIGNIFICANT CHANGE UP (ref 1.6–2.6)
MCHC RBC-ENTMCNC: 32 PG — SIGNIFICANT CHANGE UP (ref 27–34)
MCHC RBC-ENTMCNC: 34.9 GM/DL — SIGNIFICANT CHANGE UP (ref 32–36)
MCV RBC AUTO: 91.6 FL — SIGNIFICANT CHANGE UP (ref 80–100)
METHOD TYPE: SIGNIFICANT CHANGE UP
METHOD TYPE: SIGNIFICANT CHANGE UP
MONOCYTES # BLD AUTO: 0.83 K/UL — SIGNIFICANT CHANGE UP (ref 0–0.9)
MONOCYTES NFR BLD AUTO: 14 % — SIGNIFICANT CHANGE UP (ref 2–14)
NEUTROPHILS # BLD AUTO: 3.56 K/UL — SIGNIFICANT CHANGE UP (ref 1.8–7.4)
NEUTROPHILS NFR BLD AUTO: 60 % — SIGNIFICANT CHANGE UP (ref 43–77)
ORGANISM # SPEC MICROSCOPIC CNT: SIGNIFICANT CHANGE UP
ORGANISM # SPEC MICROSCOPIC CNT: SIGNIFICANT CHANGE UP
PLATELET # BLD AUTO: 116 K/UL — LOW (ref 150–400)
POTASSIUM SERPL-MCNC: 3.3 MMOL/L — LOW (ref 3.5–5.3)
POTASSIUM SERPL-SCNC: 3.3 MMOL/L — LOW (ref 3.5–5.3)
PROCALCITONIN SERPL-MCNC: <0.05 — SIGNIFICANT CHANGE UP (ref 0–0.04)
PROTHROM AB SERPL-ACNC: 31.2 SEC — HIGH (ref 10–12.9)
RBC # BLD: 4.19 M/UL — LOW (ref 4.2–5.8)
RBC # FLD: 13.1 % — SIGNIFICANT CHANGE UP (ref 10.3–14.5)
SODIUM SERPL-SCNC: 144 MMOL/L — SIGNIFICANT CHANGE UP (ref 135–145)
SPECIMEN SOURCE: SIGNIFICANT CHANGE UP
WBC # BLD: 5.93 K/UL — SIGNIFICANT CHANGE UP (ref 3.8–10.5)
WBC # FLD AUTO: 5.93 K/UL — SIGNIFICANT CHANGE UP (ref 3.8–10.5)

## 2018-11-16 PROCEDURE — 99233 SBSQ HOSP IP/OBS HIGH 50: CPT | Mod: GC

## 2018-11-16 PROCEDURE — 99232 SBSQ HOSP IP/OBS MODERATE 35: CPT

## 2018-11-16 RX ORDER — AMOXICILLIN 250 MG/5ML
1 SUSPENSION, RECONSTITUTED, ORAL (ML) ORAL
Qty: 21 | Refills: 0 | OUTPATIENT
Start: 2018-11-16 | End: 2018-11-22

## 2018-11-16 RX ORDER — ONDANSETRON 8 MG/1
1 TABLET, FILM COATED ORAL
Qty: 45 | Refills: 0 | OUTPATIENT
Start: 2018-11-16 | End: 2018-11-30

## 2018-11-16 RX ORDER — DOCUSATE SODIUM 100 MG
100 CAPSULE ORAL DAILY
Qty: 0 | Refills: 0 | Status: DISCONTINUED | OUTPATIENT
Start: 2018-11-16 | End: 2018-11-17

## 2018-11-16 RX ORDER — SENNA PLUS 8.6 MG/1
2 TABLET ORAL AT BEDTIME
Qty: 0 | Refills: 0 | Status: DISCONTINUED | OUTPATIENT
Start: 2018-11-16 | End: 2018-11-17

## 2018-11-16 RX ORDER — METOPROLOL TARTRATE 50 MG
1 TABLET ORAL
Qty: 60 | Refills: 0 | OUTPATIENT
Start: 2018-11-16 | End: 2018-12-15

## 2018-11-16 RX ORDER — AMOXICILLIN 250 MG/5ML
500 SUSPENSION, RECONSTITUTED, ORAL (ML) ORAL THREE TIMES A DAY
Qty: 0 | Refills: 0 | Status: DISCONTINUED | OUTPATIENT
Start: 2018-11-16 | End: 2018-11-17

## 2018-11-16 RX ORDER — VANCOMYCIN HCL 1 G
1000 VIAL (EA) INTRAVENOUS ONCE
Qty: 0 | Refills: 0 | Status: COMPLETED | OUTPATIENT
Start: 2018-11-16 | End: 2018-11-16

## 2018-11-16 RX ORDER — METOPROLOL TARTRATE 50 MG
2 TABLET ORAL
Qty: 0 | Refills: 0 | COMMUNITY
Start: 2018-11-16 | End: 2018-12-15

## 2018-11-16 RX ORDER — WARFARIN SODIUM 2.5 MG/1
3 TABLET ORAL ONCE
Qty: 0 | Refills: 0 | Status: COMPLETED | OUTPATIENT
Start: 2018-11-16 | End: 2018-11-16

## 2018-11-16 RX ORDER — ACETAMINOPHEN 500 MG
650 TABLET ORAL EVERY 6 HOURS
Qty: 0 | Refills: 0 | Status: DISCONTINUED | OUTPATIENT
Start: 2018-11-16 | End: 2018-11-17

## 2018-11-16 RX ORDER — LACTOBACILLUS ACIDOPHILUS 100MM CELL
1 CAPSULE ORAL
Qty: 21 | Refills: 0 | OUTPATIENT
Start: 2018-11-16 | End: 2018-11-22

## 2018-11-16 RX ORDER — ONDANSETRON 8 MG/1
4 TABLET, FILM COATED ORAL EVERY 8 HOURS
Qty: 0 | Refills: 0 | Status: DISCONTINUED | OUTPATIENT
Start: 2018-11-16 | End: 2018-11-17

## 2018-11-16 RX ORDER — POTASSIUM CHLORIDE 20 MEQ
40 PACKET (EA) ORAL EVERY 4 HOURS
Qty: 0 | Refills: 0 | Status: COMPLETED | OUTPATIENT
Start: 2018-11-16 | End: 2018-11-16

## 2018-11-16 RX ADMIN — AMPICILLIN SODIUM AND SULBACTAM SODIUM 200 GRAM(S): 250; 125 INJECTION, POWDER, FOR SUSPENSION INTRAMUSCULAR; INTRAVENOUS at 05:19

## 2018-11-16 RX ADMIN — Medication 500 MILLIGRAM(S): at 21:17

## 2018-11-16 RX ADMIN — Medication 1 TABLET(S): at 11:37

## 2018-11-16 RX ADMIN — Medication 650 MILLIGRAM(S): at 14:30

## 2018-11-16 RX ADMIN — Medication 650 MILLIGRAM(S): at 14:59

## 2018-11-16 RX ADMIN — Medication 1 TABLET(S): at 08:42

## 2018-11-16 RX ADMIN — AMPICILLIN SODIUM AND SULBACTAM SODIUM 200 GRAM(S): 250; 125 INJECTION, POWDER, FOR SUSPENSION INTRAMUSCULAR; INTRAVENOUS at 17:38

## 2018-11-16 RX ADMIN — FINASTERIDE 5 MILLIGRAM(S): 5 TABLET, FILM COATED ORAL at 08:42

## 2018-11-16 RX ADMIN — SENNA PLUS 2 TABLET(S): 8.6 TABLET ORAL at 21:17

## 2018-11-16 RX ADMIN — Medication 25 MILLIGRAM(S): at 17:41

## 2018-11-16 RX ADMIN — SERTRALINE 50 MILLIGRAM(S): 25 TABLET, FILM COATED ORAL at 21:17

## 2018-11-16 RX ADMIN — Medication 25 MILLIGRAM(S): at 05:42

## 2018-11-16 RX ADMIN — CARBIDOPA AND LEVODOPA 1 TABLET(S): 25; 100 TABLET ORAL at 17:38

## 2018-11-16 RX ADMIN — WARFARIN SODIUM 3 MILLIGRAM(S): 2.5 TABLET ORAL at 21:17

## 2018-11-16 RX ADMIN — AMPICILLIN SODIUM AND SULBACTAM SODIUM 200 GRAM(S): 250; 125 INJECTION, POWDER, FOR SUSPENSION INTRAMUSCULAR; INTRAVENOUS at 11:36

## 2018-11-16 RX ADMIN — Medication 40 MILLIEQUIVALENT(S): at 10:39

## 2018-11-16 RX ADMIN — CARBIDOPA AND LEVODOPA 1.5 TABLET(S): 25; 100 TABLET ORAL at 08:41

## 2018-11-16 RX ADMIN — Medication 1 TABLET(S): at 17:38

## 2018-11-16 RX ADMIN — CARBIDOPA AND LEVODOPA 1.5 TABLET(S): 25; 100 TABLET ORAL at 11:36

## 2018-11-16 RX ADMIN — CARBIDOPA AND LEVODOPA 1.5 TABLET(S): 25; 100 TABLET ORAL at 14:55

## 2018-11-16 RX ADMIN — Medication 250 MILLIGRAM(S): at 05:42

## 2018-11-16 RX ADMIN — TAMSULOSIN HYDROCHLORIDE 0.4 MILLIGRAM(S): 0.4 CAPSULE ORAL at 21:17

## 2018-11-16 NOTE — PROGRESS NOTE ADULT - SUBJECTIVE AND OBJECTIVE BOX
Patient is a 78y old  Male who presents with a chief complaint of episode of unresponsiveness, vomiting (2018 10:53)      FROM ADMISSION H+P:   HPI:  76yo M PMH of Afib (on coumadin), CVA (2012, 2016, nearly non-verbal), Parkinson's disease, dementia (nearly non-verbal and very limited mobility), HTN, BPH w/ hx of urinary retention (now on flomax/proscar without gore), hx of unresponsive episodes and hx of frequent UTIs, BIBEMS for unresponsive episode. Patient is virtually nonverbal and wife at bedside provides history.  Wife stated that patient has had episodes of blank stares in the past for short period of time (worst >1yr ago when he had w/up in different hospital). Today, pt has another unresponsive episode lasting ~10 minutes and vomited up all of his breakfast at the same time. Emesis was non-bloody, largely food contents. Patient was at baseline this morning, had breakfast as per usual before the unresponsive episode occurred.  Wife called ambulance and patient was brought to the ED.  Wife and Dr. Nance states that patient had workup done at a different hospital including a 72-hour EEG where he had one of the staring episodes without epileptiform activity on EEG and was deemed not to be seizure related. Wife states pt has a chronic periodic cough but had somewhat more after the vomiting episode. Of note, pt eats nectar thick fluid, and solid food as has had aspiration with this liquids.  Patient is not able to relay much information, all information obtained from wife.  Patient had left sided residual weakness and imbalance after stroke in , and had dementia after stroke in 2016.  Patient is seen by PT at home 2 times a week for 1 hour, and has a nightly aide to help transfer to bed for 1 hour. Wife denies any recent fevers, diarrhea, constipation seen in the pt.      In the ED, T 98.3, HR 78, /76, RR 18, SpO2 95% on RA.  Labs significant for CBC wnl, PT/INR 28.9/2.47, CMP wnl, positive UA. Trop negative x1, lipase wnl.  Patient noted to have positive UCx from 10/23 growing >100K Enterococcals faecalis, treated with ceftin.  In the ED, patient received Unasyn x1, carbidopa/levodopa x2, zofran x1, and 1L NS bolus.  CXR showed no acute lung pathology.  CT stone fisher showed Left renal pelvic calculi extending into the left lower pole calyces with left peripelvic infiltrative changes without significant hydronephrosis.  CT head showed No acute or focal brain pathology. Stable brain appearance. Bilateral maxillary sinus disease. (2018 15:29)      ----  INTERVAL HPI/OVERNIGHT EVENTS: Pt seen and evaluated at the bedside. No acute overnight events occurred. Patient resting comfortably in bed, wakes to verbal questions. Wife present at bedside. Patient and wife made aware about plan to continue treatment of UTI secondary to E. Faecalis with Unasyn pending cultures. Also made aware of blood culture 1/4 bottles with gram positive cocci in clusters - patient without signs of infection (no white count or fever). Wife __________________    ----  PAST MEDICAL & SURGICAL HISTORY:  Syncope  Parkinson disease  Constipation  Dementia  Urinary retention  Cerebrovascular accident  Hypertension  Atrial fibrillation  No significant past surgical history      FAMILY HISTORY:  No pertinent family history in first degree relatives      ----  MEDICATIONS  (STANDING):  ampicillin/sulbactam  IVPB 3 Gram(s) IV Intermittent every 6 hours  carbidopa/levodopa  25/100 1 Tablet(s) Oral daily  carbidopa/levodopa  25/100 1.5 Tablet(s) Oral <User Schedule>  finasteride 5 milliGRAM(s) Oral daily  lactobacillus acidophilus 1 Tablet(s) Oral three times a day with meals  metoprolol tartrate 25 milliGRAM(s) Oral two times a day  potassium chloride    Tablet ER 40 milliEquivalent(s) Oral every 4 hours  sertraline 50 milliGRAM(s) Oral at bedtime  tamsulosin 0.4 milliGRAM(s) Oral at bedtime    MEDICATIONS  (PRN):  acetaminophen   Tablet .. 650 milliGRAM(s) Oral every 6 hours PRN Temp greater or equal to 38C (100.4F)      ----  REVIEW OF SYSTEMS: unable to obtain due to non-verbal status    ----  PHYSICAL EXAM:  GENERAL: patient appears well, no acute distress, pleasant, minimally verbal during bedside encounter  EYES: sclera clear, no exudates  ENMT: oropharynx clear without erythema, no exudates, moist mucous membranes  NECK: supple, soft, no thyromegaly noted  LUNGS: good air entry bilaterally, clear to auscultation, symmetric breath sounds, no wheezing or rhonchi appreciated  HEART: soft S1/S2, regular rate and rhythm, no murmurs noted, no lower extremity edema  GASTROINTESTINAL: abdomen is soft, nontender, nondistended, normoactive bowel sounds, no palpable masses  INTEGUMENT: good skin turgor, no lesions noted  MUSCULOSKELETAL: no clubbing or cyanosis, no obvious deformity  NEUROLOGIC: awake, alert, good eye contact, nonverbal  HEME/LYMPH: no palpable supraclavicular nodules, no obvious ecchymosis or petechiae     T(C): 36.2 (18 @ 08:05), Max: 36.6 (11-15-18 @ 12:16)  HR: 71 (18 @ 08:05) (61 - 81)  BP: 146/83 (18 @ 08:05) (124/89 - 158/96)  RR: 16 (18 @ 08:05) (16 - 17)  SpO2: 98% (18 @ 08:05) (95% - 98%)  Wt(kg): --    ----  I&O's Summary    15 Nov 2018 07:01  -  2018 07:00  --------------------------------------------------------  IN: 440 mL / OUT: 0 mL / NET: 440 mL        LABS:                        13.4   5.93  )-----------( 116      ( 2018 07:14 )             38.4     11-16    144  |  109<H>  |  15  ----------------------------<  78  3.3<L>   |  26  |  0.97    Ca    8.9      2018 07:14  Mg     2.2     11-16      PT/INR - ( 2018 09:29 )   PT: 31.2 sec;   INR: 2.66 ratio           Urinalysis Basic - ( 2018 12:40 )    Color: Yellow / Appearance: Turbid / S.025 / pH: x  Gluc: x / Ketone: Small  / Bili: Negative / Urobili: Negative   Blood: x / Protein: 75 mg/dL / Nitrite: Negative   Leuk Esterase: Moderate / RBC: 11-25 /HPF / WBC >50   Sq Epi: x / Non Sq Epi: Few / Bacteria: Many      CAPILLARY BLOOD GLUCOSE           @ 22:10   No growth to date.  --  Blood Culture PCR   @ 20:37   >100,000 CFU/ml Enterococcus faecalis  --  --            ----  Personally reviewed:  Vital sign trends: [ x ] yes    [  ] no     [  ] n/a  Laboratory results: [ x ] yes    [  ] no     [  ] n/a  Radiology results: [  ] yes    [  ] no     [ x ] n/a  Culture results: [ x ] yes    [  ] no     [  ] n/a  Consultant recommendations: [ x ] yes    [  ] no     [  ] n/a Patient is a 78y old  Male who presents with a chief complaint of episode of unresponsiveness, vomiting (2018 10:53)      FROM ADMISSION H+P:   HPI:  78yo M PMH of Afib (on coumadin), CVA (2012, 2016, nearly non-verbal), Parkinson's disease, dementia (nearly non-verbal and very limited mobility), HTN, BPH w/ hx of urinary retention (now on flomax/proscar without gore), hx of unresponsive episodes and hx of frequent UTIs, BIBEMS for unresponsive episode. Patient is virtually nonverbal and wife at bedside provides history.  Wife stated that patient has had episodes of blank stares in the past for short period of time (worst >1yr ago when he had w/up in different hospital). Today, pt has another unresponsive episode lasting ~10 minutes and vomited up all of his breakfast at the same time. Emesis was non-bloody, largely food contents. Patient was at baseline this morning, had breakfast as per usual before the unresponsive episode occurred.  Wife called ambulance and patient was brought to the ED.  Wife and Dr. Nance states that patient had workup done at a different hospital including a 72-hour EEG where he had one of the staring episodes without epileptiform activity on EEG and was deemed not to be seizure related. Wife states pt has a chronic periodic cough but had somewhat more after the vomiting episode. Of note, pt eats nectar thick fluid, and solid food as has had aspiration with this liquids.  Patient is not able to relay much information, all information obtained from wife.  Patient had left sided residual weakness and imbalance after stroke in , and had dementia after stroke in 2016.  Patient is seen by PT at home 2 times a week for 1 hour, and has a nightly aide to help transfer to bed for 1 hour. Wife denies any recent fevers, diarrhea, constipation seen in the pt.      In the ED, T 98.3, HR 78, /76, RR 18, SpO2 95% on RA.  Labs significant for CBC wnl, PT/INR 28.9/2.47, CMP wnl, positive UA. Trop negative x1, lipase wnl.  Patient noted to have positive UCx from 10/23 growing >100K Enterococcals faecalis, treated with ceftin.  In the ED, patient received Unasyn x1, carbidopa/levodopa x2, zofran x1, and 1L NS bolus.  CXR showed no acute lung pathology.  CT stone fisher showed Left renal pelvic calculi extending into the left lower pole calyces with left peripelvic infiltrative changes without significant hydronephrosis.  CT head showed No acute or focal brain pathology. Stable brain appearance. Bilateral maxillary sinus disease. (2018 15:29)      ----  INTERVAL HPI/OVERNIGHT EVENTS: Pt seen and evaluated at the bedside. No acute overnight events occurred. Patient resting comfortably in bed, wakes to verbal questions. Wife present at bedside. Patient and wife made aware about plan to continue treatment of UTI secondary to E. Faecalis with Unasyn pending cultures. Also made aware of blood culture 1/4 bottles with gram positive cocci in clusters - patient without signs of infection (no white count or fever). Spouse wants to take the pt home tomorrow once the sensitivities are resulted (assuming sensitive to amoxicillin). Declines repeat blood cultures.     ----  PAST MEDICAL & SURGICAL HISTORY:  Syncope  Parkinson disease  Constipation  Dementia  Urinary retention  Cerebrovascular accident  Hypertension  Atrial fibrillation  No significant past surgical history      FAMILY HISTORY:  No pertinent family history in first degree relatives      ----  MEDICATIONS  (STANDING):  ampicillin/sulbactam  IVPB 3 Gram(s) IV Intermittent every 6 hours  carbidopa/levodopa  25/100 1 Tablet(s) Oral daily  carbidopa/levodopa  25/100 1.5 Tablet(s) Oral <User Schedule>  finasteride 5 milliGRAM(s) Oral daily  lactobacillus acidophilus 1 Tablet(s) Oral three times a day with meals  metoprolol tartrate 25 milliGRAM(s) Oral two times a day  potassium chloride    Tablet ER 40 milliEquivalent(s) Oral every 4 hours  sertraline 50 milliGRAM(s) Oral at bedtime  tamsulosin 0.4 milliGRAM(s) Oral at bedtime    MEDICATIONS  (PRN):  acetaminophen   Tablet .. 650 milliGRAM(s) Oral every 6 hours PRN Temp greater or equal to 38C (100.4F)      ----  REVIEW OF SYSTEMS: unable to obtain due to non-verbal status    ----  PHYSICAL EXAM:  GENERAL: patient appears well, no acute distress, pleasant, minimally verbal during bedside encounter  EYES: sclera clear, no exudates  ENMT: oropharynx clear without erythema, no exudates, moist mucous membranes  NECK: supple, soft, no thyromegaly noted  LUNGS: good air entry bilaterally, clear to auscultation, symmetric breath sounds, no wheezing or rhonchi appreciated  HEART: soft S1/S2, regular rate and rhythm, no murmurs noted, no lower extremity edema  GASTROINTESTINAL: abdomen is soft, nontender, nondistended, normoactive bowel sounds, no palpable masses  INTEGUMENT: good skin turgor, no lesions noted  MUSCULOSKELETAL: no clubbing or cyanosis, no obvious deformity  NEUROLOGIC: awake, alert, good eye contact, nonverbal  HEME/LYMPH: no palpable supraclavicular nodules, no obvious ecchymosis or petechiae     T(C): 36.2 (18 @ 08:05), Max: 36.6 (11-15-18 @ 12:16)  HR: 71 (18 @ 08:05) (61 - 81)  BP: 146/83 (18 @ 08:05) (124/89 - 158/96)  RR: 16 (18 @ 08:05) (16 - 17)  SpO2: 98% (18 @ 08:05) (95% - 98%)  Wt(kg): --    ----  I&O's Summary    15 Nov 2018 07:01  -  2018 07:00  --------------------------------------------------------  IN: 440 mL / OUT: 0 mL / NET: 440 mL        LABS:                        13.4   5.93  )-----------( 116      ( 2018 07:14 )             38.4     -16    144  |  109<H>  |  15  ----------------------------<  78  3.3<L>   |  26  |  0.97    Ca    8.9      2018 07:14  Mg     2.2     -16      PT/INR - ( 2018 09:29 )   PT: 31.2 sec;   INR: 2.66 ratio           Urinalysis Basic - ( 2018 12:40 )    Color: Yellow / Appearance: Turbid / S.025 / pH: x  Gluc: x / Ketone: Small  / Bili: Negative / Urobili: Negative   Blood: x / Protein: 75 mg/dL / Nitrite: Negative   Leuk Esterase: Moderate / RBC: 11-25 /HPF / WBC >50   Sq Epi: x / Non Sq Epi: Few / Bacteria: Many      CAPILLARY BLOOD GLUCOSE           @ 22:10   No growth to date.  --  Blood Culture PCR   @ 20:37   >100,000 CFU/ml Enterococcus faecalis  --  --            ----  Personally reviewed:  Vital sign trends: [ x ] yes    [  ] no     [  ] n/a  Laboratory results: [ x ] yes    [  ] no     [  ] n/a  Radiology results: [  ] yes    [  ] no     [ x ] n/a  Culture results: [ x ] yes    [  ] no     [  ] n/a  Consultant recommendations: [ x ] yes    [  ] no     [  ] n/a

## 2018-11-16 NOTE — PROGRESS NOTE ADULT - PROBLEM SELECTOR PLAN 10
-VTE ppx: warfarin 3 mg tonight    IMPROVE VTE Individual Risk Assessment          RISK                                                          Points  [  ] Previous VTE                                                3  [  ] Thrombophilia                                             2  [ x ] Lower limb paralysis                                   2        (unable to hold up >15 seconds)    [  ] Current Cancer                                             2         (within 6 months)  [ x ] Immobilization > 24 hrs                              1  [  ] ICU/CCU stay > 24 hours                             1  [ x ] Age > 60                                                         1    IMPROVE VTE Score: 4  DNR/DNI

## 2018-11-16 NOTE — PROGRESS NOTE ADULT - SUBJECTIVE AND OBJECTIVE BOX
[facility] -- Rogelio Robb, Flor, Sandi, Rick Holloway Savella  Office # [phone]      Follow Up:  CVA, AF    Subjective/Observations: Seen and examined.  Sitting in bed slow to respond but answers simple questions appropriately.  No new complaints.  NAD      REVIEW OF SYSTEMS: All other review of systems is negative unless indicated above    PAST MEDICAL & SURGICAL HISTORY:  Syncope  Parkinson disease  Constipation  Dementia  Urinary retention  Cerebrovascular accident  Hypertension  Atrial fibrillation  No significant past surgical history      MEDICATIONS  (STANDING):  ampicillin/sulbactam  IVPB 3 Gram(s) IV Intermittent every 6 hours  carbidopa/levodopa  25/100 1 Tablet(s) Oral daily  carbidopa/levodopa  25/100 1.5 Tablet(s) Oral <User Schedule>  finasteride 5 milliGRAM(s) Oral daily  lactobacillus acidophilus 1 Tablet(s) Oral three times a day with meals  metoprolol tartrate 25 milliGRAM(s) Oral two times a day  potassium chloride    Tablet ER 40 milliEquivalent(s) Oral every 4 hours  sertraline 50 milliGRAM(s) Oral at bedtime  tamsulosin 0.4 milliGRAM(s) Oral at bedtime    MEDICATIONS  (PRN):  acetaminophen   Tablet .. 650 milliGRAM(s) Oral every 6 hours PRN Temp greater or equal to 38C (100.4F)      Allergies    No Known Allergies    Intolerances            Vital Signs Last 24 Hrs  T(C): 36.2 (16 Nov 2018 08:05), Max: 36.6 (15 Nov 2018 12:16)  T(F): 97.2 (16 Nov 2018 08:05), Max: 97.9 (15 Nov 2018 12:16)  HR: 71 (16 Nov 2018 08:05) (61 - 81)  BP: 146/83 (16 Nov 2018 08:05) (124/89 - 158/96)  BP(mean): --  RR: 16 (16 Nov 2018 08:05) (16 - 17)  SpO2: 98% (16 Nov 2018 08:05) (95% - 98%)    I&O's Summary    15 Nov 2018 07:01  -  16 Nov 2018 07:00  --------------------------------------------------------  IN: 440 mL / OUT: 0 mL / NET: 440 mL          PHYSICAL EXAM:  TELE: AF 60s   Constitutional: NAD, awake and alert, well-developed  HEENT: Moist Mucous Membranes, Anicteric  Pulmonary: Non-labored, breath sounds are clear anteriorly, No wheezing, rales or rhonchi  Cardiovascular: Irregular, S1 and S2, No murmurs, rubs, gallops or clicks  Gastrointestinal: Bowel Sounds present, soft, nontender.   Lymph: No peripheral edema. No lymphadenopathy.  Skin: No visible rashes or ulcers.  Psych:  Mood & affect flat    LABS: All Labs Reviewed:                        13.4   5.93  )-----------( 116      ( 16 Nov 2018 07:14 )             38.4                         13.5   8.04  )-----------( 126      ( 15 Nov 2018 07:23 )             39.2                         15.4   5.69  )-----------( 171      ( 14 Nov 2018 11:51 )             45.0     16 Nov 2018 07:14    144    |  109    |  15     ----------------------------<  78     3.3     |  26     |  0.97   15 Nov 2018 07:23    144    |  110    |  16     ----------------------------<  84     4.0     |  26     |  1.10   14 Nov 2018 11:51    141    |  108    |  18     ----------------------------<  96     4.4     |  26     |  1.20     Ca    8.9        16 Nov 2018 07:14  Ca    8.9        15 Nov 2018 07:23  Ca    9.0        14 Nov 2018 11:51  Mg     2.2       16 Nov 2018 07:14    TPro  8.0    /  Alb  3.3    /  TBili  0.5    /  DBili  x      /  AST  27     /  ALT  9      /  AlkPhos  71     14 Nov 2018 11:51    PT/INR - ( 16 Nov 2018 09:29 )   PT: 31.2 sec;   INR: 2.66 ratio         PTT - ( 14 Nov 2018 11:51 )  PTT:42.2 sec  CARDIAC MARKERS ( 15 Nov 2018 07:23 )  <.015 ng/mL / x     / 74 U/L / x     / x      CARDIAC MARKERS ( 14 Nov 2018 16:39 )  <.015 ng/mL / x     / x     / x     / x      CARDIAC MARKERS ( 14 Nov 2018 11:51 )  <.015 ng/mL / x     / x     / x     / x      < from: 12 Lead ECG (11.14.18 @ 13:35) >  Ventricular Rate 85 BPM    Atrial Rate 65 BPM    QRS Duration 76 ms    Q-T Interval 360 ms    QTC Calculation(Bezet) 428 ms    R Axis -12 degrees    T Axis 60 degrees    Diagnosis Line Atrial fibrillation  Abnormal ECG    Confirmed by Rohan Garcia ([id number]) on 11/15/2018 11:47:57 AM    < end of copied text >    < from: TTE Echo Doppler w/o Cont (11.30.17 @ 15:14) >   EXAM:  ECHO TTE W/O CON COMP W/DOPPLR         PROCEDURE DATE:  11/30/2017        INTERPRETATION:  INDICATION: Syncope    Blood Pressure 152/100    Height 172.7     Weight 70.7       BSA 1.84    Dimensions:    LA 3.6       Normal Values: 2.0 - 4.0 cm    Ao 3.2        Normal Values: 2.0 - 3.8 cm  SEPTUM 1.2       Normal Values: 0.6 - 1.2 cm  PWT 1.1       Normal Values: 0.6 - 1.1 cm  LVIDd 4.8         Normal Values: 3.0 - 5.6 cm  LVIDs 3.5         Normal Values: 1.8 - 4.0 cm    Derived Variables:  LVMI     g/m2  RWT      Fractional Short      Ejection Fraction 65    Doppler Peak v. AoV=   (m/sec)    OBSERVATIONS:    Mitral Valve: normal, 2+ MR.  Aortic Valve/Aorta: Calcified trileaflet aortic valve.  Tricuspid Valve: normal with trace TR.  Pulmonic Valve: normal  Left Atrium: normal  Right Atrium: normal  Left Ventricle: Borderline concentric LVH with normal systolic function,   estimated LVEF of 65%.  Right Ventricle: normal size and systolic function.  Pericardium/Pleura: no significant pleural effusion, no significant   pericardial effusion.  Pulmonary/RV Pressure: estimated PA systolic pressure of 28 mmHg assuming   an RA pressure of 10 mmHg.  LV Diastolic Function: normal     Technically limited study. Borderline concentric LVH with normal systolic   function, estimated LVEF of 65%. Normal right ventricular size and   systolic function. Diastolic function is normal.. Normal biatrial size.   The aortic root is normal in size. The mitral valve is structurally   normal, 2+ MR. The aortic valve is ossified without stenosis or   insufficiency. Trace physiologic TR. Estimated PA systolic pressure is 28   mm Hg, assuming an RA pressure of 10 mmHg. No significant pericardial   effusion.                  NIK ORTIZ M.D., ATTENDING CARDIOLOGIST  This document has been electronically signed. Dec  1 2017  6:39PM        < end of copied text >    < from: CT Renal Stone Hunt (11.14.18 @ 14:34) >    EXAM:  CT RENAL STONE HUNT                            PROCEDURE DATE:  11/14/2018          INTERPRETATION:  Exam Type:  CT RENAL STONE HUNT   Date and Time: 11/14/2018 2:34 PM  Indication: Hematuria  Compared to: CT abdomen 5/12/2014  Technique: CTof the ABDOMEN and PELVIS:  No intravenous contrast was   administered at physician request. This limits sensitivity for certain   processes. Oral contrast was not administered.    COMMENTS:    LOWER LUNGS AND PLEURA: Left atrial enlargement. Coronary vascular   calcification. Hiatal hernia.    LIVER: Multiple hepatic cysts unchanged.  BILE DUCTS: normal caliber.  GALLBLADDER: Gallstones.           PANCREAS: within normal limits.  SPLEEN: within normal limits.  ADRENALS: within normal limits.    KIDNEYS, URETERS AND BLADDER: Marked atrophy of the left kidney with   marked cortical scarring and parenchymal thinning involving the lateral   midpole. No hydronephrosis bilaterally. Multiple calculi within the left   renal pelvis extending into theanterior left lower pole renal calyces.   Mild left parapelvic infiltrative changes. Punctate nonobstructive right   intrarenal calculi. No definite renal masses bilaterally. No perinephric   collections. The ureters are unremarkable. The bladder appears within   normal limits.    PELVIS: Prostate is mildly enlarged with dense prostatic calcifications.   Subcentimeter pelvic lymph nodes. Minimal presacral stranding.No pelvic   lymphadenopathy.    BOWEL: The unopacified bowel is of normal course and caliber without   evidence of obstruction or bowel wall thickening. A normal appendix is   visualized. Calcifications along the pylorus unchanged dating back to   2014.    PERITONEUM: no ascites or free air, no fluid collection.  RETROPERITONEUM: within normal limits.      VESSELS: atherosclerotic changes.     IVC filter in place.  ABDOMINAL WALL: Small fat-containing umbilical hernia. Small   fat-containing right inguinal hernia.  BONES: Degenerative changes.    IMPRESSION:     Left renal pelvic calculi extending into the left lower pole calyces with   left peripelvic infiltrative changes without significant hydronephrosis.                PAZ MULTANI M.D., ATTENDING RADIOLOGIST  This document has been electronically signed. Nov 14 2018  2:43PM                < end of copied text >    < from: CT Head No Cont (11.14.18 @ 13:03) >    EXAM:  CT BRAIN                            PROCEDURE DATE:  11/14/2018          INTERPRETATION:  History: Altered mental status.    Noncontrast head CT. Prior 11/20/2017.  Advanced cerebral parenchymal volume loss and chronic ischemic white   matter changes similar to prior. Multifocal chronic lacunar infarcts in   the bilateral thalamus and basal ganglia similar to prior. No acute   infarct or hemorrhage. No extra-axial collection or mass effect. Fluid   levels bilateral maxillary sinus. Correlate for acute sinusitis.   Calvarium is intact. Impacted cerumen bilateral external auditory canal.    Impression:    No acute or focal brain pathology. Stable brain appearance. Bilateral   maxillary sinus disease.                COY BARRIOS M.D., ATTENDING RADIOLOGIST  This document has been electronically signed. Nov 14 2018  1:31PM        < end of copied text >  < from: Xray Chest 1 View- PORTABLE-Routine (11.14.18 @ 11:34) >  EXAM:  XR CHEST PORTABLE ROUTINE 1V                            PROCEDURE DATE:  11/14/2018          INTERPRETATION:  AP chest on November 14, 2018 at 11:22 AM. Patient has   altered mental status.    Heart suggest enlargement.    The lung fields and pleural surfaces are unremarkable.    The chest is similar to October 23.    IMPRESSION: Heart enlargement again noted.                ANDRY SUNSHINE M.D., ATTENDING RADIOLOGIST  This document has been electronically signed. Nov 14 2018 11:46AM             < end of copied text >

## 2018-11-16 NOTE — PROGRESS NOTE ADULT - PROBLEM SELECTOR PLAN 4
- chronic  - continue finasteride 5 mg daily  - continue flomax 0.4 mg daily at bedtime
- chronic  - continue finasteride 5 mg daily  - continue flomax 0.4 mg daily at bedtime

## 2018-11-16 NOTE — PROGRESS NOTE ADULT - PROBLEM SELECTOR PLAN 3
- Uro Dr. Quiñonez following - expectant management/observation
- Uro Dr. Quiñonez following - expectant management/observation

## 2018-11-16 NOTE — PROGRESS NOTE ADULT - ASSESSMENT
77y M PMHx of atrial fibrillation (on coumadin), CVA (2012/16), dementia, HTN, multiple TIAs admitted for syncopal episode.  No evidence of acute ischemia, troponin I negative x 2 sets. Patient with no anginal symptoms. No evidence of any meaningful volume overload  EKG showed Atrial fibrillation at 80 bpm with no acute changes when compared to prior study   prelim echo not changed from previous    - cont tele AF rate controlled  - continue with lopressor 25 mg q12hrs  - INR within therapeutic range. Dose coumadin with goal INR 2-3.    - monitor and replete lytes, keep K>4, Mg>2  Hypokalemic supplemented today.   - Other cardiovascular workup will depend on clinical course.  - No clear cardiac etiology of his syncope. Neuro following  - ABX as per ID for +blood cx 11/14 in anaerobic specimen Gram + Cocci in clusters  - All other workup per primary team  - Will continue to follow with you   - DC planning per primary team.     Melissa Neves, NP-C  Cardiology 77y M PMHx of atrial fibrillation (on coumadin), CVA (2012/16), dementia, HTN, multiple TIAs admitted for syncopal episode.  No evidence of acute ischemia, troponin I negative x 2 sets. Patient with no anginal symptoms. No evidence of any meaningful volume overload  EKG showed Atrial fibrillation at 80 bpm with no acute changes when compared to prior study.  No clear cardiac etiology of his syncope.    Recommend:    - for rate controlled af would cont tele   - continue with lopressor 25 mg q12hrs  - INR within therapeutic range. Dose coumadin with goal INR 2-3.    - monitor and replete lytes, keep K>4, Mg>2  Hypokalemic supplemented today.   - Other cardiovascular workup will depend on clinical course.  -  Neuro followup  - ABX as per ID for +blood cx 11/14 in anaerobic specimen Gram + Cocci in clusters  - All other workup per primary team  - Will continue to follow with you   - DC planning per primary team.     Melissa Neves NP-C  Cardiology

## 2018-11-16 NOTE — PROGRESS NOTE ADULT - PROBLEM SELECTOR PLAN 6
- stable, chronic, rate controlled  - cardiology Dr. Ray following - rec lopressor 25 Q12 hours with holding parameters   - INR 3.18 - will hold tonight's warfarin dosage
- stable, chronic, rate controlled  - cardiology Dr. Ray following - rec lopressor 25 Q12 hours with holding parameters   - INR 3.18 - will hold tonight's warfarin dosage

## 2018-11-16 NOTE — PROGRESS NOTE ADULT - PROBLEM SELECTOR PLAN 7
- adjusted lopressor to 25 mg BID with holding parameters
- adjusted lopressor to 25 mg BID with holding parameters

## 2018-11-16 NOTE — PROGRESS NOTE ADULT - PROBLEM SELECTOR PLAN 1
- continue Unasyn 3 grams Q6 hours  - s/p 1 gram IV vancomycin for 1/4 bottle positive BCx  - f/u urine sensitivities   - Bacid TID with meals  - unable to obtain symptoms from patient due to baseline dementia and nonverbal  - procalcitonin < 0.05  - ID Dr. Cole following   - Uro Dr. Quiñonez following

## 2018-11-16 NOTE — PROGRESS NOTE ADULT - PROBLEM SELECTOR PLAN 2
- suspect acute metabolic encephalopathy in setting of infection  - Cardio Dr. Grey following; unlikely cardiac but will continue to monitor on telemetry
-Patient has blank staring episodes lasting a few minutes, had episode this morning that lasted about 10 minutes associated with vomiting  -suspect acute metabolic encephalopathy in setting of infection.  -Patient had workup done at another hospital including 72-hour video EEG where he had one of the staring episodes and it was not attributed to seizure like activity  -Cardio saw patient, symptoms not likely due to cardiac abnormalities but will monitor on tele for now

## 2018-11-16 NOTE — PROGRESS NOTE ADULT - SUBJECTIVE AND OBJECTIVE BOX
JOSÉ MIGUEL COOPER is a 78yMale , patient examined and chart reviewed.     INTERVAL HPI/ OVERNIGHT EVENTS:   Feeling better today. More awake alert.  Wife at bedside. No events.    PAST MEDICAL & SURGICAL HISTORY:  Syncope  Parkinson disease  Constipation  Dementia  Urinary retention  Cerebrovascular accident  Hypertension  Atrial fibrillation  No significant past surgical history      For details regarding the patient's social history, family history, and other miscellaneous elements, please refer the initial infectious diseases consultation and/or the admitting history and physical examination for this admission.    ROS:  Unable to obtain due to : Dementia    Current inpatient medications :    ANTIBIOTICS/RELEVANT:  ampicillin/sulbactam  IVPB 3 Gram(s) IV Intermittent every 6 hours  lactobacillus acidophilus 1 Tablet(s) Oral three times a day with meals      acetaminophen   Tablet .. 650 milliGRAM(s) Oral every 6 hours PRN  carbidopa/levodopa  25/100 1 Tablet(s) Oral daily  carbidopa/levodopa  25/100 1.5 Tablet(s) Oral <User Schedule>  finasteride 5 milliGRAM(s) Oral daily  metoprolol tartrate 25 milliGRAM(s) Oral two times a day  sertraline 50 milliGRAM(s) Oral at bedtime  tamsulosin 0.4 milliGRAM(s) Oral at bedtime      Objective:  Vital Signs Last 24 Hrs  T(C): 36.4 (2018 12:22), Max: 36.6 (2018 04:30)  T(F): 97.5 (2018 12:22), Max: 97.9 (2018 04:30)  HR: 70 (2018 12:22) (61 - 81)  BP: 117/73 (2018 12:22) (117/73 - 158/96)  RR: 16 (2018 12:22) (16 - 16)  SpO2: 97% (2018 12:22) (95% - 98%)    Physical Exam:  General: in no acute distress  Eyes: sclera anicteric, pupils equal and reactive to light  ENMT: buccal mucosa moist, pharynx not injected  Neck: supple, trachea midline  Lungs: clear, no wheeze/rhonchi  Cardiovascular: regular rate and rhythm, S1 S2  Abdomen: soft, nontender, no organomegaly present, bowel sounds normal  Neurological: alert and oriented x1   Skin: no increased ecchymosis/petechiae/purpura  Lymph Nodes: no palpable cervical/supraclavicular lymph nodes enlargements  Extremities: no cyanosis/clubbing/edema      LABS:                        13.4   5.93  )-----------( 116      ( 2018 07:14 )             38.4   11-16    144  |  109<H>  |  15  ----------------------------<  78  3.3<L>   |  26  |  0.97    Ca    8.9      2018 07:14  Mg     2.2     16      Urinalysis Basic - ( 2018 12:40 )    Color: Yellow / Appearance: Turbid / S.025 / pH: x  Gluc: x / Ketone: Small  / Bili: Negative / Urobili: Negative   Blood: x / Protein: 75 mg/dL / Nitrite: Negative   Leuk Esterase: Moderate / RBC: 11-25 /HPF / WBC >50   Sq Epi: x / Non Sq Epi: Few / Bacteria: Many      MICROBIOLOGY:    Culture - Blood (collected 2018 22:10)  Source: .Blood Blood-Peripheral  Gram Stain (2018 04:22):    Growth in anaerobic bottle: Gram Positive Cocci in Clusters  Preliminary Report (2018 04:23):    Growth in anaerobic bottle: Gram Positive Cocci in Clusters    "Due to technical problems, Proteus sp. will Not be reported as part of    the BCID panel until further notice"    ***Blood Panel PCR results on this specimen are available    approximately 3 hours after the Gram stain result.***    Gram stain, PCR, and/or culture results may not always    correspond due to difference in methodologies.    ************************************************************    This PCR assay was performed using [employer] software.    The following targets are tested for: Enterococcus,    vancomycin resistant enterococci, Listeria monocytogenes,    coagulase negative staphylococci, S. aureus,    methicillin resistant S. aureus, Streptococcus agalactiae    (Group B), S. pneumoniae, S. pyogenes (Group A),    Acinetobacter baumannii, Enterobacter cloacae, E. coli,    Klebsiella oxytoca, K. pneumoniae, Proteus sp.,    Serratia marcescens, Haemophilus influenzae,    Neisseria meningitidis, Pseudomonas aeruginosa, Candida    albicans, C. glabrata, C krusei, C parapsilosis,    C. tropicalis and the KPC resistance gene.  Organism: Blood Culture PCR (2018 05:54)  Organism: Blood Culture PCR (2018 05:54)      -  Coagulase negative Staphylococcus: Detec      Method Type: PCR    Culture - Blood (collected 2018 22:10)  Source: .Blood Blood  Preliminary Report (15 Nov 2018 23:02):    No growth to date.    Culture - Urine (collected 2018 20:37)  Source: .Urine Catheterized  Preliminary Report (15 Nov 2018 20:38):    >100,000 CFU/ml Enterococcus faecalis    RADIOLOGY & ADDITIONAL STUDIES:    EXAM:  CT RENAL STONE HUNT                            PROCEDURE DATE:  2018          INTERPRETATION:  Exam Type:  CT RENAL STONE HUNT   Date and Time: 2018 2:34 PM  Indication: Hematuria  Compared to: CT abdomen 2014  Technique: CTof the ABDOMEN and PELVIS:  No intravenous contrast was   administered at physician request. This limits sensitivity for certain   processes. Oral contrast was not administered.    COMMENTS:    LOWER LUNGS AND PLEURA: Left atrial enlargement. Coronary vascular   calcification. Hiatal hernia.    LIVER: Multiple hepatic cysts unchanged.  BILE DUCTS: normal caliber.  GALLBLADDER: Gallstones.           PANCREAS: within normal limits.  SPLEEN: within normal limits.  ADRENALS: within normal limits.    KIDNEYS, URETERS AND BLADDER: Marked atrophy of the left kidney with   marked cortical scarring and parenchymal thinning involving the lateral   midpole. No hydronephrosis bilaterally. Multiple calculi within the left   renal pelvis extending into theanterior left lower pole renal calyces.   Mild left parapelvic infiltrative changes. Punctate nonobstructive right   intrarenal calculi. No definite renal masses bilaterally. No perinephric   collections. The ureters are unremarkable. The bladder appears within   normal limits.    PELVIS: Prostate is mildly enlarged with dense prostatic calcifications.   Subcentimeter pelvic lymph nodes. Minimal presacral stranding.No pelvic   lymphadenopathy.    BOWEL: The unopacified bowel is of normal course and caliber without   evidence of obstruction or bowel wall thickening. A normal appendix is   visualized. Calcifications along the pylorus unchanged dating back to   .    PERITONEUM: no ascites or free air, no fluid collection.  RETROPERITONEUM: within normal limits.      VESSELS: atherosclerotic changes.     IVC filter in place.  ABDOMINAL WALL: Small fat-containing umbilical hernia. Small   fat-containing right inguinal hernia.  BONES: Degenerative changes.    IMPRESSION:     Left renal pelvic calculi extending into the left lower pole calyces with   left peripelvic infiltrative changes without significant hydronephrosis.        Assessment :  78yo M PMH of Afib (on coumadin), CVA x 2, Parkinson's Dementia, HTN, BPH urinary retention,   nephrolithiasis and frequent UTIs brought to the hospital with CC of unresponsiveness with n/v  noted to have positive UA  Ucx with E faecalis  Bacteremia with CNs contaminant  CT AP noted    Plan :   On Unasyn for now  Can change to po Amoxicillin 500mg po q8 x 7 days  Asp precautions  Stable from ID standpoint      Continue with present regiment.  Appropriate use of antibiotics and adverse effects reviewed.      I have discussed the above plan of care with patient/ family in detail. They expressed understanding of the  treatment plan . Risks, benefits and alternatives discussed in detail. I have asked if they have any questions or concerns and appropriately addressed them to the best of my ability .    > 35 minutes were spent in direct patient care reviewing notes, medications ,labs data/ imaging , discussion with multidisciplinary team.    Thank you for allowing me to participate in care of your patient .    Sanjeev Cole MD  Infectious Disease  [phone]

## 2018-11-16 NOTE — PROGRESS NOTE ADULT - SUBJECTIVE AND OBJECTIVE BOX
Neurology Follow up note    JOSÉ MIGUEL [id number]    HPI:  76yo M PMH of Afib (on coumadin), CVA (2012, 2016, nearly non-verbal), Parkinson's disease, dementia (nearly non-verbal and very limited mobility), HTN, BPH w/ hx of urinary retention (now on flomax/proscar without gore), hx of unresponsive episodes and hx of frequent UTIs, BIBEMS for unresponsive episode. Patient is virtually nonverbal and wife at bedside provides history.  Wife stated that patient has had episodes of blank stares in the past for short period of time (worst >1yr ago when he had w/up in different hospital). Today, pt has another unresponsive episode lasting ~10 minutes and vomited up all of his breakfast at the same time. Emesis was non-bloody, largely food contents. Patient was at baseline this morning, had breakfast as per usual before the unresponsive episode occurred.  Wife called ambulance and patient was brought to the ED.  Wife and Dr. Nance states that patient had workup done at a different hospital including a 72-hour EEG where he had one of the staring episodes without epileptiform activity on EEG and was deemed not to be seizure related. Wife states pt has a chronic periodic cough but had somewhat more after the vomiting episode. Of note, pt eats nectar thick fluid, and solid food as has had aspiration with this liquids.  Patient is not able to relay much information, all information obtained from wife.  Patient had left sided residual weakness and imbalance after stroke in , and had dementia after stroke in 2016.  Patient is seen by PT at home 2 times a week for 1 hour, and has a nightly aide to help transfer to bed for 1 hour. Wife denies any recent fevers, diarrhea, constipation seen in the pt.      In the ED, T 98.3, HR 78, /76, RR 18, SpO2 95% on RA.  Labs significant for CBC wnl, PT/INR 28.9/2.47, CMP wnl, positive UA. Trop negative x1, lipase wnl.  Patient noted to have positive UCx from 10/23 growing >100K Enterococcals faecalis, treated with ceftin.  In the ED, patient received Unasyn x1, carbidopa/levodopa x2, zofran x1, and 1L NS bolus.  CXR showed no acute lung pathology.  CT stone fisher showed Left renal pelvic calculi extending into the left lower pole calyces with left peripelvic infiltrative changes without significant hydronephrosis.  CT head showed No acute or focal brain pathology. Stable brain appearance. Bilateral maxillary sinus disease. (2018 15:29)      Interval History - no new events.    Patient is seen, chart was reviewed and case was discussed with the treatment team.  Pt is not in any distress.   Lying on bed comfortably.   No events reported overnight.   No clinical seizure was reported.  Sitting on chair bed comfortably.    is at bedside.    Vital Signs Last 24 Hrs  T(C): 36.2 (2018 08:05), Max: 36.6 (15 Nov 2018 12:16)  T(F): 97.2 (2018 08:05), Max: 97.9 (15 Nov 2018 12:16)  HR: 71 (2018 08:05) (61 - 81)  BP: 146/83 (2018 08:05) (124/89 - 158/96)  BP(mean): --  RR: 16 (2018 08:05) (16 - 17)  SpO2: 98% (2018 08:05) (95% - 98%)            On Neurological Examination:    Mental Status - Pt is alert, awake, Follows some commands    Speech - dysarthria.    Cranial Nerves - Pupils 3 mm equal and reactive to light, extraocular eye movements intact.   Pt has no r facial asymmetry. Facial sensation is intact.Tongue - is in midline.    Muscle tone - cogwheel rigidity,    Motor Exam - 5/5 of UE .  LE 3-4/5.    Sensory Exam . Pt withdraws all extremities equally on stimulation. No asymmetry seen.      Deep tendon Reflexes - 2 plus all over.      Neck Supple -  Yes.     MEDICATIONS    acetaminophen   Tablet .. 650 milliGRAM(s) Oral every 6 hours PRN  ampicillin/sulbactam  IVPB 3 Gram(s) IV Intermittent every 6 hours  carbidopa/levodopa  25/100 1 Tablet(s) Oral daily  carbidopa/levodopa  25/100 1.5 Tablet(s) Oral <User Schedule>  finasteride 5 milliGRAM(s) Oral daily  metoprolol tartrate 25 milliGRAM(s) Oral two times a day  sertraline 50 milliGRAM(s) Oral at bedtime  tamsulosin 0.4 milliGRAM(s) Oral at bedtime      Allergies    No Known Allergies    Intolerances        LABS:  CBC Full  -  ( 15 Nov 2018 07:23 )  WBC Count : 8.04 K/uL  Hemoglobin : 13.5 g/dL  Hematocrit : 39.2 %  Platelet Count - Automated : 126 K/uL  Mean Cell Volume : 94.2 fl  Mean Cell Hemoglobin : 32.5 pg  Mean Cell Hemoglobin Concentration : 34.4 gm/dL  Auto Neutrophil # : 5.90 K/uL  Auto Lymphocyte # : 1.41 K/uL  Auto Monocyte # : 0.64 K/uL      Urinalysis Basic - ( 2018 12:40 )    Color: Yellow / Appearance: Turbid / S.025 / pH: x  Gluc: x / Ketone: Small  / Bili: Negative / Urobili: Negative   Blood: x / Protein: 75 mg/dL / Nitrite: Negative   Leuk Esterase: Moderate / RBC: 11-25 /HPF / WBC >50   Sq Epi: x / Non Sq Epi: Few / Bacteria: Many      11-15    144  |  110<H>  |  16  ----------------------------<  84  4.0   |  26  |  1.10    Ca    8.9      15 Nov 2018 07:23    TPro  8.0  /  Alb  3.3  /  TBili  0.5  /  DBili  x   /  AST  27  /  ALT  9<L>  /  AlkPhos  71      Hemoglobin A1C:     Vitamin B12     RADIOLOGY    ASSESSMENT AND PLAN:      PRESENTED WITH UNRESPONSIVENESS CW  SYNCOPE DOUBT TIA/SEIZURE.  UTI.  PD,  HX OF CVA,  DEMENTIA    ANTIBIOTIC AS PER ID.  CONTINUE SINEMET.  Physical therapy evaluation.  OOB to chair/ambulation with assistance only.  Advanced care planning was discussed with family.  Pain is accessed and addressed.  Plan of care was discussed with family. Questions answered.  Would continue to follow.

## 2018-11-16 NOTE — PROGRESS NOTE ADULT - NSHPATTENDINGPLANDISCUSS_GEN_ALL_CORE
team
spouse, pt, RN, ID attending, SW, CM, residency team
pt, Rn, spouse @ bedside, MOLST form completed (spoke w/ palliative care team), SW, CM, residency team

## 2018-11-16 NOTE — PROGRESS NOTE ADULT - PROBLEM SELECTOR PLAN 5
- s/p CVA in 2012 and 2016, residual left sided weakness and dementia  - patient on zetia 10mg QD at home, do not have at hospital, will tell patient's wife to bring from home  - f/u PT consult
- s/p CVA in 2012 and 2016, residual left sided weakness and dementia  - patient on zetia 10mg QD at home, do not have at hospital, will tell patient's wife to bring from home  - f/u PT consult

## 2018-11-16 NOTE — PROGRESS NOTE ADULT - ASSESSMENT
76yo M PMH of Afib (on coumadin), CVA (2012, 2016, nearly non-verbal), Parkinson's disease, dementia (nearly non-verbal and very limited mobility), HTN, BPH w/ hx of urinary retention (now on flomax/proscar without gore), hx of unresponsive episodes and hx of frequent UTIs, BIBEMS for unresponsive episode a/w acute encephalopathy suspected due to UTI; UCx positive for E. faecalis, 1/4 BCx bottles with gram positive cocci in clusters.

## 2018-11-17 VITALS
TEMPERATURE: 98 F | OXYGEN SATURATION: 96 % | HEART RATE: 69 BPM | SYSTOLIC BLOOD PRESSURE: 150 MMHG | RESPIRATION RATE: 19 BRPM | DIASTOLIC BLOOD PRESSURE: 94 MMHG

## 2018-11-17 LAB
ANION GAP SERPL CALC-SCNC: 9 MMOL/L — SIGNIFICANT CHANGE UP (ref 5–17)
BUN SERPL-MCNC: 13 MG/DL — SIGNIFICANT CHANGE UP (ref 7–23)
CALCIUM SERPL-MCNC: 9 MG/DL — SIGNIFICANT CHANGE UP (ref 8.5–10.1)
CHLORIDE SERPL-SCNC: 107 MMOL/L — SIGNIFICANT CHANGE UP (ref 96–108)
CO2 SERPL-SCNC: 27 MMOL/L — SIGNIFICANT CHANGE UP (ref 22–31)
CREAT SERPL-MCNC: 0.96 MG/DL — SIGNIFICANT CHANGE UP (ref 0.5–1.3)
CULTURE RESULTS: SIGNIFICANT CHANGE UP
GLUCOSE SERPL-MCNC: 80 MG/DL — SIGNIFICANT CHANGE UP (ref 70–99)
HCT VFR BLD CALC: 41 % — SIGNIFICANT CHANGE UP (ref 39–50)
HGB BLD-MCNC: 14.5 G/DL — SIGNIFICANT CHANGE UP (ref 13–17)
INR BLD: 2.05 RATIO — HIGH (ref 0.88–1.16)
MCHC RBC-ENTMCNC: 32.2 PG — SIGNIFICANT CHANGE UP (ref 27–34)
MCHC RBC-ENTMCNC: 35.4 GM/DL — SIGNIFICANT CHANGE UP (ref 32–36)
MCV RBC AUTO: 91.1 FL — SIGNIFICANT CHANGE UP (ref 80–100)
NRBC # BLD: 0 /100 WBCS — SIGNIFICANT CHANGE UP (ref 0–0)
ORGANISM # SPEC MICROSCOPIC CNT: SIGNIFICANT CHANGE UP
ORGANISM # SPEC MICROSCOPIC CNT: SIGNIFICANT CHANGE UP
PLATELET # BLD AUTO: 132 K/UL — LOW (ref 150–400)
POTASSIUM SERPL-MCNC: 3.4 MMOL/L — LOW (ref 3.5–5.3)
POTASSIUM SERPL-SCNC: 3.4 MMOL/L — LOW (ref 3.5–5.3)
PROTHROM AB SERPL-ACNC: 23.7 SEC — HIGH (ref 10–12.9)
RBC # BLD: 4.5 M/UL — SIGNIFICANT CHANGE UP (ref 4.2–5.8)
RBC # FLD: 13.1 % — SIGNIFICANT CHANGE UP (ref 10.3–14.5)
SODIUM SERPL-SCNC: 143 MMOL/L — SIGNIFICANT CHANGE UP (ref 135–145)
SPECIMEN SOURCE: SIGNIFICANT CHANGE UP
WBC # BLD: 5.59 K/UL — SIGNIFICANT CHANGE UP (ref 3.8–10.5)
WBC # FLD AUTO: 5.59 K/UL — SIGNIFICANT CHANGE UP (ref 3.8–10.5)

## 2018-11-17 PROCEDURE — 36415 COLL VENOUS BLD VENIPUNCTURE: CPT

## 2018-11-17 PROCEDURE — 87581 M.PNEUMON DNA AMP PROBE: CPT

## 2018-11-17 PROCEDURE — 97162 PT EVAL MOD COMPLEX 30 MIN: CPT

## 2018-11-17 PROCEDURE — 80053 COMPREHEN METABOLIC PANEL: CPT

## 2018-11-17 PROCEDURE — 87150 DNA/RNA AMPLIFIED PROBE: CPT

## 2018-11-17 PROCEDURE — 87798 DETECT AGENT NOS DNA AMP: CPT

## 2018-11-17 PROCEDURE — 81001 URINALYSIS AUTO W/SCOPE: CPT

## 2018-11-17 PROCEDURE — 84484 ASSAY OF TROPONIN QUANT: CPT

## 2018-11-17 PROCEDURE — 83735 ASSAY OF MAGNESIUM: CPT

## 2018-11-17 PROCEDURE — 96366 THER/PROPH/DIAG IV INF ADDON: CPT

## 2018-11-17 PROCEDURE — 85610 PROTHROMBIN TIME: CPT

## 2018-11-17 PROCEDURE — 97530 THERAPEUTIC ACTIVITIES: CPT

## 2018-11-17 PROCEDURE — 96365 THER/PROPH/DIAG IV INF INIT: CPT

## 2018-11-17 PROCEDURE — 99239 HOSP IP/OBS DSCHRG MGMT >30: CPT

## 2018-11-17 PROCEDURE — 80048 BASIC METABOLIC PNL TOTAL CA: CPT

## 2018-11-17 PROCEDURE — 71045 X-RAY EXAM CHEST 1 VIEW: CPT

## 2018-11-17 PROCEDURE — 99232 SBSQ HOSP IP/OBS MODERATE 35: CPT

## 2018-11-17 PROCEDURE — 87633 RESP VIRUS 12-25 TARGETS: CPT

## 2018-11-17 PROCEDURE — 87486 CHLMYD PNEUM DNA AMP PROBE: CPT

## 2018-11-17 PROCEDURE — 74176 CT ABD & PELVIS W/O CONTRAST: CPT

## 2018-11-17 PROCEDURE — 83605 ASSAY OF LACTIC ACID: CPT

## 2018-11-17 PROCEDURE — 87186 SC STD MICRODIL/AGAR DIL: CPT

## 2018-11-17 PROCEDURE — 87086 URINE CULTURE/COLONY COUNT: CPT

## 2018-11-17 PROCEDURE — 85027 COMPLETE CBC AUTOMATED: CPT

## 2018-11-17 PROCEDURE — 85730 THROMBOPLASTIN TIME PARTIAL: CPT

## 2018-11-17 PROCEDURE — 84145 PROCALCITONIN (PCT): CPT

## 2018-11-17 PROCEDURE — 87040 BLOOD CULTURE FOR BACTERIA: CPT

## 2018-11-17 PROCEDURE — 70450 CT HEAD/BRAIN W/O DYE: CPT

## 2018-11-17 PROCEDURE — 99285 EMERGENCY DEPT VISIT HI MDM: CPT | Mod: 25

## 2018-11-17 PROCEDURE — 83690 ASSAY OF LIPASE: CPT

## 2018-11-17 PROCEDURE — 93005 ELECTROCARDIOGRAM TRACING: CPT

## 2018-11-17 PROCEDURE — 97110 THERAPEUTIC EXERCISES: CPT

## 2018-11-17 PROCEDURE — 82550 ASSAY OF CK (CPK): CPT

## 2018-11-17 RX ORDER — ONDANSETRON 8 MG/1
4 TABLET, FILM COATED ORAL ONCE
Qty: 0 | Refills: 0 | Status: DISCONTINUED | OUTPATIENT
Start: 2018-11-17 | End: 2018-11-17

## 2018-11-17 RX ORDER — POTASSIUM CHLORIDE 20 MEQ
40 PACKET (EA) ORAL EVERY 4 HOURS
Qty: 0 | Refills: 0 | Status: DISCONTINUED | OUTPATIENT
Start: 2018-11-17 | End: 2018-11-17

## 2018-11-17 RX ADMIN — CARBIDOPA AND LEVODOPA 1.5 TABLET(S): 25; 100 TABLET ORAL at 08:07

## 2018-11-17 RX ADMIN — Medication 1 TABLET(S): at 08:07

## 2018-11-17 RX ADMIN — Medication 100 MILLIGRAM(S): at 11:14

## 2018-11-17 RX ADMIN — ONDANSETRON 4 MILLIGRAM(S): 8 TABLET, FILM COATED ORAL at 12:00

## 2018-11-17 RX ADMIN — Medication 25 MILLIGRAM(S): at 05:09

## 2018-11-17 RX ADMIN — Medication 1 TABLET(S): at 11:32

## 2018-11-17 RX ADMIN — Medication 500 MILLIGRAM(S): at 05:09

## 2018-11-17 RX ADMIN — CARBIDOPA AND LEVODOPA 1.5 TABLET(S): 25; 100 TABLET ORAL at 11:14

## 2018-11-17 RX ADMIN — FINASTERIDE 5 MILLIGRAM(S): 5 TABLET, FILM COATED ORAL at 11:14

## 2018-11-17 NOTE — PROGRESS NOTE ADULT - PROVIDER SPECIALTY LIST ADULT
Cardiology
Hospitalist
Infectious Disease
Infectious Disease
Neurology
Hospitalist

## 2018-11-17 NOTE — PROGRESS NOTE ADULT - ATTENDING COMMENTS
The patient was personally seen and examined, in addition to being examined and evaluated by NP.  All elements of the note were edited where appropriate.
The patient was personally seen and examined, in addition to being examined and evaluated by NP.  All elements of the note were edited where appropriate.
Chart reviewed    Patient seen and examined    Agree with plan as outlined above
I personally conducted a physical examination of the patient. I personally gathered the patient's history. I edited the above listed findings which were prepared by the listed resident physician. I personally discussed the plan of care with the patient. The questions and concerns were addressed to the best of my ability. The patient is in agreement with the listed treatment plan.     - sensitivities resulted later today. same as previous culture from 10/2018. complete course of abx. home care hasn't been arrnaged until Sunday evening but the pt's spouse wants to the take the pt home tomorrow 11/17/18.  - rx's sent to pharmacy
I personally conducted a physical examination of the patient. I personally gathered the patient's history. I edited the above listed findings which were prepared by the listed resident physician. I personally discussed the plan of care with the patient. The questions and concerns were addressed to the best of my ability. The patient is in agreement with the listed treatment plan.     A/P:   - d/c planning for tomorrow. f/u cultures.

## 2018-11-17 NOTE — PROGRESS NOTE ADULT - SUBJECTIVE AND OBJECTIVE BOX
Neurology follow up note  COVERING DR ROLY COOPER78yMale      Interval History:    Patient feels ok no new complaints seen with spouse     MEDICATIONS    acetaminophen   Tablet .. 650 milliGRAM(s) Oral every 6 hours PRN  amoxicillin 500 milliGRAM(s) Oral three times a day  carbidopa/levodopa  25/100 1 Tablet(s) Oral daily  carbidopa/levodopa  25/100 1.5 Tablet(s) Oral <User Schedule>  docusate sodium 100 milliGRAM(s) Oral daily  finasteride 5 milliGRAM(s) Oral daily  lactobacillus acidophilus 1 Tablet(s) Oral three times a day with meals  metoprolol tartrate 25 milliGRAM(s) Oral two times a day  ondansetron   Disintegrating Tablet 4 milliGRAM(s) Oral every 8 hours PRN  senna 2 Tablet(s) Oral at bedtime  sertraline 50 milliGRAM(s) Oral at bedtime  tamsulosin 0.4 milliGRAM(s) Oral at bedtime      Allergies    No Known Allergies    Intolerances            Vital Signs Last 24 Hrs  T(C): 36.6 (17 Nov 2018 07:26), Max: 36.7 (16 Nov 2018 15:50)  T(F): 97.8 (17 Nov 2018 07:26), Max: 98.1 (16 Nov 2018 15:50)  HR: 69 (17 Nov 2018 07:26) (69 - 77)  BP: 150/94 (17 Nov 2018 07:26) (117/73 - 165/96)  BP(mean): --  RR: 19 (17 Nov 2018 07:26) (16 - 19)  SpO2: 96% (17 Nov 2018 07:26) (96% - 99%)      REVIEW OF SYSTEMS:     Constitutional: No fever, chills, fatigue, weakness  Eyes: no eye pain, visual disturbances, or discharge  ENT:  No difficulty hearing, tinnitus, vertigo; No sinus or throat pain  Neck: No pain or stiffness  Respiratory: No cough, dyspnea, wheezing   Cardiovascular: No chest pain, palpitations,   Gastrointestinal: No abdominal or epigastric pain. No nausea, vomiting  No diarrhea or constipation.   Genitourinary: No dysuria, frequency, hematuria or incontinence  Neurological: No headaches, lightheadedness, vertigo, numbness or tremors  Psychiatric: No depression, anxiety, mood swings or difficulty sleeping  Musculoskeletal: No joint pain or swelling; No muscle, back or extremity pain  Skin: No itching, burning, rashes or lesions   Lymph Nodes: No enlarged glands  Endocrine: No heat or cold intolerance; No hair loss   Allergy and Immunologic: No hives or eczema    On Neurological Examination:    Mental Status - Patient is alert, awake,    Follow simple commands      Speech -   Fluent                    Cranial Nerves - Pupils 3 mm equal and reactive to light,   extraocular eye movements intact.   smile symmetric  intact bilateral NLF    Motor Exam -   Right upper 4/5  Left upper 4/5  Right lower 4-/5  Left lower 4-/5  Muscle tone - increased lower ext     Sensory    Bilateral intact to light touch    GENERAL Exam: Nontoxic , No Acute Distress   	  HEENT:  normocephalic, atraumatic  		  LUNGS: Clear bilaterally    	  HEART: Normal S1S2   No murmur RRR        	  GI/ ABDOMEN:  Soft  Non tender    EXTREMITIES:   No Edema  No Clubbing  No Cyanosis No Edema  	   SKIN: Normal  No Ecchymosis               LABS:  CBC Full  -  ( 17 Nov 2018 07:08 )  WBC Count : 5.59 K/uL  Hemoglobin : 14.5 g/dL  Hematocrit : 41.0 %  Platelet Count - Automated : 132 K/uL  Mean Cell Volume : 91.1 fl  Mean Cell Hemoglobin : 32.2 pg  Mean Cell Hemoglobin Concentration : 35.4 gm/dL  Auto Neutrophil # : x  Auto Lymphocyte # : x  Auto Monocyte # : x  Auto Eosinophil # : x  Auto Basophil # : x  Auto Neutrophil % : x  Auto Lymphocyte % : x  Auto Monocyte % : x  Auto Eosinophil % : x  Auto Basophil % : x      11-17    143  |  107  |  13  ----------------------------<  80  3.4<L>   |  27  |  0.96    Ca    9.0      17 Nov 2018 07:08  Mg     2.2     11-16      Hemoglobin A1C:       Vitamin B12   PT/INR - ( 17 Nov 2018 07:08 )   PT: 23.7 sec;   INR: 2.05 ratio               RADIOLOGY    Assessment and plan    For episode of loss of consciousness, suspect most likely this was syncopal.  tele eval   monitor sbp     Parkinson sinemet    dementia supportive treatment     spoke to family -- spouse at bedside     Physical therapy evaluation.  OOB to chair/ambulation with assistance only.    30 minutes spent on total encounter; more than 50% of the visit was spent counseling and/or coordinating care by the attending physician.

## 2018-11-17 NOTE — PROGRESS NOTE ADULT - SUBJECTIVE AND OBJECTIVE BOX
[facility] -- Rogelio Robb, Flor, Sandi, Rick Holloway Savella  Office # [phone]      Follow Up:  CVA, AF    Subjective/Observations: Seen and examined.  Slow to respond today.  RN at bedside feeding pt follows simple commands, awake and alert.  No new complaints or events overnight.  NAD.        REVIEW OF SYSTEMS: All other review of systems is negative unless indicated above    PAST MEDICAL & SURGICAL HISTORY:  Syncope  Parkinson disease  Constipation  Dementia  Urinary retention  Cerebrovascular accident  Hypertension  Atrial fibrillation  No significant past surgical history      MEDICATIONS  (STANDING):  amoxicillin 500 milliGRAM(s) Oral three times a day  carbidopa/levodopa  25/100 1 Tablet(s) Oral daily  carbidopa/levodopa  25/100 1.5 Tablet(s) Oral <User Schedule>  docusate sodium 100 milliGRAM(s) Oral daily  finasteride 5 milliGRAM(s) Oral daily  lactobacillus acidophilus 1 Tablet(s) Oral three times a day with meals  metoprolol tartrate 25 milliGRAM(s) Oral two times a day  potassium chloride    Tablet ER 40 milliEquivalent(s) Oral every 4 hours  senna 2 Tablet(s) Oral at bedtime  sertraline 50 milliGRAM(s) Oral at bedtime  tamsulosin 0.4 milliGRAM(s) Oral at bedtime    MEDICATIONS  (PRN):  acetaminophen   Tablet .. 650 milliGRAM(s) Oral every 6 hours PRN Temp greater or equal to 38C (100.4F), Moderate Pain (4 - 6)  ondansetron   Disintegrating Tablet 4 milliGRAM(s) Oral every 8 hours PRN Nausea and/or Vomiting      Allergies    No Known Allergies    Intolerances            Vital Signs Last 24 Hrs  T(C): 36.6 (17 Nov 2018 07:26), Max: 36.7 (16 Nov 2018 15:50)  T(F): 97.8 (17 Nov 2018 07:26), Max: 98.1 (16 Nov 2018 15:50)  HR: 69 (17 Nov 2018 07:26) (69 - 77)  BP: 150/94 (17 Nov 2018 07:26) (117/73 - 165/96)  BP(mean): --  RR: 19 (17 Nov 2018 07:26) (16 - 19)  SpO2: 96% (17 Nov 2018 07:26) (96% - 99%)    I&O's Summary    16 Nov 2018 07:01  -  17 Nov 2018 07:00  --------------------------------------------------------  IN: 200 mL / OUT: 520 mL / NET: -320 mL    17 Nov 2018 07:01  -  17 Nov 2018 10:37  --------------------------------------------------------  IN: 120 mL / OUT: 0 mL / NET: 120 mL          PHYSICAL EXAM:  TELE: AF 60-70s low 43  Constitutional: NAD, awake and alert, well-developed  HEENT: Moist Mucous Membranes, Anicteric  Pulmonary: Non-labored, breath sounds are clear anteriorly  Cardiovascular: Irregular, S1 and S2, No murmurs, rubs, gallops or clicks  Gastrointestinal: Bowel Sounds present, soft, nontender.   Lymph: No peripheral edema. No lymphadenopathy.  Skin: No visible rashes or ulcers.  Psych:  Mood & affect flat    LABS: All Labs Reviewed:                        14.5   5.59  )-----------( 132      ( 17 Nov 2018 07:08 )             41.0                         13.4   5.93  )-----------( 116      ( 16 Nov 2018 07:14 )             38.4                         13.5   8.04  )-----------( 126      ( 15 Nov 2018 07:23 )             39.2     17 Nov 2018 07:08    143    |  107    |  13     ----------------------------<  80     3.4     |  27     |  0.96   16 Nov 2018 07:14    144    |  109    |  15     ----------------------------<  78     3.3     |  26     |  0.97   15 Nov 2018 07:23    144    |  110    |  16     ----------------------------<  84     4.0     |  26     |  1.10     Ca    9.0        17 Nov 2018 07:08  Ca    8.9        16 Nov 2018 07:14  Ca    8.9        15 Nov 2018 07:23  Mg     2.2       16 Nov 2018 07:14    TPro  8.0    /  Alb  3.3    /  TBili  0.5    /  DBili  x      /  AST  27     /  ALT  9      /  AlkPhos  71     14 Nov 2018 11:51    PT/INR - ( 17 Nov 2018 07:08 )   PT: 23.7 sec;   INR: 2.05 ratio      < from: 12 Lead ECG (11.14.18 @ 13:35) >  Ventricular Rate 85 BPM    Atrial Rate 65 BPM    QRS Duration 76 ms    Q-T Interval 360 ms    QTC Calculation(Bezet) 428 ms    R Axis -12 degrees    T Axis 60 degrees    Diagnosis Line Atrial fibrillation  Abnormal ECG    Confirmed by Rohan Garcia ([id number]) on 11/15/2018 11:47:57 AM    < end of copied text >    < from: TTE Echo Doppler w/o Cont (11.30.17 @ 15:14) >   EXAM:  ECHO TTE W/O CON COMP W/DOPPLR         PROCEDURE DATE:  11/30/2017        INTERPRETATION:  INDICATION: Syncope    Blood Pressure 152/100    Height 172.7     Weight 70.7       BSA 1.84    Dimensions:    LA 3.6       Normal Values: 2.0 - 4.0 cm    Ao 3.2        Normal Values: 2.0 - 3.8 cm  SEPTUM 1.2       Normal Values: 0.6 - 1.2 cm  PWT 1.1       Normal Values: 0.6 - 1.1 cm  LVIDd 4.8         Normal Values: 3.0 - 5.6 cm  LVIDs 3.5         Normal Values: 1.8 - 4.0 cm    Derived Variables:  LVMI     g/m2  RWT      Fractional Short      Ejection Fraction 65    Doppler Peak v. AoV=   (m/sec)    OBSERVATIONS:    Mitral Valve: normal, 2+ MR.  Aortic Valve/Aorta: Calcified trileaflet aortic valve.  Tricuspid Valve: normal with trace TR.  Pulmonic Valve: normal  Left Atrium: normal  Right Atrium: normal  Left Ventricle: Borderline concentric LVH with normal systolic function,   estimated LVEF of 65%.  Right Ventricle: normal size and systolic function.  Pericardium/Pleura: no significant pleural effusion, no significant   pericardial effusion.  Pulmonary/RV Pressure: estimated PA systolic pressure of 28 mmHg assuming   an RA pressure of 10 mmHg.  LV Diastolic Function: normal     Technically limited study. Borderline concentric LVH with normal systolic   function, estimated LVEF of 65%. Normal right ventricular size and   systolic function. Diastolic function is normal.. Normal biatrial size.   The aortic root is normal in size. The mitral valve is structurally   normal, 2+ MR. The aortic valve is ossified without stenosis or   insufficiency. Trace physiologic TR. Estimated PA systolic pressure is 28   mm Hg, assuming an RA pressure of 10 mmHg. No significant pericardial   effusion.                  NIK ORTIZ M.D., ATTENDING CARDIOLOGIST  This document has been electronically signed. Dec  1 2017  6:39PM        < end of copied text >       < from: CT Head No Cont (11.14.18 @ 13:03) >  EXAM:  CT BRAIN                            PROCEDURE DATE:  11/14/2018          INTERPRETATION:  History: Altered mental status.    Noncontrast head CT. Prior 11/20/2017.  Advanced cerebral parenchymal volume loss and chronic ischemic white   matter changes similar to prior. Multifocal chronic lacunar infarcts in   the bilateral thalamus and basal ganglia similar to prior. No acute   infarct or hemorrhage. No extra-axial collection or mass effect. Fluid   levels bilateral maxillary sinus. Correlate for acute sinusitis.   Calvarium is intact. Impacted cerumen bilateral external auditory canal.    Impression:    No acute or focal brain pathology. Stable brain appearance. Bilateral   maxillary sinus disease.                COY BARRIOS M.D., ATTENDING RADIOLOGIST  This document has been electronically signed. Nov 14 2018  1:31PM                < end of copied text > 3-4 drinks

## 2018-11-17 NOTE — PROGRESS NOTE ADULT - REASON FOR ADMISSION
episode of unresponsiveness, vomiting

## 2018-11-17 NOTE — PROGRESS NOTE ADULT - ASSESSMENT
77y M PMHx of atrial fibrillation (on coumadin), CVA (2012/16), dementia, HTN, multiple TIAs admitted for syncopal episode.  No evidence of acute ischemia, troponin I negative x 2 sets. Patient with no anginal symptoms. No evidence of any meaningful volume overload  EKG showed Atrial fibrillation at 80 bpm with no acute changes when compared to prior study.  No clear cardiac etiology of his syncope.    Recommend:    - for rate controlled af would cont tele   - continue with lopressor 25 mg q12hrs  - BP labile would check orthostatics.    - INR within therapeutic range. Dose coumadin with goal INR 2-3.    - monitor and replete lytes, keep K>4, Mg>2  Hypokalemic supplemented today.   - Other cardiovascular workup will depend on clinical course.  -  Neuro followup  - ABX as per ID for UTI  - All other workup per primary team  - Will continue to follow with you   - DC planning per primary team.     Melissa Neves, NP-C  Cardiology 77y M PMHx of atrial fibrillation (on coumadin), CVA (2012/16), dementia, HTN, multiple TIAs admitted for syncopal episode.  No evidence of acute ischemia, troponin I negative x 2 sets. Patient with no anginal symptoms. No evidence of any meaningful volume overload  EKG showed Atrial fibrillation at 80 bpm with no acute changes when compared to prior study.  No clear cardiac etiology of his syncope.    Recommend:    - for rate controlled af would cont tele   - continue with lopressor 25 mg q12hrs  - BP labile would check orthostatics.    - INR within therapeutic range. Dose coumadin with goal INR 2-3.    - monitor and replete lytes, keep K>4, Mg>2  Hypokalemic, supplemented today.   - Other cardiovascular workup will depend on clinical course.  -  Neuro followup  - ABX as per ID for UTI  - All other workup and dc planning per primary team  - Will continue to follow with you

## 2018-11-19 LAB
CULTURE RESULTS: SIGNIFICANT CHANGE UP
SPECIMEN SOURCE: SIGNIFICANT CHANGE UP

## 2018-11-30 ENCOUNTER — APPOINTMENT (OUTPATIENT)
Dept: HOME HEALTH SERVICES | Facility: HOME HEALTH | Age: 78
End: 2018-11-30
Payer: MEDICARE

## 2018-11-30 VITALS
HEART RATE: 76 BPM | OXYGEN SATURATION: 98 % | BODY MASS INDEX: 24.25 KG/M2 | DIASTOLIC BLOOD PRESSURE: 80 MMHG | WEIGHT: 160 LBS | HEIGHT: 68 IN | SYSTOLIC BLOOD PRESSURE: 128 MMHG | RESPIRATION RATE: 14 BRPM | TEMPERATURE: 97.8 F

## 2018-11-30 DIAGNOSIS — Z74.09 OTHER REDUCED MOBILITY: ICD-10-CM

## 2018-11-30 DIAGNOSIS — Z82.49 FAMILY HISTORY OF ISCHEMIC HEART DISEASE AND OTHER DISEASES OF THE CIRCULATORY SYSTEM: ICD-10-CM

## 2018-11-30 DIAGNOSIS — I25.10 ATHEROSCLEROTIC HEART DISEASE OF NATIVE CORONARY ARTERY W/OUT ANGINA PECTORIS: ICD-10-CM

## 2018-11-30 DIAGNOSIS — F41.1 GENERALIZED ANXIETY DISORDER: ICD-10-CM

## 2018-11-30 DIAGNOSIS — N20.0 CALCULUS OF KIDNEY: ICD-10-CM

## 2018-11-30 DIAGNOSIS — Z86.718 PERSONAL HISTORY OF OTHER VENOUS THROMBOSIS AND EMBOLISM: ICD-10-CM

## 2018-11-30 DIAGNOSIS — T75.3XXA MOTION SICKNESS, INITIAL ENCOUNTER: ICD-10-CM

## 2018-11-30 PROCEDURE — G0506: CPT

## 2018-11-30 PROCEDURE — G0438: CPT

## 2018-11-30 PROCEDURE — 99497 ADVNCD CARE PLAN 30 MIN: CPT | Mod: 33

## 2018-11-30 PROCEDURE — 99345 HOME/RES VST NEW HIGH MDM 75: CPT | Mod: 25

## 2018-11-30 RX ORDER — CEFPODOXIME PROXETIL 100 MG/1
100 TABLET, FILM COATED ORAL
Qty: 10 | Refills: 0 | Status: DISCONTINUED | COMMUNITY
Start: 2018-10-23

## 2018-11-30 RX ORDER — ONDANSETRON 4 MG/1
4 TABLET, ORALLY DISINTEGRATING ORAL
Qty: 30 | Refills: 0 | Status: DISCONTINUED | COMMUNITY
Start: 2018-11-16

## 2018-11-30 RX ORDER — METOPROLOL TARTRATE 50 MG/1
50 TABLET, FILM COATED ORAL
Qty: 180 | Refills: 0 | Status: DISCONTINUED | COMMUNITY
Start: 2018-07-31

## 2018-11-30 RX ORDER — TRIAMTERENE AND HYDROCHLOROTHIAZIDE 37.5; 25 MG/1; MG/1
37.5-25 CAPSULE ORAL
Qty: 30 | Refills: 0 | Status: DISCONTINUED | COMMUNITY
Start: 2018-08-08

## 2018-11-30 RX ORDER — KETOCONAZOLE 20 MG/G
2 CREAM TOPICAL
Qty: 60 | Refills: 0 | Status: DISCONTINUED | COMMUNITY
Start: 2018-10-25

## 2018-11-30 RX ORDER — AMOXICILLIN 500 MG/1
500 CAPSULE ORAL
Qty: 21 | Refills: 0 | Status: DISCONTINUED | COMMUNITY
Start: 2018-11-16

## 2018-12-01 PROBLEM — I25.10 CORONARY ARTERY CALCIFICATION SEEN ON CAT SCAN: Status: ACTIVE | Noted: 2018-11-30

## 2018-12-01 PROBLEM — N20.0 RENAL LITHIASIS: Status: ACTIVE | Noted: 2018-11-30

## 2018-12-01 PROBLEM — Z86.718 HISTORY OF DEEP VEIN THROMBOSIS (DVT) OF LOWER EXTREMITY: Status: RESOLVED | Noted: 2018-12-01 | Resolved: 2018-12-01

## 2018-12-01 PROBLEM — F41.1 GENERALIZED ANXIETY DISORDER: Status: ACTIVE | Noted: 2018-11-30

## 2018-12-06 ENCOUNTER — EMERGENCY (EMERGENCY)
Facility: HOSPITAL | Age: 78
LOS: 1 days | Discharge: HOME | End: 2018-12-06
Attending: EMERGENCY MEDICINE | Admitting: EMERGENCY MEDICINE
Payer: MEDICARE

## 2018-12-06 VITALS
DIASTOLIC BLOOD PRESSURE: 100 MMHG | HEART RATE: 85 BPM | SYSTOLIC BLOOD PRESSURE: 152 MMHG | TEMPERATURE: 98 F | RESPIRATION RATE: 15 BRPM | OXYGEN SATURATION: 96 %

## 2018-12-06 VITALS
HEART RATE: 84 BPM | HEIGHT: 68 IN | TEMPERATURE: 98 F | RESPIRATION RATE: 20 BRPM | DIASTOLIC BLOOD PRESSURE: 95 MMHG | WEIGHT: 160.06 LBS | OXYGEN SATURATION: 98 % | SYSTOLIC BLOOD PRESSURE: 154 MMHG

## 2018-12-06 LAB
ANION GAP SERPL CALC-SCNC: 7 MMOL/L — SIGNIFICANT CHANGE UP (ref 5–17)
APPEARANCE UR: ABNORMAL
BILIRUB UR-MCNC: NEGATIVE — SIGNIFICANT CHANGE UP
BUN SERPL-MCNC: 20 MG/DL — SIGNIFICANT CHANGE UP (ref 7–23)
CALCIUM SERPL-MCNC: 9.3 MG/DL — SIGNIFICANT CHANGE UP (ref 8.5–10.1)
CHLORIDE SERPL-SCNC: 109 MMOL/L — HIGH (ref 96–108)
CO2 SERPL-SCNC: 27 MMOL/L — SIGNIFICANT CHANGE UP (ref 22–31)
COLOR SPEC: YELLOW — SIGNIFICANT CHANGE UP
CREAT SERPL-MCNC: 1 MG/DL — SIGNIFICANT CHANGE UP (ref 0.5–1.3)
DIFF PNL FLD: ABNORMAL
GLUCOSE SERPL-MCNC: 94 MG/DL — SIGNIFICANT CHANGE UP (ref 70–99)
GLUCOSE UR QL: NEGATIVE — SIGNIFICANT CHANGE UP
HCT VFR BLD CALC: 44.2 % — SIGNIFICANT CHANGE UP (ref 39–50)
HGB BLD-MCNC: 15.1 G/DL — SIGNIFICANT CHANGE UP (ref 13–17)
KETONES UR-MCNC: NEGATIVE — SIGNIFICANT CHANGE UP
LEUKOCYTE ESTERASE UR-ACNC: ABNORMAL
MCHC RBC-ENTMCNC: 32.1 PG — SIGNIFICANT CHANGE UP (ref 27–34)
MCHC RBC-ENTMCNC: 34.2 GM/DL — SIGNIFICANT CHANGE UP (ref 32–36)
MCV RBC AUTO: 93.8 FL — SIGNIFICANT CHANGE UP (ref 80–100)
NITRITE UR-MCNC: NEGATIVE — SIGNIFICANT CHANGE UP
NRBC # BLD: 0 /100 WBCS — SIGNIFICANT CHANGE UP (ref 0–0)
PH UR: 7 — SIGNIFICANT CHANGE UP (ref 5–8)
PLATELET # BLD AUTO: 131 K/UL — LOW (ref 150–400)
POTASSIUM SERPL-MCNC: 4.3 MMOL/L — SIGNIFICANT CHANGE UP (ref 3.5–5.3)
POTASSIUM SERPL-SCNC: 4.3 MMOL/L — SIGNIFICANT CHANGE UP (ref 3.5–5.3)
PROT UR-MCNC: 25 MG/DL
RBC # BLD: 4.71 M/UL — SIGNIFICANT CHANGE UP (ref 4.2–5.8)
RBC # FLD: 13.3 % — SIGNIFICANT CHANGE UP (ref 10.3–14.5)
SODIUM SERPL-SCNC: 143 MMOL/L — SIGNIFICANT CHANGE UP (ref 135–145)
SP GR SPEC: 1.01 — SIGNIFICANT CHANGE UP (ref 1.01–1.02)
UROBILINOGEN FLD QL: NEGATIVE — SIGNIFICANT CHANGE UP
WBC # BLD: 6.91 K/UL — SIGNIFICANT CHANGE UP (ref 3.8–10.5)
WBC # FLD AUTO: 6.91 K/UL — SIGNIFICANT CHANGE UP (ref 3.8–10.5)

## 2018-12-06 PROCEDURE — 36415 COLL VENOUS BLD VENIPUNCTURE: CPT

## 2018-12-06 PROCEDURE — 93005 ELECTROCARDIOGRAM TRACING: CPT

## 2018-12-06 PROCEDURE — 85027 COMPLETE CBC AUTOMATED: CPT

## 2018-12-06 PROCEDURE — 87086 URINE CULTURE/COLONY COUNT: CPT

## 2018-12-06 PROCEDURE — 80048 BASIC METABOLIC PNL TOTAL CA: CPT

## 2018-12-06 PROCEDURE — 99283 EMERGENCY DEPT VISIT LOW MDM: CPT

## 2018-12-06 PROCEDURE — 99283 EMERGENCY DEPT VISIT LOW MDM: CPT | Mod: 25

## 2018-12-06 PROCEDURE — 81001 URINALYSIS AUTO W/SCOPE: CPT

## 2018-12-06 PROCEDURE — 87186 SC STD MICRODIL/AGAR DIL: CPT

## 2018-12-06 RX ORDER — SODIUM CHLORIDE 9 MG/ML
1000 INJECTION INTRAMUSCULAR; INTRAVENOUS; SUBCUTANEOUS ONCE
Qty: 0 | Refills: 0 | Status: DISCONTINUED | OUTPATIENT
Start: 2018-12-06 | End: 2018-12-06

## 2018-12-06 RX ORDER — CARBIDOPA AND LEVODOPA 25; 100 MG/1; MG/1
1 TABLET ORAL
Qty: 0 | Refills: 0 | COMMUNITY

## 2018-12-06 NOTE — ED ADULT NURSE NOTE - CHPI ED NUR SYMPTOMS NEG
no fever/no nausea/no pain/no vomiting/no tingling/no weakness/no chills/no dizziness/no decreased eating/drinking

## 2018-12-06 NOTE — ED ADULT NURSE NOTE - OBJECTIVE STATEMENT
Pt to ED via EMS, per wife, she noted him with decreased responsiveness and pale color. Episode lasted x 45 minutes, pt is now at baseline, color normal, pt smiles and responds. No c/o pain, no abdominal tenderness

## 2018-12-06 NOTE — ED ADULT TRIAGE NOTE - CHIEF COMPLAINT QUOTE
Patient sent by wife for pale, abd pain n/v. Denies fever or diarrhea. Patient recently pt d/c w/ a UTI. 20g left hand 4mg of Zofran and 250mL bolus of NS.

## 2018-12-06 NOTE — ED PROVIDER NOTE - OBJECTIVE STATEMENT
79 yo white male with H/O A. Fib, CVA, HTN, Dementia, Parkinson's Dispase and Syncope who was a his baseline condition until this evening when suddenly he developed a blank stare and seemed altered. No seizure activity was witnessed. Entire episode lasted for approximately 20-30 minutes and patient returned back to his baseline condition. No chest pain, abdominal pains, SOB, vomiting or diarrhea occurred.

## 2018-12-06 NOTE — ED PROVIDER NOTE - CONSTITUTIONAL, MLM
normal... Chronically ill appearing white male with flat affect and facies, well nourished, awake, in no apparent distress.

## 2018-12-07 ENCOUNTER — APPOINTMENT (OUTPATIENT)
Age: 78
End: 2018-12-07

## 2018-12-07 VITALS
HEART RATE: 70 BPM | SYSTOLIC BLOOD PRESSURE: 140 MMHG | OXYGEN SATURATION: 94 % | TEMPERATURE: 97.4 F | RESPIRATION RATE: 14 BRPM | DIASTOLIC BLOOD PRESSURE: 80 MMHG

## 2018-12-10 ENCOUNTER — LABORATORY RESULT (OUTPATIENT)
Age: 78
End: 2018-12-10

## 2018-12-11 ENCOUNTER — APPOINTMENT (OUTPATIENT)
Dept: HOME HEALTH SERVICES | Facility: HOME HEALTH | Age: 78
End: 2018-12-11

## 2018-12-12 PROBLEM — Z74.09 LIMITED MOBILITY: Status: ACTIVE | Noted: 2018-12-12

## 2018-12-13 ENCOUNTER — APPOINTMENT (OUTPATIENT)
Age: 78
End: 2018-12-13

## 2018-12-13 VITALS
SYSTOLIC BLOOD PRESSURE: 126 MMHG | RESPIRATION RATE: 14 BRPM | OXYGEN SATURATION: 94 % | HEART RATE: 74 BPM | TEMPERATURE: 97.4 F | DIASTOLIC BLOOD PRESSURE: 70 MMHG

## 2018-12-18 ENCOUNTER — APPOINTMENT (OUTPATIENT)
Dept: HOME HEALTH SERVICES | Facility: HOME HEALTH | Age: 78
End: 2018-12-18

## 2018-12-18 LAB
APPEARANCE: CLEAR
BACTERIA UR CULT: ABNORMAL
BACTERIA: ABNORMAL
BILIRUBIN URINE: NEGATIVE
BLOOD URINE: ABNORMAL
COLOR: YELLOW
GLUCOSE QUALITATIVE U: NEGATIVE MG/DL
HYALINE CASTS: 3 /LPF
INR PPP: 2.94 RATIO
KETONES URINE: NEGATIVE
LEUKOCYTE ESTERASE URINE: ABNORMAL
MICROSCOPIC-UA: NORMAL
NITRITE URINE: POSITIVE
PH URINE: 6
PROTEIN URINE: 30 MG/DL
PT BLD: 34.6 SEC
RED BLOOD CELLS URINE: 39 /HPF
SPECIFIC GRAVITY URINE: 1.02
SQUAMOUS EPITHELIAL CELLS: 0 /HPF
UROBILINOGEN URINE: NEGATIVE MG/DL
WHITE BLOOD CELLS URINE: 81 /HPF

## 2019-01-02 ENCOUNTER — APPOINTMENT (OUTPATIENT)
Dept: HOME HEALTH SERVICES | Facility: HOME HEALTH | Age: 79
End: 2019-01-02

## 2019-01-02 VITALS
DIASTOLIC BLOOD PRESSURE: 64 MMHG | RESPIRATION RATE: 16 BRPM | OXYGEN SATURATION: 96 % | SYSTOLIC BLOOD PRESSURE: 122 MMHG | HEART RATE: 78 BPM | TEMPERATURE: 97.6 F

## 2019-01-02 LAB
INR PPP: 2.03 RATIO
PT BLD: 23.2 SEC

## 2019-01-03 ENCOUNTER — LABORATORY RESULT (OUTPATIENT)
Age: 79
End: 2019-01-03

## 2019-01-04 LAB
ALBUMIN SERPL ELPH-MCNC: 3.8 G/DL
ALP BLD-CCNC: 66 U/L
ALT SERPL-CCNC: 12 U/L
ANION GAP SERPL CALC-SCNC: 10 MMOL/L
APPEARANCE: ABNORMAL
AST SERPL-CCNC: 5 U/L
BILIRUB SERPL-MCNC: 0.4 MG/DL
BILIRUBIN URINE: NEGATIVE
BLOOD URINE: ABNORMAL
BUN SERPL-MCNC: 25 MG/DL
CALCIUM SERPL-MCNC: 10 MG/DL
CHLORIDE SERPL-SCNC: 112 MMOL/L
CO2 SERPL-SCNC: 28 MMOL/L
COLOR: ABNORMAL
CREAT SERPL-MCNC: 0.95 MG/DL
GLUCOSE QUALITATIVE U: NEGATIVE MG/DL
GLUCOSE SERPL-MCNC: 105 MG/DL
KETONES URINE: NEGATIVE
LEUKOCYTE ESTERASE URINE: ABNORMAL
NITRITE URINE: NEGATIVE
PH URINE: 5
POTASSIUM SERPL-SCNC: 4.3 MMOL/L
PROT SERPL-MCNC: 6.8 G/DL
PROTEIN URINE: 100 MG/DL
SODIUM SERPL-SCNC: 150 MMOL/L
SPECIFIC GRAVITY URINE: 1.03
UROBILINOGEN URINE: NEGATIVE MG/DL

## 2019-01-07 ENCOUNTER — MEDICATION RENEWAL (OUTPATIENT)
Age: 79
End: 2019-01-07

## 2019-01-07 ENCOUNTER — LABORATORY RESULT (OUTPATIENT)
Age: 79
End: 2019-01-07

## 2019-01-08 ENCOUNTER — CLINICAL ADVICE (OUTPATIENT)
Age: 79
End: 2019-01-08

## 2019-01-16 LAB
INR PPP: 2.14 RATIO
PT BLD: 25 SEC

## 2019-01-18 ENCOUNTER — OTHER (OUTPATIENT)
Age: 79
End: 2019-01-18

## 2019-01-22 ENCOUNTER — OTHER (OUTPATIENT)
Age: 79
End: 2019-01-22

## 2019-01-22 ENCOUNTER — MOBILE ON CALL (OUTPATIENT)
Age: 79
End: 2019-01-22

## 2019-01-22 LAB
INR PPP: 2.49 RATIO
PT BLD: 28.7 SEC

## 2019-01-25 ENCOUNTER — APPOINTMENT (OUTPATIENT)
Dept: HOME HEALTH SERVICES | Facility: HOME HEALTH | Age: 79
End: 2019-01-25
Payer: MEDICARE

## 2019-01-25 VITALS
HEART RATE: 80 BPM | SYSTOLIC BLOOD PRESSURE: 132 MMHG | RESPIRATION RATE: 16 BRPM | DIASTOLIC BLOOD PRESSURE: 80 MMHG | TEMPERATURE: 98.1 F

## 2019-01-25 DIAGNOSIS — N13.8 BENIGN PROSTATIC HYPERPLASIA WITH LOWER URINARY TRACT SYMPMS: ICD-10-CM

## 2019-01-25 DIAGNOSIS — J32.0 CHRONIC MAXILLARY SINUSITIS: ICD-10-CM

## 2019-01-25 DIAGNOSIS — R21 RASH AND OTHER NONSPECIFIC SKIN ERUPTION: ICD-10-CM

## 2019-01-25 DIAGNOSIS — I77.810 THORACIC AORTIC ECTASIA: ICD-10-CM

## 2019-01-25 DIAGNOSIS — D69.6 THROMBOCYTOPENIA, UNSPECIFIED: ICD-10-CM

## 2019-01-25 DIAGNOSIS — N40.1 BENIGN PROSTATIC HYPERPLASIA WITH LOWER URINARY TRACT SYMPMS: ICD-10-CM

## 2019-01-25 DIAGNOSIS — K59.09 OTHER CONSTIPATION: ICD-10-CM

## 2019-01-25 DIAGNOSIS — I69.319 UNSPECIFIED SYMPTOMS AND SIGNS INVOLVING COGNITIVE FUNCTIONS FOLLOWING CEREBRAL INFARCTION: ICD-10-CM

## 2019-01-25 DIAGNOSIS — E78.5 HYPERLIPIDEMIA, UNSPECIFIED: ICD-10-CM

## 2019-01-25 PROCEDURE — 99350 HOME/RES VST EST HIGH MDM 60: CPT

## 2019-01-25 RX ORDER — CEFUROXIME AXETIL 500 MG/1
500 TABLET ORAL
Qty: 20 | Refills: 0 | Status: DISCONTINUED | COMMUNITY
Start: 2018-12-18 | End: 2019-01-25

## 2019-01-25 NOTE — ASSESSMENT
[FreeTextEntry1] : Pt has dementia with limited mobilty- requires Gel Flotation Overlay Mattress and gel wheelchair cushion as he cannot  independently make changes in body position significant enough to alleviate pressure with fecal and urinary incontinence and altered sensory perception\par \par

## 2019-01-25 NOTE — REASON FOR VISIT
[Follow-Up] : a follow-up visit [Spouse] : spouse [Pre-Visit Preparation] : pre-visit preparation was done [Intercurrent Specialty/Sub-specialty Visits] : the patient has intercurrent specialty/sub-specialty visits [FreeTextEntry1] : for chronic conditions [FreeTextEntry3] : Urology, Neurology

## 2019-01-25 NOTE — CHRONIC CARE ASSESSMENT
[PPS Score: ____] : Palliative Performance Scale (PPS) Score: [unfilled] [Oriented To Person] : ~L oriented to person [Oriented To Place] : ~L oriented to place [Reviewed Mini-Cog Outcomes from Comprehensive Assessment] : Reviewed Mini-Cog outcomes from comprehensive assessment [Reviewed depression screen from comprehensive assessment] : Reviewed depression screen from comprehensive assessment [ADL Assessment Completed] : ADL assessment completed [iADL Assessment Completed] : iADL assessment completed [Recent decline in ADLs or iADLs] : Recent decline in ADLs or IADLs [Relies on Family/Friends] : relies on family/friends [More assistance needed] : more assistance needed [Reviewed Fall Risk from Comprehensive Assessment] : Reviewed fall risk from comprehensive assessment [Reviewed Home Safety Evaluation] : Reviewed home safety evaluation [No Action Needed] : No action needed [Safety elements used in home] : Safety elements used in home [Limited decision making ability] : limited decision making ability [Other: ____] : [unfilled] [Can not Exercise (Disability)] : Exercise: The patient can not exercise due to disability [de-identified] : no c/o pain [FreeTextEntry2] : has limited speech [FAST Score: ____] : Functional Assessment Scale (FAST) Score: [unfilled]

## 2019-01-25 NOTE — HISTORY OF PRESENT ILLNESS
[Spouse] : spouse [FreeTextEntry1] : Dementia  [FreeTextEntry2] : PMH: Parkinson's Disease, Dementia,Anxiety,  Afib (on Coumadin), HTN, HLD, Aortic Ectasia, Coronary Artery Calcification, BPH, Chronic UTI's, Kidney Stones, Dysphagia, Vasovagal Syncope, S/P CVA x2, S/P DVT with [place] Filter in place, \par \par Interval events: treated for multiple UTI's since SOC- urine was grey, now resolved, also with small dark stone in  urine last month- has hx of kidney stones, has blood in urine\par \par Pt quiet today, wife reports pt declining from recurrent  UTI's, was coughing last night but able to expectorate clear sputum; mainly bedbound now and mattress and overlay indented\par Incontinent of bowel and bladder- was constipated last week did not move bowels for a week- now resolved with senna and colace\par WIfe denies recent fever, chills, SOB\par \par Vascular Dementia- s/p CVA 2012 & 2016- no longer speaking, and wihdrawn, declining as above\par \par BPH- on finasteride, tamsulosin- has chronic UTI's as above currently on methenamine, last treated with cipro\par \par Afib/DVT- on Couamdin- INR stable on current 3mg daily; has IVC filter in place\par \par HTN/HLD/CAD- BP controlled with metoprolol, on Zetia \par \par Parkinsons- on Sinemet- managed by neurology\par \par Vasovagal syncope- no recent episodes- had five day EEG in March 2018 in [facility] which did not reveal any seizure activity\par \par Dysphagia- on regular consistency food with nectar thickened liquids- has had no recent episodes of aspiration per wife, appetite "very good"- weight stable\par \par \par \par \par \par \par

## 2019-01-25 NOTE — REVIEW OF SYSTEMS
[Negative] : Heme/Lymph [Constipation] : constipation [FreeTextEntry2] : as noted in HPI [FreeTextEntry8] : as noted in HPI

## 2019-01-25 NOTE — COUNSELING
[Normal Weight (BMI <25)] : normal weight - BMI <25 [Hypertension self management education material provided] : Hypertension self management education material provided [Non - Smoker] : non-smoker [Not Indicated] : not indicated [Decrease hospital use] : decrease hospital use [Minimize unnecessary interventions] : minimize unnecessary interventions [Maintain functional ability] : maintain functional ability [Discussed disease trajectory with patient/caregiver] : discussed disease trajectory with patient/caregiver [Likely to achieve goals/desired outcomes] : likely to achieve goals/desired outcomes [Patient/Caregiver has ___ understanding of disease process] : patient/caregiver has [unfilled] understanding of disease process [Completed Healthcare Proxy] : completed healthcare proxy [Completed DNR] : completed DNR [Completed Medical Orders for Life-Sustaining Treatment] : completed medical orders for life-sustaining treatment [DNR] : Code Status: DNR [Limited] : Treatment Guidelines: Limited [DNI] : Intubation: DNI [[facility]: _____] [facility]: [unfilled] [_____] : HCP: [unfilled] [FreeTextEntry5] : does not take immunizations

## 2019-01-29 LAB
INR PPP: 2.66 RATIO
PT BLD: 31.2 SEC

## 2019-02-04 ENCOUNTER — LABORATORY RESULT (OUTPATIENT)
Age: 79
End: 2019-02-04

## 2019-02-14 ENCOUNTER — RESULT REVIEW (OUTPATIENT)
Age: 79
End: 2019-02-14

## 2019-02-14 LAB
INR PPP: 2.67 RATIO
PT BLD: 31.4 SEC

## 2019-02-19 LAB
INR PPP: 2.15 RATIO
PT BLD: 25.3 SEC

## 2019-02-21 ENCOUNTER — MEDICATION RENEWAL (OUTPATIENT)
Age: 79
End: 2019-02-21

## 2019-02-26 ENCOUNTER — CLINICAL ADVICE (OUTPATIENT)
Age: 79
End: 2019-02-26

## 2019-02-26 LAB
INR PPP: 1.41 RATIO
PT BLD: 16.4 SEC

## 2019-02-27 ENCOUNTER — LABORATORY RESULT (OUTPATIENT)
Age: 79
End: 2019-02-27

## 2019-03-01 ENCOUNTER — APPOINTMENT (OUTPATIENT)
Dept: HOME HEALTH SERVICES | Facility: HOME HEALTH | Age: 79
End: 2019-03-01

## 2019-03-01 VITALS
TEMPERATURE: 97.8 F | HEART RATE: 82 BPM | DIASTOLIC BLOOD PRESSURE: 84 MMHG | SYSTOLIC BLOOD PRESSURE: 126 MMHG | OXYGEN SATURATION: 98 % | RESPIRATION RATE: 16 BRPM

## 2019-03-04 ENCOUNTER — LABORATORY RESULT (OUTPATIENT)
Age: 79
End: 2019-03-04

## 2019-03-08 ENCOUNTER — CLINICAL ADVICE (OUTPATIENT)
Age: 79
End: 2019-03-08

## 2019-03-08 LAB
BASOPHILS # BLD AUTO: 0.01 K/UL
BASOPHILS NFR BLD AUTO: 0.2 %
CHOLEST SERPL-MCNC: 212 MG/DL
CHOLEST/HDLC SERPL: 5.9 RATIO
EOSINOPHIL # BLD AUTO: 0.09 K/UL
EOSINOPHIL NFR BLD AUTO: 1.6 %
HCT VFR BLD CALC: 43.6 %
HDLC SERPL-MCNC: 36 MG/DL
HGB BLD-MCNC: 14.7 G/DL
IMM GRANULOCYTES NFR BLD AUTO: 0.2 %
INR PPP: 1.67 RATIO
LDLC SERPL CALC-MCNC: 149 MG/DL
LYMPHOCYTES # BLD AUTO: 1.26 K/UL
LYMPHOCYTES NFR BLD AUTO: 23.1 %
MAN DIFF?: NORMAL
MCHC RBC-ENTMCNC: 32.2 PG
MCHC RBC-ENTMCNC: 33.7 GM/DL
MCV RBC AUTO: 95.6 FL
MONOCYTES # BLD AUTO: 0.37 K/UL
MONOCYTES NFR BLD AUTO: 6.8 %
NEUTROPHILS # BLD AUTO: 3.72 K/UL
NEUTROPHILS NFR BLD AUTO: 68.1 %
PLATELET # BLD AUTO: 169 K/UL
PREALB SERPL NEPH-MCNC: 27 MG/DL
PT BLD: 19.2 SEC
RBC # BLD: 4.56 M/UL
RBC # FLD: 13.5 %
TRIGL SERPL-MCNC: 136 MG/DL
WBC # FLD AUTO: 5.46 K/UL

## 2019-03-11 LAB
INR PPP: 2.19 RATIO
PT BLD: 25.3 SEC

## 2019-03-19 ENCOUNTER — OTHER (OUTPATIENT)
Age: 79
End: 2019-03-19

## 2019-03-21 ENCOUNTER — MEDICATION RENEWAL (OUTPATIENT)
Age: 79
End: 2019-03-21

## 2019-03-25 ENCOUNTER — APPOINTMENT (OUTPATIENT)
Dept: HOME HEALTH SERVICES | Facility: HOME HEALTH | Age: 79
End: 2019-03-25

## 2019-03-25 LAB
INR PPP: 2.75 RATIO
PT BLD: 32.4 SEC

## 2019-03-26 LAB
INR PPP: 2.73 RATIO
PT BLD: 32.1 SEC

## 2019-03-27 ENCOUNTER — APPOINTMENT (OUTPATIENT)
Dept: HOME HEALTH SERVICES | Facility: HOME HEALTH | Age: 79
End: 2019-03-27
Payer: MEDICARE

## 2019-03-27 VITALS
OXYGEN SATURATION: 98 % | RESPIRATION RATE: 14 BRPM | SYSTOLIC BLOOD PRESSURE: 124 MMHG | HEART RATE: 86 BPM | TEMPERATURE: 97.6 F | DIASTOLIC BLOOD PRESSURE: 80 MMHG

## 2019-03-27 DIAGNOSIS — I69.391 DYSPHAGIA FOLLOWING CEREBRAL INFARCTION: ICD-10-CM

## 2019-03-27 DIAGNOSIS — I10 ESSENTIAL (PRIMARY) HYPERTENSION: ICD-10-CM

## 2019-03-27 DIAGNOSIS — R55 SYNCOPE AND COLLAPSE: ICD-10-CM

## 2019-03-27 LAB
BASOPHILS # BLD AUTO: 0.03 K/UL
BASOPHILS NFR BLD AUTO: 0.5 %
EOSINOPHIL # BLD AUTO: 0.11 K/UL
EOSINOPHIL NFR BLD AUTO: 1.9 %
HCT VFR BLD CALC: 38.9 %
HGB BLD-MCNC: 12.8 G/DL
IMM GRANULOCYTES NFR BLD AUTO: 0.2 %
LYMPHOCYTES # BLD AUTO: 1.99 K/UL
LYMPHOCYTES NFR BLD AUTO: 34.1 %
MAN DIFF?: NORMAL
MCHC RBC-ENTMCNC: 32.1 PG
MCHC RBC-ENTMCNC: 32.9 GM/DL
MCV RBC AUTO: 97.5 FL
MONOCYTES # BLD AUTO: 0.43 K/UL
MONOCYTES NFR BLD AUTO: 7.4 %
NEUTROPHILS # BLD AUTO: 3.26 K/UL
NEUTROPHILS NFR BLD AUTO: 55.9 %
PLATELET # BLD AUTO: 146 K/UL
RBC # BLD: 3.99 M/UL
RBC # FLD: 14.1 %
WBC # FLD AUTO: 5.83 K/UL

## 2019-03-27 PROCEDURE — 99350 HOME/RES VST EST HIGH MDM 60: CPT

## 2019-03-27 RX ORDER — MUPIROCIN 20 MG/G
2 OINTMENT TOPICAL TWICE DAILY
Qty: 1 | Refills: 0 | Status: DISCONTINUED | COMMUNITY
Start: 2018-12-13 | End: 2019-03-27

## 2019-03-27 RX ORDER — CICLOPIROX 7.7 MG/G
0.77 GEL TOPICAL
Qty: 1 | Refills: 3 | Status: DISCONTINUED | COMMUNITY
Start: 2018-12-13 | End: 2019-03-27

## 2019-03-27 RX ORDER — METHENAMINE HIPPURATE 1 G/1
1 TABLET ORAL TWICE DAILY
Qty: 180 | Refills: 3 | Status: DISCONTINUED | COMMUNITY
Start: 2018-11-30 | End: 2019-03-27

## 2019-03-30 PROBLEM — I69.391 DYSPHAGIA S/P CVA (CEREBROVASCULAR ACCIDENT): Status: ACTIVE | Noted: 2018-11-30

## 2019-03-30 PROBLEM — I10 BENIGN ESSENTIAL HTN: Status: ACTIVE | Noted: 2018-11-30

## 2019-03-30 PROBLEM — R55 VASOVAGAL SYNCOPE: Status: ACTIVE | Noted: 2018-12-01

## 2019-03-30 NOTE — REVIEW OF SYSTEMS
[Constipation] : constipation [Negative] : Heme/Lymph [FreeTextEntry2] : as noted in HPI [FreeTextEntry8] : as noted in HPI

## 2019-03-30 NOTE — HISTORY OF PRESENT ILLNESS
[Spouse] : spouse [FreeTextEntry1] : Dementia  [FreeTextEntry2] : PMH: Parkinson's Disease, Dementia,Anxiety,  Afib (on Coumadin), HTN, HLD, Aortic Ectasia, Coronary Artery Calcification, BPH, Chronic UTI's, Kidney Stones, Dysphagia, Vasovagal Syncope, S/P CVA x2, S/P DVT with [place] Filter in place, \par \par Interval events: started on entacapone by neurology which have made pt more alert, but has started with side effects of hallucinations, which "were really bad last night" pt only slept for 1.5 hours; also started on xanax for hallucinations which wife does not believe is helping- has appointment with neurology on 4/2; wife also stopped methenamine and is using D Mannose for UTI prophylaxis now- has had no UTI's since December\par Also had CP response on Monday for syncopal episode- labs done and (-) for any acute processes\par \par Pt quiet today sitting in wheelchair for visit \par Appetite good; no longer refusing medications since wife crushing medications and giving with agave nectar- weight stable\par Incontinent of bowel and bladder- doing well on senna and colace\par WIfe denies recent fever, chills, SOB\par \par Vascular Dementia- s/p CVA 2012 & 2016- as above\par \par BPH- on finasteride, tamsulosin- has chronic UTI's as above - no recent issues- on D-Mannose- last treated with cipro\par \par Afib/DVT- on Couamdin- INR stable on current 3mg daily; has IVC filter in place\par \par HTN/HLD/CAD- BP controlled with metoprolol, on Zetia \par \par Parkinsons- on Sinemet,entacapone as above- managed by neurology\par \par Vasovagal syncope- no recent episodes- had five day EEG in March 2018 in [facility] which did not reveal any seizure activity\par \par Dysphagia- on regular consistency food with nectar thickened liquids- has had no recent episodes of aspiration per wife, \par \par \par \par \par \par \par

## 2019-03-30 NOTE — COUNSELING
[Normal Weight (BMI <25)] : normal weight - BMI <25 [Hypertension self management education material provided] : Hypertension self management education material provided [Non - Smoker] : non-smoker [Not Indicated] : not indicated [Decrease hospital use] : decrease hospital use [Minimize unnecessary interventions] : minimize unnecessary interventions [Maintain functional ability] : maintain functional ability [Discussed disease trajectory with patient/caregiver] : discussed disease trajectory with patient/caregiver [Likely to achieve goals/desired outcomes] : likely to achieve goals/desired outcomes [Patient/Caregiver has ___ understanding of disease process] : patient/caregiver has [unfilled] understanding of disease process [Completed Healthcare Proxy] : completed healthcare proxy [Completed DNR] : completed DNR [Completed Medical Orders for Life-Sustaining Treatment] : completed medical orders for life-sustaining treatment [DNR] : Code Status: DNR [Limited] : Treatment Guidelines: Limited [DNI] : Intubation: DNI [[facility] Date: _____] : [facility] Completion/last verification date: [unfilled] [_____] : HCP: [unfilled] [FreeTextEntry5] : does not take immunizations

## 2019-03-30 NOTE — PHYSICAL EXAM
[No Acute Distress] : no acute distress [Well Nourished] : well nourished [Well Developed] : well developed [Normal Sclera/Conjunctiva] : normal sclera/conjunctiva [PERRL] : pupils equal, round and reactive to light [Normal Outer Ear/Nose] : the ears and nose were normal in appearance [Normal Oropharynx] : the oropharynx was normal [No JVD] : no jugular venous distention [Supple] : the neck was supple [No LAD] : no lymphadenopathy [No Respiratory Distress] : no respiratory distress [Clear to Auscultation] : lungs were clear to auscultation bilaterally [No Accessory Muscle Use] : no accessory muscle use [Normal Rate] : heart rate was normal  [Regular Rhythm] : with a regular rhythm [Normal S1, S2] : normal S1 and S2 [No Murmurs] : no murmurs heard [No Edema] : there was no peripheral edema [Normal Bowel Sounds] : normal bowel sounds [Non Tender] : non-tender [Soft] : abdomen soft [Not Distended] : not distended [No Hernias] : no hernia was discovered [Normal Post Cervical Nodes] : no posterior cervical lymphadenopathy [Normal Anterior Cervical Nodes] : no anterior cervical lymphadenopathy [No Joint Swelling] : no joint swelling seen [No Clubbing, Cyanosis] : no clubbing  or cyanosis of the fingernails [No Rash] : no rash [No Skin Lesions] : no skin lesions [Kyphosis] : no kyphosis present [Scoliosis] : scoliosis not present [de-identified] : frail, non- verbal, holding head up again [de-identified] : unable to assess secondary to dementia [de-identified] : alert to name,

## 2019-04-02 LAB
INR PPP: 5.57 RATIO
PT BLD: 66.4 SEC

## 2019-04-04 ENCOUNTER — LABORATORY RESULT (OUTPATIENT)
Age: 79
End: 2019-04-04

## 2019-04-08 ENCOUNTER — LABORATORY RESULT (OUTPATIENT)
Age: 79
End: 2019-04-08

## 2019-04-09 ENCOUNTER — CLINICAL ADVICE (OUTPATIENT)
Age: 79
End: 2019-04-09

## 2019-04-09 RX ORDER — ALPRAZOLAM 0.25 MG/1
0.25 TABLET ORAL
Qty: 30 | Refills: 0 | Status: DISCONTINUED | COMMUNITY
Start: 2019-03-22 | End: 2019-04-09

## 2019-04-16 LAB
INR PPP: 8.83 RATIO
PT BLD: 107.7 SEC

## 2019-04-17 ENCOUNTER — APPOINTMENT (OUTPATIENT)
Dept: HOME HEALTH SERVICES | Facility: HOME HEALTH | Age: 79
End: 2019-04-17

## 2019-04-17 VITALS
RESPIRATION RATE: 16 BRPM | DIASTOLIC BLOOD PRESSURE: 56 MMHG | HEART RATE: 92 BPM | TEMPERATURE: 98.4 F | SYSTOLIC BLOOD PRESSURE: 106 MMHG | OXYGEN SATURATION: 98 %

## 2019-04-19 DIAGNOSIS — E87.0 HYPEROSMOLALITY AND HYPERNATREMIA: ICD-10-CM

## 2019-04-19 LAB
ALBUMIN SERPL ELPH-MCNC: 4.1 G/DL
ALP BLD-CCNC: 72 U/L
ALT SERPL-CCNC: 13 U/L
ANION GAP SERPL CALC-SCNC: 13 MMOL/L
APPEARANCE: ABNORMAL
AST SERPL-CCNC: 28 U/L
BACTERIA: ABNORMAL
BASOPHILS # BLD AUTO: 0.02 K/UL
BASOPHILS NFR BLD AUTO: 0.3 %
BILIRUB SERPL-MCNC: 1.1 MG/DL
BILIRUBIN URINE: NEGATIVE
BLOOD URINE: ABNORMAL
BUN SERPL-MCNC: 26 MG/DL
CALCIUM OXALATE CRYSTALS: ABNORMAL
CALCIUM SERPL-MCNC: 10.4 MG/DL
CHLORIDE SERPL-SCNC: 118 MMOL/L
CO2 SERPL-SCNC: 27 MMOL/L
COLOR: ABNORMAL
CREAT SERPL-MCNC: 0.97 MG/DL
EOSINOPHIL # BLD AUTO: 0.06 K/UL
EOSINOPHIL NFR BLD AUTO: 0.8 %
GLUCOSE QUALITATIVE U: NEGATIVE
GLUCOSE SERPL-MCNC: 134 MG/DL
HCT VFR BLD CALC: 46.7 %
HGB BLD-MCNC: 15.3 G/DL
HYALINE CASTS: 1 /LPF
IMM GRANULOCYTES NFR BLD AUTO: 0.3 %
INR PPP: 1.22 RATIO
KETONES URINE: NORMAL
LEUKOCYTE ESTERASE URINE: ABNORMAL
LYMPHOCYTES # BLD AUTO: 1.95 K/UL
LYMPHOCYTES NFR BLD AUTO: 25.3 %
MAN DIFF?: NORMAL
MCHC RBC-ENTMCNC: 32.1 PG
MCHC RBC-ENTMCNC: 32.8 GM/DL
MCV RBC AUTO: 97.9 FL
MICROSCOPIC-UA: NORMAL
MONOCYTES # BLD AUTO: 0.43 K/UL
MONOCYTES NFR BLD AUTO: 5.6 %
NEUTROPHILS # BLD AUTO: 5.22 K/UL
NEUTROPHILS NFR BLD AUTO: 67.7 %
NITRITE URINE: NEGATIVE
PH URINE: 5.5
PLATELET # BLD AUTO: 199 K/UL
POTASSIUM SERPL-SCNC: 3.7 MMOL/L
PROT SERPL-MCNC: 7.3 G/DL
PROTEIN URINE: ABNORMAL
PT BLD: 14 SEC
RBC # BLD: 4.77 M/UL
RBC # FLD: 14 %
RED BLOOD CELLS URINE: 17 /HPF
SODIUM SERPL-SCNC: 158 MMOL/L
SPECIFIC GRAVITY URINE: 1.03
SQUAMOUS EPITHELIAL CELLS: 3 /HPF
UROBILINOGEN URINE: NORMAL
WBC # FLD AUTO: 7.7 K/UL
WHITE BLOOD CELLS URINE: 201 /HPF

## 2019-04-22 LAB — BACTERIA UR CULT: ABNORMAL

## 2019-04-22 RX ORDER — CIPROFLOXACIN HYDROCHLORIDE 500 MG/1
500 TABLET, FILM COATED ORAL TWICE DAILY
Qty: 14 | Refills: 0 | Status: DISCONTINUED | COMMUNITY
Start: 2019-01-04 | End: 2019-04-22

## 2019-04-23 LAB
ALBUMIN SERPL ELPH-MCNC: 4 G/DL
ANION GAP SERPL CALC-SCNC: 12 MMOL/L
BUN SERPL-MCNC: 24 MG/DL
CALCIUM SERPL-MCNC: 10.4 MG/DL
CHLORIDE SERPL-SCNC: 119 MMOL/L
CO2 SERPL-SCNC: 28 MMOL/L
CREAT SERPL-MCNC: 0.85 MG/DL
GLUCOSE SERPL-MCNC: 96 MG/DL
INR PPP: 3.52 RATIO
PHOSPHATE SERPL-MCNC: 3.1 MG/DL
POTASSIUM SERPL-SCNC: 4 MMOL/L
PT BLD: 41.7 SEC
SODIUM SERPL-SCNC: 159 MMOL/L

## 2019-04-23 RX ORDER — SODIUM CHLORIDE 2250 MG/500ML
0.45 INJECTION, SOLUTION INTRAVENOUS
Qty: 2000 | Refills: 0 | Status: DISCONTINUED | COMMUNITY
Start: 2019-04-23 | End: 2019-04-23

## 2019-04-25 ENCOUNTER — INPATIENT (INPATIENT)
Facility: HOSPITAL | Age: 79
LOS: 6 days | Discharge: HOME | DRG: 689 | End: 2019-05-02
Attending: STUDENT IN AN ORGANIZED HEALTH CARE EDUCATION/TRAINING PROGRAM | Admitting: FAMILY MEDICINE
Payer: MEDICARE

## 2019-04-25 VITALS
HEART RATE: 57 BPM | TEMPERATURE: 96 F | RESPIRATION RATE: 18 BRPM | DIASTOLIC BLOOD PRESSURE: 80 MMHG | SYSTOLIC BLOOD PRESSURE: 119 MMHG | WEIGHT: 169.98 LBS | OXYGEN SATURATION: 97 %

## 2019-04-25 DIAGNOSIS — I10 ESSENTIAL (PRIMARY) HYPERTENSION: ICD-10-CM

## 2019-04-25 DIAGNOSIS — G20 PARKINSON'S DISEASE: ICD-10-CM

## 2019-04-25 DIAGNOSIS — R41.82 ALTERED MENTAL STATUS, UNSPECIFIED: ICD-10-CM

## 2019-04-25 DIAGNOSIS — Z29.9 ENCOUNTER FOR PROPHYLACTIC MEASURES, UNSPECIFIED: ICD-10-CM

## 2019-04-25 DIAGNOSIS — R31.9 HEMATURIA, UNSPECIFIED: ICD-10-CM

## 2019-04-25 DIAGNOSIS — E86.0 DEHYDRATION: ICD-10-CM

## 2019-04-25 DIAGNOSIS — I48.91 UNSPECIFIED ATRIAL FIBRILLATION: ICD-10-CM

## 2019-04-25 DIAGNOSIS — N39.0 URINARY TRACT INFECTION, SITE NOT SPECIFIED: ICD-10-CM

## 2019-04-25 DIAGNOSIS — F03.90 UNSPECIFIED DEMENTIA WITHOUT BEHAVIORAL DISTURBANCE: ICD-10-CM

## 2019-04-25 DIAGNOSIS — K59.00 CONSTIPATION, UNSPECIFIED: ICD-10-CM

## 2019-04-25 LAB
ALBUMIN SERPL ELPH-MCNC: 3 G/DL — LOW (ref 3.3–5)
ALP SERPL-CCNC: 64 U/L — SIGNIFICANT CHANGE UP (ref 40–120)
ALT FLD-CCNC: 8 U/L — LOW (ref 12–78)
ANION GAP SERPL CALC-SCNC: 4 MMOL/L — LOW (ref 5–17)
APTT BLD: 56.7 SEC — HIGH (ref 27.5–36.3)
AST SERPL-CCNC: 25 U/L — SIGNIFICANT CHANGE UP (ref 15–37)
BASOPHILS # BLD AUTO: 0.02 K/UL — SIGNIFICANT CHANGE UP (ref 0–0.2)
BASOPHILS NFR BLD AUTO: 0.3 % — SIGNIFICANT CHANGE UP (ref 0–2)
BILIRUB SERPL-MCNC: 0.5 MG/DL — SIGNIFICANT CHANGE UP (ref 0.2–1.2)
BUN SERPL-MCNC: 23 MG/DL — SIGNIFICANT CHANGE UP (ref 7–23)
CALCIUM SERPL-MCNC: 9.3 MG/DL — SIGNIFICANT CHANGE UP (ref 8.5–10.1)
CHLORIDE SERPL-SCNC: 122 MMOL/L — HIGH (ref 96–108)
CO2 SERPL-SCNC: 31 MMOL/L — SIGNIFICANT CHANGE UP (ref 22–31)
CREAT SERPL-MCNC: 1 MG/DL — SIGNIFICANT CHANGE UP (ref 0.5–1.3)
EOSINOPHIL # BLD AUTO: 0.12 K/UL — SIGNIFICANT CHANGE UP (ref 0–0.5)
EOSINOPHIL NFR BLD AUTO: 1.8 % — SIGNIFICANT CHANGE UP (ref 0–6)
GLUCOSE SERPL-MCNC: 96 MG/DL — SIGNIFICANT CHANGE UP (ref 70–99)
HCT VFR BLD CALC: 41.7 % — SIGNIFICANT CHANGE UP (ref 39–50)
HGB BLD-MCNC: 13.9 G/DL — SIGNIFICANT CHANGE UP (ref 13–17)
IMM GRANULOCYTES NFR BLD AUTO: 0.4 % — SIGNIFICANT CHANGE UP (ref 0–1.5)
INR BLD: 7.08 RATIO — CRITICAL HIGH (ref 0.88–1.16)
LACTATE SERPL-SCNC: 1.9 MMOL/L — SIGNIFICANT CHANGE UP (ref 0.7–2)
LYMPHOCYTES # BLD AUTO: 2.39 K/UL — SIGNIFICANT CHANGE UP (ref 1–3.3)
LYMPHOCYTES # BLD AUTO: 35.6 % — SIGNIFICANT CHANGE UP (ref 13–44)
MCHC RBC-ENTMCNC: 32.6 PG — SIGNIFICANT CHANGE UP (ref 27–34)
MCHC RBC-ENTMCNC: 33.3 GM/DL — SIGNIFICANT CHANGE UP (ref 32–36)
MCV RBC AUTO: 97.7 FL — SIGNIFICANT CHANGE UP (ref 80–100)
MONOCYTES # BLD AUTO: 0.53 K/UL — SIGNIFICANT CHANGE UP (ref 0–0.9)
MONOCYTES NFR BLD AUTO: 7.9 % — SIGNIFICANT CHANGE UP (ref 2–14)
NEUTROPHILS # BLD AUTO: 3.62 K/UL — SIGNIFICANT CHANGE UP (ref 1.8–7.4)
NEUTROPHILS NFR BLD AUTO: 54 % — SIGNIFICANT CHANGE UP (ref 43–77)
NRBC # BLD: 0 /100 WBCS — SIGNIFICANT CHANGE UP (ref 0–0)
PLATELET # BLD AUTO: 159 K/UL — SIGNIFICANT CHANGE UP (ref 150–400)
POTASSIUM SERPL-MCNC: 3.1 MMOL/L — LOW (ref 3.5–5.3)
POTASSIUM SERPL-SCNC: 3.1 MMOL/L — LOW (ref 3.5–5.3)
PROT SERPL-MCNC: 6.5 G/DL — SIGNIFICANT CHANGE UP (ref 6–8.3)
PROTHROM AB SERPL-ACNC: 85 SEC — HIGH (ref 10–12.9)
RBC # BLD: 4.27 M/UL — SIGNIFICANT CHANGE UP (ref 4.2–5.8)
RBC # FLD: 14.7 % — HIGH (ref 10.3–14.5)
SODIUM SERPL-SCNC: 157 MMOL/L — HIGH (ref 135–145)
WBC # BLD: 6.71 K/UL — SIGNIFICANT CHANGE UP (ref 3.8–10.5)
WBC # FLD AUTO: 6.71 K/UL — SIGNIFICANT CHANGE UP (ref 3.8–10.5)

## 2019-04-25 PROCEDURE — 70450 CT HEAD/BRAIN W/O DYE: CPT | Mod: 26

## 2019-04-25 PROCEDURE — 99285 EMERGENCY DEPT VISIT HI MDM: CPT

## 2019-04-25 PROCEDURE — 74176 CT ABD & PELVIS W/O CONTRAST: CPT | Mod: 26

## 2019-04-25 PROCEDURE — 93010 ELECTROCARDIOGRAM REPORT: CPT

## 2019-04-25 PROCEDURE — 71045 X-RAY EXAM CHEST 1 VIEW: CPT | Mod: 26

## 2019-04-25 PROCEDURE — 99223 1ST HOSP IP/OBS HIGH 75: CPT | Mod: AI,GC

## 2019-04-25 RX ORDER — METOPROLOL TARTRATE 50 MG
25 TABLET ORAL
Qty: 0 | Refills: 0 | Status: DISCONTINUED | OUTPATIENT
Start: 2019-04-25 | End: 2019-05-02

## 2019-04-25 RX ORDER — SERTRALINE 25 MG/1
1 TABLET, FILM COATED ORAL
Qty: 0 | Refills: 0 | COMMUNITY

## 2019-04-25 RX ORDER — SIMVASTATIN 20 MG/1
10 TABLET, FILM COATED ORAL AT BEDTIME
Qty: 0 | Refills: 0 | Status: DISCONTINUED | OUTPATIENT
Start: 2019-04-25 | End: 2019-05-02

## 2019-04-25 RX ORDER — SENNA PLUS 8.6 MG/1
2 TABLET ORAL AT BEDTIME
Qty: 0 | Refills: 0 | Status: DISCONTINUED | OUTPATIENT
Start: 2019-04-25 | End: 2019-05-02

## 2019-04-25 RX ORDER — POLYETHYLENE GLYCOL 3350 17 G/17G
17 POWDER, FOR SOLUTION ORAL DAILY
Qty: 0 | Refills: 0 | Status: DISCONTINUED | OUTPATIENT
Start: 2019-04-25 | End: 2019-05-02

## 2019-04-25 RX ORDER — FINASTERIDE 5 MG/1
5 TABLET, FILM COATED ORAL DAILY
Qty: 0 | Refills: 0 | Status: DISCONTINUED | OUTPATIENT
Start: 2019-04-25 | End: 2019-05-02

## 2019-04-25 RX ORDER — AMPICILLIN TRIHYDRATE 250 MG
1 CAPSULE ORAL EVERY 6 HOURS
Qty: 0 | Refills: 0 | Status: DISCONTINUED | OUTPATIENT
Start: 2019-04-26 | End: 2019-04-29

## 2019-04-25 RX ORDER — OLANZAPINE 15 MG/1
2.5 TABLET, FILM COATED ORAL EVERY 8 HOURS
Qty: 0 | Refills: 0 | Status: DISCONTINUED | OUTPATIENT
Start: 2019-04-25 | End: 2019-04-29

## 2019-04-25 RX ORDER — CARBIDOPA AND LEVODOPA 25; 100 MG/1; MG/1
1 TABLET ORAL
Qty: 0 | Refills: 0 | Status: DISCONTINUED | OUTPATIENT
Start: 2019-04-25 | End: 2019-05-02

## 2019-04-25 RX ORDER — AMPICILLIN TRIHYDRATE 250 MG
CAPSULE ORAL
Qty: 0 | Refills: 0 | Status: DISCONTINUED | OUTPATIENT
Start: 2019-04-25 | End: 2019-04-29

## 2019-04-25 RX ORDER — POTASSIUM CHLORIDE 20 MEQ
40 PACKET (EA) ORAL ONCE
Qty: 0 | Refills: 0 | Status: COMPLETED | OUTPATIENT
Start: 2019-04-25 | End: 2019-04-25

## 2019-04-25 RX ORDER — TAMSULOSIN HYDROCHLORIDE 0.4 MG/1
0.4 CAPSULE ORAL AT BEDTIME
Qty: 0 | Refills: 0 | Status: DISCONTINUED | OUTPATIENT
Start: 2019-04-25 | End: 2019-05-02

## 2019-04-25 RX ORDER — CARBIDOPA AND LEVODOPA 25; 100 MG/1; MG/1
1 TABLET ORAL
Qty: 0 | Refills: 0 | COMMUNITY

## 2019-04-25 RX ORDER — METHENAMINE MANDELATE 1 G
1 TABLET ORAL
Qty: 0 | Refills: 0 | COMMUNITY

## 2019-04-25 RX ORDER — CARBIDOPA AND LEVODOPA 25; 100 MG/1; MG/1
1 TABLET ORAL AT BEDTIME
Qty: 0 | Refills: 0 | Status: DISCONTINUED | OUTPATIENT
Start: 2019-04-25 | End: 2019-05-02

## 2019-04-25 RX ORDER — CARBIDOPA AND LEVODOPA 25; 100 MG/1; MG/1
2 TABLET ORAL
Qty: 0 | Refills: 0 | COMMUNITY

## 2019-04-25 RX ORDER — ENTACAPONE 200 MG/1
200 TABLET, FILM COATED ORAL
Qty: 0 | Refills: 0 | Status: DISCONTINUED | OUTPATIENT
Start: 2019-04-25 | End: 2019-05-02

## 2019-04-25 RX ORDER — SODIUM CHLORIDE 9 MG/ML
1000 INJECTION, SOLUTION INTRAVENOUS
Qty: 0 | Refills: 0 | Status: DISCONTINUED | OUTPATIENT
Start: 2019-04-25 | End: 2019-04-26

## 2019-04-25 RX ORDER — DOCUSATE SODIUM 100 MG
100 CAPSULE ORAL
Qty: 0 | Refills: 0 | Status: DISCONTINUED | OUTPATIENT
Start: 2019-04-25 | End: 2019-05-02

## 2019-04-25 RX ORDER — POTASSIUM CHLORIDE 20 MEQ
40 PACKET (EA) ORAL ONCE
Qty: 0 | Refills: 0 | Status: DISCONTINUED | OUTPATIENT
Start: 2019-04-25 | End: 2019-04-25

## 2019-04-25 RX ORDER — AMPICILLIN TRIHYDRATE 250 MG
1 CAPSULE ORAL ONCE
Qty: 0 | Refills: 0 | Status: COMPLETED | OUTPATIENT
Start: 2019-04-25 | End: 2019-04-25

## 2019-04-25 RX ADMIN — SODIUM CHLORIDE 500 MILLILITER(S): 9 INJECTION, SOLUTION INTRAVENOUS at 20:00

## 2019-04-25 RX ADMIN — Medication 108 GRAM(S): at 22:02

## 2019-04-25 RX ADMIN — Medication 40 MILLIEQUIVALENT(S): at 19:03

## 2019-04-25 RX ADMIN — OLANZAPINE 2.5 MILLIGRAM(S): 15 TABLET, FILM COATED ORAL at 21:54

## 2019-04-25 NOTE — H&P ADULT - PROBLEM SELECTOR PLAN 2
Was recently diagnosed with UTI, started on PO cipro then switched to PO ampicillin per wife. UA noted with hematuria on admission, expect to not show wbcs in urine having being on abx already. No signs of systemic infection, afebrile, no leukocytosis, tho with hematuria + hx of urinary retention/bph, concern for prostatitis remains  - will give a dose of IV ampicillin for now, reassess tomorrow am if further warranted  - urine cultures

## 2019-04-25 NOTE — ED PROVIDER NOTE - PROGRESS NOTE DETAILS
ct head and cxr wnl  u/a blood ct no stone  labs reviewed INR 7 no active bleeding will hold of giving vit K due to cva hx  na and cl elevated lactated ringers and K repleted  will admit to hospitalist Dr. Amaro for hematuria ams dehydration

## 2019-04-25 NOTE — H&P ADULT - PROBLEM SELECTOR PLAN 3
CT renal stone hunt noted, no changes compared with prior  - will monitor for now, if hematuria does not resolve/worsen, will likely need CBI, can consult urology in am

## 2019-04-25 NOTE — PATIENT PROFILE ADULT - FUNCTIONAL SCREEN CURRENT LEVEL: COMMUNICATION, MLM
2 = difficulty speaking (not related to language barrier) 2 = difficulty understanding and speaking (not related to language barrier)

## 2019-04-25 NOTE — H&P ADULT - PROBLEM SELECTOR PLAN 1
Admit to med/surg - CT head neg, possibly metabolic in nature and multifactorial in setting of uti, hypernatremia vs progressive dementia and parkinson's. Likely has severe sundowning, will potentially need soft restraints mittens  - continue IVF with LR  - trend BMP  - Fall risk protocol

## 2019-04-25 NOTE — H&P ADULT - NSICDXPASTMEDICALHX_GEN_ALL_CORE_FT
PAST MEDICAL HISTORY:  Atrial fibrillation     Cerebrovascular accident     Constipation     Dementia     Hypertension     Parkinson disease     Syncope     Urinary retention

## 2019-04-25 NOTE — ED PROVIDER NOTE - CARE PLAN
Principal Discharge DX:	Altered mental status, unspecified altered mental status type  Secondary Diagnosis:	Dehydration  Secondary Diagnosis:	Hematuria, unspecified type

## 2019-04-25 NOTE — ED ADULT NURSE NOTE - NSIMPLEMENTINTERV_GEN_ALL_ED
Implemented All Fall with Harm Risk Interventions:  [place] to call system. Call bell, personal items and telephone within reach. Instruct patient to call for assistance. Room bathroom lighting operational. Non-slip footwear when patient is off stretcher. Physically safe environment: no spills, clutter or unnecessary equipment. Stretcher in lowest position, wheels locked, appropriate side rails in place. Provide visual cue, wrist band, yellow gown, etc. Monitor gait and stability. Monitor for mental status changes and reorient to person, place, and time. Review medications for side effects contributing to fall risk. Reinforce activity limits and safety measures with patient and family. Provide visual clues: red socks.

## 2019-04-25 NOTE — ED PROVIDER NOTE - ATTENDING CONTRIBUTION TO CARE
pt with hx of dementia, PD, CVA with increased weakness/lethargy as per wife worse over the past few days.  Pt recently placed on Seroquel resulting in urinary retention and UTI.  Pt was placed on Cipro and then changed to ampicillin after urine cultures resulted.      Pt alert and oriented to person, dry mucous membranes.    CV irregular rhythm, regular rate.  Resp: CTA b/l  Abd: soft NT/ND +BS     septic workup, head CT, IVFs.  As per EMS pt was hypernatremic recently an was to get 1/2NS administered by House calls but had difficulty with attaining.

## 2019-04-25 NOTE — H&P ADULT - PROBLEM SELECTOR PLAN 8
Severe, possibly progressive with sundowning  - constant observation, will potentially need soft restraints mittens

## 2019-04-25 NOTE — H&P ADULT - HISTORY OF PRESENT ILLNESS
78y M PMH Afib (on coumadin), CVA (2012, 2016), dementia, HTN, BPH, parkinson's, and syncope BIBEMS for AMS.  Pt baseline of severe dementia, history obtained via wife at bedside.  Wife reports a week ago pt was not acting like his usual self, reported decline of appetite and more agitated/altered than his baseline.  Was seen by home call and diagnosed  with a UTI.  PO Cipro was started and later changed to PO Ampicillin.  Abx course started on 4/23, per wife pt symptoms has not improved.  Pt was to receive IV fluids at home, but upon EMS arrival, reported to have low bp, abnormal electrocutes and deferred to have pt bought to the ED.  Off note, pt has been having worsening of urinary retention recently 2/2 Seroquel use which has been discontinued.    In the ED, vitals: T: 96.2, HR 57, bp 119/80, 97% o2 on RA. Labs: INR 7.08, na 157, K 3.1, UA dirty, CT head and renal stone hunt negative. Given potassium, 1 L LR bolus 78y M PMH Afib (on coumadin), CVA (2012, 2016), dementia, HTN, BPH, parkinson's, and syncope BIBEMS for AMS.  Pt baseline of severe dementia, history obtained via wife at bedside.  Wife reports a week ago pt was not acting like his usual self, reported decline of appetite and more agitated/altered than his baseline.  Was seen by home call and diagnosed  with a UTI.  PO Cipro was started and later changed to PO Ampicillin.  Abx course started on 4/23, per wife pt symptoms has not improved.  Pt was to receive IV fluids at home, but upon EMS arrival, reported to have low bp, abnormal electrocutes and deferred to have pt bought to the ED.  Off note, pt has been having worsening of urinary retention recently 2/2 Seroquel use which has been discontinued.    In the ED, vitals: T: 96.2, HR 57, bp 119/80, 97% o2 on RA. Labs: INR 7.08, na 157, K 3.1, CT head and renal stone hunt negative. Given potassium, 1 L LR bolus

## 2019-04-25 NOTE — H&P ADULT - PROBLEM SELECTOR PLAN 10
IMPROVE VTE Individual Risk Assessment        RISK                                                          Points  [  ] Previous VTE                                                3  [  ] Thrombophilia                                             2  [  ] Lower limb paralysis                                   2        (unable to hold up >15 seconds)    [  ] Current Cancer                                             2         (within 6 months)  [ x ] Immobilization > 24 hrs                              1  [  ] ICU/CCU stay > 24 hours                             1  [ x ] Age > 60                                                         1  IMPROVE VTE Score: 2  DVT ppx: on warfarin IMPROVE VTE Individual Risk Assessment        RISK                                                          Points  [  ] Previous VTE                                                3  [  ] Thrombophilia                                             2  [  ] Lower limb paralysis                                   2        (unable to hold up >15 seconds)    [  ] Current Cancer                                             2         (within 6 months)  [ x ] Immobilization > 24 hrs                              1  [  ] ICU/CCU stay > 24 hours                             1  [ x ] Age > 60                                                         1  IMPROVE VTE Score: 2  DVT ppx: on warfarin  problem 11. BPH - continue flomax and finasteride

## 2019-04-25 NOTE — ED ADULT NURSE REASSESSMENT NOTE - NS ED NURSE REASSESS COMMENT FT1
Assumed care for pt awake and confused and agitated MD made aware. Vss, afebrile. will continue to monitor

## 2019-04-25 NOTE — H&P ADULT - ATTENDING COMMENTS
Trend sodium levels in AM and depending on results consider switching fluids. Pt has a history of dementia with behavioral disturbance. Will trial zyprexa for behavioral disturbance/agitation. Pt's wife at bedside is requesting medications to help with his behavior.

## 2019-04-25 NOTE — H&P ADULT - PROBLEM SELECTOR PLAN 5
Rate controlled, INR supra-therapeutic  - hold warfarin  - trend INR, can restart once indicated target INR 2-3  - continue metoprolol

## 2019-04-25 NOTE — H&P ADULT - NSHPPHYSICALEXAM_GEN_ALL_CORE
Vital Signs Last 24 Hrs  T(C): 37.1 (25 Apr 2019 20:57), Max: 37.1 (25 Apr 2019 20:57)  T(F): 98.8 (25 Apr 2019 20:57), Max: 98.8 (25 Apr 2019 20:57)  HR: 59 (25 Apr 2019 20:02) (57 - 59)  BP: 104/75 (25 Apr 2019 20:02) (104/75 - 119/80)  BP(mean): --  RR: 20 (25 Apr 2019 20:02) (18 - 20)  SpO2: 96% (25 Apr 2019 20:02) (96% - 97%)    Physical Exam:  General: Frail elderly, severe dementia, alert but does not respond to questions  HEENT: NCAT, PERRLA, EOMI bl, dry mucous membranes   Neck: Supple, nontender, no mass  Neurology: A&Ox1, nonfocal  Respiratory: grossly CTA B/L, No W  CV: irregular, +S1/S2, no murmurs  Abdominal: Soft, NT, ND +BS  Extremities: No C/C/E, + 2 peripheral pulses  MSK: Normal ROM, no joint erythema or warmth, no joint swelling   Skin: warm, dry, normal color

## 2019-04-25 NOTE — H&P ADULT - ASSESSMENT
78y M PMH Afib (on coumadin), CVA (2012, 2016), dementia, HTN, BPH, parkinson's, and syncope BIBEMS for AMS.  Admitted for AMS, metabolic encephalopathy, uti

## 2019-04-25 NOTE — H&P ADULT - NSHPSOCIALHISTORY_GEN_ALL_CORE
Pt lives with wife, uses wheelchair at baseline, is able to ambulate with walker  Dependent on all ADLs

## 2019-04-25 NOTE — ED PROVIDER NOTE - OBJECTIVE STATEMENT
Pt is a 79 yo male with pmhx of ATanya Laguerre on coumadin CVA, HTN, Dementia, Parkinson's Disease and Syncope BIBEMS for AMS. As per EMS pt was more weak and lethargic today. Pt was in urinary retention last week possibly due to Seroquel use which wife discontinued. Pt was diagnosed with UTI placed on cipro and changed to Ampicillin based on culture results. Pt INR was supratherapuetic last week but normal as per EMS. Pt wife noted blood clot with urine in diaper today no bloody stool no nvd fever. As per ems his sodium was also elevated. History limited from pt due to mental status obtained from emr and ems.

## 2019-04-26 LAB
ANION GAP SERPL CALC-SCNC: 4 MMOL/L — LOW (ref 5–17)
APTT BLD: 59.4 SEC — HIGH (ref 28.5–37)
BASOPHILS # BLD AUTO: 0.04 K/UL — SIGNIFICANT CHANGE UP (ref 0–0.2)
BASOPHILS NFR BLD AUTO: 0.7 % — SIGNIFICANT CHANGE UP (ref 0–2)
BUN SERPL-MCNC: 18 MG/DL — SIGNIFICANT CHANGE UP (ref 7–23)
CALCIUM SERPL-MCNC: 9.1 MG/DL — SIGNIFICANT CHANGE UP (ref 8.5–10.1)
CHLORIDE SERPL-SCNC: 123 MMOL/L — HIGH (ref 96–108)
CO2 SERPL-SCNC: 30 MMOL/L — SIGNIFICANT CHANGE UP (ref 22–31)
CREAT SERPL-MCNC: 0.92 MG/DL — SIGNIFICANT CHANGE UP (ref 0.5–1.3)
CULTURE RESULTS: NO GROWTH — SIGNIFICANT CHANGE UP
EOSINOPHIL # BLD AUTO: 0.14 K/UL — SIGNIFICANT CHANGE UP (ref 0–0.5)
EOSINOPHIL NFR BLD AUTO: 2.6 % — SIGNIFICANT CHANGE UP (ref 0–6)
GLUCOSE SERPL-MCNC: 90 MG/DL — SIGNIFICANT CHANGE UP (ref 70–99)
HCT VFR BLD CALC: 35.8 % — LOW (ref 39–50)
HGB BLD-MCNC: 12 G/DL — LOW (ref 13–17)
IMM GRANULOCYTES NFR BLD AUTO: 0.2 % — SIGNIFICANT CHANGE UP (ref 0–1.5)
INR BLD: 7.44 RATIO — CRITICAL HIGH (ref 0.88–1.16)
LYMPHOCYTES # BLD AUTO: 1.83 K/UL — SIGNIFICANT CHANGE UP (ref 1–3.3)
LYMPHOCYTES # BLD AUTO: 33.6 % — SIGNIFICANT CHANGE UP (ref 13–44)
MCHC RBC-ENTMCNC: 32.5 PG — SIGNIFICANT CHANGE UP (ref 27–34)
MCHC RBC-ENTMCNC: 33.5 GM/DL — SIGNIFICANT CHANGE UP (ref 32–36)
MCV RBC AUTO: 97 FL — SIGNIFICANT CHANGE UP (ref 80–100)
MONOCYTES # BLD AUTO: 0.5 K/UL — SIGNIFICANT CHANGE UP (ref 0–0.9)
MONOCYTES NFR BLD AUTO: 9.2 % — SIGNIFICANT CHANGE UP (ref 2–14)
NEUTROPHILS # BLD AUTO: 2.92 K/UL — SIGNIFICANT CHANGE UP (ref 1.8–7.4)
NEUTROPHILS NFR BLD AUTO: 53.7 % — SIGNIFICANT CHANGE UP (ref 43–77)
NRBC # BLD: 0 /100 WBCS — SIGNIFICANT CHANGE UP (ref 0–0)
PLATELET # BLD AUTO: 117 K/UL — LOW (ref 150–400)
POTASSIUM SERPL-MCNC: 3.6 MMOL/L — SIGNIFICANT CHANGE UP (ref 3.5–5.3)
POTASSIUM SERPL-SCNC: 3.6 MMOL/L — SIGNIFICANT CHANGE UP (ref 3.5–5.3)
PROTHROM AB SERPL-ACNC: 90.2 SEC — HIGH (ref 10–12.9)
RBC # BLD: 3.69 M/UL — LOW (ref 4.2–5.8)
RBC # FLD: 14.5 % — SIGNIFICANT CHANGE UP (ref 10.3–14.5)
SODIUM SERPL-SCNC: 157 MMOL/L — HIGH (ref 135–145)
SPECIMEN SOURCE: SIGNIFICANT CHANGE UP
WBC # BLD: 5.44 K/UL — SIGNIFICANT CHANGE UP (ref 3.8–10.5)
WBC # FLD AUTO: 5.44 K/UL — SIGNIFICANT CHANGE UP (ref 3.8–10.5)

## 2019-04-26 PROCEDURE — 99233 SBSQ HOSP IP/OBS HIGH 50: CPT | Mod: GC

## 2019-04-26 RX ORDER — SODIUM CHLORIDE 9 MG/ML
1000 INJECTION, SOLUTION INTRAVENOUS
Qty: 0 | Refills: 0 | Status: DISCONTINUED | OUTPATIENT
Start: 2019-04-26 | End: 2019-04-27

## 2019-04-26 RX ADMIN — ENTACAPONE 200 MILLIGRAM(S): 200 TABLET, FILM COATED ORAL at 11:44

## 2019-04-26 RX ADMIN — Medication 108 GRAM(S): at 11:38

## 2019-04-26 RX ADMIN — SODIUM CHLORIDE 75 MILLILITER(S): 9 INJECTION, SOLUTION INTRAVENOUS at 11:38

## 2019-04-26 RX ADMIN — SODIUM CHLORIDE 100 MILLILITER(S): 9 INJECTION, SOLUTION INTRAVENOUS at 00:25

## 2019-04-26 RX ADMIN — Medication 25 MILLIGRAM(S): at 17:45

## 2019-04-26 RX ADMIN — SENNA PLUS 2 TABLET(S): 8.6 TABLET ORAL at 22:44

## 2019-04-26 RX ADMIN — ENTACAPONE 200 MILLIGRAM(S): 200 TABLET, FILM COATED ORAL at 06:34

## 2019-04-26 RX ADMIN — TAMSULOSIN HYDROCHLORIDE 0.4 MILLIGRAM(S): 0.4 CAPSULE ORAL at 22:44

## 2019-04-26 RX ADMIN — FINASTERIDE 5 MILLIGRAM(S): 5 TABLET, FILM COATED ORAL at 11:52

## 2019-04-26 RX ADMIN — ENTACAPONE 200 MILLIGRAM(S): 200 TABLET, FILM COATED ORAL at 17:45

## 2019-04-26 RX ADMIN — CARBIDOPA AND LEVODOPA 1 TABLET(S): 25; 100 TABLET ORAL at 06:34

## 2019-04-26 RX ADMIN — Medication 108 GRAM(S): at 17:45

## 2019-04-26 RX ADMIN — CARBIDOPA AND LEVODOPA 1 TABLET(S): 25; 100 TABLET ORAL at 11:44

## 2019-04-26 RX ADMIN — Medication 108 GRAM(S): at 05:43

## 2019-04-26 RX ADMIN — POLYETHYLENE GLYCOL 3350 17 GRAM(S): 17 POWDER, FOR SOLUTION ORAL at 11:44

## 2019-04-26 RX ADMIN — Medication 100 MILLIGRAM(S): at 06:34

## 2019-04-26 RX ADMIN — CARBIDOPA AND LEVODOPA 1 TABLET(S): 25; 100 TABLET ORAL at 22:44

## 2019-04-26 RX ADMIN — Medication 25 MILLIGRAM(S): at 06:34

## 2019-04-26 RX ADMIN — CARBIDOPA AND LEVODOPA 1 TABLET(S): 25; 100 TABLET ORAL at 17:45

## 2019-04-26 RX ADMIN — SIMVASTATIN 10 MILLIGRAM(S): 20 TABLET, FILM COATED ORAL at 22:44

## 2019-04-26 NOTE — PROGRESS NOTE ADULT - PROBLEM SELECTOR PLAN 10
IMPROVE VTE Individual Risk Assessment        RISK                                                          Points  [  ] Previous VTE                                                3  [  ] Thrombophilia                                             2  [  ] Lower limb paralysis                                   2        (unable to hold up >15 seconds)    [  ] Current Cancer                                             2         (within 6 months)  [ x ] Immobilization > 24 hrs                              1  [  ] ICU/CCU stay > 24 hours                             1  [ x ] Age > 60                                                         1  IMPROVE VTE Score: 2  DVT ppx: on warfarin  problem 11. BPH - continue flomax and finasteride IMPROVE VTE Individual Risk Assessment        RISK                                                          Points  [  ] Previous VTE                                                3  [  ] Thrombophilia                                             2  [  ] Lower limb paralysis                                   2        (unable to hold up >15 seconds)    [  ] Current Cancer                                             2         (within 6 months)  [ x ] Immobilization > 24 hrs                              1  [  ] ICU/CCU stay > 24 hours                             1  [ x ] Age > 60                                                         1  IMPROVE VTE Score: 2  DVT ppx: on warfarin    11. BPH; continue flomax and finasteride.

## 2019-04-26 NOTE — PROGRESS NOTE ADULT - PROBLEM SELECTOR PLAN 6
Chronic - continue COMTAN and sinemet  - fall risk protocol - Chronic, stable.  - Continue entacapone and sinemet.  - Fall risk protocol.

## 2019-04-26 NOTE — PROGRESS NOTE ADULT - PROBLEM SELECTOR PLAN 9
Controlled - continue metoprolol   - routine hemodynamics - Controlled - continue metoprolol tartrate 25 mg BID with hold parameters.   - Routine hemodynamics.

## 2019-04-26 NOTE — PROGRESS NOTE ADULT - PROBLEM SELECTOR PLAN 1
- Improving; possibly metabolic in nature and multifactorial in setting of UTI, hypernatremia vs progressive dementia and Parkinson's.  - CT head negative for acute findings.   - Switch IVF to D5W @ 75cc/hour x 12 hours in setting of hypernatremia.  - Trend BMP.   - Follow up speech and swallow evaluation; per wife, patient on nectar thickened liquids at home.  - Fall risk protocol, aspiration precautions. - Improving, suspect metabolic encephalopathy, multifactorial due to UTI, hypernatremia with underlying dementia.   - CT head negative for acute findings.   - Switch IVF to D5W @ 75cc/hour x 12 hours in setting of hypernatremia.  - Trend BMP.   - Follow up speech and swallow evaluation; per wife, patient on nectar thickened liquids at home.  - Fall risk protocol, aspiration precautions.

## 2019-04-26 NOTE — SWALLOW BEDSIDE ASSESSMENT ADULT - COMMENTS
Second attempt made at a formal assessment of swallow function, however, patient currently sleeping and unable to be aroused. Patient's wife present at bedside and reporting her  consumed his pureed lunch without any difficulties. She stated she requested nectar thick liquids given that is his baseline liquid consistency at home. Second attempt made at a formal assessment of swallow function, however, patient currently sleeping and unable to be aroused. Patient's wife present at bedside and reporting her  consumed his pureed lunch without any difficulties. She stated she requested nectar thick liquids given that is his baseline liquid consistency at home. Defer PO diet to MD at this time and consider a nutritional consult as the patient is at an increased nutritional/aspiration risk. Call out to MD to discuss. This department to re-attempt a formal swallow evaluation as schedule permits. Second attempt made at a formal assessment of swallow function, however, patient currently sleeping and unable to be aroused. Patient's wife present at bedside and reporting her  consumed his pureed lunch without any s/s suggestive of airway penetration. She stated she requested nectar thick liquids given that is his baseline liquid consistency at home. Defer PO diet to MD at this time and consider a nutritional consult as the patient is at an increased nutritional/aspiration risk. Call out to MD to discuss. This department to re-attempt a formal swallow evaluation as schedule permits.

## 2019-04-26 NOTE — DIETITIAN INITIAL EVALUATION ADULT. - PROBLEM SELECTOR PLAN 10
IMPROVE VTE Individual Risk Assessment        RISK                                                          Points  [  ] Previous VTE                                                3  [  ] Thrombophilia                                             2  [  ] Lower limb paralysis                                   2        (unable to hold up >15 seconds)    [  ] Current Cancer                                             2         (within 6 months)  [ x ] Immobilization > 24 hrs                              1  [  ] ICU/CCU stay > 24 hours                             1  [ x ] Age > 60                                                         1  IMPROVE VTE Score: 2  DVT ppx: on warfarin  problem 11. BPH - continue flomax and finasteride

## 2019-04-26 NOTE — PROGRESS NOTE ADULT - PROBLEM SELECTOR PLAN 8
Severe, possibly progressive with sundowning  - constant observation, will potentially need soft restraints mittens - Severe, possibly progressive with sundowning  - Zyprexa 2.5 mg IM Q8 PRN for agitation.

## 2019-04-26 NOTE — CONSULT NOTE ADULT - SUBJECTIVE AND OBJECTIVE BOX
Infectious Diseases Consult by Lawanda Car MD    Reason for Consult :AMS secondary to  infection    HPI: All hx per chart and from wife who is t bedside   78y M PMH Afib (on coumadin), CVA (, ), dementia, HTN, BPH, parkinson's, and syncope BIBEMS for AMS.  Pt baseline of severe dementia, history obtained via wife at bedside.  Wife reports a week ago pt was not acting like his usual self, reported decline of appetite and more agitated/altered than his baseline.  Was seen by home call and diagnosed  with a UTI.  PO Cipro was started and later changed to PO Ampicillin based on urine cs results from 19 showed > 100K of E fecalis sensitive to ampicillin .  Abx course started on , per wife pt symptoms has not improved.  Pt was to receive IV fluids at home, but upon EMS arrival, reported to have low bp, abnormal electrocutes and deferred to have pt bought to the ED.  Off note, pt has been having worsening of urinary retention recently 2/2 Seroquel use which has been discontinued. In ER noted to have elevated INR  No hx of fever or chills . No cough     In the ED, vitals: T: 96.2, HR 57, bp 119/80, 97% o2 on RA. Labs: INR 7.08, na 157, K 3.1, CT head and renal stone hunt negative. Given potassium, 1 L LR bolus (2019 20:38)    H eis still having hematuria . he has hx or recurrent UTI . He has dysphagia .  Wife is the primary care giver, he spend most of day in wheel chair     Past Medical & Surgical Hx:  PAST MEDICAL & SURGICAL HISTORY:  Syncope  Parkinson disease  Constipation  Dementia  Urinary retention  Cerebrovascular accident  Hypertension  Atrial fibrillation  No significant past surgical history      Social History-- Retired lives with family at home   EtOH: denies   Tobacco: denies   Drug Use: denies     FAMILY HISTORY:  No pertinent family history in first degree relatives      Allergies    No Known Allergies    Intolerances        Home/ Out patient  Medications :  Home Medications:  Colace 100 mg oral capsule: 1 cap(s) orally 2 times a day (2019 20:38)  entacapone 200 mg oral tablet: 1 tab(s) orally 4 times a day (2019 20:38)  finasteride 5 mg oral tablet: 1 tab(s) orally once a day (2019 20:38)  Metoprolol Tartrate 25 mg oral tablet: 1 tab(s) orally 2 times a day (2019 20:38)  Senna 8.6 mg oral tablet: 1 tab(s) orally 2 times a day (2019 20:38)  Sinemet 25 mg-100 mg oral tablet: 1 tab(s) orally 5 times a day (2019 20:38)  tamsulosin 0.4 mg oral capsule: 1 cap(s) orally once a day (at bedtime) (2019 20:38)  warfarin 3 mg oral tablet: 1 tab(s) orally once a day (2019 20:38)  Zetia 10 mg oral tablet: 1 tab(s) orally once a day (2019 20:38)      Current Inpatient Medications :    ANTIBIOTICS:   ampicillin  IVPB      ampicillin  IVPB 1 Gram(s) IV Intermittent every 6 hours      OTHER RELEVANT MEDICATIONS :  carbidopa/levodopa  25/100 1 Tablet(s) Oral four times a day  carbidopa/levodopa  25/100 1 Tablet(s) Oral at bedtime  dextrose 5%. 1000 milliLiter(s) IV Continuous <Continuous>  docusate sodium 100 milliGRAM(s) Oral two times a day  entacapone 200 milliGRAM(s) Oral four times a day  finasteride 5 milliGRAM(s) Oral daily  metoprolol tartrate 25 milliGRAM(s) Oral two times a day  OLANZapine Injectable 2.5 milliGRAM(s) IntraMuscular every 8 hours PRN  polyethylene glycol 3350 17 Gram(s) Oral daily  senna 2 Tablet(s) Oral at bedtime  simvastatin 10 milliGRAM(s) Oral at bedtime  tamsulosin 0.4 milliGRAM(s) Oral at bedtime      ROS:  Unable to obtain due to : advanced dementia       I&O's Detail    2019 07:01  -  2019 07:00  --------------------------------------------------------  IN:    lactated ringers.: 1000 mL    Solution: 50 mL  Total IN: 1050 mL    OUT:  Total OUT: 0 mL    Total NET: 1050 mL      Physical Exam:  Vital Signs Last 24 Hrs  T(C): 36.8 (2019 12:41), Max: 37.1 (2019 20:57)  T(F): 98.2 (2019 12:41), Max: 98.8 (2019 20:57)  HR: 84 (2019 12:41) (59 - 94)  BP: 124/85 (2019 12:41) (104/75 - 138/83)  BP(mean): --  RR: 17 (2019 12:41) (17 - 20)  SpO2: 94% (2019 12:41) (94% - 100%)    Weight (kg): 53 (04-25 @ 23:56)    General: Elderly male ,cachectic , in no acute distress  Eyes: sclera anicteric, pupils equal and reactive to light  ENMT: buccal mucosa dry, pharynx not injected  Neck: supple, trachea midline  Lungs: clear, no wheeze/rhonchi  Cardiovascular: regular rate and rhythm, S1 S2  Abdomen: soft, nontender, no organomegaly present, bowel sounds normal  Neurological:  awake, confused non verbal .  Skin: no increased ecchymosis/petechiae/purpura  Lymph Nodes: no palpable cervical/supraclavicular lymph nodes enlargements  Extremities: no cyanosis/clubbing/edema    Labs:                            12.0   5.44   )----------(  117       ( 2019 08:39 )               35.8      157    |  123    |  18     ----------------------------<  90         ( 2019 08:39 )  3.6     |  30     |  0.92     Ca    9.1        ( 2019 08:39 )        PT/INR -  90.2 sec / 7.44 ratio   ( 2019 10:39 )       PTT -  59.4 sec   ( 2019 10:39 )  CAPILLARY BLOOD GLUCOSE        Urinalysis Basic - ( 2019 17:45 )    Color: Brown / Appearance: Turbid / S.025 / pH: x  Gluc: x / Ketone: Small  / Bili: Small / Urobili: Negative   Blood: x / Protein: 500 mg/dL / Nitrite: Negative   Leuk Esterase: Trace / RBC: >50 /HPF / WBC 0-2   Sq Epi: x / Non Sq Epi: Occasional / Bacteria: Moderate    Lactate, Blood: 1.9 mmol/L (19 @ 17:39)        RECENT CULTURES:    RADIOLOGY & ADDITIONAL STUDIES:  Xray Chest 1 View AP/PA (19 @ 16:10) >  Impression: The lung apices are obscured by the patient's chin. No gross   pulmonary consolidation, pleural effusion or pneumothorax.    New small left basilar atelectasis.    Stable cardiac silhouette.    CT Head No Cont (19 @ 16:52) >  Findings: No intracranial hemorrhage is seen. Gray-white differentiation   is maintained. Ventricular and sulcal size are proportionately prominent.   There are moderate patchy periventricular and deep cortical white matter   hypodensities indicating nonspecific microvascular ischemic change, not   significantly changed since prior. No mass effect or midline shift is   seen. The     paranasal sinuses and visualized mastoid air cells are   clear. No osseous abnormality isseen.    Impression: No acute intracranial abnormality.    Moderate volume loss and microvascular ischemic change without interval   progression.      Assessment :     78y M PMH Afib (on coumadin), CVA (, ), dementia, HTN, BPH, parkinson's, and syncope BIBEMS for AMS , likely secondary to cystitis . he has markedly elevated INR most likely secondary to recent use of Cipro . His repeat UA from this admission is all hematuria     Plan :   - follow repeat sepsis work up sent , if negative consider changing to PO Amoxil 500 mg po tid x 7 days   - for MBS on Monday   - monitor INR , hold coumadin till inr < 2  - OOB to chair   - he appears non toxic so doubt he has sepsis       Continue with present regime .  Appropriate use of antibiotics and adverse effects reviewed.      I have discussed the above plan of care with patient's family in detail. They expressed understanding of the treatment plan . Risks, benefits and alternatives discussed in detail. I have asked if they have any questions or concerns and appropriately addressed them to the best of my ability . he should have goals of care conversation and a MOLST form filled out for advanced directives .      > 55 minutes spent in direct patient care reviewing  the notes, lab data/ imaging , discussion with multidisciplinary team. All questions were addressed and answered to the best of my capacity .    Thank you for allowing me to participate in the care of your patient .      Lawanda Car MD  [phone]

## 2019-04-26 NOTE — SWALLOW BEDSIDE ASSESSMENT ADULT - COMMENTS
Consult received and chart reviewed. Attempted to see patient this AM for an initial assessment of swallow function, however, patient sleeping and unable to be aroused despite maximum verbal/visual/tactile prompting by this clinician and patient's wife who was also present. According to the patient's wife, the patient has a history of dysphagia and has been on nectar thick liquids. She further added that the patient had a Modified Barium Swallow Study performed at [facility] in March 2017 with recommendations for regular solids and nectar thick liquids. She stated that her 's swallowing difficulties arise when he is sundowning (around 5-6pm). This department to re-attempt a formal evaluation of swallow function, as schedule permits.

## 2019-04-26 NOTE — DIETITIAN INITIAL EVALUATION ADULT. - OTHER INFO
per wife, believes pt lost ~5# recently with decreased intake, but no scale to document exact amount. does not like Ensure products.

## 2019-04-26 NOTE — PROGRESS NOTE ADULT - SUBJECTIVE AND OBJECTIVE BOX
Patient is a 78y old  Male who presents with a chief complaint of AMS (25 Apr 2019 20:38)    OVERNIGHT EVENTS/INTERVAL HPI: Patient seen and examined at bedside, minimally verbal. Wife present. Patient able to respond yes/no to questions, denies pain. Per wife, patient was having episodes of dark brown urine yesterday. Wife also states patient has been having hallucinations which she attributes to entacapone, but admits being in a new environment may be exacerbating them as well.     REVIEW OF SYSTEMS: limited due to patient's clinical status; he denies suprapubic pain, fevers, chills.     Vital Signs  T(C): 36.5 (26 Apr 2019 04:57), Max: 37.1 (25 Apr 2019 20:57)  T(F): 97.7 (26 Apr 2019 04:57), Max: 98.8 (25 Apr 2019 20:57)  HR: 93 (26 Apr 2019 04:57) (57 - 94)  BP: 138/83 (26 Apr 2019 04:57) (104/75 - 138/83)  RR: 17 (26 Apr 2019 04:57) (17 - 20)  SpO2: 96% (26 Apr 2019 04:57) (96% - 100%)    PHYSICAL EXAM:   GENERAL: no acute distress, resting in bed, wife present at bedside  HEENT: NC/AT, EOMI, neck supple, MMM  RESPIRATORY: LCTA B/L, no rhonchi, rales, or wheezing  CARDIOVASCULAR: RRR, no murmurs, gallops, rubs  ABDOMINAL: soft, non-tender, non-distended, positive bowel sounds   EXTREMITIES: no clubbing, cyanosis, or edema  NEUROLOGICAL: awake and alert, answers yes/no to questions, minimally verbal, non-focal, pleasant  SKIN: no rashes or lesions   MUSCULOSKELETAL: no gross joint deformity                          12.0   5.44  )-----------( 117      ( 26 Apr 2019 08:39 )             35.8     04-26    157<H>  |  123<H>  |  18  ----------------------------<  90  3.6   |  30  |  0.92    Ca    9.1      26 Apr 2019 08:39    TPro  6.5  /  Alb  3.0<L>  /  TBili  0.5  /  DBili  x   /  AST  25  /  ALT  8<L>  /  AlkPhos  64  04-25      MEDICATIONS  (STANDING):  ampicillin  IVPB      ampicillin  IVPB 1 Gram(s) IV Intermittent every 6 hours  carbidopa/levodopa  25/100 1 Tablet(s) Oral four times a day  carbidopa/levodopa  25/100 1 Tablet(s) Oral at bedtime  docusate sodium 100 milliGRAM(s) Oral two times a day  entacapone 200 milliGRAM(s) Oral four times a day  finasteride 5 milliGRAM(s) Oral daily  lactated ringers. 1000 milliLiter(s) (100 mL/Hr) IV Continuous <Continuous>  metoprolol tartrate 25 milliGRAM(s) Oral two times a day  polyethylene glycol 3350 17 Gram(s) Oral daily  senna 2 Tablet(s) Oral at bedtime  simvastatin 10 milliGRAM(s) Oral at bedtime  tamsulosin 0.4 milliGRAM(s) Oral at bedtime    MEDICATIONS  (PRN):  OLANZapine Injectable 2.5 milliGRAM(s) IntraMuscular every 8 hours PRN Agitation      RADIOLOGY  < from: CT Renal Stone [facility] (04.25.19 @ 19:22) >    EXAM:  CT RENAL STONE HUNT                            PROCEDURE DATE:  04/25/2019          INTERPRETATION:  HISTORY:    Hematuria.    DATE and TIME of EXAM: 4/25/2019 7:11 PM    TECHNIQUE:  Sections were obtained from the diaphragm to the pubic   symphysis  without oral or IV contrast.    COMPARISON EXAMINATION:   11/14/2018.    FINDINGS:    The lung bases are clear and there is no evidence of a pleural effusion.    Multiple cysts noted in the liver, unchanged and the prior examination.   Cholelithiasis noted.    The spleen is not enlarged and demonstrates no focal abnormality. The   pancreatic contour is unremarkable without evidence of mass, inflammation   or ductal dilatation.    The adrenal glands demonstrate normal size and contour.    The right kidney demonstrates a normal noncontrast appearance without   evidence of stone or hydronephrosis.    Cortical scarring is noted involving the midportion of the left kidney   unchanged the prior study. There are multiple stones in the lowerpole   extending into the renal pelvis. Small stones in the upper pole as well.   These findings are unchanged. No evidence of a left ureteral calculus and   no evidence of left hydroureter or hydronephrosis. Overall no change in   the appearance of the left kidney since the prior study.    An IVC filter is noted, unchanged.    Atherosclerotic changes of the aorta. No evidence of aneurysm.    No evidence of retroperitoneal or pelvic lymphadenopathy.    The prostate gland is enlarged, indenting the base of the bladder. The   bladder is otherwise unremarkable.    No intestinal abnormality.    Degenerative changes noted in the spine.    IMPRESSION:    Multiple left intrarenal stones without evidence of obstruction. No   evidence of ureteral calculus or hydronephrosis on either side. These   findings are unchanged since 11/14/2018. Left renal cortical scarring,   unchanged.    Enlarged prostate gland.    Cholelithiasis.    DEVIN SPAULDING M.D., ATTENDING RADIOLOGIST  This document has been electronically signed. Apr 25 2019  7:29PM        < end of copied text >

## 2019-04-26 NOTE — DIETITIAN INITIAL EVALUATION ADULT. - ENERGY NEEDS
Pt ate small amount today. SLP eval pending, pt was not awake when eval attempted.Limited NFPE performed as pt asleep and awaiting to be cleaned. Reveals severe fat loss orbital, moderate fat loss triceps. Severe muscle loss temporal, deltoid, scapula area.Wife agreeable to trial magic cup with pt to supplement oral intake.

## 2019-04-26 NOTE — PROGRESS NOTE ADULT - PROBLEM SELECTOR PLAN 3
- Per wife, patient with dark brown urine.  - Monitor hematuria, especially in setting of elevated INR.  - H/H stable.   - If persists, may need CBI/urology consult.

## 2019-04-26 NOTE — PROGRESS NOTE ADULT - PROBLEM SELECTOR PLAN 5
Rate controlled, INR supra-therapeutic  - hold warfarin  - trend INR, can restart once indicated target INR 2-3  - continue metoprolol - Rate controlled, INR supratherapeutic.  - Hold warfarin.  - Trend INR, can restart once indicated target INR 2-3  - Continue metoprolol tartrate 25 mg BID with hold parameters.

## 2019-04-26 NOTE — CHART NOTE - NSCHARTNOTEFT_GEN_A_CORE
Upon Nutritional Assessment by the Registered Dietitian your patient was determined to meet criteria / has evidence of the following diagnosis/diagnoses:          [ ]  Mild Protein Calorie Malnutrition        [ ]  Moderate Protein Calorie Malnutrition        [x ] Severe Protein Calorie Malnutrition, chronic        [ ] Unspecified Protein Calorie Malnutrition        [ ] Underweight / BMI <19        [ ] Morbid Obesity / BMI > 40      Findings as based on:  •  Comprehensive nutrition assessment and consultation  •  Calorie counts (nutrient intake analysis)  •  Food acceptance and intake status from observations by staff  •  Follow up  •  Patient education  •  Intervention secondary to interdisciplinary rounds  •   concerns  **report of wt loss, decreased oral intake, severe muscle/fat loss    Treatment:    The following diet has been recommended:  Puree diet with nectar thick liquids, magic cup added TID    PROVIDER Section:     By signing this assessment you are acknowledging and agree with the diagnosis/diagnoses assigned by the Registered Dietitian    Comments:

## 2019-04-26 NOTE — PROGRESS NOTE ADULT - PROBLEM SELECTOR PLAN 4
2/2 decreased PO intake - Likely secondary to decreased PO intake.  - D5W @ 75cc/hour x 12 hours in setting of hypernatremia.

## 2019-04-26 NOTE — DIETITIAN INITIAL EVALUATION ADULT. - SIGNS/SYMPTOMS
As evidenced by previous SLP evals As evidenced by wt loss, muscle fat loss and decreased oral intake over last few weeks/months

## 2019-04-26 NOTE — PROGRESS NOTE ADULT - ASSESSMENT
78y M PMH Afib (on coumadin), CVA (2012, 2016), dementia, HTN, BPH, parkinson's, and syncope BIBEMS for AMS.  Admitted for AMS, metabolic encephalopathy, UTI. On IV ampicillin for UTI/possible prostatitis.

## 2019-04-27 LAB
ANION GAP SERPL CALC-SCNC: 7 MMOL/L — SIGNIFICANT CHANGE UP (ref 5–17)
BUN SERPL-MCNC: 12 MG/DL — SIGNIFICANT CHANGE UP (ref 7–23)
CALCIUM SERPL-MCNC: 8.4 MG/DL — LOW (ref 8.5–10.1)
CHLORIDE SERPL-SCNC: 118 MMOL/L — HIGH (ref 96–108)
CO2 SERPL-SCNC: 27 MMOL/L — SIGNIFICANT CHANGE UP (ref 22–31)
CREAT SERPL-MCNC: 0.78 MG/DL — SIGNIFICANT CHANGE UP (ref 0.5–1.3)
GLUCOSE SERPL-MCNC: 89 MG/DL — SIGNIFICANT CHANGE UP (ref 70–99)
HCT VFR BLD CALC: 33.8 % — LOW (ref 39–50)
HGB BLD-MCNC: 11.6 G/DL — LOW (ref 13–17)
INR BLD: 2.7 RATIO — HIGH (ref 0.88–1.16)
MCHC RBC-ENTMCNC: 32.5 PG — SIGNIFICANT CHANGE UP (ref 27–34)
MCHC RBC-ENTMCNC: 34.3 GM/DL — SIGNIFICANT CHANGE UP (ref 32–36)
MCV RBC AUTO: 94.7 FL — SIGNIFICANT CHANGE UP (ref 80–100)
NRBC # BLD: 0 /100 WBCS — SIGNIFICANT CHANGE UP (ref 0–0)
PLATELET # BLD AUTO: 111 K/UL — LOW (ref 150–400)
POTASSIUM SERPL-MCNC: 3 MMOL/L — LOW (ref 3.5–5.3)
POTASSIUM SERPL-SCNC: 3 MMOL/L — LOW (ref 3.5–5.3)
PROTHROM AB SERPL-ACNC: 31.5 SEC — HIGH (ref 10–12.9)
RBC # BLD: 3.57 M/UL — LOW (ref 4.2–5.8)
RBC # FLD: 14 % — SIGNIFICANT CHANGE UP (ref 10.3–14.5)
SODIUM SERPL-SCNC: 152 MMOL/L — HIGH (ref 135–145)
WBC # BLD: 4.86 K/UL — SIGNIFICANT CHANGE UP (ref 3.8–10.5)
WBC # FLD AUTO: 4.86 K/UL — SIGNIFICANT CHANGE UP (ref 3.8–10.5)

## 2019-04-27 PROCEDURE — 99233 SBSQ HOSP IP/OBS HIGH 50: CPT

## 2019-04-27 PROCEDURE — 92610 EVALUATE SWALLOWING FUNCTION: CPT

## 2019-04-27 RX ORDER — POTASSIUM CHLORIDE 20 MEQ
40 PACKET (EA) ORAL EVERY 4 HOURS
Qty: 0 | Refills: 0 | Status: COMPLETED | OUTPATIENT
Start: 2019-04-27 | End: 2019-04-27

## 2019-04-27 RX ORDER — POTASSIUM CHLORIDE 20 MEQ
40 PACKET (EA) ORAL
Qty: 0 | Refills: 0 | Status: DISCONTINUED | OUTPATIENT
Start: 2019-04-27 | End: 2019-04-27

## 2019-04-27 RX ORDER — SODIUM CHLORIDE 9 MG/ML
1000 INJECTION, SOLUTION INTRAVENOUS
Qty: 0 | Refills: 0 | Status: DISCONTINUED | OUTPATIENT
Start: 2019-04-27 | End: 2019-04-28

## 2019-04-27 RX ADMIN — ENTACAPONE 200 MILLIGRAM(S): 200 TABLET, FILM COATED ORAL at 05:17

## 2019-04-27 RX ADMIN — CARBIDOPA AND LEVODOPA 1 TABLET(S): 25; 100 TABLET ORAL at 17:36

## 2019-04-27 RX ADMIN — ENTACAPONE 200 MILLIGRAM(S): 200 TABLET, FILM COATED ORAL at 17:36

## 2019-04-27 RX ADMIN — CARBIDOPA AND LEVODOPA 1 TABLET(S): 25; 100 TABLET ORAL at 05:17

## 2019-04-27 RX ADMIN — Medication 100 MILLIGRAM(S): at 05:17

## 2019-04-27 RX ADMIN — OLANZAPINE 2.5 MILLIGRAM(S): 15 TABLET, FILM COATED ORAL at 21:55

## 2019-04-27 RX ADMIN — Medication 108 GRAM(S): at 11:37

## 2019-04-27 RX ADMIN — Medication 40 MILLIEQUIVALENT(S): at 17:35

## 2019-04-27 RX ADMIN — FINASTERIDE 5 MILLIGRAM(S): 5 TABLET, FILM COATED ORAL at 11:37

## 2019-04-27 RX ADMIN — Medication 108 GRAM(S): at 05:17

## 2019-04-27 RX ADMIN — CARBIDOPA AND LEVODOPA 1 TABLET(S): 25; 100 TABLET ORAL at 00:25

## 2019-04-27 RX ADMIN — POLYETHYLENE GLYCOL 3350 17 GRAM(S): 17 POWDER, FOR SOLUTION ORAL at 11:37

## 2019-04-27 RX ADMIN — CARBIDOPA AND LEVODOPA 1 TABLET(S): 25; 100 TABLET ORAL at 11:37

## 2019-04-27 RX ADMIN — SENNA PLUS 2 TABLET(S): 8.6 TABLET ORAL at 21:52

## 2019-04-27 RX ADMIN — OLANZAPINE 2.5 MILLIGRAM(S): 15 TABLET, FILM COATED ORAL at 12:38

## 2019-04-27 RX ADMIN — Medication 108 GRAM(S): at 17:35

## 2019-04-27 RX ADMIN — CARBIDOPA AND LEVODOPA 1 TABLET(S): 25; 100 TABLET ORAL at 21:52

## 2019-04-27 RX ADMIN — TAMSULOSIN HYDROCHLORIDE 0.4 MILLIGRAM(S): 0.4 CAPSULE ORAL at 21:52

## 2019-04-27 RX ADMIN — Medication 108 GRAM(S): at 00:25

## 2019-04-27 RX ADMIN — SIMVASTATIN 10 MILLIGRAM(S): 20 TABLET, FILM COATED ORAL at 21:52

## 2019-04-27 RX ADMIN — Medication 25 MILLIGRAM(S): at 17:36

## 2019-04-27 RX ADMIN — Medication 100 MILLIGRAM(S): at 17:36

## 2019-04-27 RX ADMIN — ENTACAPONE 200 MILLIGRAM(S): 200 TABLET, FILM COATED ORAL at 00:25

## 2019-04-27 RX ADMIN — Medication 40 MILLIEQUIVALENT(S): at 13:02

## 2019-04-27 RX ADMIN — Medication 25 MILLIGRAM(S): at 05:17

## 2019-04-27 RX ADMIN — ENTACAPONE 200 MILLIGRAM(S): 200 TABLET, FILM COATED ORAL at 11:37

## 2019-04-27 NOTE — SWALLOW BEDSIDE ASSESSMENT ADULT - SWALLOW EVAL: DIAGNOSIS
1. Mild - moderate oral dysphagia for puree, honey thick and nectar thick liquids marked by reduced bolus collection and delayed a-p transport and suspected posterior loss. 2. Mild- moderate oral dysphagia for puree and honey thick liquids marked by delayed swallow trigger with reduced laryngeal elevation upon palpation. No clinical evidence of airway penetration. 3. Moderate - severe pharyngeal dysphagia for nectar thick liquids marked by delayed swallow trigger with reduced laryngeal elevation. Immediate evidence of "wet" vocal quality noted subsequent to deglutition suggestive of airway penetration.

## 2019-04-27 NOTE — PROGRESS NOTE ADULT - PROBLEM SELECTOR PLAN 10
IMPROVE VTE Individual Risk Assessment        RISK                                                          Points  [  ] Previous VTE                                                3  [  ] Thrombophilia                                             2  [  ] Lower limb paralysis                                   2        (unable to hold up >15 seconds)    [  ] Current Cancer                                             2         (within 6 months)  [ x ] Immobilization > 24 hrs                              1  [  ] ICU/CCU stay > 24 hours                             1  [ x ] Age > 60                                                         1  IMPROVE VTE Score: 2  DVT ppx: on warfarin    11. BPH; continue flomax and finasteride.

## 2019-04-27 NOTE — PROGRESS NOTE ADULT - PROBLEM SELECTOR PLAN 9
- Controlled - continue metoprolol tartrate 25 mg BID with hold parameters.   - Routine hemodynamics.

## 2019-04-27 NOTE — SWALLOW BEDSIDE ASSESSMENT ADULT - SWALLOW EVAL: RECOMMENDED FEEDING/EATING TECHNIQUES
slow rate of PO presentation/maintain upright posture during/after eating for 30 mins/oral hygiene/allow for swallow between intakes/check mouth frequently for oral residue/pocketing/crush medication (when feasible)/no straws/position upright (90 degrees)/small sips/bites

## 2019-04-27 NOTE — PROGRESS NOTE ADULT - SUBJECTIVE AND OBJECTIVE BOX
Patient is a 78y old  Male who presents with a chief complaint of AMS (25 Apr 2019 20:38)    OVERNIGHT EVENTS/INTERVAL HPI: Patient seen and examined at bedside, minimally verbal. Wife present. Patient able to respond yes/no to questions, denies pain. Per wife, patient was having episodes of dark brown urine yesterday. Wife also states patient has been having hallucinations which she attributes to entacapone, but admits being in a new environment may be exacerbating them as well.     REVIEW OF SYSTEMS: limited due to patient's clinical status; he denies pain, but notably, he is confused.     Vital Signs Last 24 Hrs  T(C): 36.8 (27 Apr 2019 05:16), Max: 36.8 (26 Apr 2019 21:06)  T(F): 98.2 (27 Apr 2019 05:16), Max: 98.2 (26 Apr 2019 21:06)  HR: 96 (27 Apr 2019 04:40) (61 - 114)  BP: 122/82 (27 Apr 2019 04:40) (103/72 - 122/86)  BP(mean): --  RR: 18 (27 Apr 2019 04:40) (18 - 18)  SpO2: 91% (27 Apr 2019 05:16) (86% - 92%)    PHYSICAL EXAM:   GENERAL: no acute distress, resting in bed, wife present at bedside  HEENT: NC/AT, EOMI, neck supple, MMM  RESPIRATORY: LCTA B/L, no rhonchi, rales, or wheezing  CARDIOVASCULAR: RRR, no murmurs, gallops, rubs  ABDOMINAL: soft, non-tender, non-distended, positive bowel sounds   EXTREMITIES: no clubbing, cyanosis, or edema  NEUROLOGICAL: awake and alert, answers yes/no to questions, minimally verbal, non-focal, pleasant  SKIN: no rashes or lesions   MUSCULOSKELETAL: no gross joint deformity  : blood clots noted at urinary meatus                           11.6   4.86  )-----------( 111      ( 27 Apr 2019 08:59 )             33.8   04-27    152<H>  |  118<H>  |  12  ----------------------------<  89  3.0<L>   |  27  |  0.78    Ca    8.4<L>      27 Apr 2019 08:59        MEDICATIONS  (STANDING):  ampicillin  IVPB      ampicillin  IVPB 1 Gram(s) IV Intermittent every 6 hours  carbidopa/levodopa  25/100 1 Tablet(s) Oral four times a day  carbidopa/levodopa  25/100 1 Tablet(s) Oral at bedtime  dextrose 5%. 1000 milliLiter(s) (75 mL/Hr) IV Continuous <Continuous>  docusate sodium 100 milliGRAM(s) Oral two times a day  entacapone 200 milliGRAM(s) Oral four times a day  finasteride 5 milliGRAM(s) Oral daily  metoprolol tartrate 25 milliGRAM(s) Oral two times a day  polyethylene glycol 3350 17 Gram(s) Oral daily  senna 2 Tablet(s) Oral at bedtime  simvastatin 10 milliGRAM(s) Oral at bedtime  tamsulosin 0.4 milliGRAM(s) Oral at bedtime    MEDICATIONS  (PRN):  OLANZapine Injectable 2.5 milliGRAM(s) IntraMuscular every 8 hours PRN Agitation    RADIOLOGY  < from: CT Renal Stone [facility] (04.25.19 @ 19:22) >    EXAM:  CT RENAL STONE HUNT                            PROCEDURE DATE:  04/25/2019          INTERPRETATION:  HISTORY:    Hematuria.    DATE and TIME of EXAM: 4/25/2019 7:11 PM    TECHNIQUE:  Sections were obtained from the diaphragm to the pubic   symphysis  without oral or IV contrast.    COMPARISON EXAMINATION:   11/14/2018.    FINDINGS:    The lung bases are clear and there is no evidence of a pleural effusion.    Multiple cysts noted in the liver, unchanged and the prior examination.   Cholelithiasis noted.    The spleen is not enlarged and demonstrates no focal abnormality. The   pancreatic contour is unremarkable without evidence of mass, inflammation   or ductal dilatation.    The adrenal glands demonstrate normal size and contour.    The right kidney demonstrates a normal noncontrast appearance without   evidence of stone or hydronephrosis.    Cortical scarring is noted involving the midportion of the left kidney   unchanged the prior study. There are multiple stones in the lowerpole   extending into the renal pelvis. Small stones in the upper pole as well.   These findings are unchanged. No evidence of a left ureteral calculus and   no evidence of left hydroureter or hydronephrosis. Overall no change in   the appearance of the left kidney since the prior study.    An IVC filter is noted, unchanged.    Atherosclerotic changes of the aorta. No evidence of aneurysm.    No evidence of retroperitoneal or pelvic lymphadenopathy.    The prostate gland is enlarged, indenting the base of the bladder. The   bladder is otherwise unremarkable.    No intestinal abnormality.    Degenerative changes noted in the spine.    IMPRESSION:    Multiple left intrarenal stones without evidence of obstruction. No   evidence of ureteral calculus or hydronephrosis on either side. These   findings are unchanged since 11/14/2018. Left renal cortical scarring,   unchanged.    Enlarged prostate gland.    Cholelithiasis.    DEVIN SPAULDING M.D., ATTENDING RADIOLOGIST  This document has been electronically signed. Apr 25 2019  7:29PM        < end of copied text >

## 2019-04-27 NOTE — SWALLOW BEDSIDE ASSESSMENT ADULT - COMMENTS
Patient seen at bedside at which time he was alert though confused and required maximum verbal cues to maintain adequate attention to task and accept PO trials. In addition, patient required physical adjustment in order to achieve adequate positioning for evaluation.  As per chart review, patient with history of dysphagia, CVA, Parkinsons, AMS and UTI

## 2019-04-27 NOTE — SWALLOW BEDSIDE ASSESSMENT ADULT - SWALLOW EVAL: CURRENT DIET
Puree with thin liquids pending formal swallow evaluation
Puree with thin liquids per MD order
puree with nectar thick liquids per MD order

## 2019-04-27 NOTE — PROGRESS NOTE ADULT - PROBLEM SELECTOR PLAN 1
-Was improving, more confused today. Suspect metabolic encephalopathy, multifactorial due to UTI, hypernatremia with underlying dementia and possible element of hospital delirium at this point.  - CT head negative for acute findings.   - D5W IV for hypernatremia.  - Trend BMP.   - Seen by Speech and Swallow, continue dysphagia diet, follow up Monday.  - Fall risk protocol, aspiration precautions.

## 2019-04-27 NOTE — SWALLOW BEDSIDE ASSESSMENT ADULT - DIET PRIOR TO ADMI
Regular solids with nectar thick liquids per patient's wife's report
soft with nectar thick liquids as per patient's spouse

## 2019-04-27 NOTE — SWALLOW BEDSIDE ASSESSMENT ADULT - ASR SWALLOW ASPIRATION MONITOR
oral hygiene/position upright (90Y)/pneumonia/upper respiratory infection/gurgly voice/change of breathing pattern/cough/fever/throat clearing

## 2019-04-27 NOTE — PROGRESS NOTE ADULT - PROBLEM SELECTOR PLAN 5
- Rate controlled, INR supratherapeutic at presentation, now within therapeutic range.   - Given his hematuria, will continue to hold Warfarin.  - Continue metoprolol tartrate 25 mg BID with hold parameters.

## 2019-04-27 NOTE — SWALLOW BEDSIDE ASSESSMENT ADULT - ASR SWALLOW RECOMMEND DIAG
not appropriate at this time due to increased resistance to PO presentations, reduced positioning and confusion

## 2019-04-27 NOTE — PROGRESS NOTE ADULT - SUBJECTIVE AND OBJECTIVE BOX
ID Progress note     Name: JOSÉ MIGUEL COOPER  Age: 78y  Gender: Male  MRN: [id number]    Interval History-- Events noted, much more agitated this am .   Notes reviewed    Past Medical History--  Syncope  Parkinson disease  Constipation  Dementia  Urinary retention  Cerebrovascular accident  Hypertension  Atrial fibrillation  No significant past surgical history      For details regarding the patient's social history, family history, and other miscellaneous elements, please refer the initial infectious diseases consultation and/or the admitting history and physical examination for this admission.    Allergies--  Allergies    No Known Allergies    Intolerances        Medications--  Antibiotics:  ampicillin  IVPB      ampicillin  IVPB 1 Gram(s) IV Intermittent every 6 hours    Immunologic:    Other:  carbidopa/levodopa  25/100  carbidopa/levodopa  25/100  dextrose 5%.  docusate sodium  entacapone  finasteride  metoprolol tartrate  OLANZapine Injectable PRN  polyethylene glycol 3350  potassium chloride   Powder  senna  simvastatin  tamsulosin      Review of Systems--  Review of systems unable to be obtained secondary to clinical condition.    Physical Examination--    Vital Signs: T(F): 98.2 (04-27-19 @ 05:16), Max: 98.2 (04-26-19 @ 21:06)  HR: 96 (04-27-19 @ 04:40)  BP: 122/82 (04-27-19 @ 04:40)  RR: 18 (04-27-19 @ 04:40)  SpO2: 91% (04-27-19 @ 05:16)  Wt(kg): --  General: Nontoxic-appearing Male in no acute distress.  HEENT: AT/NC. PERRL. EOMI. Anicteric. Conjunctiva pink and moist. Oropharynx clear. Dentition fair.  Neck: Not rigid. No sense of mass.  Nodes: None palpable.  Lungs: Clear bilaterally without rales, wheezing or rhonchi  Heart: Regular rate and rhythm. No Murmur. No rub. No gallop. No palpable thrill.  Abdomen: Bowel sounds present and normoactive. Soft. Nondistended. Nontender. No sense of mass. No organomegaly.  Back: No spinal tenderness. No costovertebral angle tenderness.   Extremities: No cyanosis or clubbing. No edema.   Skin: Warm. Dry. Good turgor. No rash. No vasculitic stigmata.  Psychiatric: Appropriate affect and mood for situation.         Laboratory Studies--  CBC                        11.6   4.86  )-----------( 111      ( 27 Apr 2019 08:59 )             33.8       Chemistries  04-27    152<H>  |  118<H>  |  12  ----------------------------<  89  3.0<L>   |  27  |  0.78    Ca    8.4<L>      27 Apr 2019 08:59    TPro  6.5  /  Alb  3.0<L>  /  TBili  0.5  /  DBili  x   /  AST  25  /  ALT  8<L>  /  AlkPhos  64  04-25      Culture Data    Culture - Urine (collected 25 Apr 2019 22:39)  Source: .Urine Clean Catch (Midstream)  Final Report (26 Apr 2019 18:00):    No growth    Culture - Blood (collected 25 Apr 2019 21:59)  Source: .Blood Blood-Peripheral  Preliminary Report (26 Apr 2019 22:01):    No growth to date.    Culture - Blood (collected 25 Apr 2019 21:59)  Source: .Blood Blood-Peripheral  Preliminary Report (26 Apr 2019 22:01):    No growth to date.            Radiology:  CT Renal Stone [facility] (04.25.19 @ 19:22) >  FINDINGS:    The lung bases are clear and there is no evidence of a pleural effusion.    Multiple cysts noted in the liver, unchanged and the prior examination.   Cholelithiasis noted.    The spleen is not enlarged and demonstrates no focal abnormality. The   pancreatic contour is unremarkable without evidence of mass, inflammation   or ductal dilatation.    The adrenal glands demonstrate normal size and contour.    The right kidney demonstrates a normal noncontrast appearance without   evidence of stone or hydronephrosis.    Cortical scarring is noted involving the midportion of the left kidney   unchanged the prior study. There are multiple stones in the lowerpole   extending into the renal pelvis. Small stones in the upper pole as well.   These findings are unchanged. No evidence of a left ureteral calculus and   no evidence of left hydroureter or hydronephrosis. Overall no change in   the appearance of the left kidney since the prior study.    An IVC filter is noted, unchanged.    Atherosclerotic changes of the aorta. No evidence of aneurysm.    No evidence of retroperitoneal or pelvic lymphadenopathy.    The prostate gland is enlarged, indenting the base of the bladder. The   bladder is otherwise unremarkable.    No intestinal abnormality.    Degenerative changes noted in the spine.    IMPRESSION:    Multiple left intrarenal stones without evidence of obstruction. No   evidence of ureteral calculus or hydronephrosis on either side. These   findings are unchanged since 11/14/2018. Left renal cortical scarring,   unchanged.    Enlarged prostate gland.    Cholelithiasis..    Xray Chest 1 View AP/PA (04.25.19 @ 16:10) >  Impression: The lung apices are obscured by the patient's chin. No gross   pulmonary consolidation, pleural effusion or pneumothorax.    New small left basilar atelectasis.    Stable cardiac silhouette.    CT Head No Cont (04.25.19 @ 16:52) >  Findings: No intracranial hemorrhage is seen. Gray-white differentiation   is maintained. Ventricular and sulcal size are proportionately prominent.   There are moderate patchy periventricular and deep cortical white matter   hypodensities indicating nonspecific microvascular ischemic change, not   significantly changed since prior. No mass effect or midline shift is   seen. The     paranasal sinuses and visualized mastoid air cells are   clear. No osseous abnormality isseen.    Impression: No acute intracranial abnormality.    Moderate volume loss and microvascular ischemic change without interval   progression.    Assessment--  78y M PMH Afib (on coumadin), CVA (2012, 2016), dementia, HTN, BPH, parkinson's, and syncope BIBEMS for AMS , likely secondary to cystitis . he has markedly elevated INR most likely secondary to recent use of Cipro . His repeat UA from this admission is all hematuria     Plan :   - He can be  changed PO Amoxil 500 mg po tid x 7 days as here urine cs is negative   - for MBS on Monday   - monitor INR , hold coumadin till inr < 2  - OOB to chair   - he appears non toxic so doubt he has sepsis       Continue with present regime .  Appropriate use of antibiotics and adverse effects reviewed.    I have discussed the above plan of care with patient's family in detail. They expressed understanding of the treatment plan . Risks, benefits and alternatives discussed in detail. I have asked if they have any questions or concerns and appropriately addressed them to the best of my ability .      > 35 minutes spent in direct patient care reviewing  the notes, lab data/ imaging , discussion with multidisciplinary team. All questions were addressed and answered to the best of my capacity .    Thank you for allowing me to participate in the care of your patient .        Lawanda Car MD  [phone]

## 2019-04-28 DIAGNOSIS — N40.1 BENIGN PROSTATIC HYPERPLASIA WITH LOWER URINARY TRACT SYMPTOMS: ICD-10-CM

## 2019-04-28 LAB
ANION GAP SERPL CALC-SCNC: 7 MMOL/L — SIGNIFICANT CHANGE UP (ref 5–17)
APTT BLD: 28.5 SEC — SIGNIFICANT CHANGE UP (ref 28.5–37)
BUN SERPL-MCNC: 14 MG/DL — SIGNIFICANT CHANGE UP (ref 7–23)
CALCIUM SERPL-MCNC: 8.9 MG/DL — SIGNIFICANT CHANGE UP (ref 8.5–10.1)
CHLORIDE SERPL-SCNC: 120 MMOL/L — HIGH (ref 96–108)
CO2 SERPL-SCNC: 24 MMOL/L — SIGNIFICANT CHANGE UP (ref 22–31)
CREAT SERPL-MCNC: 0.91 MG/DL — SIGNIFICANT CHANGE UP (ref 0.5–1.3)
GLUCOSE SERPL-MCNC: 93 MG/DL — SIGNIFICANT CHANGE UP (ref 70–99)
HCT VFR BLD CALC: 35.3 % — LOW (ref 39–50)
HGB BLD-MCNC: 12 G/DL — LOW (ref 13–17)
INR BLD: 1.51 RATIO — HIGH (ref 0.88–1.16)
MCHC RBC-ENTMCNC: 32.5 PG — SIGNIFICANT CHANGE UP (ref 27–34)
MCHC RBC-ENTMCNC: 34 GM/DL — SIGNIFICANT CHANGE UP (ref 32–36)
MCV RBC AUTO: 95.7 FL — SIGNIFICANT CHANGE UP (ref 80–100)
NRBC # BLD: 0 /100 WBCS — SIGNIFICANT CHANGE UP (ref 0–0)
PLATELET # BLD AUTO: 125 K/UL — LOW (ref 150–400)
POTASSIUM SERPL-MCNC: 3.7 MMOL/L — SIGNIFICANT CHANGE UP (ref 3.5–5.3)
POTASSIUM SERPL-SCNC: 3.7 MMOL/L — SIGNIFICANT CHANGE UP (ref 3.5–5.3)
PROTHROM AB SERPL-ACNC: 17.4 SEC — HIGH (ref 10–12.9)
RBC # BLD: 3.69 M/UL — LOW (ref 4.2–5.8)
RBC # FLD: 14.6 % — HIGH (ref 10.3–14.5)
SODIUM SERPL-SCNC: 151 MMOL/L — HIGH (ref 135–145)
WBC # BLD: 6.72 K/UL — SIGNIFICANT CHANGE UP (ref 3.8–10.5)
WBC # FLD AUTO: 6.72 K/UL — SIGNIFICANT CHANGE UP (ref 3.8–10.5)

## 2019-04-28 PROCEDURE — 99233 SBSQ HOSP IP/OBS HIGH 50: CPT

## 2019-04-28 RX ORDER — SODIUM CHLORIDE 9 MG/ML
1000 INJECTION, SOLUTION INTRAVENOUS
Qty: 0 | Refills: 0 | Status: DISCONTINUED | OUTPATIENT
Start: 2019-04-28 | End: 2019-04-29

## 2019-04-28 RX ADMIN — ENTACAPONE 200 MILLIGRAM(S): 200 TABLET, FILM COATED ORAL at 23:14

## 2019-04-28 RX ADMIN — CARBIDOPA AND LEVODOPA 1 TABLET(S): 25; 100 TABLET ORAL at 21:34

## 2019-04-28 RX ADMIN — TAMSULOSIN HYDROCHLORIDE 0.4 MILLIGRAM(S): 0.4 CAPSULE ORAL at 21:34

## 2019-04-28 RX ADMIN — ENTACAPONE 200 MILLIGRAM(S): 200 TABLET, FILM COATED ORAL at 17:16

## 2019-04-28 RX ADMIN — CARBIDOPA AND LEVODOPA 1 TABLET(S): 25; 100 TABLET ORAL at 05:58

## 2019-04-28 RX ADMIN — CARBIDOPA AND LEVODOPA 1 TABLET(S): 25; 100 TABLET ORAL at 00:40

## 2019-04-28 RX ADMIN — Medication 108 GRAM(S): at 05:55

## 2019-04-28 RX ADMIN — POLYETHYLENE GLYCOL 3350 17 GRAM(S): 17 POWDER, FOR SOLUTION ORAL at 12:46

## 2019-04-28 RX ADMIN — Medication 100 MILLIGRAM(S): at 17:16

## 2019-04-28 RX ADMIN — FINASTERIDE 5 MILLIGRAM(S): 5 TABLET, FILM COATED ORAL at 12:46

## 2019-04-28 RX ADMIN — Medication 25 MILLIGRAM(S): at 05:58

## 2019-04-28 RX ADMIN — Medication 108 GRAM(S): at 11:28

## 2019-04-28 RX ADMIN — CARBIDOPA AND LEVODOPA 1 TABLET(S): 25; 100 TABLET ORAL at 23:14

## 2019-04-28 RX ADMIN — Medication 108 GRAM(S): at 23:14

## 2019-04-28 RX ADMIN — Medication 25 MILLIGRAM(S): at 17:16

## 2019-04-28 RX ADMIN — CARBIDOPA AND LEVODOPA 1 TABLET(S): 25; 100 TABLET ORAL at 12:45

## 2019-04-28 RX ADMIN — SENNA PLUS 2 TABLET(S): 8.6 TABLET ORAL at 21:34

## 2019-04-28 RX ADMIN — CARBIDOPA AND LEVODOPA 1 TABLET(S): 25; 100 TABLET ORAL at 17:16

## 2019-04-28 RX ADMIN — Medication 100 MILLIGRAM(S): at 05:58

## 2019-04-28 RX ADMIN — Medication 108 GRAM(S): at 17:17

## 2019-04-28 RX ADMIN — Medication 108 GRAM(S): at 00:40

## 2019-04-28 RX ADMIN — ENTACAPONE 200 MILLIGRAM(S): 200 TABLET, FILM COATED ORAL at 00:40

## 2019-04-28 RX ADMIN — ENTACAPONE 200 MILLIGRAM(S): 200 TABLET, FILM COATED ORAL at 05:58

## 2019-04-28 RX ADMIN — SODIUM CHLORIDE 75 MILLILITER(S): 9 INJECTION, SOLUTION INTRAVENOUS at 22:51

## 2019-04-28 RX ADMIN — SIMVASTATIN 10 MILLIGRAM(S): 20 TABLET, FILM COATED ORAL at 21:34

## 2019-04-28 RX ADMIN — ENTACAPONE 200 MILLIGRAM(S): 200 TABLET, FILM COATED ORAL at 12:45

## 2019-04-28 NOTE — PROGRESS NOTE ADULT - PROBLEM SELECTOR PLAN 1
-Suspect metabolic encephalopathy, multifactorial due to UTI, hypernatremia with underlying dementia and possible element of hospital delirium at this point.  - CT head negative for acute findings.   - D5W IV for hypernatremia.  - Trend BMP.   - Seen by Speech and Swallow, continue dysphagia diet, follow up Monday.  - Fall risk protocol, aspiration precautions.

## 2019-04-28 NOTE — PROGRESS NOTE ADULT - PROBLEM SELECTOR PLAN 3
- Per wife, patient with dark brown urine at home.  - Now with bright red blood and clots from meatus at this point.   -Urology consulted: can hold off on CBI for now and observe. Monitor for retention as he may need catheter.   - Monitor H/H. Hold Coumadin

## 2019-04-28 NOTE — PROGRESS NOTE ADULT - PROBLEM SELECTOR PLAN 10
IMPROVE VTE Individual Risk Assessment        RISK                                                          Points  [  ] Previous VTE                                                3  [  ] Thrombophilia                                             2  [  ] Lower limb paralysis                                   2        (unable to hold up >15 seconds)    [  ] Current Cancer                                             2         (within 6 months)  [ x ] Immobilization > 24 hrs                              1  [  ] ICU/CCU stay > 24 hours                             1  [ x ] Age > 60                                                         1  IMPROVE VTE Score: 2  DVT ppx: SCDs while Coumadin held    11. BPH; continue flomax and finasteride.

## 2019-04-28 NOTE — PROGRESS NOTE ADULT - SUBJECTIVE AND OBJECTIVE BOX
ID Progress note     Name: JOSÉ MIGUEL COOPER  Age: 78y  Gender: Male  MRN: [id number]    Interval History-- Events noted, more awake , Urology follow up noted, no further hematuria . No plans for  any intervention   Notes reviewed    Past Medical History--  Syncope  Parkinson disease  Constipation  Dementia  Urinary retention  Cerebrovascular accident  Hypertension  Atrial fibrillation  No significant past surgical history      For details regarding the patient's social history, family history, and other miscellaneous elements, please refer the initial infectious diseases consultation and/or the admitting history and physical examination for this admission.    Allergies--  Allergies    No Known Allergies    Intolerances        Medications--  Antibiotics:  ampicillin  IVPB      ampicillin  IVPB 1 Gram(s) IV Intermittent every 6 hours    Immunologic:    Other:  carbidopa/levodopa  25/100  carbidopa/levodopa  25/100  dextrose 5%.  docusate sodium  entacapone  finasteride  metoprolol tartrate  OLANZapine Injectable PRN  polyethylene glycol 3350  senna  simvastatin  tamsulosin      Review of Systems--  Review of systems unable to be obtained secondary to clinical condition.    Physical Examination--    Vital Signs: T(F): 97.6 (04-28-19 @ 13:30), Max: 98.6 (04-27-19 @ 20:58)  HR: 92 (04-28-19 @ 13:30)  BP: 159/94 (04-28-19 @ 05:00)  RR: 18 (04-28-19 @ 13:30)  SpO2: 97% (04-28-19 @ 13:30)  Wt(kg): --    General: Nontoxic-appearing Male in no acute distress.  HEENT: AT/NC. PERRL. EOMI. Anicteric. Conjunctiva pink and moist. Oropharynx clear. Dentition fair.  Neck: Not rigid. No sense of mass.  Nodes: None palpable.  Lungs: Clear bilaterally without rales, wheezing or rhonchi  Heart: Regular rate and rhythm. No Murmur. No rub. No gallop. No palpable thrill.  Abdomen: Bowel sounds present and normoactive. Soft. Nondistended. Nontender. No sense of mass. No organomegaly.  Back: No spinal tenderness. No costovertebral angle tenderness.   Extremities: No cyanosis or clubbing. No edema.   Skin: Warm. Dry. Good turgor. No rash. No vasculitic stigmata.  Psychiatric: Appropriate affect and mood for situation.       Laboratory Studies--  CBC                        12.0   6.72  )-----------( 125      ( 28 Apr 2019 09:39 )             35.3       Chemistries  04-28    151<H>  |  120<H>  |  14  ----------------------------<  93  3.7   |  24  |  0.91    Ca    8.9      28 Apr 2019 09:39        Culture Data    Culture - Urine (collected 25 Apr 2019 22:39)  Source: .Urine Clean Catch (Midstream)  Final Report (26 Apr 2019 18:00):    No growth    Culture - Blood (collected 25 Apr 2019 21:59)  Source: .Blood Blood-Peripheral  Preliminary Report (26 Apr 2019 22:01):    No growth to date.    Culture - Blood (collected 25 Apr 2019 21:59)  Source: .Blood Blood-Peripheral  Preliminary Report (26 Apr 2019 22:01):    No growth to date.        Radiology:  CT Renal Stone [place] (04.25.19 @ 19:22) >  IMPRESSION:    Multiple left intrarenal stones without evidence of obstruction. No   evidence of ureteral calculus or hydronephrosis on either side. These   findings are unchanged since 11/14/2018. Left renal cortical scarring,   unchanged.    Enlarged prostate gland.    Cholelithiasis..    Xray Chest 1 View AP/PA (04.25.19 @ 16:10) >  Impression: The lung apices are obscured by the patient's chin. No gross   pulmonary consolidation, pleural effusion or pneumothorax.    New small left basilar atelectasis.    Stable cardiac silhouette.    CT Head No Cont (04.25.19 @ 16:52) >  Findings: No intracranial hemorrhage is seen. Gray-white differentiation   is maintained. Ventricular and sulcal size are proportionately prominent.   There are moderate patchy periventricular and deep cortical white matter   hypodensities indicating nonspecific microvascular ischemic change, not   significantly changed since prior. No mass effect or midline shift is   seen. The     paranasal sinuses and visualized mastoid air cells are   clear. No osseous abnormality isseen.    Impression: No acute intracranial abnormality.    Moderate volume loss and microvascular ischemic change without interval   progression.    Assessment--  78y M PMH Afib (on coumadin), CVA (2012, 2016), dementia, HTN, BPH, parkinson's, and syncope BIBEMS for AMS , likely secondary to cystitis . he has markedly elevated INR most likely secondary to recent use of Cipro . His repeat UA from this admission is all hematuria     INR is better . hematuria has cleared up     Plan :   - He can be  changed PO Amoxil 500 mg po tid x 5 more days from am as here urine cs is negative   - for MBS on Monday   - monitor INR , hold coumadin till inr < 2  - OOB to chair   - he appears non toxic so doubt he has sepsis       Continue with present regime .  Appropriate use of antibiotics and adverse effects reviewed.    I have discussed the above plan of care with patient's family in detail. They expressed understanding of the treatment plan . Risks, benefits and alternatives discussed in detail. I have asked if they have any questions or concerns and appropriately addressed them to the best of my ability .      > 25 minutes spent in direct patient care reviewing  the notes, lab data/ imaging , discussion with multidisciplinary team. All questions were addressed and answered to the best of my capacity .    Thank you for allowing me to participate in the care of your patient .        Lawanda Car MD  [phone]

## 2019-04-28 NOTE — PHYSICAL THERAPY INITIAL EVALUATION ADULT - SITTING BALANCE: DYNAMIC
Pt req mod A with sitting edge of bed for knee extension AROM. Denied dizziness with sitting./poor minus

## 2019-04-28 NOTE — PHYSICAL THERAPY INITIAL EVALUATION ADULT - IMPAIRMENTS FOUND, PT EVAL
cognitive impairment/muscle strength/arousal, attention, and cognition/posture/poor safety awareness/gross motor/ROM/gait, locomotion, and balance

## 2019-04-28 NOTE — PHYSICAL THERAPY INITIAL EVALUATION ADULT - LEVEL OF INDEPENDENCE: SIT/STAND, REHAB EVAL
CHIEF COMPLAINT:  Diarrhea.    HPI:  Josehp June is an 59 Joseph Wahl, DO    Pt not following commands, not appropriate to stand at this time./unable to perform

## 2019-04-28 NOTE — PROVIDER CONTACT NOTE (OTHER) - ACTION/TREATMENT ORDERED:
Dr. Weller made aware, will continue to monitor urine output
Patient already has labs ordered, will monitor CBC.

## 2019-04-28 NOTE — CONSULT NOTE ADULT - SUBJECTIVE AND OBJECTIVE BOX
CHIEF COMPLAINT: Hematuria    HISTORY OF PRESENT ILLNESS:    78 year old male with gross hematuria. Patient was admitted with AMS and cystitis. Patient has been experiencing gross hematuria intermittently.      PAST MEDICAL & SURGICAL HISTORY:  Syncope  Parkinson disease  Constipation  Dementia  Urinary retention  Cerebrovascular accident  Hypertension  Atrial fibrillation  No significant past surgical history      REVIEW OF SYSTEMS:    CONSTITUTIONAL: No weakness, fevers or chills  EYES/ENT: No visual changes;  No vertigo or throat pain   NECK: No pain or stiffness  RESPIRATORY: No cough, wheezing, hemoptysis; No shortness of breath  CARDIOVASCULAR: No chest pain or palpitations  GASTROINTESTINAL: No abdominal or epigastric pain. No nausea, vomiting, or hematemesis; No diarrhea or constipation. No melena or hematochezia.  GENITOURINARY: as above  NEUROLOGICAL: No numbness or weakness  SKIN: No itching, burning, rashes, or lesions   All other review of systems is negative unless indicated above.    MEDICATIONS  (STANDING):  ampicillin  IVPB      ampicillin  IVPB 1 Gram(s) IV Intermittent every 6 hours  carbidopa/levodopa  25/100 1 Tablet(s) Oral four times a day  carbidopa/levodopa  25/100 1 Tablet(s) Oral at bedtime  dextrose 5%. 1000 milliLiter(s) (75 mL/Hr) IV Continuous <Continuous>  docusate sodium 100 milliGRAM(s) Oral two times a day  entacapone 200 milliGRAM(s) Oral four times a day  finasteride 5 milliGRAM(s) Oral daily  metoprolol tartrate 25 milliGRAM(s) Oral two times a day  polyethylene glycol 3350 17 Gram(s) Oral daily  senna 2 Tablet(s) Oral at bedtime  simvastatin 10 milliGRAM(s) Oral at bedtime  tamsulosin 0.4 milliGRAM(s) Oral at bedtime    MEDICATIONS  (PRN):  OLANZapine Injectable 2.5 milliGRAM(s) IntraMuscular every 8 hours PRN Agitation      Allergies    No Known Allergies    Intolerances        SOCIAL HISTORY:    FAMILY HISTORY:  No pertinent family history in first degree relatives      Vital Signs Last 24 Hrs  T(C): 37 (28 Apr 2019 05:00), Max: 37 (27 Apr 2019 20:58)  T(F): 98.6 (28 Apr 2019 05:00), Max: 98.6 (27 Apr 2019 20:58)  HR: 87 (28 Apr 2019 05:00) (87 - 91)  BP: 159/94 (28 Apr 2019 05:00) (114/77 - 159/94)  BP(mean): --  RR: 18 (28 Apr 2019 05:00) (18 - 18)  SpO2: 98% (28 Apr 2019 05:00) (93% - 98%)    PHYSICAL EXAM:    Constitutional: NAD, well-developed  Abd: Soft, NT/ND, No CVAT  : Normal phallus,open meatus,bilateral descended testes, no masses  Extremities: No peripheral edema  Psychiatric: Normal mood, normal affect  Skin: No rashes    LABS:                        11.6   4.86  )-----------( 111      ( 27 Apr 2019 08:59 )             33.8     04-27    152<H>  |  118<H>  |  12  ----------------------------<  89  3.0<L>   |  27  |  0.78    Ca    8.4<L>      27 Apr 2019 08:59      PT/INR - ( 27 Apr 2019 08:59 )   PT: 31.5 sec;   INR: 2.70 ratio         PTT - ( 26 Apr 2019 10:39 )  PTT:59.4 sec    RADIOLOGY & ADDITIONAL STUDIES:    < from: CT Renal Stone [facility] (04.25.19 @ 19:22) >    EXAM:  CT RENAL STONE [facility]                            PROCEDURE DATE:  04/25/2019          INTERPRETATION:  HISTORY:    Hematuria.    DATE and TIME of EXAM: 4/25/2019 7:11 PM    TECHNIQUE:  Sections were obtained from the diaphragm to the pubic   symphysis  without oral or IV contrast.    COMPARISON EXAMINATION:   11/14/2018.    FINDINGS:    The lung bases are clear and there is no evidence of a pleural effusion.    Multiple cysts noted in the liver, unchanged and the prior examination.   Cholelithiasis noted.    The spleen is not enlarged and demonstrates no focal abnormality. The   pancreatic contour is unremarkable without evidence of mass, inflammation   or ductal dilatation.    The adrenal glands demonstrate normal size and contour.    The right kidney demonstrates a normal noncontrast appearance without   evidence of stone or hydronephrosis.    Cortical scarring is noted involving the midportion of the left kidney   unchanged the prior study. There are multiple stones in the lowerpole   extending into the renal pelvis. Small stones in the upper pole as well.   These findings are unchanged. No evidence of a left ureteral calculus and   no evidence of left hydroureter or hydronephrosis. Overall no change in   the appearance of the left kidney since the prior study.    An IVC filter is noted, unchanged.    Atherosclerotic changes of the aorta. No evidence of aneurysm.    No evidence of retroperitoneal or pelvic lymphadenopathy.    The prostate gland is enlarged, indenting the base of the bladder. The   bladder is otherwise unremarkable.    No intestinal abnormality.    Degenerative changes noted in the spine.    IMPRESSION:    Multiple left intrarenal stones without evidence of obstruction. No   evidence of ureteral calculus or hydronephrosis on either side. These   findings are unchanged since 11/14/2018. Left renal cortical scarring,   unchanged.    Enlarged prostate gland.    Cholelithiasis..                DEVIN SPAULDING M.D., ATTENDING RADIOLOGIST  This document has been electronically signed. Apr 25 2019  7:29PM                < end of copied text > CHIEF COMPLAINT: Hematuria    HISTORY OF PRESENT ILLNESS:    78 year old male with gross hematuria. Patient was admitted with AMS and cystitis. Patient has been experiencing gross hematuria intermittently.   He has been voiding without difficult. He was noted to pass small blood clots yesterday that has resolved. He has a h/o kidney stones but no ureteral stones notes on CT stone study.    PAST MEDICAL & SURGICAL HISTORY:  Syncope  Parkinson disease  Constipation  Dementia  Urinary retention  Cerebrovascular accident  Hypertension  Atrial fibrillation  No significant past surgical history      REVIEW OF SYSTEMS:    CONSTITUTIONAL: No weakness, fevers or chills  EYES/ENT: No visual changes;  No vertigo or throat pain   NECK: No pain or stiffness  RESPIRATORY: No cough, wheezing, hemoptysis; No shortness of breath  CARDIOVASCULAR: No chest pain or palpitations  GASTROINTESTINAL: No abdominal or epigastric pain. No nausea, vomiting, or hematemesis; No diarrhea or constipation. No melena or hematochezia.  GENITOURINARY: as above  NEUROLOGICAL: No numbness or weakness  SKIN: No itching, burning, rashes, or lesions   All other review of systems is negative unless indicated above.    MEDICATIONS  (STANDING):  ampicillin  IVPB      ampicillin  IVPB 1 Gram(s) IV Intermittent every 6 hours  carbidopa/levodopa  25/100 1 Tablet(s) Oral four times a day  carbidopa/levodopa  25/100 1 Tablet(s) Oral at bedtime  dextrose 5%. 1000 milliLiter(s) (75 mL/Hr) IV Continuous <Continuous>  docusate sodium 100 milliGRAM(s) Oral two times a day  entacapone 200 milliGRAM(s) Oral four times a day  finasteride 5 milliGRAM(s) Oral daily  metoprolol tartrate 25 milliGRAM(s) Oral two times a day  polyethylene glycol 3350 17 Gram(s) Oral daily  senna 2 Tablet(s) Oral at bedtime  simvastatin 10 milliGRAM(s) Oral at bedtime  tamsulosin 0.4 milliGRAM(s) Oral at bedtime    MEDICATIONS  (PRN):  OLANZapine Injectable 2.5 milliGRAM(s) IntraMuscular every 8 hours PRN Agitation      Allergies    No Known Allergies    Intolerances        SOCIAL HISTORY:    FAMILY HISTORY:  No pertinent family history in first degree relatives      Vital Signs Last 24 Hrs  T(C): 37 (28 Apr 2019 05:00), Max: 37 (27 Apr 2019 20:58)  T(F): 98.6 (28 Apr 2019 05:00), Max: 98.6 (27 Apr 2019 20:58)  HR: 87 (28 Apr 2019 05:00) (87 - 91)  BP: 159/94 (28 Apr 2019 05:00) (114/77 - 159/94)  BP(mean): --  RR: 18 (28 Apr 2019 05:00) (18 - 18)  SpO2: 98% (28 Apr 2019 05:00) (93% - 98%)    PHYSICAL EXAM:    Constitutional: NAD, well-developed  Abd: Soft, NT/ND, No CVAT. No SP tenderness or distention.  : Normal phallus,open meatus,bilateral descended testes, no masses  Extremities: No peripheral edema  Psychiatric: Normal mood, normal affect  Skin: No rashes    LABS:                        11.6   4.86  )-----------( 111      ( 27 Apr 2019 08:59 )             33.8     04-27    152<H>  |  118<H>  |  12  ----------------------------<  89  3.0<L>   |  27  |  0.78    Ca    8.4<L>      27 Apr 2019 08:59      PT/INR - ( 27 Apr 2019 08:59 )   PT: 31.5 sec;   INR: 2.70 ratio         PTT - ( 26 Apr 2019 10:39 )  PTT:59.4 sec    RADIOLOGY & ADDITIONAL STUDIES:    < from: CT Renal Stone [facility] (04.25.19 @ 19:22) >    EXAM:  CT RENAL STONE [facility]                            PROCEDURE DATE:  04/25/2019          INTERPRETATION:  HISTORY:    Hematuria.    DATE and TIME of EXAM: 4/25/2019 7:11 PM    TECHNIQUE:  Sections were obtained from the diaphragm to the pubic   symphysis  without oral or IV contrast.    COMPARISON EXAMINATION:   11/14/2018.    FINDINGS:    The lung bases are clear and there is no evidence of a pleural effusion.    Multiple cysts noted in the liver, unchanged and the prior examination.   Cholelithiasis noted.    The spleen is not enlarged and demonstrates no focal abnormality. The   pancreatic contour is unremarkable without evidence of mass, inflammation   or ductal dilatation.    The adrenal glands demonstrate normal size and contour.    The right kidney demonstrates a normal noncontrast appearance without   evidence of stone or hydronephrosis.    Cortical scarring is noted involving the midportion of the left kidney   unchanged the prior study. There are multiple stones in the lowerpole   extending into the renal pelvis. Small stones in the upper pole as well.   These findings are unchanged. No evidence of a left ureteral calculus and   no evidence of left hydroureter or hydronephrosis. Overall no change in   the appearance of the left kidney since the prior study.    An IVC filter is noted, unchanged.    Atherosclerotic changes of the aorta. No evidence of aneurysm.    No evidence of retroperitoneal or pelvic lymphadenopathy.    The prostate gland is enlarged, indenting the base of the bladder. The   bladder is otherwise unremarkable.    No intestinal abnormality.    Degenerative changes noted in the spine.    IMPRESSION:    Multiple left intrarenal stones without evidence of obstruction. No   evidence of ureteral calculus or hydronephrosis on either side. These   findings are unchanged since 11/14/2018. Left renal cortical scarring,   unchanged.    Enlarged prostate gland.    Cholelithiasis..                DEVIN SPAULDING M.D., ATTENDING RADIOLOGIST  This document has been electronically signed. Apr 25 2019  7:29PM                < end of copied text >

## 2019-04-28 NOTE — PHYSICAL THERAPY INITIAL EVALUATION ADULT - PERTINENT HX OF CURRENT PROBLEM, REHAB EVAL
Pt is a 79 y/o M presenting to hospital for altered mental status. At baseline pt has severe dementia. One week prior to admission pt had UTI and was on anti-biotics. Symptoms did not improve. Pt exhibited worsening mental status according to wife. Dx: AMS likely metabolic and multifactorial in setting of UTI.

## 2019-04-28 NOTE — PHYSICAL THERAPY INITIAL EVALUATION ADULT - BALANCE TRAINING, PT EVAL
(3 - 5 sessions) Pt will improve sitting dynamic balance to Fair minus (will be able to sit edge of bed with CG).

## 2019-04-28 NOTE — PHYSICAL THERAPY INITIAL EVALUATION ADULT - ACTIVE RANGE OF MOTION EXAMINATION, REHAB EVAL
Limitations in active knee extension; about 1/2 through range. Unable to assess further secondary to limitations with following commands.

## 2019-04-28 NOTE — PHYSICAL THERAPY INITIAL EVALUATION ADULT - FOLLOWS COMMANDS/ANSWERS QUESTIONS, REHAB EVAL
able to follow commands 10% of the time. Pt's wife states he was given medication yesterday and he still seems very sleepy from it; does not appear to be at his normal orientation./unable to answer questions

## 2019-04-28 NOTE — PHYSICAL THERAPY INITIAL EVALUATION ADULT - ADDITIONAL COMMENTS
History obtained from wife. Pt's wife states she is able to get him sitting edge of bed. She assists him with sit to stands using rolling walker. She helps dress him and bathes him, and transfers to the commode (pt stands while pts wife places commode behind him). Pt had physical therapy services in the home and would walk about 40 feet with the PT only. Otherwise pt mostly in the wheel chair.

## 2019-04-28 NOTE — PHYSICAL THERAPY INITIAL EVALUATION ADULT - DISCHARGE DISPOSITION, PT EVAL
rehabilitation facility/home w/ home PT/subacute rehab vs Home with Home PT pending progress and ability to follow commands/home w/ assist

## 2019-04-28 NOTE — PROGRESS NOTE ADULT - SUBJECTIVE AND OBJECTIVE BOX
Patient is a 78y old  Male who presents with a chief complaint of AMS (25 Apr 2019 20:38)    OVERNIGHT EVENTS/INTERVAL HPI: Patient seen and examined at bedside, minimally verbal. Wife at bedside. Sleepy today, but woke up as I spoke with wife. Unable to voice complaints. Agitated overnight per nursing report and he received Zyprexa.     REVIEW OF SYSTEMS: limited due to patient's clinical status; he denies pain, but notably, he is confused.     Vital Signs Last 24 Hrs  T(C): 36.8 (28 Apr 2019 20:11), Max: 37 (27 Apr 2019 20:58)  T(F): 98.2 (28 Apr 2019 20:11), Max: 98.6 (27 Apr 2019 20:58)  HR: 88 (28 Apr 2019 20:11) (87 - 92)  BP: 105/74 (28 Apr 2019 20:11) (105/74 - 159/94)  BP(mean): --  RR: 18 (28 Apr 2019 20:11) (18 - 18)  SpO2: 95% (28 Apr 2019 20:11) (93% - 98%)    PHYSICAL EXAM:   GENERAL: no acute distress, resting in bed, wife present at bedside  HEENT: NC/AT, EOMI, neck supple, MMM  RESPIRATORY: LCTA B/L, no rhonchi, rales, or wheezing  CARDIOVASCULAR: RRR, no murmurs, gallops, rubs  ABDOMINAL: soft, non-tender, non-distended, positive bowel sounds   EXTREMITIES: no clubbing, cyanosis, or edema  NEUROLOGICAL:  answers yes/no to questions, minimally verbal, non-focal, pleasant  SKIN: warm, dry, intact  MUSCULOSKELETAL: no gross joint deformity  : no clots noted at meatus or in bed                            12.0   6.72  )-----------( 125      ( 28 Apr 2019 09:39 )             35.3   04-28    151<H>  |  120<H>  |  14  ----------------------------<  93  3.7   |  24  |  0.91    Ca    8.9      28 Apr 2019 09:39    PT/INR - ( 28 Apr 2019 09:39 )   PT: 17.4 sec;   INR: 1.51 ratio         PTT - ( 28 Apr 2019 09:39 )  PTT:28.5 sec      MEDICATIONS  (STANDING):  ampicillin  IVPB      ampicillin  IVPB 1 Gram(s) IV Intermittent every 6 hours  carbidopa/levodopa  25/100 1 Tablet(s) Oral four times a day  carbidopa/levodopa  25/100 1 Tablet(s) Oral at bedtime  dextrose 5%. 1000 milliLiter(s) (75 mL/Hr) IV Continuous <Continuous>  docusate sodium 100 milliGRAM(s) Oral two times a day  entacapone 200 milliGRAM(s) Oral four times a day  finasteride 5 milliGRAM(s) Oral daily  metoprolol tartrate 25 milliGRAM(s) Oral two times a day  polyethylene glycol 3350 17 Gram(s) Oral daily  senna 2 Tablet(s) Oral at bedtime  simvastatin 10 milliGRAM(s) Oral at bedtime  tamsulosin 0.4 milliGRAM(s) Oral at bedtime    MEDICATIONS  (PRN):  OLANZapine Injectable 2.5 milliGRAM(s) IntraMuscular every 8 hours PRN Agitation      RADIOLOGY  < from: CT Renal Stone Hunt (04.25.19 @ 19:22) >    EXAM:  CT RENAL STONE HUNT                            PROCEDURE DATE:  04/25/2019          INTERPRETATION:  HISTORY:    Hematuria.    DATE and TIME of EXAM: 4/25/2019 7:11 PM    TECHNIQUE:  Sections were obtained from the diaphragm to the pubic   symphysis  without oral or IV contrast.    COMPARISON EXAMINATION:   11/14/2018.    FINDINGS:    The lung bases are clear and there is no evidence of a pleural effusion.    Multiple cysts noted in the liver, unchanged and the prior examination.   Cholelithiasis noted.    The spleen is not enlarged and demonstrates no focal abnormality. The   pancreatic contour is unremarkable without evidence of mass, inflammation   or ductal dilatation.    The adrenal glands demonstrate normal size and contour.    The right kidney demonstrates a normal noncontrast appearance without   evidence of stone or hydronephrosis.    Cortical scarring is noted involving the midportion of the left kidney   unchanged the prior study. There are multiple stones in the lowerpole   extending into the renal pelvis. Small stones in the upper pole as well.   These findings are unchanged. No evidence of a left ureteral calculus and   no evidence of left hydroureter or hydronephrosis. Overall no change in   the appearance of the left kidney since the prior study.    An IVC filter is noted, unchanged.    Atherosclerotic changes of the aorta. No evidence of aneurysm.    No evidence of retroperitoneal or pelvic lymphadenopathy.    The prostate gland is enlarged, indenting the base of the bladder. The   bladder is otherwise unremarkable.    No intestinal abnormality.    Degenerative changes noted in the spine.    IMPRESSION:    Multiple left intrarenal stones without evidence of obstruction. No   evidence of ureteral calculus or hydronephrosis on either side. These   findings are unchanged since 11/14/2018. Left renal cortical scarring,   unchanged.    Enlarged prostate gland.    Cholelithiasis.    DEVIN SPAULDING M.D., ATTENDING RADIOLOGIST  This document has been electronically signed. Apr 25 2019  7:29PM        < end of copied text >

## 2019-04-29 LAB
ANION GAP SERPL CALC-SCNC: 7 MMOL/L — SIGNIFICANT CHANGE UP (ref 5–17)
BUN SERPL-MCNC: 17 MG/DL — SIGNIFICANT CHANGE UP (ref 7–23)
CALCIUM SERPL-MCNC: 8.7 MG/DL — SIGNIFICANT CHANGE UP (ref 8.5–10.1)
CHLORIDE SERPL-SCNC: 119 MMOL/L — HIGH (ref 96–108)
CO2 SERPL-SCNC: 24 MMOL/L — SIGNIFICANT CHANGE UP (ref 22–31)
CREAT SERPL-MCNC: 0.82 MG/DL — SIGNIFICANT CHANGE UP (ref 0.5–1.3)
GLUCOSE SERPL-MCNC: 118 MG/DL — HIGH (ref 70–99)
HCT VFR BLD CALC: 32.1 % — LOW (ref 39–50)
HGB BLD-MCNC: 11.1 G/DL — LOW (ref 13–17)
INR BLD: 1.33 RATIO — HIGH (ref 0.88–1.16)
MCHC RBC-ENTMCNC: 32.7 PG — SIGNIFICANT CHANGE UP (ref 27–34)
MCHC RBC-ENTMCNC: 34.6 GM/DL — SIGNIFICANT CHANGE UP (ref 32–36)
MCV RBC AUTO: 94.7 FL — SIGNIFICANT CHANGE UP (ref 80–100)
NRBC # BLD: 0 /100 WBCS — SIGNIFICANT CHANGE UP (ref 0–0)
PLATELET # BLD AUTO: 115 K/UL — LOW (ref 150–400)
POTASSIUM SERPL-MCNC: 3.2 MMOL/L — LOW (ref 3.5–5.3)
POTASSIUM SERPL-SCNC: 3.2 MMOL/L — LOW (ref 3.5–5.3)
PROTHROM AB SERPL-ACNC: 15.2 SEC — HIGH (ref 10–12.9)
RBC # BLD: 3.39 M/UL — LOW (ref 4.2–5.8)
RBC # FLD: 14.4 % — SIGNIFICANT CHANGE UP (ref 10.3–14.5)
SODIUM SERPL-SCNC: 150 MMOL/L — HIGH (ref 135–145)
WBC # BLD: 4.8 K/UL — SIGNIFICANT CHANGE UP (ref 3.8–10.5)
WBC # FLD AUTO: 4.8 K/UL — SIGNIFICANT CHANGE UP (ref 3.8–10.5)

## 2019-04-29 PROCEDURE — 99233 SBSQ HOSP IP/OBS HIGH 50: CPT | Mod: GC

## 2019-04-29 RX ORDER — AMOXICILLIN 250 MG/5ML
500 SUSPENSION, RECONSTITUTED, ORAL (ML) ORAL THREE TIMES A DAY
Qty: 0 | Refills: 0 | Status: DISCONTINUED | OUTPATIENT
Start: 2019-04-29 | End: 2019-05-02

## 2019-04-29 RX ORDER — SODIUM CHLORIDE 9 MG/ML
1000 INJECTION, SOLUTION INTRAVENOUS
Qty: 0 | Refills: 0 | Status: DISCONTINUED | OUTPATIENT
Start: 2019-04-29 | End: 2019-04-30

## 2019-04-29 RX ORDER — WARFARIN SODIUM 2.5 MG/1
2.5 TABLET ORAL ONCE
Qty: 0 | Refills: 0 | Status: COMPLETED | OUTPATIENT
Start: 2019-04-29 | End: 2019-04-29

## 2019-04-29 RX ORDER — POTASSIUM CHLORIDE 20 MEQ
40 PACKET (EA) ORAL EVERY 4 HOURS
Qty: 0 | Refills: 0 | Status: COMPLETED | OUTPATIENT
Start: 2019-04-29 | End: 2019-04-29

## 2019-04-29 RX ORDER — OLANZAPINE 15 MG/1
5 TABLET, FILM COATED ORAL
Qty: 0 | Refills: 0 | Status: DISCONTINUED | OUTPATIENT
Start: 2019-04-29 | End: 2019-04-30

## 2019-04-29 RX ADMIN — SENNA PLUS 2 TABLET(S): 8.6 TABLET ORAL at 22:29

## 2019-04-29 RX ADMIN — Medication 100 MILLIGRAM(S): at 05:58

## 2019-04-29 RX ADMIN — Medication 108 GRAM(S): at 05:57

## 2019-04-29 RX ADMIN — Medication 40 MILLIEQUIVALENT(S): at 11:06

## 2019-04-29 RX ADMIN — Medication 500 MILLIGRAM(S): at 13:21

## 2019-04-29 RX ADMIN — CARBIDOPA AND LEVODOPA 1 TABLET(S): 25; 100 TABLET ORAL at 11:06

## 2019-04-29 RX ADMIN — Medication 500 MILLIGRAM(S): at 22:29

## 2019-04-29 RX ADMIN — Medication 40 MILLIEQUIVALENT(S): at 13:22

## 2019-04-29 RX ADMIN — ENTACAPONE 200 MILLIGRAM(S): 200 TABLET, FILM COATED ORAL at 11:06

## 2019-04-29 RX ADMIN — Medication 100 MILLIGRAM(S): at 17:41

## 2019-04-29 RX ADMIN — SODIUM CHLORIDE 75 MILLILITER(S): 9 INJECTION, SOLUTION INTRAVENOUS at 22:29

## 2019-04-29 RX ADMIN — SODIUM CHLORIDE 75 MILLILITER(S): 9 INJECTION, SOLUTION INTRAVENOUS at 11:07

## 2019-04-29 RX ADMIN — ENTACAPONE 200 MILLIGRAM(S): 200 TABLET, FILM COATED ORAL at 23:08

## 2019-04-29 RX ADMIN — CARBIDOPA AND LEVODOPA 1 TABLET(S): 25; 100 TABLET ORAL at 17:41

## 2019-04-29 RX ADMIN — WARFARIN SODIUM 2.5 MILLIGRAM(S): 2.5 TABLET ORAL at 22:29

## 2019-04-29 RX ADMIN — ENTACAPONE 200 MILLIGRAM(S): 200 TABLET, FILM COATED ORAL at 05:57

## 2019-04-29 RX ADMIN — CARBIDOPA AND LEVODOPA 1 TABLET(S): 25; 100 TABLET ORAL at 23:08

## 2019-04-29 RX ADMIN — Medication 25 MILLIGRAM(S): at 05:57

## 2019-04-29 RX ADMIN — CARBIDOPA AND LEVODOPA 1 TABLET(S): 25; 100 TABLET ORAL at 05:57

## 2019-04-29 RX ADMIN — ENTACAPONE 200 MILLIGRAM(S): 200 TABLET, FILM COATED ORAL at 17:41

## 2019-04-29 RX ADMIN — FINASTERIDE 5 MILLIGRAM(S): 5 TABLET, FILM COATED ORAL at 11:06

## 2019-04-29 RX ADMIN — SIMVASTATIN 10 MILLIGRAM(S): 20 TABLET, FILM COATED ORAL at 22:29

## 2019-04-29 RX ADMIN — CARBIDOPA AND LEVODOPA 1 TABLET(S): 25; 100 TABLET ORAL at 22:29

## 2019-04-29 RX ADMIN — POLYETHYLENE GLYCOL 3350 17 GRAM(S): 17 POWDER, FOR SOLUTION ORAL at 11:06

## 2019-04-29 RX ADMIN — Medication 25 MILLIGRAM(S): at 17:41

## 2019-04-29 RX ADMIN — TAMSULOSIN HYDROCHLORIDE 0.4 MILLIGRAM(S): 0.4 CAPSULE ORAL at 22:29

## 2019-04-29 NOTE — PROGRESS NOTE ADULT - PROBLEM SELECTOR PLAN 5
- Rate controlled, INR supratherapeutic at presentation, now within therapeutic range.   - Given his hematuria, will continue to hold Warfarin.  - Continue metoprolol tartrate 25 mg BID with hold parameters. - Rate controlled.  - Given his hematuria, will continue to hold Warfarin.  - Continue metoprolol tartrate 25 mg BID with hold parameters.

## 2019-04-29 NOTE — PROGRESS NOTE ADULT - SUBJECTIVE AND OBJECTIVE BOX
Patient is a 78y old  Male who presents with a chief complaint of AMS (28 Apr 2019 15:12)    OVERNIGHT EVENTS/INTERVAL HPI: Patient seen and examined sitting comfortably in chair. He denies current complaints but is poor historian. Per RN, patient had hematuria overnight, no clots noted - currently monitoring off coumadin.     REVIEW OF SYSTEMS: limited due to patient's clinical status; he denies pain but is confused.     Vital Signs  T(C): 36.8 (29 Apr 2019 04:45), Max: 36.8 (28 Apr 2019 20:11)  T(F): 98.2 (29 Apr 2019 04:45), Max: 98.2 (28 Apr 2019 20:11)  HR: 88 (29 Apr 2019 04:45) (88 - 92)  BP: 122/88 (29 Apr 2019 04:45) (105/74 - 140/80)  RR: 17 (29 Apr 2019 04:45) (17 - 18)  SpO2: 92% (29 Apr 2019 04:45) (92% - 97%)    PHYSICAL EXAM:   GENERAL: no acute distress, sitting comfortably in chair  HEENT: NC/AT, EOMI, neck supple, MMM, poor dentition  RESPIRATORY: LCTAB/L, no rhonchi, rales, or wheezing  CARDIOVASCULAR: RRR, no murmurs, gallops, rubs  ABDOMINAL: soft, non-tender, non-distended, positive bowel sounds   :   EXTREMITIES: no clubbing, cyanosis, or edema  NEUROLOGICAL: AAOx1, answers yes/no to questions, minimally verbal, non-focal, pleasant  SKIN: no rashes or lesions   MUSCULOSKELETAL: no gross joint deformity                          11.1   4.80  )-----------( 115      ( 29 Apr 2019 08:30 )             32.1     04-29    150<H>  |  119<H>  |  17  ----------------------------<  118<H>  3.2<L>   |  24  |  0.82    Ca    8.7      29 Apr 2019 08:30      MEDICATIONS  (STANDING):  ampicillin  IVPB      ampicillin  IVPB 1 Gram(s) IV Intermittent every 6 hours  carbidopa/levodopa  25/100 1 Tablet(s) Oral four times a day  carbidopa/levodopa  25/100 1 Tablet(s) Oral at bedtime  dextrose 5%. 1000 milliLiter(s) (75 mL/Hr) IV Continuous <Continuous>  docusate sodium 100 milliGRAM(s) Oral two times a day  entacapone 200 milliGRAM(s) Oral four times a day  finasteride 5 milliGRAM(s) Oral daily  metoprolol tartrate 25 milliGRAM(s) Oral two times a day  polyethylene glycol 3350 17 Gram(s) Oral daily  potassium chloride   Powder 40 milliEquivalent(s) Oral every 4 hours  senna 2 Tablet(s) Oral at bedtime  simvastatin 10 milliGRAM(s) Oral at bedtime  tamsulosin 0.4 milliGRAM(s) Oral at bedtime    MEDICATIONS  (PRN):  OLANZapine Injectable 2.5 milliGRAM(s) IntraMuscular every 8 hours PRN Agitation Patient is a 78y old  Male who presents with a chief complaint of AMS (28 Apr 2019 15:12)    OVERNIGHT EVENTS/INTERVAL HPI: Patient seen and examined sitting comfortably in chair. He denies current complaints but is poor historian. Per RN, patient had hematuria overnight, no clots noted - currently monitoring off coumadin.     REVIEW OF SYSTEMS: limited due to patient's clinical status; he denies pain but is confused.     Vital Signs  T(C): 36.8 (29 Apr 2019 04:45), Max: 36.8 (28 Apr 2019 20:11)  T(F): 98.2 (29 Apr 2019 04:45), Max: 98.2 (28 Apr 2019 20:11)  HR: 88 (29 Apr 2019 04:45) (88 - 92)  BP: 122/88 (29 Apr 2019 04:45) (105/74 - 140/80)  RR: 17 (29 Apr 2019 04:45) (17 - 18)  SpO2: 92% (29 Apr 2019 04:45) (92% - 97%)    PHYSICAL EXAM:   GENERAL: no acute distress, sitting comfortably in chair  HEENT: NC/AT, EOMI, neck supple, MMM, poor dentition  RESPIRATORY: LCTAB/L, no rhonchi, rales, or wheezing  CARDIOVASCULAR: RRR, no murmurs, gallops, rubs  ABDOMINAL: soft, non-tender, non-distended, positive bowel sounds   EXTREMITIES: no clubbing, cyanosis, or edema  NEUROLOGICAL: AAOx1, answers yes/no to questions, minimally verbal, non-focal, pleasant  SKIN: no rashes or lesions   MUSCULOSKELETAL: no gross joint deformity                          11.1   4.80  )-----------( 115      ( 29 Apr 2019 08:30 )             32.1     04-29    150<H>  |  119<H>  |  17  ----------------------------<  118<H>  3.2<L>   |  24  |  0.82    Ca    8.7      29 Apr 2019 08:30      MEDICATIONS  (STANDING):  amoxicillin 500 milliGRAM(s) Oral three times a day  carbidopa/levodopa  25/100 1 Tablet(s) Oral four times a day  carbidopa/levodopa  25/100 1 Tablet(s) Oral at bedtime  dextrose 5%. 1000 milliLiter(s) (75 mL/Hr) IV Continuous <Continuous>  docusate sodium 100 milliGRAM(s) Oral two times a day  entacapone 200 milliGRAM(s) Oral four times a day  finasteride 5 milliGRAM(s) Oral daily  metoprolol tartrate 25 milliGRAM(s) Oral two times a day  polyethylene glycol 3350 17 Gram(s) Oral daily  potassium chloride   Powder 40 milliEquivalent(s) Oral every 4 hours  senna 2 Tablet(s) Oral at bedtime  simvastatin 10 milliGRAM(s) Oral at bedtime  tamsulosin 0.4 milliGRAM(s) Oral at bedtime    MEDICATIONS  (PRN):  OLANZapine Injectable 2.5 milliGRAM(s) IntraMuscular every 8 hours PRN Agitation

## 2019-04-29 NOTE — PROGRESS NOTE ADULT - PROBLEM SELECTOR PLAN 10
IMPROVE VTE Individual Risk Assessment        RISK                                                          Points  [  ] Previous VTE                                                3  [  ] Thrombophilia                                             2  [  ] Lower limb paralysis                                   2        (unable to hold up >15 seconds)    [  ] Current Cancer                                             2         (within 6 months)  [ x ] Immobilization > 24 hrs                              1  [  ] ICU/CCU stay > 24 hours                             1  [ x ] Age > 60                                                         1  IMPROVE VTE Score: 2  DVT ppx: SCDs while Coumadin held    11. BPH; continue flomax and finasteride. 10. HLD: continue simvastatin.       IMPROVE VTE Individual Risk Assessment        RISK                                                          Points  [  ] Previous VTE                                                3  [  ] Thrombophilia                                             2  [  ] Lower limb paralysis                                   2        (unable to hold up >15 seconds)    [  ] Current Cancer                                             2         (within 6 months)  [ x ] Immobilization > 24 hrs                              1  [  ] ICU/CCU stay > 24 hours                             1  [ x ] Age > 60                                                         1  IMPROVE VTE Score: 2  DVT ppx: SCDs while Coumadin held    11. BPH; continue flomax and finasteride.

## 2019-04-29 NOTE — PROVIDER CONTACT NOTE (MEDICATION) - SITUATION
Endorsed from previous shift that patient wife requesting to hold bedtime dose of Zyprexa 5mg due to pt feeling lethargic during the day. Pt was not able to participate in Physical therapy this morning secondary to lethargy.

## 2019-04-29 NOTE — PROGRESS NOTE ADULT - PROBLEM SELECTOR PLAN 1
resolving on antibiotics. Pt will f/u with his urologist on d/c from hospital. Discussed with family at bedside

## 2019-04-29 NOTE — PROGRESS NOTE ADULT - PROBLEM SELECTOR PLAN 8
- Severe, possibly progressive with sundowning  - Zyprexa 2.5 mg IM Q8 PRN for agitation. - Severe, possibly progressive with sundowning  - Will start Zyprexa Zydis 5 mg qHS to attempt to control symptoms overnight.

## 2019-04-29 NOTE — PROGRESS NOTE ADULT - PROBLEM SELECTOR PLAN 1
- Suspect metabolic encephalopathy, multifactorial due to UTI, hypernatremia with underlying dementia and possible element of hospital delirium.  - CT head negative for acute findings.   - D5W IV for hypernatremia.  - Trend BMP.   - Follow up MBS today.   - Zyprexa PRN for agitation.   - Fall risk protocol, aspiration precautions. - Suspect metabolic encephalopathy, multifactorial due to UTI, hypernatremia with underlying dementia and possible element of hospital delirium.  - CT head negative for acute findings.   - D5W IV for hypernatremia.  - Trend BMP.   - Follow up repeat speech and swallow eval today.   - Zyprexa PRN for agitation.   - Fall risk protocol, aspiration precautions.

## 2019-04-29 NOTE — PROGRESS NOTE ADULT - SUBJECTIVE AND OBJECTIVE BOX
KENNETHJOSÉ MIGUEL is a 78yMale , patient examined and chart reviewed.      INTERVAL HPI/ OVERNIGHT EVENTS:   Confused. Wife at bedside.  No events. Afebrile.    PAST MEDICAL & SURGICAL HISTORY:  Syncope  Parkinson disease  Constipation  Dementia  Urinary retention  Cerebrovascular accident  Hypertension  Atrial fibrillation  No significant past surgical history      For details regarding the patient's social history, family history, and other miscellaneous elements, please refer the initial infectious diseases consultation and/or the admitting history and physical examination for this admission.    ROS:  Unable to obtain due to : pt's condition    Current inpatient medications :    ANTIBIOTICS/RELEVANT:  amoxicillin 500 milliGRAM(s) Oral three times a day      carbidopa/levodopa  25/100 1 Tablet(s) Oral four times a day  carbidopa/levodopa  25/100 1 Tablet(s) Oral at bedtime  dextrose 5%. 1000 milliLiter(s) IV Continuous <Continuous>  docusate sodium 100 milliGRAM(s) Oral two times a day  entacapone 200 milliGRAM(s) Oral four times a day  finasteride 5 milliGRAM(s) Oral daily  metoprolol tartrate 25 milliGRAM(s) Oral two times a day  OLANZapine Injectable 2.5 milliGRAM(s) IntraMuscular every 8 hours PRN  polyethylene glycol 3350 17 Gram(s) Oral daily  potassium chloride   Powder 40 milliEquivalent(s) Oral every 4 hours  senna 2 Tablet(s) Oral at bedtime  simvastatin 10 milliGRAM(s) Oral at bedtime  tamsulosin 0.4 milliGRAM(s) Oral at bedtime      Objective:    T(C): 36.8 (04-29-19 @ 04:45), Max: 36.8 (04-28-19 @ 20:11)  HR: 88 (04-29-19 @ 04:45) (88 - 92)  BP: 122/88 (04-29-19 @ 04:45) (105/74 - 140/80)  RR: 17 (04-29-19 @ 04:45) (17 - 18)  SpO2: 92% (04-29-19 @ 04:45) (92% - 97%)    Physical Exam:  General: no acute distress  Eyes: sclera anicteric, pupils equal and reactive to light  ENMT: buccal mucosa moist, pharynx not injected  Neck: supple, trachea midline  Lungs: clear, no wheeze/rhonchi  Cardiovascular: regular rate and rhythm, S1 S2  Abdomen: soft, nontender, no organomegaly present, bowel sounds normal  Neurological: alert and oriented x0, Cranial Nerves II-XII grossly intact  Skin: no increased ecchymosis/petechiae/purpura  Lymph Nodes: no palpable cervical/supraclavicular lymph nodes enlargements  Extremities: no cyanosis/clubbing/edema      LABS:                          11.1   4.80  )-----------( 115      ( 29 Apr 2019 08:30 )             32.1       04-29    150<H>  |  119<H>  |  17  ----------------------------<  118<H>  3.2<L>   |  24  |  0.82    Ca    8.7      29 Apr 2019 08:30      PT/INR - ( 29 Apr 2019 08:30 )   PT: 15.2 sec;   INR: 1.33 ratio         PTT - ( 28 Apr 2019 09:39 )  PTT:28.5 sec      MICROBIOLOGY:  Culture - Urine (04.25.19 @ 22:39)    Specimen Source: .Urine Clean Catch (Midstream)    Culture Results:   No growth    Culture - Blood (04.25.19 @ 21:59)    Specimen Source: .Blood Blood-Peripheral    Culture Results:   No growth to date.      RADIOLOGY & ADDITIONAL STUDIES:      EXAM:  XR CHEST AP OR PA 1V                            PROCEDURE DATE:  04/25/2019          INTERPRETATION:  Portable chest x-ray     Indication: Sepsis.    2 portable chest x-ray are compared to a previous examination dated   11/14/2018.    Impression: The lung apices are obscured by the patient's chin. No gross   pulmonary consolidation, pleural effusion or pneumothorax.    New small left basilar atelectasis.    Stable cardiac silhouette.    Assessment :  78y M PMH Afib (on coumadin), CVA (2012, 2016), dementia, HTN, BPH, parkinson's, and syncope BIBEMS for AMS , likely secondary to cystitis . he has markedly elevated INR most likely secondary to recent use of Cipro . His repeat UA from this admission is all hematuria  and culture has been ngtd  INR is better. Hematuria has cleared up.    Plan :   - Finish course of PO Amoxil 500 mg po tid x 5 more days   - For MBS   - monitor INR , hold coumadin till inr < 2  - OOB to chair   - Asp precautions      Continue with present regiment.  Appropriate use of antibiotics and adverse effects reviewed.      I have discussed the above plan of care with patient/ family in detail. They expressed understanding of the  treatment plan . Risks, benefits and alternatives discussed in detail. I have asked if they have any questions or concerns and appropriately addressed them to the best of my ability .    > 35 minutes were spent in direct patient care reviewing notes, medications ,labs data/ imaging , discussion with multidisciplinary team.    Thank you for allowing me to participate in care of your patient .    Sanjeev Cole MD  Infectious Disease  [phone]

## 2019-04-29 NOTE — PROGRESS NOTE ADULT - PROBLEM SELECTOR PLAN 3
- Per wife, patient with dark brown urine at home.  - Now with bright red blood and clots from meatus at this point.   -Urology consulted: can hold off on CBI for now and observe. Monitor for retention as he may need catheter.   - Monitor H/H. Hold Coumadin - Per wife, patient with dark brown urine at home; per RN, hematuria overnight.  - Urology consulted: can hold off on CBI for now and observe. Monitor for retention as he may need catheter.   - Monitor H/H, hold Coumadin.  - Continue tamsulosin 0.4 mg QHS, finasteride 5 mg daily.

## 2019-04-29 NOTE — PROGRESS NOTE ADULT - ASSESSMENT
78y M PMH Afib (on coumadin), CVA (2012, 2016), dementia, HTN, BPH, parkinson's, and syncope BIBEMS for AMS.  Admitted for AMS, metabolic encephalopathy, UTI. On IV ampicillin for UTI/possible prostatitis. 78y M PMH Afib (on coumadin), CVA (2012, 2016), dementia, HTN, BPH, parkinson's, and syncope BIBEMS for AMS.  Admitted for AMS, metabolic encephalopathy, UTI. On IV ampicillin for UTI/possible prostatitis. Now with hematuria, holding coumadin.

## 2019-04-29 NOTE — PROGRESS NOTE ADULT - PROBLEM SELECTOR PLAN 4
- Likely secondary to decreased PO intake.  - Continue D5W. - Likely secondary to decreased PO intake.  - Continue D5W, monitor Na.  - Replete electrolytes PRN.

## 2019-04-29 NOTE — CHART NOTE - NSCHARTNOTEFT_GEN_A_CORE
Assessment: Pt with improving po intake, ate all of breakfast/lunch today. Fluids encouraged, pt with IV fluids added as Na+ 150. SLP evals noted, per wife would prefer nectar liquids to continue(honey recommended).     Factors impacting intake: [ ] none [ ] nausea  [ ] vomiting [ ] diarrhea [ ] constipation  [ ]chewing problems [ ] swallowing issues  [ ] other:     Diet Presciption: Diet, Dysphagia 1 Pureed-Nectar Consistency Fluid (04-26-19 @ 15:11)    Intake:   100% today  Current Weight: Weight (kg): 53 (04-25 @ 23:56) No new wt  % Weight Change    Pertinent Medications: MEDICATIONS  (STANDING):  amoxicillin 500 milliGRAM(s) Oral three times a day  carbidopa/levodopa  25/100 1 Tablet(s) Oral four times a day  carbidopa/levodopa  25/100 1 Tablet(s) Oral at bedtime  dextrose 5%. 1000 milliLiter(s) (75 mL/Hr) IV Continuous <Continuous>  docusate sodium 100 milliGRAM(s) Oral two times a day  entacapone 200 milliGRAM(s) Oral four times a day  finasteride 5 milliGRAM(s) Oral daily  metoprolol tartrate 25 milliGRAM(s) Oral two times a day  polyethylene glycol 3350 17 Gram(s) Oral daily  senna 2 Tablet(s) Oral at bedtime  simvastatin 10 milliGRAM(s) Oral at bedtime  tamsulosin 0.4 milliGRAM(s) Oral at bedtime    MEDICATIONS  (PRN):  OLANZapine Injectable 2.5 milliGRAM(s) IntraMuscular every 8 hours PRN Agitation    Pertinent Labs: 04-29 Na150 mmol/L<H> Glu 118 mg/dL<H> K+ 3.2 mmol/L<L> Cr  0.82 mg/dL BUN 17 mg/dL 04-25 Alb 3.0 g/dL<L>     CAPILLARY BLOOD GLUCOSE        Skin: heels st 1 pressure injury    Estimated Needs:   [x ] no change since previous assessment  [ ] recalculated:     Previous Nutrition Diagnosis:   [ ] Inadequate Energy Intake [ ]Inadequate Oral Intake [ ] Excessive Energy Intake   [ ] Underweight [ ] Increased Nutrient Needs [ ] Overweight/Obesity   [ ] Altered GI Function [ ] Unintended Weight Loss [ ] Food & Nutrition Related Knowledge Deficit [x ] Malnutrition     Nutrition Diagnosis is [x ] ongoing  [ ] resolved [ ] not applicable     New Nutrition Diagnosis: [ ] not applicable       Interventions:   Recommend  [ ] Change Diet To:  [ ] Nutrition Supplement  [ ] Nutrition Support  [x ] Other: fluids encouraged, snacks added(wife brought in thickened liquids from home that pt prefers). Consider palliative care eval for GOC discussion to discuss artificial nutrition/hydration.    Monitoring and Evaluation:   [ ] PO intake [ x ] Tolerance to diet prescription [ x ] weights [ x ] labs[ x ] follow up per protocol  [ ] other:

## 2019-04-29 NOTE — PROGRESS NOTE ADULT - SUBJECTIVE AND OBJECTIVE BOX
INTERVAL HPI/OVERNIGHT EVENTS: urine continues to clear, still tinged according to RN, no clots    MEDICATIONS  (STANDING):  amoxicillin 500 milliGRAM(s) Oral three times a day  carbidopa/levodopa  25/100 1 Tablet(s) Oral four times a day  carbidopa/levodopa  25/100 1 Tablet(s) Oral at bedtime  dextrose 5%. 1000 milliLiter(s) (75 mL/Hr) IV Continuous <Continuous>  docusate sodium 100 milliGRAM(s) Oral two times a day  entacapone 200 milliGRAM(s) Oral four times a day  finasteride 5 milliGRAM(s) Oral daily  metoprolol tartrate 25 milliGRAM(s) Oral two times a day  polyethylene glycol 3350 17 Gram(s) Oral daily  potassium chloride   Powder 40 milliEquivalent(s) Oral every 4 hours  senna 2 Tablet(s) Oral at bedtime  simvastatin 10 milliGRAM(s) Oral at bedtime  tamsulosin 0.4 milliGRAM(s) Oral at bedtime    MEDICATIONS  (PRN):  OLANZapine Injectable 2.5 milliGRAM(s) IntraMuscular every 8 hours PRN Agitation        Vital Signs Last 24 Hrs  T(C): 36.8 (29 Apr 2019 04:45), Max: 36.8 (28 Apr 2019 20:11)  T(F): 98.2 (29 Apr 2019 04:45), Max: 98.2 (28 Apr 2019 20:11)  HR: 88 (29 Apr 2019 04:45) (88 - 92)  BP: 122/88 (29 Apr 2019 04:45) (105/74 - 140/80)  BP(mean): --  RR: 17 (29 Apr 2019 04:45) (17 - 18)  SpO2: 92% (29 Apr 2019 04:45) (92% - 97%)    PHYSICAL EXAM:    ABDOMEN: soft, NT	  GENITALIA: nml phallus    LABS:                        11.1   4.80  )-----------( 115      ( 29 Apr 2019 08:30 )             32.1     04-29    150<H>  |  119<H>  |  17  ----------------------------<  118<H>  3.2<L>   |  24  |  0.82    Ca    8.7      29 Apr 2019 08:30      PT/INR - ( 29 Apr 2019 08:30 )   PT: 15.2 sec;   INR: 1.33 ratio         PTT - ( 28 Apr 2019 09:39 )  PTT:28.5 sec    Urine culture:  04-25 @ 22:39 --   No growth  Urine culture:  04-25 @ 21:59 --   No growth to date.      RADIOLOGY & ADDITIONAL TESTS:

## 2019-04-30 ENCOUNTER — TRANSCRIPTION ENCOUNTER (OUTPATIENT)
Age: 79
End: 2019-04-30

## 2019-04-30 LAB
ANION GAP SERPL CALC-SCNC: 6 MMOL/L — SIGNIFICANT CHANGE UP (ref 5–17)
BUN SERPL-MCNC: 16 MG/DL — SIGNIFICANT CHANGE UP (ref 7–23)
CALCIUM SERPL-MCNC: 8.5 MG/DL — SIGNIFICANT CHANGE UP (ref 8.5–10.1)
CHLORIDE SERPL-SCNC: 117 MMOL/L — HIGH (ref 96–108)
CO2 SERPL-SCNC: 23 MMOL/L — SIGNIFICANT CHANGE UP (ref 22–31)
CREAT SERPL-MCNC: 0.77 MG/DL — SIGNIFICANT CHANGE UP (ref 0.5–1.3)
CULTURE RESULTS: SIGNIFICANT CHANGE UP
CULTURE RESULTS: SIGNIFICANT CHANGE UP
GLUCOSE SERPL-MCNC: 101 MG/DL — HIGH (ref 70–99)
HCT VFR BLD CALC: 30.6 % — LOW (ref 39–50)
HGB BLD-MCNC: 10.4 G/DL — LOW (ref 13–17)
INR BLD: 1.15 RATIO — SIGNIFICANT CHANGE UP (ref 0.88–1.16)
MCHC RBC-ENTMCNC: 33.2 PG — SIGNIFICANT CHANGE UP (ref 27–34)
MCHC RBC-ENTMCNC: 34 GM/DL — SIGNIFICANT CHANGE UP (ref 32–36)
MCV RBC AUTO: 97.8 FL — SIGNIFICANT CHANGE UP (ref 80–100)
NRBC # BLD: 0 /100 WBCS — SIGNIFICANT CHANGE UP (ref 0–0)
PLATELET # BLD AUTO: 119 K/UL — LOW (ref 150–400)
POTASSIUM SERPL-MCNC: 3.9 MMOL/L — SIGNIFICANT CHANGE UP (ref 3.5–5.3)
POTASSIUM SERPL-SCNC: 3.9 MMOL/L — SIGNIFICANT CHANGE UP (ref 3.5–5.3)
PROTHROM AB SERPL-ACNC: 13.2 SEC — HIGH (ref 10–12.9)
RBC # BLD: 3.13 M/UL — LOW (ref 4.2–5.8)
RBC # FLD: 14.6 % — HIGH (ref 10.3–14.5)
SODIUM SERPL-SCNC: 146 MMOL/L — HIGH (ref 135–145)
SPECIMEN SOURCE: SIGNIFICANT CHANGE UP
SPECIMEN SOURCE: SIGNIFICANT CHANGE UP
WBC # BLD: 5.4 K/UL — SIGNIFICANT CHANGE UP (ref 3.8–10.5)
WBC # FLD AUTO: 5.4 K/UL — SIGNIFICANT CHANGE UP (ref 3.8–10.5)

## 2019-04-30 PROCEDURE — 99233 SBSQ HOSP IP/OBS HIGH 50: CPT

## 2019-04-30 RX ORDER — OLANZAPINE 15 MG/1
5 TABLET, FILM COATED ORAL AT BEDTIME
Qty: 0 | Refills: 0 | Status: DISCONTINUED | OUTPATIENT
Start: 2019-04-30 | End: 2019-05-02

## 2019-04-30 RX ORDER — WARFARIN SODIUM 2.5 MG/1
3 TABLET ORAL ONCE
Qty: 0 | Refills: 0 | Status: COMPLETED | OUTPATIENT
Start: 2019-04-30 | End: 2019-04-30

## 2019-04-30 RX ORDER — SODIUM CHLORIDE 9 MG/ML
1000 INJECTION, SOLUTION INTRAVENOUS
Qty: 0 | Refills: 0 | Status: DISCONTINUED | OUTPATIENT
Start: 2019-04-30 | End: 2019-05-01

## 2019-04-30 RX ADMIN — POLYETHYLENE GLYCOL 3350 17 GRAM(S): 17 POWDER, FOR SOLUTION ORAL at 11:50

## 2019-04-30 RX ADMIN — CARBIDOPA AND LEVODOPA 1 TABLET(S): 25; 100 TABLET ORAL at 17:44

## 2019-04-30 RX ADMIN — SODIUM CHLORIDE 75 MILLILITER(S): 9 INJECTION, SOLUTION INTRAVENOUS at 17:44

## 2019-04-30 RX ADMIN — TAMSULOSIN HYDROCHLORIDE 0.4 MILLIGRAM(S): 0.4 CAPSULE ORAL at 22:01

## 2019-04-30 RX ADMIN — ENTACAPONE 200 MILLIGRAM(S): 200 TABLET, FILM COATED ORAL at 22:01

## 2019-04-30 RX ADMIN — WARFARIN SODIUM 3 MILLIGRAM(S): 2.5 TABLET ORAL at 22:01

## 2019-04-30 RX ADMIN — CARBIDOPA AND LEVODOPA 1 TABLET(S): 25; 100 TABLET ORAL at 05:37

## 2019-04-30 RX ADMIN — Medication 25 MILLIGRAM(S): at 17:44

## 2019-04-30 RX ADMIN — Medication 5 MILLIGRAM(S): at 17:48

## 2019-04-30 RX ADMIN — FINASTERIDE 5 MILLIGRAM(S): 5 TABLET, FILM COATED ORAL at 11:50

## 2019-04-30 RX ADMIN — Medication 500 MILLIGRAM(S): at 22:01

## 2019-04-30 RX ADMIN — ENTACAPONE 200 MILLIGRAM(S): 200 TABLET, FILM COATED ORAL at 11:50

## 2019-04-30 RX ADMIN — Medication 100 MILLIGRAM(S): at 17:44

## 2019-04-30 RX ADMIN — Medication 500 MILLIGRAM(S): at 14:26

## 2019-04-30 RX ADMIN — Medication 25 MILLIGRAM(S): at 05:37

## 2019-04-30 RX ADMIN — SIMVASTATIN 10 MILLIGRAM(S): 20 TABLET, FILM COATED ORAL at 22:01

## 2019-04-30 RX ADMIN — Medication 500 MILLIGRAM(S): at 05:37

## 2019-04-30 RX ADMIN — SENNA PLUS 2 TABLET(S): 8.6 TABLET ORAL at 22:01

## 2019-04-30 RX ADMIN — Medication 100 MILLIGRAM(S): at 05:37

## 2019-04-30 RX ADMIN — CARBIDOPA AND LEVODOPA 1 TABLET(S): 25; 100 TABLET ORAL at 11:50

## 2019-04-30 RX ADMIN — ENTACAPONE 200 MILLIGRAM(S): 200 TABLET, FILM COATED ORAL at 05:37

## 2019-04-30 RX ADMIN — CARBIDOPA AND LEVODOPA 1 TABLET(S): 25; 100 TABLET ORAL at 22:01

## 2019-04-30 RX ADMIN — ENTACAPONE 200 MILLIGRAM(S): 200 TABLET, FILM COATED ORAL at 17:44

## 2019-04-30 NOTE — PROGRESS NOTE ADULT - PROBLEM SELECTOR PLAN 3
- Hematuria resolved; [place] Rajput with dark yellow urine output today.   - Urology consulted: can hold off on CBI for now and observe. Monitor for retention as he may need catheter.   - Monitor H/H, continue Coumadin while patient without evidence of bleeding.   - Continue tamsulosin 0.4 mg QHS, finasteride 5 mg daily. - Hematuria resolved; [place] Rajput with dark yellow urine output today.   - Continue IVF x 12 hours.   - Urology consulted: can hold off on CBI for now and observe. Monitor for retention as he may need catheter.   - Monitor H/H, continue Coumadin while patient without evidence of bleeding.   - Continue tamsulosin 0.4 mg QHS, finasteride 5 mg daily. - Hematuria resolved; [place] catheter with dark yellow urine output today.   - Continue IVF x 12 hours.   - Urology consulted: can hold off on CBI for now and observe. Monitor for retention as he may need catheter.   - Monitor H/H, continue Coumadin while patient without evidence of bleeding.   - Continue tamsulosin 0.4 mg QHS, finasteride 5 mg daily.

## 2019-04-30 NOTE — DISCHARGE NOTE PROVIDER - CARE PROVIDER_API CALL
Lisa Trimble)  Internal Medicine  [address] [place] [id number]  Phone: [phone]  Fax: [phone]  Follow Up Time:

## 2019-04-30 NOTE — PROGRESS NOTE ADULT - PROBLEM SELECTOR PLAN 4
- Dehydration with hypernatremia secondary to decreased PO intake.  - Hypernatremia resolved, off IVF. Will discuss with patient's wife her wishes with regards to artificial nutrition and hydration as patient has poor PO intake which will likely cause symptoms/hypernatremia to recur.   - S/S recommend patient to be on honey-thickened fluids, wife requests patient to remain on nectar-thickened fluids for better PO intake.   - Replete electrolytes PRN. - Dehydration with hypernatremia secondary to decreased PO intake.  - Hypernatremia resolved, continue IVF x 12 hours. Will discuss with patient's wife her wishes with regards to artificial nutrition and hydration as patient has poor PO intake which will likely cause symptoms/hypernatremia to recur.   - S/S recommend patient to be on honey-thickened fluids, wife requests patient to remain on nectar-thickened fluids for better PO intake.   - Replete electrolytes PRN.

## 2019-04-30 NOTE — DISCHARGE NOTE PROVIDER - HOSPITAL COURSE
ADMISSION H+P:        HPI:    78y M PMH Afib (on coumadin), CVA (2012, 2016), dementia, HTN, BPH, parkinson's, and syncope BIBEMS for AMS.  Pt baseline of severe dementia, history obtained via wife at bedside.  Wife reports a week ago pt was not acting like his usual self, reported decline of appetite and more agitated/altered than his baseline.  Was seen by home call and diagnosed  with a UTI.  PO Cipro was started and later changed to PO Ampicillin.  Abx course started on 4/23, per wife pt symptoms has not improved.  Pt was to receive IV fluids at home, but upon EMS arrival, reported to have low bp, abnormal electrocutes and deferred to have pt bought to the ED.  Off note, pt has been having worsening of urinary retention recently 2/2 Seroquel use which has been discontinued.        In the ED, vitals: T: 96.2, HR 57, bp 119/80, 97% o2 on RA. Labs: INR 7.08, na 157, K 3.1, CT head and renal stone hunt negative. Given potassium, 1 L LR bolus (25 Apr 2019 20:38)            ---    HOSPITAL COURSE:         Patient was admitted for elevated INR and altered mental status likely caused by cystitis and UTI. INR was likely elevated secondary to outpatient ciprofloxacin treatment for UTI. Home warfarin was held to normalize INR. The patient was evaluated by infectious disease and urology specialists during admission. The patient was initially treated with ampicillin, given the sensitivities of a recent urine culture.  The patient was advanced to Amoxicillin PO, as blood and urine cultures produced no growth at five days. Transient hematuria resolved with antibiotic treatment. INR was normalized and home dose of warfarin was restarted during admission.        Patient was medically optimized and improved clinically throughout hospital course. Patient seen and examined on day of discharge.        Vital Signs    T(C): 37.1 (30 Apr 2019 12:44), Max: 37.1 (30 Apr 2019 12:44)    T(F): 98.8 (30 Apr 2019 12:44), Max: 98.8 (30 Apr 2019 12:44)    HR: 93 (30 Apr 2019 12:44) (93 - 110)    BP: 124/87 (30 Apr 2019 12:44) (103/76 - 151/84)    RR: 18 (30 Apr 2019 12:44) (18 - 18)    SpO2: 96% (30 Apr 2019 12:44) (93% - 97%)        PHYSICAL EXAM:     GENERAL: no acute distress, sitting comfortably in chair    HEENT: NC/AT, EOMI, neck supple, MMM, poor dentition    RESPIRATORY: LCTAB/L, no rhonchi, rales, or wheezing    CARDIOVASCULAR: RRR, no murmurs, gallops, rubs    ABDOMINAL: soft, non-tender, non-distended, positive bowel sounds     EXTREMITIES: no clubbing, cyanosis, or edema    NEUROLOGICAL: AAOx1, answers yes/no to questions, minimally verbal, non-focal, pleasant    SKIN: no rashes or lesions     MUSCULOSKELETAL: no gross joint deformity            Patient is medically stable for discharge to home with outpatient follow up.    ---    CONSULTANTS:     Dr. Sushil Crockett        ---    FINAL DISCHARGE DIAGNOSIS LIST:    Please see last daily progress note for final discharge diagnoses 78y M PMH Afib (on coumadin), CVA (2012, 2016), dementia, HTN, BPH, Parkinson's, and syncope BIBEMS for AMS. Patient has a baseline of severe dementia, history obtained via wife at bedside. Wife reported that patient had not been acting like his usual self, had a decreased appetite, and was agitated. He was seen by home call and diagnosed with UTI. PO Cipro was started, and later changed to PO Ampicillin. Patient did not improve with a few days of antibiotic therapy; he was found to have low BP and abnormal electrolytes and was brought to the ED. In the hospital, patient was found to have INR of 7.08 and Na of 157. Patient was noted to have hematuria as well; Coumadin was held for elevated INR and hematuria. INR was likely elevated secondary to outpatient ciprofloxacin treatment for UTI. CT head and renal stone hunt were negative for acute findings. The patient was evaluated by Infectious Disease and Urology specialists during admission. The patient was initially treated with IV Ampicillin given the sensitivities of a recent urine culture; UCx during hospital stay was NGTD, BCx NGTD. The patient was advanced to Amoxicillin PO. Transient hematuria resolved with antibiotic treatment. INR was normalized and home dose of warfarin was restarted during admission without recurrence of hematuria. Patient was bridged with subcutaneous Lovenox while attaining therapeutic INR. Patient's sodium normalized during hospital stay with the use of IV fluids.         Patient was medically optimized and improved clinically throughout hospital course. Patient seen and examined on day of discharge.        Vital Signs    T(C): 36.7 (02 May 2019 04:37), Max: 37.2 (01 May 2019 13:32)    T(F): 98.1 (02 May 2019 04:37), Max: 98.9 (01 May 2019 13:32)    HR: 104 (02 May 2019 04:37) (85 - 110)    BP: 143/90 (02 May 2019 04:37) (101/72 - 143/90)    RR: 18 (02 May 2019 04:37) (18 - 18)    SpO2: 97% (02 May 2019 04:37) (97% - 98%)        PHYSICAL EXAM:     GENERAL: no acute distress, sitting comfortably in chair    HEENT: NC/AT, EOMI, neck supple, MMM, poor dentition    RESPIRATORY: LCTAB/L, no rhonchi, rales, or wheezing    CARDIOVASCULAR: RRR, no murmurs, gallops, rubs    ABDOMINAL: soft, non-tender, non-distended, positive bowel sounds     EXTREMITIES: no clubbing, cyanosis, or edema    NEUROLOGICAL: AAOx1, answers yes/no to questions, minimally verbal, non-focal, pleasant    SKIN: no rashes or lesions     MUSCULOSKELETAL: no gross joint deformity            Patient is medically stable for discharge to home with outpatient follow up.    ---    CONSULTANTS:     Dr. Sushil Crockett        ---    FINAL DISCHARGE DIAGNOSIS LIST:    Please see last daily progress note for final discharge diagnoses 78y M PMH Afib (on coumadin), CVA (2012, 2016), dementia, HTN, BPH, Parkinson's, and syncope BIBEMS for AMS. Patient has a baseline of severe dementia, history obtained via wife at bedside. Wife reported that patient had not been acting like his usual self, had a decreased appetite, and was agitated. He was seen by home call and diagnosed with UTI. PO Cipro was started, and later changed to PO Ampicillin. Patient did not improve with a few days of antibiotic therapy; he was found to have low BP and abnormal electrolytes and was brought to the ED. In the hospital, patient was found to have INR of 7.08 and Na of 157. Patient was noted to have hematuria as well; Coumadin was held for elevated INR and hematuria. INR was likely elevated secondary to outpatient ciprofloxacin treatment for UTI. CT head and renal stone hunt were negative for acute findings. The patient was evaluated by Infectious Disease and Urology specialists during admission. The patient was initially treated with IV Ampicillin given the sensitivities of a recent urine culture; UCx during hospital stay was NGTD, BCx NGTD. The patient was advanced to Amoxicillin PO. Transient hematuria resolved with antibiotic treatment. INR was normalized and home dose of warfarin was restarted during admission without recurrence of hematuria. Patient was bridged with subcutaneous Lovenox while attaining therapeutic INR. Patient's sodium normalized during hospital stay with the use of IV fluids. Patient was seen and evaluated by speech and language pathology team who recommended patient be placed on honey-thickened fluids; patient's wife requested to keep patient on nectar-thickened fluids to promote oral intake. Goals of care were discussed with wife, including the possibility of patient requiring PEG tube in the future for adequate oral intake/hydration. Wife made aware of options for patient, and will consider all options at the appropriate time.         Patient was medically optimized and improved clinically throughout hospital course. Patient seen and examined on day of discharge.        Vital Signs    T(C): 36.7 (02 May 2019 04:37), Max: 37.2 (01 May 2019 13:32)    T(F): 98.1 (02 May 2019 04:37), Max: 98.9 (01 May 2019 13:32)    HR: 104 (02 May 2019 04:37) (85 - 110)    BP: 143/90 (02 May 2019 04:37) (101/72 - 143/90)    RR: 18 (02 May 2019 04:37) (18 - 18)    SpO2: 97% (02 May 2019 04:37) (97% - 98%)        PHYSICAL EXAM:     GENERAL: no acute distress, sitting comfortably in chair    HEENT: NC/AT, EOMI, neck supple, MMM, poor dentition    RESPIRATORY: LCTAB/L, no rhonchi, rales, or wheezing    CARDIOVASCULAR: RRR, no murmurs, gallops, rubs    ABDOMINAL: soft, non-tender, non-distended, positive bowel sounds     EXTREMITIES: no clubbing, cyanosis, or edema    NEUROLOGICAL: AAOx1, answers yes/no to questions, minimally verbal, non-focal, pleasant    SKIN: no rashes or lesions     MUSCULOSKELETAL: no gross joint deformity        Patient is medically stable for discharge to home with outpatient follow up.        ---    CONSULTANTS:     Dr. Sushil Crockett        ---    FINAL DISCHARGE DIAGNOSIS LIST:    Please see last daily progress note for final discharge diagnoses. 78y M PMH Afib (on coumadin), CVA (2012, 2016), dementia, HTN, BPH, Parkinson's, and syncope BIBEMS for AMS. Patient has a baseline of severe dementia, history obtained via wife at bedside. Wife reported that patient had not been acting like his usual self, had a decreased appetite, and was agitated. He was seen by home call and diagnosed with UTI. PO Cipro was started, and later changed to PO Ampicillin. Patient did not improve with a few days of antibiotic therapy; he was found to have low BP and abnormal electrolytes and was brought to the ED. In the hospital, patient was found to have INR of 7.08 and Na of 157. Patient was noted to have hematuria as well; Coumadin was held for elevated INR and hematuria. INR was likely elevated secondary to outpatient ciprofloxacin treatment for UTI. CT head and renal stone hunt were negative for acute findings. The patient was evaluated by Infectious Disease and Urology specialists during admission. The patient was initially treated with IV Ampicillin given the sensitivities of a recent urine culture; UCx during hospital stay was NGTD, BCx NGTD. The patient was advanced to Amoxicillin PO. Transient hematuria resolved with antibiotic treatment. INR was normalized and home dose of warfarin was restarted during admission without recurrence of hematuria. Patient was bridged with subcutaneous Lovenox while attaining therapeutic INR, family would like to continue therapeutic lovenox until INR therapeutic. Patient's sodium normalized during hospital stay with the use of IV fluids. Patient was seen and evaluated by speech and language pathology team who recommended patient be placed on honey-thickened fluids; patient's wife requested to keep patient on nectar-thickened fluids to promote oral intake. Goals of care were discussed with wife, including the possibility of patient requiring PEG tube in the future for adequate oral intake/hydration. Wife made aware of options for patient, and will consider all options at the appropriate time.         Patient was medically optimized and improved clinically throughout hospital course. Patient seen and examined on day of discharge.        Vital Signs    T(C): 36.7 (02 May 2019 04:37), Max: 37.2 (01 May 2019 13:32)    T(F): 98.1 (02 May 2019 04:37), Max: 98.9 (01 May 2019 13:32)    HR: 104 (02 May 2019 04:37) (85 - 110)    BP: 143/90 (02 May 2019 04:37) (101/72 - 143/90)    RR: 18 (02 May 2019 04:37) (18 - 18)    SpO2: 97% (02 May 2019 04:37) (97% - 98%)        PHYSICAL EXAM:     GENERAL: no acute distress, sitting comfortably in chair    HEENT: NC/AT, EOMI, neck supple, MMM, poor dentition    RESPIRATORY: LCTAB/L, no rhonchi, rales, or wheezing    CARDIOVASCULAR: RRR, no murmurs, gallops, rubs    ABDOMINAL: soft, non-tender, non-distended, positive bowel sounds     EXTREMITIES: no clubbing, cyanosis, or edema    NEUROLOGICAL: AAOx1, answers yes/no to questions, minimally verbal, non-focal, pleasant    SKIN: no rashes or lesions     MUSCULOSKELETAL: no gross joint deformity         ---    CONSULTANTS:     Dr. Lawanda Crockett        ---    FINAL DISCHARGE DIAGNOSIS LIST:    Please see last daily progress note for final discharge diagnoses.        ---    The total amount of time spent reviewing the hospital notes, laboratory values, imaging findings, assessing/counseling the patient, discussing with consultant physicians, social work, nursing staff took 63 minutes

## 2019-04-30 NOTE — PROGRESS NOTE ADULT - PROBLEM SELECTOR PLAN 10
10. HLD: continue simvastatin.       IMPROVE VTE Individual Risk Assessment        RISK                                                          Points  [  ] Previous VTE                                                3  [  ] Thrombophilia                                             2  [  ] Lower limb paralysis                                   2        (unable to hold up >15 seconds)    [  ] Current Cancer                                             2         (within 6 months)  [ x ] Immobilization > 24 hrs                              1  [  ] ICU/CCU stay > 24 hours                             1  [ x ] Age > 60                                                         1  IMPROVE VTE Score: 2  DVT ppx: SCDs, Coumadin     11. BPH; continue flomax and finasteride.

## 2019-04-30 NOTE — PROGRESS NOTE ADULT - ASSESSMENT
78y M PMH Afib (on coumadin), CVA (2012, 2016), dementia, HTN, BPH, parkinson's, and syncope BIBEMS for AMS.  Admitted for AMS, metabolic encephalopathy, UTI. On PO Amoxicillin for UTI/possible prostatitis. Hematuria now resolving, back on coumadin.

## 2019-04-30 NOTE — PROGRESS NOTE ADULT - PROBLEM SELECTOR PLAN 8
- Severe dementia; agitation improving.   - Patient on Zyprexa Zydis 5 mg QHS to attempt to control symptoms overnight, however, dose last night held per wife's request to not have patient lethargic/sedated today. - Severe dementia; agitation improving.   - Patient on Zyprexa Zydis 5 mg QHS to attempt to control symptoms overnight, however, dose last night held per wife's request to not have patient lethargic/sedated today.  - Continue Zyprexa PO PRN for agitation.

## 2019-04-30 NOTE — PROGRESS NOTE ADULT - PROBLEM SELECTOR PLAN 7
- Chronic; continue senna, colace, Miralax. - Chronic; continue senna, colace, Miralax.  - Will add dulcolax today.

## 2019-04-30 NOTE — PROGRESS NOTE ADULT - PROBLEM SELECTOR PLAN 1
- Improving; likely multifactorial to UTI, hypernatremia, underlying dementia, and possible element of hospital delirium.  - CT head negative for acute findings.   - Zyprexa for agitation held overnight per wife's request to not have patient lethargic/sedated today.   - Hypernatremia resolved, off IVF. Will discuss with patient's wife her wishes with regards to artificial nutrition and hydration as patient has poor PO intake which will likely cause symptoms/hypernatremia to recur.   - Fall risk protocol, aspiration precautions. - Improving; likely multifactorial to UTI, hypernatremia, underlying dementia, and possible element of hospital delirium.  - CT head negative for acute findings.   - Zyprexa for agitation held overnight per wife's request to not have patient lethargic/sedated today; will make order PRN for agitation.   - Hypernatremia resolved, will continue IVF x 12 hours.   - Fall risk protocol, aspiration precautions. - Improving; likely multifactorial to UTI, hypernatremia, underlying dementia, and possible element of hospital delirium.  - CT head negative for acute findings.   - Zyprexa for agitation held overnight per wife's request to not have patient lethargic/sedated today; will make order PRN for agitation.   - Hypernatremia improved, will continue IVF x 12 hours.   - Fall risk protocol, aspiration precautions.

## 2019-04-30 NOTE — PROGRESS NOTE ADULT - PROBLEM SELECTOR PLAN 5
- Rate controlled.  - Continue coumadin, daily PT/INR. Monitor for signs of bleeding/hematuria.   - Continue metoprolol tartrate 25 mg BID with hold parameters.

## 2019-04-30 NOTE — DISCHARGE NOTE PROVIDER - NSDCHHHOMEBOUND_GEN_ALL_CORE
History of nasal and ocular symptoms for multiple years. Perennial with spring and fall worsening. Tried cetirizine and loratadine which were helpful. Currently used Allegra as needed. Allergy testing done in 2013 positive for dust mite, cat, dog, molds, trees, grasses and weeds. Was tolerating cluster but stopped given psychiatric concerns. She has followed with psychiatrist and now in a place to resume shots and feeling much better from a psychiatric standpoint. Last received yellow 0.25ml in late march of 2018.       Skin testing  Positive for cat, dog, dust mites, weeds, trees and molds.       - Flonase 2 spray/nostril daily. Use spring and fall at the very least.   - Allegra or Zyrtec as needed and on allergy shot days.   - Continue allergen immunotherapy. Return to clinic to continue cluster immunotherapy. Will reduce dose to blue 0.2ml and resume building.    Fall risk

## 2019-04-30 NOTE — PROGRESS NOTE ADULT - SUBJECTIVE AND OBJECTIVE BOX
KENNETHJOSÉ MIGUEL is a 78yMale , patient examined and chart reviewed.      INTERVAL HPI/ OVERNIGHT EVENTS:   Confused. Wife at bedside.  No events. Afebrile.    PAST MEDICAL & SURGICAL HISTORY:  Syncope  Parkinson disease  Constipation  Dementia  Urinary retention  Cerebrovascular accident  Hypertension  Atrial fibrillation  No significant past surgical history      For details regarding the patient's social history, family history, and other miscellaneous elements, please refer the initial infectious diseases consultation and/or the admitting history and physical examination for this admission.    ROS:  Unable to obtain due to : pt's condition    Current inpatient medications :    ANTIBIOTICS/RELEVANT:  amoxicillin 500 milliGRAM(s) Oral three times a day      carbidopa/levodopa  25/100 1 Tablet(s) Oral four times a day  carbidopa/levodopa  25/100 1 Tablet(s) Oral at bedtime  dextrose 5%. 1000 milliLiter(s) IV Continuous <Continuous>  docusate sodium 100 milliGRAM(s) Oral two times a day  entacapone 200 milliGRAM(s) Oral four times a day  finasteride 5 milliGRAM(s) Oral daily  metoprolol tartrate 25 milliGRAM(s) Oral two times a day  OLANZapine Injectable 2.5 milliGRAM(s) IntraMuscular every 8 hours PRN  polyethylene glycol 3350 17 Gram(s) Oral daily  potassium chloride   Powder 40 milliEquivalent(s) Oral every 4 hours  senna 2 Tablet(s) Oral at bedtime  simvastatin 10 milliGRAM(s) Oral at bedtime  tamsulosin 0.4 milliGRAM(s) Oral at bedtime      Objective:  Vital Signs Last 24 Hrs  T(C): 37.1 (30 Apr 2019 12:44), Max: 37.1 (30 Apr 2019 12:44)  T(F): 98.8 (30 Apr 2019 12:44), Max: 98.8 (30 Apr 2019 12:44)  HR: 93 (30 Apr 2019 12:44) (93 - 110)  BP: 124/87 (30 Apr 2019 12:44) (103/76 - 151/84)  RR: 18 (30 Apr 2019 12:44) (18 - 18)  SpO2: 96% (30 Apr 2019 12:44) (93% - 97%)    Physical Exam:  General: no acute distress  Eyes: sclera anicteric, pupils equal and reactive to light  ENMT: buccal mucosa moist, pharynx not injected  Neck: supple, trachea midline  Lungs: clear, no wheeze/rhonchi  Cardiovascular: regular rate and rhythm, S1 S2  Abdomen: soft, nontender, no organomegaly present, bowel sounds normal  Neurological: alert and oriented x0, Cranial Nerves II-XII grossly intact  Skin: no increased ecchymosis/petechiae/purpura  Lymph Nodes: no palpable cervical/supraclavicular lymph nodes enlargements  Extremities: no cyanosis/clubbing/edema      LABS:                          11.1   4.80  )-----------( 115      ( 29 Apr 2019 08:30 )             32.1       04-29    150<H>  |  119<H>  |  17  ----------------------------<  118<H>  3.2<L>   |  24  |  0.82    Ca    8.7      29 Apr 2019 08:30      PT/INR - ( 29 Apr 2019 08:30 )   PT: 15.2 sec;   INR: 1.33 ratio         PTT - ( 28 Apr 2019 09:39 )  PTT:28.5 sec      MICROBIOLOGY:  Culture - Urine (04.25.19 @ 22:39)    Specimen Source: .Urine Clean Catch (Midstream)    Culture Results:   No growth    Culture - Blood (04.25.19 @ 21:59)    Specimen Source: .Blood Blood-Peripheral    Culture Results:   No growth to date.      RADIOLOGY & ADDITIONAL STUDIES:      EXAM:  XR CHEST AP OR PA 1V                            PROCEDURE DATE:  04/25/2019          INTERPRETATION:  Portable chest x-ray     Indication: Sepsis.    2 portable chest x-ray are compared to a previous examination dated   11/14/2018.    Impression: The lung apices are obscured by the patient's chin. No gross   pulmonary consolidation, pleural effusion or pneumothorax.    New small left basilar atelectasis.    Stable cardiac silhouette.    Assessment :  78y M PMH Afib (on coumadin), CVA (2012, 2016), dementia, HTN, BPH, parkinson's, and syncope BIBEMS for AMS , likely secondary to cystitis . he has markedly elevated INR most likely secondary to recent use of Cipro . His repeat UA from this admission is all hematuria but culture has been ngtd  INR is better. Hematuria has cleared up.    Plan :   - Finish course of PO Amoxil 500 mg po tid     - Asp precautions  - Stable from Id standpoint      Continue with present regiment.  Appropriate use of antibiotics and adverse effects reviewed.      I have discussed the above plan of care with patient/ family in detail. They expressed understanding of the  treatment plan . Risks, benefits and alternatives discussed in detail. I have asked if they have any questions or concerns and appropriately addressed them to the best of my ability .    > 35 minutes were spent in direct patient care reviewing notes, medications ,labs data/ imaging , discussion with multidisciplinary team.    Thank you for allowing me to participate in care of your patient .    Sanjeev Cole MD  Infectious Disease  [phone]

## 2019-04-30 NOTE — PROGRESS NOTE ADULT - SUBJECTIVE AND OBJECTIVE BOX
Patient is a 78y old  Male who presents with a chief complaint of AMS (30 Apr 2019 15:59)    OVERNIGHT EVENTS/INTERVAL HPI: Patient seen and examined in bed, denies complaints. Patient did not receive PM Zyprexa dose as his wife did not want him to be lethargic/sedated today. Patient with dark yellow urine output, no evidence of further hematuria.     REVIEW OF SYSTEMS: limited due to patient's clinical status; he denies pain.    Vital Signs  T(C): 37.1 (30 Apr 2019 12:44), Max: 37.1 (30 Apr 2019 12:44)  T(F): 98.8 (30 Apr 2019 12:44), Max: 98.8 (30 Apr 2019 12:44)  HR: 93 (30 Apr 2019 12:44) (93 - 110)  BP: 124/87 (30 Apr 2019 12:44) (103/76 - 151/84)  RR: 18 (30 Apr 2019 12:44) (18 - 18)  SpO2: 96% (30 Apr 2019 12:44) (93% - 97%)    PHYSICAL EXAM:   GENERAL: no acute distress, sitting comfortably in chair  HEENT: NC/AT, EOMI, neck supple, MMM, poor dentition  RESPIRATORY: LCTAB/L, no rhonchi, rales, or wheezing  CARDIOVASCULAR: RRR, no murmurs, gallops, rubs  ABDOMINAL: soft, non-tender, non-distended, positive bowel sounds; [place] catheter with dark yellow urine output  EXTREMITIES: no clubbing, cyanosis, or edema  NEUROLOGICAL: AAOx1, answers yes/no to questions, minimally verbal, non-focal, pleasant  SKIN: no rashes or lesions   MUSCULOSKELETAL: no gross joint deformity                            10.4   5.40  )-----------( 119      ( 30 Apr 2019 08:31 )             30.6     04-30    146<H>  |  117<H>  |  16  ----------------------------<  101<H>  3.9   |  23  |  0.77    Ca    8.5      30 Apr 2019 08:31      MEDICATIONS  (STANDING):  amoxicillin 500 milliGRAM(s) Oral three times a day  carbidopa/levodopa  25/100 1 Tablet(s) Oral four times a day  carbidopa/levodopa  25/100 1 Tablet(s) Oral at bedtime  dextrose 5%. 1000 milliLiter(s) (75 mL/Hr) IV Continuous <Continuous>  docusate sodium 100 milliGRAM(s) Oral two times a day  entacapone 200 milliGRAM(s) Oral four times a day  finasteride 5 milliGRAM(s) Oral daily  metoprolol tartrate 25 milliGRAM(s) Oral two times a day  OLANZapine Disintegrating Tablet 5 milliGRAM(s) Oral <User Schedule>  polyethylene glycol 3350 17 Gram(s) Oral daily  senna 2 Tablet(s) Oral at bedtime  simvastatin 10 milliGRAM(s) Oral at bedtime  tamsulosin 0.4 milliGRAM(s) Oral at bedtime  warfarin 3 milliGRAM(s) Oral once

## 2019-04-30 NOTE — DISCHARGE NOTE PROVIDER - NSDCFUADDINST_GEN_ALL_CORE_FT
Please call to schedule your follow up appointments with your doctors (primary care doctor)  Please take your medications as detailed in your medication reconciliation  Please return to the ED for worsening of your medical condition

## 2019-04-30 NOTE — DISCHARGE NOTE PROVIDER - NSDCCPCAREPLAN_GEN_ALL_CORE_FT
PRINCIPAL DISCHARGE DIAGNOSIS  Diagnosis: Altered mental status, unspecified altered mental status type  Assessment and Plan of Treatment: You presented with altered mental status, which was likely multifactorial to UTI, hypernatremia, underlying dementia, and possible element of hospital delirium. You were treated for each of these as detailed below, and your altered mental status resolved.      SECONDARY DISCHARGE DIAGNOSES  Diagnosis: UTI (urinary tract infection)  Assessment and Plan of Treatment: You presented already on treatment for UTI. You were treated with IV antibiotics based on urine culture results and transitioned to oral antibiotics. Continue Amoxil 500 mg three times per day through 5/4/2019 to complete your course of antibiotics.    Diagnosis: Hematuria, unspecified type  Assessment and Plan of Treatment: You presented with an INR of 7.44 which likely caused you to have blood in your urine - your Coumadin was held at this time. Your hematuria resolved with return of your INR to therapeutic levels. You were started back on Coumadin and were bridged with subcutaneous Lovenox. Continue Coumadin 3 mg nightly. Follow up with your PMD for frequent INR checks.    Diagnosis: BPH with urinary obstruction  Assessment and Plan of Treatment: Continue tamsulosin 0.4 mg nightly and finasteride 5 mg daily for BPH.    Diagnosis: Dehydration  Assessment and Plan of Treatment: You presented with dehydration and hypernatremia secondary to decreased oral intake. You were treated with IV fluids and her hypernatremia resolved. You were evaluated by speech and language pathology who recommended honey-thickened fluids, however, your family opted to keep you on nectar-thickened fluids to encourage oral intake. Oral intake and hydration  will likely be a long-term issue; options for artificial hydration (including PEG tube) were discussed with your family.    Diagnosis: Atrial fibrillation  Assessment and Plan of Treatment: Your Coumadin was held due to elevated INR and hematuria. It was restarted when your INR decreased, and you were bridged with Lovenox. Continue to take your Coumadin and check your INR levels routinely.    Diagnosis: Parkinson disease  Assessment and Plan of Treatment: Continue your entacapone and sinemet.    Diagnosis: Constipation  Assessment and Plan of Treatment: Continue bowel regimen as needed; senna, colace, miralax, dulcolax.    Diagnosis: Dementia  Assessment and Plan of Treatment: You were given Zyprexa for agitation/possible worsening dementia, but did not require it through your hospital stay.    Diagnosis: Hypertension  Assessment and Plan of Treatment: Continue metoprolol tartrate 25 mg twice a day.    Diagnosis: Hyperlipidemia  Assessment and Plan of Treatment: Continue simvastatin nightly.

## 2019-05-01 LAB
ANION GAP SERPL CALC-SCNC: 6 MMOL/L — SIGNIFICANT CHANGE UP (ref 5–17)
BUN SERPL-MCNC: 13 MG/DL — SIGNIFICANT CHANGE UP (ref 7–23)
CALCIUM SERPL-MCNC: 9.4 MG/DL — SIGNIFICANT CHANGE UP (ref 8.5–10.1)
CHLORIDE SERPL-SCNC: 115 MMOL/L — HIGH (ref 96–108)
CO2 SERPL-SCNC: 26 MMOL/L — SIGNIFICANT CHANGE UP (ref 22–31)
CREAT SERPL-MCNC: 0.84 MG/DL — SIGNIFICANT CHANGE UP (ref 0.5–1.3)
GLUCOSE SERPL-MCNC: 101 MG/DL — HIGH (ref 70–99)
HCT VFR BLD CALC: 34.4 % — LOW (ref 39–50)
HGB BLD-MCNC: 11.9 G/DL — LOW (ref 13–17)
INR BLD: 1.23 RATIO — HIGH (ref 0.88–1.16)
MCHC RBC-ENTMCNC: 33.1 PG — SIGNIFICANT CHANGE UP (ref 27–34)
MCHC RBC-ENTMCNC: 34.6 GM/DL — SIGNIFICANT CHANGE UP (ref 32–36)
MCV RBC AUTO: 95.8 FL — SIGNIFICANT CHANGE UP (ref 80–100)
NRBC # BLD: 0 /100 WBCS — SIGNIFICANT CHANGE UP (ref 0–0)
PLATELET # BLD AUTO: 152 K/UL — SIGNIFICANT CHANGE UP (ref 150–400)
POTASSIUM SERPL-MCNC: 4 MMOL/L — SIGNIFICANT CHANGE UP (ref 3.5–5.3)
POTASSIUM SERPL-SCNC: 4 MMOL/L — SIGNIFICANT CHANGE UP (ref 3.5–5.3)
PROTHROM AB SERPL-ACNC: 14 SEC — HIGH (ref 10–12.9)
RBC # BLD: 3.59 M/UL — LOW (ref 4.2–5.8)
RBC # FLD: 14.2 % — SIGNIFICANT CHANGE UP (ref 10.3–14.5)
SODIUM SERPL-SCNC: 147 MMOL/L — HIGH (ref 135–145)
WBC # BLD: 6.38 K/UL — SIGNIFICANT CHANGE UP (ref 3.8–10.5)
WBC # FLD AUTO: 6.38 K/UL — SIGNIFICANT CHANGE UP (ref 3.8–10.5)

## 2019-05-01 PROCEDURE — 99233 SBSQ HOSP IP/OBS HIGH 50: CPT | Mod: GC

## 2019-05-01 RX ORDER — WARFARIN SODIUM 2.5 MG/1
3 TABLET ORAL ONCE
Qty: 0 | Refills: 0 | Status: COMPLETED | OUTPATIENT
Start: 2019-05-01 | End: 2019-05-01

## 2019-05-01 RX ORDER — ENOXAPARIN SODIUM 100 MG/ML
50 INJECTION SUBCUTANEOUS EVERY 12 HOURS
Qty: 0 | Refills: 0 | Status: DISCONTINUED | OUTPATIENT
Start: 2019-05-01 | End: 2019-05-02

## 2019-05-01 RX ADMIN — Medication 500 MILLIGRAM(S): at 22:41

## 2019-05-01 RX ADMIN — FINASTERIDE 5 MILLIGRAM(S): 5 TABLET, FILM COATED ORAL at 12:46

## 2019-05-01 RX ADMIN — Medication 500 MILLIGRAM(S): at 12:46

## 2019-05-01 RX ADMIN — ENOXAPARIN SODIUM 50 MILLIGRAM(S): 100 INJECTION SUBCUTANEOUS at 15:27

## 2019-05-01 RX ADMIN — SENNA PLUS 2 TABLET(S): 8.6 TABLET ORAL at 22:41

## 2019-05-01 RX ADMIN — Medication 25 MILLIGRAM(S): at 17:43

## 2019-05-01 RX ADMIN — ENTACAPONE 200 MILLIGRAM(S): 200 TABLET, FILM COATED ORAL at 05:25

## 2019-05-01 RX ADMIN — ENTACAPONE 200 MILLIGRAM(S): 200 TABLET, FILM COATED ORAL at 22:41

## 2019-05-01 RX ADMIN — CARBIDOPA AND LEVODOPA 1 TABLET(S): 25; 100 TABLET ORAL at 22:41

## 2019-05-01 RX ADMIN — CARBIDOPA AND LEVODOPA 1 TABLET(S): 25; 100 TABLET ORAL at 12:46

## 2019-05-01 RX ADMIN — WARFARIN SODIUM 3 MILLIGRAM(S): 2.5 TABLET ORAL at 22:41

## 2019-05-01 RX ADMIN — CARBIDOPA AND LEVODOPA 1 TABLET(S): 25; 100 TABLET ORAL at 17:43

## 2019-05-01 RX ADMIN — POLYETHYLENE GLYCOL 3350 17 GRAM(S): 17 POWDER, FOR SOLUTION ORAL at 12:45

## 2019-05-01 RX ADMIN — Medication 500 MILLIGRAM(S): at 05:24

## 2019-05-01 RX ADMIN — CARBIDOPA AND LEVODOPA 1 TABLET(S): 25; 100 TABLET ORAL at 05:24

## 2019-05-01 RX ADMIN — ENTACAPONE 200 MILLIGRAM(S): 200 TABLET, FILM COATED ORAL at 12:46

## 2019-05-01 RX ADMIN — SIMVASTATIN 10 MILLIGRAM(S): 20 TABLET, FILM COATED ORAL at 22:41

## 2019-05-01 RX ADMIN — TAMSULOSIN HYDROCHLORIDE 0.4 MILLIGRAM(S): 0.4 CAPSULE ORAL at 22:41

## 2019-05-01 RX ADMIN — Medication 100 MILLIGRAM(S): at 05:25

## 2019-05-01 RX ADMIN — Medication 100 MILLIGRAM(S): at 17:43

## 2019-05-01 RX ADMIN — ENTACAPONE 200 MILLIGRAM(S): 200 TABLET, FILM COATED ORAL at 17:43

## 2019-05-01 RX ADMIN — Medication 25 MILLIGRAM(S): at 05:25

## 2019-05-01 NOTE — PROGRESS NOTE ADULT - PROBLEM SELECTOR PLAN 2
- 4/18/2019 UCx > 100,000 E. faecalis sensitive to Ampicillin.  - Patient had been started on PO cipro then switched to PO ampicillin.   - CT renal stone hunt: prostate gland is enlarged, indenting the base of the bladder, multiple unchanged left intrarenal stones without evidence of obstruction.   - Continue Ampicillin 1 gm IV Q6 hours, plan to switch to po abx tomorrow 4/29  - Follow up repeat urine cultures; may be negative as patient had been on outpatient antibiotic therapy.
- 4/18/2019 UCx > 100,000 E. faecalis sensitive to Ampicillin.  - Continue PO Amoxil 500 mg PO TID x 5 days per ID recs (to end 5/4).
- 4/18/2019 UCx > 100,000 E. faecalis sensitive to Ampicillin.  - Continue PO Amoxil 500 mg PO TID x 5 days per ID recs (to end 5/4).
- 4/18/2019 UCx > 100,000 E. faecalis sensitive to Ampicillin.  - Off IV Amoxicillin; repeat UCx NGTD.   - Will transition to PO Amoxil 500 mg PO TID x 5 days per ID recs.
- 4/18/2019 UCx > 100,000 E. faecalis sensitive to Ampicillin.  - Patient had been started on PO cipro then switched to PO ampicillin.   - CT renal stone hunt: prostate gland is enlarged, indenting the base of the bladder, multiple unchanged left intrarenal stones without evidence of obstruction.   - Continue Ampicillin 1 gm IV Q6 hours.   - Follow up repeat urine cultures; may be negative as patient had been on outpatient antibiotic therapy.
- 4/18/2019 UCx > 100,000 E. faecalis sensitive to Ampicillin.  - Patient had been started on PO cipro then switched to PO ampicillin.   - CT renal stone hunt: prostate gland is enlarged, indenting the base of the bladder, multiple unchanged left intrarenal stones without evidence of obstruction.   - Continue Ampicillin 1 gm IV Q6 hours.   - Follow up repeat urine cultures; may be negative as patient had been on outpatient antibiotic therapy.

## 2019-05-01 NOTE — PROGRESS NOTE ADULT - PROBLEM SELECTOR PLAN 3
- Hematuria resolved.   - Monitor for retention.  - Monitor H/H, continue Coumadin while patient without evidence of bleeding. Will bridge with Lovenox BID given subtherapeutic INR.   - Continue tamsulosin 0.4 mg QHS, finasteride 5 mg daily for BPH.  - Urology Dr. Crockett following. - Hematuria resolved.   - Monitor for retention.  - Monitor H/H, continue Coumadin while patient without evidence of bleeding. Will bridge with Lovenox BID given subtherapeutic INR. Family aware of CVA risk while subtherapeutic and agree w/ bridging therapy  - Continue tamsulosin 0.4 mg QHS, finasteride 5 mg daily for BPH.  - Urology Dr. Crockett following.

## 2019-05-01 NOTE — PROGRESS NOTE ADULT - NSHPATTENDINGPLANDISCUSS_GEN_ALL_CORE
patient's wife, re: mental status changes, dementia, dehydration, poor po intake, dysphagia, antibiotics, hematuria, urology eval,
patient's wife, ID, re: mental status changes, dehydration, poor po intake, dysphagia, antibiotics, hematuria, urology eval,
pt, family @ bedside, residency team, RN, SW, CM - re: above
patient's wife, ID, re: mental status changes, dehydration, poor po intake, dysphagia, antibiotics and pending cultures.
patient's wife re: treatment of agitation, antibiotics and planned course, hematuria, resumption of coumadin.

## 2019-05-01 NOTE — PROGRESS NOTE ADULT - PROBLEM SELECTOR PLAN 5
- Rate controlled; INR subtherapeutic, will bridge with Lovenox.   - Continue coumadin, daily PT/INR. Monitor for signs of bleeding/hematuria.   - Continue metoprolol tartrate 25 mg BID with hold parameters.

## 2019-05-01 NOTE — PROGRESS NOTE ADULT - PROBLEM SELECTOR PLAN 4
- Dehydration with hypernatremia secondary to decreased PO intake.  - Hypernatremia resolved, off IVF. Discussed with patient's wife about possibility of needing PEG in the future for adequate nutritional/fluid intake.   - S/S recommend patient to be on honey-thickened fluids, wife requests patient to remain on nectar-thickened fluids for better PO intake.   - Replete electrolytes PRN.

## 2019-05-01 NOTE — PROGRESS NOTE ADULT - ATTENDING COMMENTS
I personally conducted a physical examination of the patient. I personally gathered the patient's history. I edited the above listed findings which were prepared by the listed resident physician. I personally discussed the plan of care with the patient. The questions and concerns were addressed to the best of my ability. The patient is in agreement with the listed treatment plan.     - hypernatremia improving w/ IVF. added bridging AC today 2/2 CVA risk and family was counseled prior to starting this therapy, they agree w/ minimizing cva risk and hematuria has resolved. warfarin was dosed the last 2 evenings but pt remains subtherapeutic. monitor off IVF for 24hrs and d/c planning
Discussed GOC extensively with wife and discussed that hypernatremia is a common occurrence in patients requiring thickened liquids due to dysphagia as free water intake becomes limited. She has not considered a PEG tube before, but understands that as his parkinsons progresses, his swallowing will likely worsen. She will consider her options.
Hematuria appears to have resolved.  Will restart Coumadin tonight and monitor if he remains without hematuria until 10PM tonight when dose would be due.    Anticipate dc home in 24-48 hours if stable.    Continue fluids for hypernatremia.

## 2019-05-01 NOTE — PROGRESS NOTE ADULT - ASSESSMENT
78y M PMH Afib (on coumadin), CVA (2012, 2016), dementia, HTN, BPH, parkinson's, and syncope BIBEMS for AMS.  Admitted for AMS, metabolic encephalopathy, UTI. On PO Amoxicillin for UTI/possible prostatitis. Hematuria resolved, back on coumadin. Bridging with Lovenox.

## 2019-05-01 NOTE — PROGRESS NOTE ADULT - PROBLEM SELECTOR PROBLEM 1
Altered mental status, unspecified altered mental status type
Hematuria, unspecified type
Altered mental status, unspecified altered mental status type

## 2019-05-01 NOTE — PROGRESS NOTE ADULT - SUBJECTIVE AND OBJECTIVE BOX
Patient is a 78y old  Male who presents with a chief complaint of AMS (30 Apr 2019 16:41)      FROM ADMISSION H+P:   HPI:  78y M PMH Afib (on coumadin), CVA (2012, 2016), dementia, HTN, BPH, parkinson's, and syncope BIBEMS for AMS.  Pt baseline of severe dementia, history obtained via wife at bedside.  Wife reports a week ago pt was not acting like his usual self, reported decline of appetite and more agitated/altered than his baseline.  Was seen by home call and diagnosed  with a UTI.  PO Cipro was started and later changed to PO Ampicillin.  Abx course started on 4/23, per wife pt symptoms has not improved.  Pt was to receive IV fluids at home, but upon EMS arrival, reported to have low bp, abnormal electrocutes and deferred to have pt bought to the ED.  Off note, pt has been having worsening of urinary retention recently 2/2 Seroquel use which has been discontinued.    In the ED, vitals: T: 96.2, HR 57, bp 119/80, 97% o2 on RA. Labs: INR 7.08, na 157, K 3.1, CT head and renal stone hunt negative. Given potassium, 1 L LR bolus (25 Apr 2019 20:38)      ----  INTERVAL HPI/OVERNIGHT EVENTS: Pt seen and evaluated at the bedside. No acute overnight events occurred. Patient resting comfortably, wife present at bedside. Spoke to wife about patient's hydration status; discussed possibility of patient requiring PEG in the future for adequate nutrition intake.     ----  PAST MEDICAL & SURGICAL HISTORY:  Syncope  Parkinson disease  Constipation  Dementia  Urinary retention  Cerebrovascular accident  Hypertension  Atrial fibrillation  No significant past surgical history      FAMILY HISTORY:  No pertinent family history in first degree relatives      ----  MEDICATIONS  (STANDING):  amoxicillin 500 milliGRAM(s) Oral three times a day  carbidopa/levodopa  25/100 1 Tablet(s) Oral four times a day  carbidopa/levodopa  25/100 1 Tablet(s) Oral at bedtime  docusate sodium 100 milliGRAM(s) Oral two times a day  enoxaparin Injectable 50 milliGRAM(s) SubCutaneous every 12 hours  entacapone 200 milliGRAM(s) Oral four times a day  finasteride 5 milliGRAM(s) Oral daily  metoprolol tartrate 25 milliGRAM(s) Oral two times a day  polyethylene glycol 3350 17 Gram(s) Oral daily  senna 2 Tablet(s) Oral at bedtime  simvastatin 10 milliGRAM(s) Oral at bedtime  tamsulosin 0.4 milliGRAM(s) Oral at bedtime  warfarin 3 milliGRAM(s) Oral once    MEDICATIONS  (PRN):  bisacodyl 5 milliGRAM(s) Oral every 12 hours PRN Constipation  OLANZapine Disintegrating Tablet 5 milliGRAM(s) Oral at bedtime PRN agitation      ----  REVIEW OF SYSTEMS: limited due to patient's clinical status    ----  PHYSICAL EXAM:  GENERAL: no acute distress, comfortable in bed  HEENT: NC/AT, EOMI, neck supple, MMM, poor dentition  RESPIRATORY: LCTA B/L, no rhonchi, rales, or wheezing  CARDIOVASCULAR: RRR, no murmurs, gallops, rubs  ABDOMINAL: soft, non-tender, non-distended, positive bowel sounds  EXTREMITIES: no clubbing, cyanosis, or edema  NEUROLOGICAL: AAOx1, answers yes/no to questions, minimally verbal, non-focal, pleasant  SKIN: no rashes or lesions   MUSCULOSKELETAL: no gross joint deformity    T(C): 37.2 (05-01-19 @ 13:32), Max: 37.2 (05-01-19 @ 13:32)  HR: 85 (05-01-19 @ 13:32) (85 - 91)  BP: 111/74 (05-01-19 @ 13:32) (111/74 - 137/94)  RR: 18 (05-01-19 @ 13:32) (18 - 18)  SpO2: 97% (05-01-19 @ 13:32) (93% - 97%)  Wt(kg): --    ----  I&O's Summary    30 Apr 2019 07:01  -  01 May 2019 07:00  --------------------------------------------------------  IN: 1305 mL / OUT: 600 mL / NET: 705 mL        LABS:                        11.9   6.38  )-----------( 152      ( 01 May 2019 08:46 )             34.4     05-01    147<H>  |  115<H>  |  13  ----------------------------<  101<H>  4.0   |  26  |  0.84    Ca    9.4      01 May 2019 08:46      PT/INR - ( 01 May 2019 08:46 )   PT: 14.0 sec;   INR: 1.23 ratio           ----  Personally reviewed:  Vital sign trends: [ x ] yes    [  ] no     [  ] n/a  Laboratory results: [ x ] yes    [  ] no     [  ] n/a  Radiology results: [  ] yes    [  ] no     [ x ] n/a  Culture results: [  ] yes    [  ] no     [ x ] n/a  Consultant recommendations: [ x ] yes    [  ] no     [  ] n/a Patient is a 78y old  Male who presents with a chief complaint of AMS (30 Apr 2019 16:41)      FROM ADMISSION H+P:   HPI:  78y M PMH Afib (on coumadin), CVA (2012, 2016), dementia, HTN, BPH, parkinson's, and syncope BIBEMS for AMS.  Pt baseline of severe dementia, history obtained via wife at bedside.  Wife reports a week ago pt was not acting like his usual self, reported decline of appetite and more agitated/altered than his baseline.  Was seen by home call and diagnosed  with a UTI.  PO Cipro was started and later changed to PO Ampicillin.  Abx course started on 4/23, per wife pt symptoms has not improved.  Pt was to receive IV fluids at home, but upon EMS arrival, reported to have low bp, abnormal electrocutes and deferred to have pt bought to the ED.  Off note, pt has been having worsening of urinary retention recently 2/2 Seroquel use which has been discontinued.    In the ED, vitals: T: 96.2, HR 57, bp 119/80, 97% o2 on RA. Labs: INR 7.08, na 157, K 3.1, CT head and renal stone hunt negative. Given potassium, 1 L LR bolus (25 Apr 2019 20:38)      ----  INTERVAL HPI/OVERNIGHT EVENTS: Pt seen and evaluated at the bedside. No acute overnight events occurred. Patient resting comfortably, wife present at bedside. Spoke to wife about patient's hydration status; discussed possibility of patient requiring PEG in the future for adequate nutrition intake. Pt is nonverbal, unable to obtain any reliable history from him.     ----  PAST MEDICAL & SURGICAL HISTORY:  Syncope  Parkinson disease  Constipation  Dementia  Urinary retention  Cerebrovascular accident  Hypertension  Atrial fibrillation  No significant past surgical history      FAMILY HISTORY:  No pertinent family history in first degree relatives      ----  MEDICATIONS  (STANDING):  amoxicillin 500 milliGRAM(s) Oral three times a day  carbidopa/levodopa  25/100 1 Tablet(s) Oral four times a day  carbidopa/levodopa  25/100 1 Tablet(s) Oral at bedtime  docusate sodium 100 milliGRAM(s) Oral two times a day  enoxaparin Injectable 50 milliGRAM(s) SubCutaneous every 12 hours  entacapone 200 milliGRAM(s) Oral four times a day  finasteride 5 milliGRAM(s) Oral daily  metoprolol tartrate 25 milliGRAM(s) Oral two times a day  polyethylene glycol 3350 17 Gram(s) Oral daily  senna 2 Tablet(s) Oral at bedtime  simvastatin 10 milliGRAM(s) Oral at bedtime  tamsulosin 0.4 milliGRAM(s) Oral at bedtime  warfarin 3 milliGRAM(s) Oral once    MEDICATIONS  (PRN):  bisacodyl 5 milliGRAM(s) Oral every 12 hours PRN Constipation  OLANZapine Disintegrating Tablet 5 milliGRAM(s) Oral at bedtime PRN agitation      ----  REVIEW OF SYSTEMS: limited due to patient's clinical status    ----  PHYSICAL EXAM:  GENERAL: no acute distress, comfortable in bed  HEENT: NC/AT, EOMI, neck supple, MMM, poor dentition  RESPIRATORY: LCTA B/L, no rhonchi, rales, or wheezing  CARDIOVASCULAR: RRR, no murmurs, gallops, rubs  ABDOMINAL: soft, non-tender, non-distended, positive bowel sounds  EXTREMITIES: no clubbing, cyanosis, or edema  NEUROLOGICAL: AAOx1, answers yes/no to questions, minimally verbal, non-focal, pleasant  SKIN: no rashes or lesions visualized on extremities   MUSCULOSKELETAL: no gross joint deformity    T(C): 37.2 (05-01-19 @ 13:32), Max: 37.2 (05-01-19 @ 13:32)  HR: 85 (05-01-19 @ 13:32) (85 - 91)  BP: 111/74 (05-01-19 @ 13:32) (111/74 - 137/94)  RR: 18 (05-01-19 @ 13:32) (18 - 18)  SpO2: 97% (05-01-19 @ 13:32) (93% - 97%)  Wt(kg): --    ----  I&O's Summary    30 Apr 2019 07:01  -  01 May 2019 07:00  --------------------------------------------------------  IN: 1305 mL / OUT: 600 mL / NET: 705 mL        LABS:                        11.9   6.38  )-----------( 152      ( 01 May 2019 08:46 )             34.4     05-01    147<H>  |  115<H>  |  13  ----------------------------<  101<H>  4.0   |  26  |  0.84    Ca    9.4      01 May 2019 08:46      PT/INR - ( 01 May 2019 08:46 )   PT: 14.0 sec;   INR: 1.23 ratio           ----  Personally reviewed:  Vital sign trends: [ x ] yes    [  ] no     [  ] n/a  Laboratory results: [ x ] yes    [  ] no     [  ] n/a  Radiology results: [  ] yes    [  ] no     [ x ] n/a  Culture results: [  ] yes    [  ] no     [ x ] n/a  Consultant recommendations: [ x ] yes    [  ] no     [  ] n/a

## 2019-05-01 NOTE — PROGRESS NOTE ADULT - SUBJECTIVE AND OBJECTIVE BOX
KENNETHJOSÉ MIGUEL is a 78yMale , patient examined and chart reviewed.      INTERVAL HPI/ OVERNIGHT EVENTS:   Confused. Wife at bedside.  No events. Afebrile.    PAST MEDICAL & SURGICAL HISTORY:  Syncope  Parkinson disease  Constipation  Dementia  Urinary retention  Cerebrovascular accident  Hypertension  Atrial fibrillation  No significant past surgical history      For details regarding the patient's social history, family history, and other miscellaneous elements, please refer the initial infectious diseases consultation and/or the admitting history and physical examination for this admission.    ROS:  Unable to obtain due to : pt's condition    Current inpatient medications :    ANTIBIOTICS/RELEVANT:  amoxicillin 500 milliGRAM(s) Oral three times a day      MEDICATIONS  (STANDING):  carbidopa/levodopa  25/100 1 Tablet(s) Oral four times a day  carbidopa/levodopa  25/100 1 Tablet(s) Oral at bedtime  docusate sodium 100 milliGRAM(s) Oral two times a day  enoxaparin Injectable 50 milliGRAM(s) SubCutaneous every 12 hours  entacapone 200 milliGRAM(s) Oral four times a day  finasteride 5 milliGRAM(s) Oral daily  metoprolol tartrate 25 milliGRAM(s) Oral two times a day  polyethylene glycol 3350 17 Gram(s) Oral daily  senna 2 Tablet(s) Oral at bedtime  simvastatin 10 milliGRAM(s) Oral at bedtime  tamsulosin 0.4 milliGRAM(s) Oral at bedtime  warfarin 3 milliGRAM(s) Oral once    MEDICATIONS  (PRN):  bisacodyl 5 milliGRAM(s) Oral every 12 hours PRN Constipation  OLANZapine Disintegrating Tablet 5 milliGRAM(s) Oral at bedtime PRN agitation        Objective:  Vital Signs Last 24 Hrs  T(C): 37.2 (01 May 2019 13:32), Max: 37.2 (01 May 2019 13:32)  T(F): 98.9 (01 May 2019 13:32), Max: 98.9 (01 May 2019 13:32)  HR: 85 (01 May 2019 13:32) (85 - 91)  BP: 111/74 (01 May 2019 13:32) (111/74 - 137/94)  RR: 18 (01 May 2019 13:32) (18 - 18)  SpO2: 97% (01 May 2019 13:32) (93% - 97%)    Physical Exam:  General: no acute distress  Eyes: sclera anicteric, pupils equal and reactive to light  ENMT: buccal mucosa moist, pharynx not injected  Neck: supple, trachea midline  Lungs: clear, no wheeze/rhonchi  Cardiovascular: regular rate and rhythm, S1 S2  Abdomen: soft, nontender, no organomegaly present, bowel sounds normal  Neurological: alert and oriented x0, Cranial Nerves II-XII grossly intact  Skin: no increased ecchymosis/petechiae/purpura  Lymph Nodes: no palpable cervical/supraclavicular lymph nodes enlargements  Extremities: no cyanosis/clubbing/edema      LABS:                          11.9   6.38  )-----------( 152      ( 01 May 2019 08:46 )             34.4   05-01    147<H>  |  115<H>  |  13  ----------------------------<  101<H>  4.0   |  26  |  0.84    Ca    9.4      01 May 2019 08:46      MICROBIOLOGY:  Culture - Urine (04.25.19 @ 22:39)    Specimen Source: .Urine Clean Catch (Midstream)    Culture Results:   No growth    Culture - Blood (04.25.19 @ 21:59)    Specimen Source: .Blood Blood-Peripheral    Culture Results:   No growth to date.                      Assessment :  78y M PMH Afib (on coumadin), CVA (2012, 2016), dementia, HTN, BPH, parkinson's, and syncope BIBEMS for AMS , likely secondary to cystitis . he has markedly elevated INR most likely secondary to recent use of Cipro . His repeat UA from this admission is all hematuria but culture has been ngtd  INR is better. Hematuria has cleared up.  Overall stable    Plan :   - Finish course of PO Amoxil 500 mg po tid     - Asp precautions  - Stable from Id standpoint      Continue with present regiment.  Appropriate use of antibiotics and adverse effects reviewed.      I have discussed the above plan of care with patient/ family in detail. They expressed understanding of the  treatment plan . Risks, benefits and alternatives discussed in detail. I have asked if they have any questions or concerns and appropriately addressed them to the best of my ability .    > 35 minutes were spent in direct patient care reviewing notes, medications ,labs data/ imaging , discussion with multidisciplinary team.    Thank you for allowing me to participate in care of your patient .    Sanjeev Cole MD  Infectious Disease  [phone]

## 2019-05-01 NOTE — PROGRESS NOTE ADULT - PROBLEM SELECTOR PLAN 1
- Improving; likely multifactorial to UTI, hypernatremia, underlying dementia, and possible element of hospital delirium.  - CT head negative for acute findings.   - Zyprexa PRN for agitation.   - Hypernatremia improved, will monitor off IVF x 24 hours.   - Fall risk protocol, aspiration precautions. - Improving; likely multifactorial to UTI, hypernatremia, underlying dementia, and possible element of hospital delirium.  - CT head negative for acute findings.   - Zyprexa PRN for agitation.   - Hypernatremia improving, will monitor off IVF x 24 hours and monitor for maintaining lytes without IVF support  - Fall risk protocol, aspiration precautions.

## 2019-05-02 ENCOUNTER — TRANSCRIPTION ENCOUNTER (OUTPATIENT)
Age: 79
End: 2019-05-02

## 2019-05-02 VITALS
OXYGEN SATURATION: 98 % | DIASTOLIC BLOOD PRESSURE: 69 MMHG | RESPIRATION RATE: 18 BRPM | SYSTOLIC BLOOD PRESSURE: 112 MMHG | TEMPERATURE: 98 F | HEART RATE: 62 BPM

## 2019-05-02 LAB
ANION GAP SERPL CALC-SCNC: 7 MMOL/L — SIGNIFICANT CHANGE UP (ref 5–17)
BASOPHILS # BLD AUTO: 0.01 K/UL — SIGNIFICANT CHANGE UP (ref 0–0.2)
BASOPHILS NFR BLD AUTO: 0.2 % — SIGNIFICANT CHANGE UP (ref 0–2)
BUN SERPL-MCNC: 15 MG/DL — SIGNIFICANT CHANGE UP (ref 7–23)
CALCIUM SERPL-MCNC: 8.9 MG/DL — SIGNIFICANT CHANGE UP (ref 8.5–10.1)
CHLORIDE SERPL-SCNC: 113 MMOL/L — HIGH (ref 96–108)
CO2 SERPL-SCNC: 25 MMOL/L — SIGNIFICANT CHANGE UP (ref 22–31)
CREAT SERPL-MCNC: 0.7 MG/DL — SIGNIFICANT CHANGE UP (ref 0.5–1.3)
EOSINOPHIL # BLD AUTO: 0.12 K/UL — SIGNIFICANT CHANGE UP (ref 0–0.5)
EOSINOPHIL NFR BLD AUTO: 2.4 % — SIGNIFICANT CHANGE UP (ref 0–6)
GLUCOSE SERPL-MCNC: 102 MG/DL — HIGH (ref 70–99)
HCT VFR BLD CALC: 30.8 % — LOW (ref 39–50)
HGB BLD-MCNC: 10.6 G/DL — LOW (ref 13–17)
IMM GRANULOCYTES NFR BLD AUTO: 0.2 % — SIGNIFICANT CHANGE UP (ref 0–1.5)
INR BLD: 1.36 RATIO — HIGH (ref 0.88–1.16)
LYMPHOCYTES # BLD AUTO: 1.09 K/UL — SIGNIFICANT CHANGE UP (ref 1–3.3)
LYMPHOCYTES # BLD AUTO: 21.9 % — SIGNIFICANT CHANGE UP (ref 13–44)
MCHC RBC-ENTMCNC: 32.9 PG — SIGNIFICANT CHANGE UP (ref 27–34)
MCHC RBC-ENTMCNC: 34.4 GM/DL — SIGNIFICANT CHANGE UP (ref 32–36)
MCV RBC AUTO: 95.7 FL — SIGNIFICANT CHANGE UP (ref 80–100)
MONOCYTES # BLD AUTO: 0.44 K/UL — SIGNIFICANT CHANGE UP (ref 0–0.9)
MONOCYTES NFR BLD AUTO: 8.9 % — SIGNIFICANT CHANGE UP (ref 2–14)
NEUTROPHILS # BLD AUTO: 3.3 K/UL — SIGNIFICANT CHANGE UP (ref 1.8–7.4)
NEUTROPHILS NFR BLD AUTO: 66.4 % — SIGNIFICANT CHANGE UP (ref 43–77)
NRBC # BLD: 0 /100 WBCS — SIGNIFICANT CHANGE UP (ref 0–0)
PLATELET # BLD AUTO: 142 K/UL — LOW (ref 150–400)
POTASSIUM SERPL-MCNC: 3.7 MMOL/L — SIGNIFICANT CHANGE UP (ref 3.5–5.3)
POTASSIUM SERPL-SCNC: 3.7 MMOL/L — SIGNIFICANT CHANGE UP (ref 3.5–5.3)
PROTHROM AB SERPL-ACNC: 15.7 SEC — HIGH (ref 10–12.9)
RBC # BLD: 3.22 M/UL — LOW (ref 4.2–5.8)
RBC # FLD: 14.1 % — SIGNIFICANT CHANGE UP (ref 10.3–14.5)
SODIUM SERPL-SCNC: 145 MMOL/L — SIGNIFICANT CHANGE UP (ref 135–145)
WBC # BLD: 4.97 K/UL — SIGNIFICANT CHANGE UP (ref 3.8–10.5)
WBC # FLD AUTO: 4.97 K/UL — SIGNIFICANT CHANGE UP (ref 3.8–10.5)

## 2019-05-02 PROCEDURE — 97530 THERAPEUTIC ACTIVITIES: CPT

## 2019-05-02 PROCEDURE — 97116 GAIT TRAINING THERAPY: CPT

## 2019-05-02 PROCEDURE — 80053 COMPREHEN METABOLIC PANEL: CPT

## 2019-05-02 PROCEDURE — 97162 PT EVAL MOD COMPLEX 30 MIN: CPT

## 2019-05-02 PROCEDURE — 87086 URINE CULTURE/COLONY COUNT: CPT

## 2019-05-02 PROCEDURE — 81001 URINALYSIS AUTO W/SCOPE: CPT

## 2019-05-02 PROCEDURE — 83605 ASSAY OF LACTIC ACID: CPT

## 2019-05-02 PROCEDURE — 85610 PROTHROMBIN TIME: CPT

## 2019-05-02 PROCEDURE — 97110 THERAPEUTIC EXERCISES: CPT

## 2019-05-02 PROCEDURE — 74176 CT ABD & PELVIS W/O CONTRAST: CPT

## 2019-05-02 PROCEDURE — 87040 BLOOD CULTURE FOR BACTERIA: CPT

## 2019-05-02 PROCEDURE — 85730 THROMBOPLASTIN TIME PARTIAL: CPT

## 2019-05-02 PROCEDURE — 71045 X-RAY EXAM CHEST 1 VIEW: CPT

## 2019-05-02 PROCEDURE — 92526 ORAL FUNCTION THERAPY: CPT | Mod: GN

## 2019-05-02 PROCEDURE — 70450 CT HEAD/BRAIN W/O DYE: CPT

## 2019-05-02 PROCEDURE — 99285 EMERGENCY DEPT VISIT HI MDM: CPT | Mod: 25

## 2019-05-02 PROCEDURE — 93005 ELECTROCARDIOGRAM TRACING: CPT

## 2019-05-02 PROCEDURE — 80048 BASIC METABOLIC PNL TOTAL CA: CPT

## 2019-05-02 PROCEDURE — 85027 COMPLETE CBC AUTOMATED: CPT

## 2019-05-02 PROCEDURE — 36415 COLL VENOUS BLD VENIPUNCTURE: CPT

## 2019-05-02 PROCEDURE — 99239 HOSP IP/OBS DSCHRG MGMT >30: CPT

## 2019-05-02 RX ORDER — ENOXAPARIN SODIUM 100 MG/ML
60 INJECTION SUBCUTANEOUS
Qty: 6 | Refills: 0 | OUTPATIENT
Start: 2019-05-02 | End: 2019-05-04

## 2019-05-02 RX ORDER — ONDANSETRON 8 MG/1
4 TABLET, FILM COATED ORAL ONCE
Qty: 0 | Refills: 0 | Status: COMPLETED | OUTPATIENT
Start: 2019-05-02 | End: 2019-05-02

## 2019-05-02 RX ORDER — AMOXICILLIN 250 MG/5ML
1 SUSPENSION, RECONSTITUTED, ORAL (ML) ORAL
Qty: 9 | Refills: 0 | OUTPATIENT
Start: 2019-05-02 | End: 2019-05-04

## 2019-05-02 RX ADMIN — Medication 500 MILLIGRAM(S): at 13:19

## 2019-05-02 RX ADMIN — FINASTERIDE 5 MILLIGRAM(S): 5 TABLET, FILM COATED ORAL at 12:01

## 2019-05-02 RX ADMIN — Medication 500 MILLIGRAM(S): at 06:03

## 2019-05-02 RX ADMIN — POLYETHYLENE GLYCOL 3350 17 GRAM(S): 17 POWDER, FOR SOLUTION ORAL at 12:00

## 2019-05-02 RX ADMIN — ENTACAPONE 200 MILLIGRAM(S): 200 TABLET, FILM COATED ORAL at 12:00

## 2019-05-02 RX ADMIN — ENOXAPARIN SODIUM 50 MILLIGRAM(S): 100 INJECTION SUBCUTANEOUS at 16:14

## 2019-05-02 RX ADMIN — Medication 25 MILLIGRAM(S): at 06:03

## 2019-05-02 RX ADMIN — ONDANSETRON 4 MILLIGRAM(S): 8 TABLET, FILM COATED ORAL at 16:15

## 2019-05-02 RX ADMIN — Medication 100 MILLIGRAM(S): at 06:03

## 2019-05-02 RX ADMIN — CARBIDOPA AND LEVODOPA 1 TABLET(S): 25; 100 TABLET ORAL at 06:03

## 2019-05-02 RX ADMIN — ENOXAPARIN SODIUM 50 MILLIGRAM(S): 100 INJECTION SUBCUTANEOUS at 06:03

## 2019-05-02 RX ADMIN — CARBIDOPA AND LEVODOPA 1 TABLET(S): 25; 100 TABLET ORAL at 12:01

## 2019-05-02 RX ADMIN — ENTACAPONE 200 MILLIGRAM(S): 200 TABLET, FILM COATED ORAL at 06:03

## 2019-05-02 NOTE — PROGRESS NOTE ADULT - PROVIDER SPECIALTY LIST ADULT
Hospitalist
Infectious Disease
Urology
Infectious Disease
Hospitalist

## 2019-05-02 NOTE — DISCHARGE NOTE NURSING/CASE MANAGEMENT/SOCIAL WORK - NSDCDPATPORTLINK_GEN_ALL_CORE
You can access the [facility] Portal, offered by [facility], by registering with the following website: [email or web address]

## 2019-05-02 NOTE — DISCHARGE NOTE NURSING/CASE MANAGEMENT/SOCIAL WORK - NSDCPEPTCOWAR_GEN_ALL_CORE
Coumadin/Warfarin - Follow up monitoring/Coumadin/Warfarin - Potential for adverse drug reactions and interactions/Coumadin/Warfarin - Dietary Advice/Coumadin/Warfarin - Compliance

## 2019-05-02 NOTE — PROGRESS NOTE ADULT - SUBJECTIVE AND OBJECTIVE BOX
JOSÉ MIGUEL COOPER is a 78yMale , patient examined and chart reviewed.      INTERVAL HPI/ OVERNIGHT EVENTS:   No events. Afebrile.    PAST MEDICAL & SURGICAL HISTORY:  Syncope  Parkinson disease  Constipation  Dementia  Urinary retention  Cerebrovascular accident  Hypertension  Atrial fibrillation  No significant past surgical history      For details regarding the patient's social history, family history, and other miscellaneous elements, please refer the initial infectious diseases consultation and/or the admitting history and physical examination for this admission.    ROS:  Unable to obtain due to : pt's condition    Current inpatient medications :    ANTIBIOTICS/RELEVANT:  amoxicillin 500 milliGRAM(s) Oral three times a day    MEDICATIONS  (STANDING):  carbidopa/levodopa  25/100 1 Tablet(s) Oral four times a day  carbidopa/levodopa  25/100 1 Tablet(s) Oral at bedtime  docusate sodium 100 milliGRAM(s) Oral two times a day  enoxaparin Injectable 50 milliGRAM(s) SubCutaneous every 12 hours  entacapone 200 milliGRAM(s) Oral four times a day  finasteride 5 milliGRAM(s) Oral daily  metoprolol tartrate 25 milliGRAM(s) Oral two times a day  polyethylene glycol 3350 17 Gram(s) Oral daily  senna 2 Tablet(s) Oral at bedtime  simvastatin 10 milliGRAM(s) Oral at bedtime  tamsulosin 0.4 milliGRAM(s) Oral at bedtime    MEDICATIONS  (PRN):  bisacodyl 5 milliGRAM(s) Oral every 12 hours PRN Constipation  OLANZapine Disintegrating Tablet 5 milliGRAM(s) Oral at bedtime PRN agitation  ondansetron Injectable 4 milliGRAM(s) IV Push once PRN prior to transport, for nausea and/or vomiting in transit      Objective:  Vital Signs Last 24 Hrs  T(C): 37.2 (01 May 2019 13:32), Max: 37.2 (01 May 2019 13:32)  T(F): 98.9 (01 May 2019 13:32), Max: 98.9 (01 May 2019 13:32)  HR: 85 (01 May 2019 13:32) (85 - 91)  BP: 111/74 (01 May 2019 13:32) (111/74 - 137/94)  RR: 18 (01 May 2019 13:32) (18 - 18)  SpO2: 97% (01 May 2019 13:32) (93% - 97%)    Physical Exam:  General: no acute distress  Eyes: sclera anicteric, pupils equal and reactive to light  ENMT: buccal mucosa moist, pharynx not injected  Neck: supple, trachea midline  Lungs: clear, no wheeze/rhonchi  Cardiovascular: regular rate and rhythm, S1 S2  Abdomen: soft, nontender, no organomegaly present, bowel sounds normal  Neurological: alert and oriented x0, Cranial Nerves II-XII grossly intact  Skin: no increased ecchymosis/petechiae/purpura  Lymph Nodes: no palpable cervical/supraclavicular lymph nodes enlargements  Extremities: no cyanosis/clubbing/edema      LABS:                        10.6   4.97  )-----------( 142      ( 02 May 2019 09:22 )             30.8   05-02    145  |  113<H>  |  15  ----------------------------<  102<H>  3.7   |  25  |  0.70    Ca    8.9      02 May 2019 09:22    MICROBIOLOGY:  Culture - Urine (04.25.19 @ 22:39)    Specimen Source: .Urine Clean Catch (Midstream)    Culture Results:   No growth    Culture - Blood (04.25.19 @ 21:59)    Specimen Source: .Blood Blood-Peripheral    Culture Results:   No growth to date.                      Assessment :  78y M PMH Afib (on coumadin), CVA (2012, 2016), dementia, HTN, BPH, parkinson's, and syncope BIBEMS for AMS , likely secondary to cystitis . he has markedly elevated INR most likely secondary to recent use of Cipro . His repeat UA from this admission is all hematuria but culture has been ngtd. Hematuria has cleared up.  Overall stable    Plan :   - Finish course of PO Amoxil 500 mg po tid     - Asp precautions  - Stable from ID standpoint      Continue with present regiment.  Appropriate use of antibiotics and adverse effects reviewed.      I have discussed the above plan of care with patient/ family in detail. They expressed understanding of the  treatment plan . Risks, benefits and alternatives discussed in detail. I have asked if they have any questions or concerns and appropriately addressed them to the best of my ability .    25 minutes were spent in direct patient care reviewing notes, medications ,labs data/ imaging , discussion with multidisciplinary team.    Thank you for allowing me to participate in care of your patient .    Sanjeev Cole MD  Infectious Disease  [phone]

## 2019-05-03 ENCOUNTER — APPOINTMENT (OUTPATIENT)
Age: 79
End: 2019-05-03

## 2019-05-03 ENCOUNTER — EMERGENCY (EMERGENCY)
Facility: HOSPITAL | Age: 79
LOS: 1 days | Discharge: HOME | End: 2019-05-03
Attending: INTERNAL MEDICINE | Admitting: INTERNAL MEDICINE
Payer: MEDICARE

## 2019-05-03 VITALS
WEIGHT: 143 LBS | HEART RATE: 64 BPM | DIASTOLIC BLOOD PRESSURE: 60 MMHG | TEMPERATURE: 98 F | BODY MASS INDEX: 21.67 KG/M2 | HEIGHT: 68 IN | OXYGEN SATURATION: 96 % | SYSTOLIC BLOOD PRESSURE: 112 MMHG | RESPIRATION RATE: 14 BRPM

## 2019-05-03 VITALS
HEIGHT: 69 IN | TEMPERATURE: 98 F | WEIGHT: 149.91 LBS | RESPIRATION RATE: 16 BRPM | DIASTOLIC BLOOD PRESSURE: 71 MMHG | HEART RATE: 112 BPM | SYSTOLIC BLOOD PRESSURE: 104 MMHG | OXYGEN SATURATION: 98 %

## 2019-05-03 LAB
ALBUMIN SERPL ELPH-MCNC: 3.6 G/DL — SIGNIFICANT CHANGE UP (ref 3.3–5)
ALP SERPL-CCNC: 67 U/L — SIGNIFICANT CHANGE UP (ref 40–120)
ALT FLD-CCNC: 14 U/L — SIGNIFICANT CHANGE UP (ref 12–78)
ANION GAP SERPL CALC-SCNC: 9 MMOL/L — SIGNIFICANT CHANGE UP (ref 5–17)
APPEARANCE UR: ABNORMAL
AST SERPL-CCNC: 50 U/L — HIGH (ref 15–37)
BACTERIA # UR AUTO: ABNORMAL
BILIRUB SERPL-MCNC: 0.6 MG/DL — SIGNIFICANT CHANGE UP (ref 0.2–1.2)
BILIRUB UR-MCNC: NEGATIVE — SIGNIFICANT CHANGE UP
BUN SERPL-MCNC: 22 MG/DL — SIGNIFICANT CHANGE UP (ref 7–23)
CALCIUM SERPL-MCNC: 10 MG/DL — SIGNIFICANT CHANGE UP (ref 8.5–10.1)
CHLORIDE SERPL-SCNC: 111 MMOL/L — HIGH (ref 96–108)
CO2 SERPL-SCNC: 24 MMOL/L — SIGNIFICANT CHANGE UP (ref 22–31)
COD CRY URNS QL: ABNORMAL
COLOR SPEC: YELLOW — SIGNIFICANT CHANGE UP
CREAT SERPL-MCNC: 1 MG/DL — SIGNIFICANT CHANGE UP (ref 0.5–1.3)
DIFF PNL FLD: ABNORMAL
EPI CELLS # UR: SIGNIFICANT CHANGE UP
GLUCOSE SERPL-MCNC: 115 MG/DL — HIGH (ref 70–99)
GLUCOSE UR QL: NEGATIVE — SIGNIFICANT CHANGE UP
HCT VFR BLD CALC: 36.4 % — LOW (ref 39–50)
HGB BLD-MCNC: 12.6 G/DL — LOW (ref 13–17)
INR BLD: 1.91 RATIO — HIGH (ref 0.88–1.16)
KETONES UR-MCNC: ABNORMAL
LEUKOCYTE ESTERASE UR-ACNC: ABNORMAL
MCHC RBC-ENTMCNC: 33 PG — SIGNIFICANT CHANGE UP (ref 27–34)
MCHC RBC-ENTMCNC: 34.6 GM/DL — SIGNIFICANT CHANGE UP (ref 32–36)
MCV RBC AUTO: 95.3 FL — SIGNIFICANT CHANGE UP (ref 80–100)
NITRITE UR-MCNC: NEGATIVE — SIGNIFICANT CHANGE UP
NRBC # BLD: 0 /100 WBCS — SIGNIFICANT CHANGE UP (ref 0–0)
PH UR: 5 — SIGNIFICANT CHANGE UP (ref 5–8)
PLATELET # BLD AUTO: 208 K/UL — SIGNIFICANT CHANGE UP (ref 150–400)
POTASSIUM SERPL-MCNC: 4.1 MMOL/L — SIGNIFICANT CHANGE UP (ref 3.5–5.3)
POTASSIUM SERPL-SCNC: 4.1 MMOL/L — SIGNIFICANT CHANGE UP (ref 3.5–5.3)
PROT SERPL-MCNC: 7.3 G/DL — SIGNIFICANT CHANGE UP (ref 6–8.3)
PROT UR-MCNC: 25 MG/DL
PROTHROM AB SERPL-ACNC: 22 SEC — HIGH (ref 10–12.9)
RBC # BLD: 3.82 M/UL — LOW (ref 4.2–5.8)
RBC # FLD: 14.8 % — HIGH (ref 10.3–14.5)
RBC CASTS # UR COMP ASSIST: >50 /HPF (ref 0–4)
SODIUM SERPL-SCNC: 144 MMOL/L — SIGNIFICANT CHANGE UP (ref 135–145)
SP GR SPEC: 1.02 — SIGNIFICANT CHANGE UP (ref 1.01–1.02)
UROBILINOGEN FLD QL: NEGATIVE — SIGNIFICANT CHANGE UP
WBC # BLD: 5.09 K/UL — SIGNIFICANT CHANGE UP (ref 3.8–10.5)
WBC # FLD AUTO: 5.09 K/UL — SIGNIFICANT CHANGE UP (ref 3.8–10.5)
WBC UR QL: SIGNIFICANT CHANGE UP

## 2019-05-03 PROCEDURE — 99284 EMERGENCY DEPT VISIT MOD MDM: CPT

## 2019-05-03 RX ORDER — AMPICILLIN 500 MG/1
500 CAPSULE ORAL
Qty: 14 | Refills: 0 | Status: DISCONTINUED | COMMUNITY
Start: 2019-04-22 | End: 2019-05-03

## 2019-05-03 RX ORDER — WARFARIN 1 MG/1
1 TABLET ORAL
Qty: 30 | Refills: 3 | Status: DISCONTINUED | COMMUNITY
Start: 2018-11-30 | End: 2019-05-03

## 2019-05-03 RX ADMIN — Medication 1 MILLIGRAM(S): at 23:00

## 2019-05-03 NOTE — ED PROVIDER NOTE - MUSCULOSKELETAL, MLM
Spine appears normal, range of motion is not limited, no muscle or joint tenderness, pulses femoral and popliteal 2/2, DP 1/1 bilaterally

## 2019-05-03 NOTE — ED ADULT NURSE REASSESSMENT NOTE - NS ED NURSE REASSESS COMMENT FT1
Received patient from Annabel Garnica RN lying in bed with spouse at the bedside was medicated with Ativan waiting for ultrasound.

## 2019-05-03 NOTE — ED PROVIDER NOTE - SIGNIFICANT NEGATIVE FINDINGS
no headache, no chest pain, no SOB, no palpitations, no abdominal pain, no n/v/d, no urinary symptoms, no GI  bleeding. no neuro changes.

## 2019-05-03 NOTE — ED PROVIDER NOTE - OBJECTIVE STATEMENT
c/o right thigh pain and right ankle pain, and decreased urine output  pain, thigh c/o right thigh pain and right ankle pain, and decreased urine output  pain, thigh  HPI Objective Statement: Pt is a 77 yo male with pmhx of A. Fib on coumadin CVA, HTN, Dementia, Parkinson's Disease and Syncope BIBEMS for AMS. As per EMS pt was more weak and lethargic today. Pt was in urinary retention last week possibly due to Seroquel use which wife discontinued. Pt was diagnosed with UTI placed on cipro and changed to Ampicillin based on culture results. Pt INR was supratherapuetic last week but normal as per EMS. Pt wife noted blood clot with urine in diaper today no bloody stool no nvd fever. As per ems his sodium was also elevated. History limited from pt due to mental status obtained from emr and ems. c/o right thigh pain and right ankle pain, and decreased urine output  pain, thigh  HPI Objective Statement: Pt is a 79 yo male with pmhx of A. Fib on coumadin CVA, HTN, Dementia, Parkinson's Disease and Syncope BIBEMS for AMS. As per EMS pt was more weak and lethargic today. Pt was in urinary retention last week possibly due to Seroquel use which wife discontinued. Pt was diagnosed with UTI placed on cipro and changed to Ampicillin based on culture results. Pt INR was supratherapuetic last week but normal as per EMS. Pt wife noted blood clot with urine in diaper today no bloody stool no nvd fever. As per ems his sodium was also elevated. History limited from pt due to mental status obtained from emr and ems.78 year old male with gross hematuria. Patient was admitted with AMS and cystitis. Patient has been experiencing gross hematuria intermittently.   He has been voiding without difficult. He was noted to pass small blood clots yesterday that has resolved. He has a h/o kidney stones but no ureteral stones notes on CT stone study. c/o right thigh pain and right ankle pain, and decreased urine output  pain, thigh  HPI Objective Statement: Pt is a 79 yo male with pmhx of A. Fib on coumadin CVA, HTN, Dementia, Parkinson's Disease and Syncope BIBEMS for AMS. As per EMS pt was more weak and lethargic today. Pt was in urinary retention last week possibly due to Seroquel use which wife discontinued. Pt was diagnosed with UTI placed on cipro and changed to Ampicillin based on culture results. Pt INR was supratherapuetic last week but normal as per EMS. Pt wife noted blood clot with urine in diaper today no bloody stool no nvd fever. As per ems his sodium was also elevated. History limited from pt due to mental status obtained from emr and ems.78 year old male with gross hematuria. Patient was admitted with AMS and cystitis. Patient has been experiencing gross hematuria intermittently.   He has been voiding without difficult. He was noted to pass small blood clots yesterday that has resolved. He has a h/o kidney stones but no ureteral stones notes on CT stone study.78y M PMH Afib (on coumadin), CVA (2012, 2016), dementia, HTN, BPH, parkinson's, and syncope BIBEMS for AMS.  Pt baseline of severe dementia, history obtained via wife at bedside.  Wife reports a week ago pt was not acting like his usual self, reported decline of appetite and more agitated/altered than his baseline.  Was seen by home call and diagnosed  with a UTI.  PO Cipro was started and later changed to PO Ampicillin.  Abx course started on 4/23, per wife pt symptoms has not improved.  Pt was to receive IV fluids at home, but upon EMS arrival, reported to have low bp, abnormal electrocutes and deferred to have pt bought to the ED.  Off note, pt has been having worsening of urinary retention recently 2/2 Seroquel use which has been discontinued. c/o right thigh pain and swelling, no trauma, decreased urine output  no cp, no sob, no fever, no chills, no GI symptoms, no acute stroke symptoms.   Pt is a 77 yo male with pmhx of A. Fib on coumadin CVA, HTN, Dementia, Parkinson's Disease and Syncope. Recent admission and discharged yesterday for  weakness and lethargy. Also h/o urinary retention last week possibly due to Seroquel and  UTI Rx  Ampicillin witch he is currently takings. Pt INR was supratherapuetic last week but normal as per EMS. Pt wife noted blood clot with urine in diaper today no bloody stool no nvd fever. As per ems his sodium was also elevated. History limited from pt due to mental status obtained from emr and ems.78 year old male with gross hematuria. Patient was admitted with AMS and cystitis. Patient has been experiencing gross hematuria intermittently.   He has been voiding without difficult. He was noted to pass small blood clots yesterday that has resolved. He has a h/o kidney stones but no ureteral stones notes on CT stone study.78y M PMH Afib (on coumadin), CVA (2012, 2016), dementia, HTN, BPH, parkinson's, and syncope BIBEMS for AMS.  Pt baseline of severe dementia, history obtained via wife at bedside.  Wife reports a week ago pt was not acting like his usual self, reported decline of appetite and more agitated/altered than his baseline.  Was seen by home call and diagnosed  with a UTI.  PO Cipro was started and later changed to PO Ampicillin.  Abx course started on 4/23, per wife pt symptoms has not improved.  Pt was to receive IV fluids at home, but upon EMS arrival, reported to have low bp, abnormal electrocutes and deferred to have pt bought to the ED.  Off note, pt has been having worsening of urinary retention recently 2/2 Seroquel use which has been discontinued. c/o right thigh pain and swelling, no trauma, decreased urine output  no cp, no sob, no fever, no chills, no GI symptoms, no acute stroke symptoms.   Pt is a 77 yo male with pmhx of A. Fib on coumadin CVA, HTN, Dementia, Parkinson's Disease and Syncope. Recent admission and discharged yesterday for  weakness and lethargy. Also h/o urinary retention last week possibly due to Seroquel and  UTI Rx  Ampicillin witch he is currently takings. H/O  supra-therapuetic INR, elevated sodium.

## 2019-05-03 NOTE — ED PROVIDER NOTE - CONSTITUTIONAL, MLM
normal... awake, base line confusion, agitation during the sono test  and in mild apparent distress.

## 2019-05-03 NOTE — ED CLERICAL - NS ED CLERK NOTE PRE-ARRIVAL INFORMATION; ADDITIONAL PRE-ARRIVAL INFORMATION

## 2019-05-03 NOTE — ED PROVIDER NOTE - CARE PLAN
Principal Discharge DX:	Leg pain  Goal:	r/o DVT Principal Discharge DX:	Leg pain  Goal:	r/o DVT, superficial phlebitis, left chronic DVT  Secondary Diagnosis:	Bakers cyst, right

## 2019-05-03 NOTE — ED ADULT NURSE REASSESSMENT NOTE - NS ED NURSE REASSESS COMMENT FT1
Patient still restless and pulling out lines with spouse at the bedside waiting for medication to take effect.

## 2019-05-04 VITALS
TEMPERATURE: 98 F | DIASTOLIC BLOOD PRESSURE: 61 MMHG | SYSTOLIC BLOOD PRESSURE: 98 MMHG | RESPIRATION RATE: 16 BRPM | OXYGEN SATURATION: 98 %

## 2019-05-04 PROCEDURE — 99284 EMERGENCY DEPT VISIT MOD MDM: CPT

## 2019-05-04 PROCEDURE — 93970 EXTREMITY STUDY: CPT | Mod: 26

## 2019-05-04 PROCEDURE — 96375 TX/PRO/DX INJ NEW DRUG ADDON: CPT

## 2019-05-04 PROCEDURE — 87086 URINE CULTURE/COLONY COUNT: CPT

## 2019-05-04 PROCEDURE — 36415 COLL VENOUS BLD VENIPUNCTURE: CPT

## 2019-05-04 PROCEDURE — 93970 EXTREMITY STUDY: CPT

## 2019-05-04 PROCEDURE — 85610 PROTHROMBIN TIME: CPT

## 2019-05-04 PROCEDURE — 80053 COMPREHEN METABOLIC PANEL: CPT

## 2019-05-04 PROCEDURE — 96376 TX/PRO/DX INJ SAME DRUG ADON: CPT

## 2019-05-04 PROCEDURE — 96374 THER/PROPH/DIAG INJ IV PUSH: CPT

## 2019-05-04 PROCEDURE — 85027 COMPLETE CBC AUTOMATED: CPT

## 2019-05-04 PROCEDURE — 81001 URINALYSIS AUTO W/SCOPE: CPT

## 2019-05-04 RX ORDER — ONDANSETRON 8 MG/1
4 TABLET, FILM COATED ORAL ONCE
Qty: 0 | Refills: 0 | Status: COMPLETED | OUTPATIENT
Start: 2019-05-04 | End: 2019-05-04

## 2019-05-04 RX ADMIN — Medication 1 MILLIGRAM(S): at 00:20

## 2019-05-04 RX ADMIN — ONDANSETRON 4 MILLIGRAM(S): 8 TABLET, FILM COATED ORAL at 02:54

## 2019-05-04 NOTE — ED ADULT NURSE REASSESSMENT NOTE - NS ED NURSE REASSESS COMMENT FT1
Patient's wife requested for Zofran for patient as he gets car sick whenever he travels informed Dr. Choudhary Zofran 4 mg IV push ordered.

## 2019-05-05 ENCOUNTER — EMERGENCY (EMERGENCY)
Facility: HOSPITAL | Age: 79
LOS: 1 days | Discharge: HOME | End: 2019-05-05
Attending: EMERGENCY MEDICINE | Admitting: EMERGENCY MEDICINE
Payer: MEDICARE

## 2019-05-05 VITALS
RESPIRATION RATE: 17 BRPM | DIASTOLIC BLOOD PRESSURE: 75 MMHG | SYSTOLIC BLOOD PRESSURE: 121 MMHG | TEMPERATURE: 98 F | OXYGEN SATURATION: 99 % | HEART RATE: 98 BPM

## 2019-05-05 VITALS
TEMPERATURE: 96 F | DIASTOLIC BLOOD PRESSURE: 82 MMHG | SYSTOLIC BLOOD PRESSURE: 109 MMHG | WEIGHT: 169.98 LBS | RESPIRATION RATE: 16 BRPM | HEART RATE: 117 BPM | OXYGEN SATURATION: 98 %

## 2019-05-05 LAB
ALBUMIN SERPL ELPH-MCNC: 3.3 G/DL — SIGNIFICANT CHANGE UP (ref 3.3–5)
ALP SERPL-CCNC: 63 U/L — SIGNIFICANT CHANGE UP (ref 40–120)
ALT FLD-CCNC: 9 U/L — LOW (ref 12–78)
ANION GAP SERPL CALC-SCNC: 6 MMOL/L — SIGNIFICANT CHANGE UP (ref 5–17)
APTT BLD: 54.2 SEC — HIGH (ref 27.5–36.3)
AST SERPL-CCNC: 54 U/L — HIGH (ref 15–37)
BASOPHILS # BLD AUTO: 0.02 K/UL — SIGNIFICANT CHANGE UP (ref 0–0.2)
BASOPHILS NFR BLD AUTO: 0.3 % — SIGNIFICANT CHANGE UP (ref 0–2)
BILIRUB SERPL-MCNC: 0.7 MG/DL — SIGNIFICANT CHANGE UP (ref 0.2–1.2)
BUN SERPL-MCNC: 17 MG/DL — SIGNIFICANT CHANGE UP (ref 7–23)
CALCIUM SERPL-MCNC: 10 MG/DL — SIGNIFICANT CHANGE UP (ref 8.5–10.1)
CHLORIDE SERPL-SCNC: 113 MMOL/L — HIGH (ref 96–108)
CO2 SERPL-SCNC: 26 MMOL/L — SIGNIFICANT CHANGE UP (ref 22–31)
CREAT SERPL-MCNC: 0.95 MG/DL — SIGNIFICANT CHANGE UP (ref 0.5–1.3)
CULTURE RESULTS: NO GROWTH — SIGNIFICANT CHANGE UP
EOSINOPHIL # BLD AUTO: 0.01 K/UL — SIGNIFICANT CHANGE UP (ref 0–0.5)
EOSINOPHIL NFR BLD AUTO: 0.1 % — SIGNIFICANT CHANGE UP (ref 0–6)
GLUCOSE SERPL-MCNC: 109 MG/DL — HIGH (ref 70–99)
HCT VFR BLD CALC: 33.1 % — LOW (ref 39–50)
HGB BLD-MCNC: 11.4 G/DL — LOW (ref 13–17)
IMM GRANULOCYTES NFR BLD AUTO: 0.4 % — SIGNIFICANT CHANGE UP (ref 0–1.5)
INR BLD: 3.46 RATIO — HIGH (ref 0.88–1.16)
LYMPHOCYTES # BLD AUTO: 1.22 K/UL — SIGNIFICANT CHANGE UP (ref 1–3.3)
LYMPHOCYTES # BLD AUTO: 17.2 % — SIGNIFICANT CHANGE UP (ref 13–44)
MCHC RBC-ENTMCNC: 33.1 PG — SIGNIFICANT CHANGE UP (ref 27–34)
MCHC RBC-ENTMCNC: 34.4 GM/DL — SIGNIFICANT CHANGE UP (ref 32–36)
MCV RBC AUTO: 96.2 FL — SIGNIFICANT CHANGE UP (ref 80–100)
MONOCYTES # BLD AUTO: 0.95 K/UL — HIGH (ref 0–0.9)
MONOCYTES NFR BLD AUTO: 13.4 % — SIGNIFICANT CHANGE UP (ref 2–14)
NEUTROPHILS # BLD AUTO: 4.87 K/UL — SIGNIFICANT CHANGE UP (ref 1.8–7.4)
NEUTROPHILS NFR BLD AUTO: 68.6 % — SIGNIFICANT CHANGE UP (ref 43–77)
NRBC # BLD: 0 /100 WBCS — SIGNIFICANT CHANGE UP (ref 0–0)
PLATELET # BLD AUTO: 227 K/UL — SIGNIFICANT CHANGE UP (ref 150–400)
POTASSIUM SERPL-MCNC: 3.8 MMOL/L — SIGNIFICANT CHANGE UP (ref 3.5–5.3)
POTASSIUM SERPL-SCNC: 3.8 MMOL/L — SIGNIFICANT CHANGE UP (ref 3.5–5.3)
PROT SERPL-MCNC: 7.3 G/DL — SIGNIFICANT CHANGE UP (ref 6–8.3)
PROTHROM AB SERPL-ACNC: 40.6 SEC — HIGH (ref 10–12.9)
RBC # BLD: 3.44 M/UL — LOW (ref 4.2–5.8)
RBC # FLD: 15.3 % — HIGH (ref 10.3–14.5)
SODIUM SERPL-SCNC: 145 MMOL/L — SIGNIFICANT CHANGE UP (ref 135–145)
SPECIMEN SOURCE: SIGNIFICANT CHANGE UP
WBC # BLD: 7.1 K/UL — SIGNIFICANT CHANGE UP (ref 3.8–10.5)
WBC # FLD AUTO: 7.1 K/UL — SIGNIFICANT CHANGE UP (ref 3.8–10.5)

## 2019-05-05 PROCEDURE — 99291 CRITICAL CARE FIRST HOUR: CPT

## 2019-05-05 PROCEDURE — 71045 X-RAY EXAM CHEST 1 VIEW: CPT | Mod: 26

## 2019-05-05 PROCEDURE — 70450 CT HEAD/BRAIN W/O DYE: CPT | Mod: 26

## 2019-05-05 PROCEDURE — 93010 ELECTROCARDIOGRAM REPORT: CPT

## 2019-05-05 NOTE — ED CLERICAL - NS ED CLERK NOTE PRE-ARRIVAL INFORMATION; ADDITIONAL PRE-ARRIVAL INFORMATION

## 2019-05-05 NOTE — ED ADULT NURSE NOTE - NSIMPLEMENTINTERV_GEN_ALL_ED
Implemented All Fall with Harm Risk Interventions:  [place] to call system. Call bell, personal items and telephone within reach. Instruct patient to call for assistance. Room bathroom lighting operational. Non-slip footwear when patient is off stretcher. Physically safe environment: no spills, clutter or unnecessary equipment. Stretcher in lowest position, wheels locked, appropriate side rails in place. Provide visual cue, wrist band, yellow gown, etc. Monitor gait and stability. Monitor for mental status changes and reorient to person, place, and time. Review medications for side effects contributing to fall risk. Reinforce activity limits and safety measures with patient and family. Provide visual clues: red socks.

## 2019-05-05 NOTE — ED ADULT NURSE NOTE - CAS DISCH CONDITION
Detail Level: Detailed Add 98460 Cpt? (Important Note: In 2017 The Use Of 53128 Is Being Tracked By Cms To Determine Future Global Period Reimbursement For Global Periods): no Stable

## 2019-05-05 NOTE — ED PROVIDER NOTE - PROGRESS NOTE DETAILS
All results were explained to patient and/or family and a copy of all available results given. case dw with [facility], pt has ho syncope and had neg seizure on EEG.  Daughter and wife were present.

## 2019-05-05 NOTE — ED PROVIDER NOTE - OBJECTIVE STATEMENT
HO from pt's wife,  Hanny.  pt is wheel chair bond. pt is drooling to the right and vomited x 1   and right lower lip hanging downward to the right side.  As per ems [place], pt was unresponsive c eyes opened for about 30 minutes.  arms and legs were not shaking.  neuro- Goyo. pmd- [facility].  pt was dc this past Thursday for uti, dehydration and low K.  pt is DNR/DNI

## 2019-05-05 NOTE — ED ADULT NURSE NOTE - OBJECTIVE STATEMENT
Pt. received alert and confused with chief complaint as per wife of unresponsiveness while at home today. As per ems pt. presented w/ right facial droop. Pt. asymptomatic upon arrival to ED. pt. placed on continuous cardiac monitor with continuous pulse ox. EKG performed at bedside.

## 2019-05-06 ENCOUNTER — LABORATORY RESULT (OUTPATIENT)
Age: 79
End: 2019-05-06

## 2019-05-06 ENCOUNTER — CLINICAL ADVICE (OUTPATIENT)
Age: 79
End: 2019-05-06

## 2019-05-07 ENCOUNTER — APPOINTMENT (OUTPATIENT)
Dept: HOME HEALTH SERVICES | Facility: HOME HEALTH | Age: 79
End: 2019-05-07

## 2019-05-07 ENCOUNTER — INPATIENT (INPATIENT)
Facility: HOSPITAL | Age: 79
LOS: 6 days | Discharge: HOME | DRG: 314 | End: 2019-05-14
Attending: HOSPITALIST | Admitting: HOSPITALIST
Payer: MEDICARE

## 2019-05-07 VITALS
TEMPERATURE: 97.6 F | RESPIRATION RATE: 16 BRPM | SYSTOLIC BLOOD PRESSURE: 110 MMHG | DIASTOLIC BLOOD PRESSURE: 60 MMHG | HEART RATE: 72 BPM | OXYGEN SATURATION: 96 %

## 2019-05-07 VITALS
TEMPERATURE: 96 F | HEART RATE: 100 BPM | DIASTOLIC BLOOD PRESSURE: 87 MMHG | OXYGEN SATURATION: 100 % | SYSTOLIC BLOOD PRESSURE: 118 MMHG | WEIGHT: 160.06 LBS | RESPIRATION RATE: 18 BRPM

## 2019-05-07 DIAGNOSIS — I10 ESSENTIAL (PRIMARY) HYPERTENSION: ICD-10-CM

## 2019-05-07 DIAGNOSIS — F03.90 UNSPECIFIED DEMENTIA WITHOUT BEHAVIORAL DISTURBANCE: ICD-10-CM

## 2019-05-07 DIAGNOSIS — Z29.9 ENCOUNTER FOR PROPHYLACTIC MEASURES, UNSPECIFIED: ICD-10-CM

## 2019-05-07 DIAGNOSIS — T68.XXXA HYPOTHERMIA, INITIAL ENCOUNTER: ICD-10-CM

## 2019-05-07 DIAGNOSIS — R55 SYNCOPE AND COLLAPSE: ICD-10-CM

## 2019-05-07 DIAGNOSIS — K59.00 CONSTIPATION, UNSPECIFIED: ICD-10-CM

## 2019-05-07 DIAGNOSIS — R33.9 RETENTION OF URINE, UNSPECIFIED: ICD-10-CM

## 2019-05-07 DIAGNOSIS — R31.9 HEMATURIA, UNSPECIFIED: ICD-10-CM

## 2019-05-07 DIAGNOSIS — G20 PARKINSON'S DISEASE: ICD-10-CM

## 2019-05-07 DIAGNOSIS — I48.91 UNSPECIFIED ATRIAL FIBRILLATION: ICD-10-CM

## 2019-05-07 DIAGNOSIS — K92.1 MELENA: ICD-10-CM

## 2019-05-07 LAB
ALBUMIN SERPL ELPH-MCNC: 3 G/DL — LOW (ref 3.3–5)
ALP SERPL-CCNC: 59 U/L — SIGNIFICANT CHANGE UP (ref 40–120)
ALT FLD-CCNC: 9 U/L — LOW (ref 12–78)
ANION GAP SERPL CALC-SCNC: 6 MMOL/L — SIGNIFICANT CHANGE UP (ref 5–17)
APTT BLD: 43.5 SEC — HIGH (ref 27.5–36.3)
AST SERPL-CCNC: 93 U/L — HIGH (ref 15–37)
BASOPHILS # BLD AUTO: 0.02 K/UL — SIGNIFICANT CHANGE UP (ref 0–0.2)
BASOPHILS NFR BLD AUTO: 0.2 % — SIGNIFICANT CHANGE UP (ref 0–2)
BILIRUB SERPL-MCNC: 0.9 MG/DL — SIGNIFICANT CHANGE UP (ref 0.2–1.2)
BUN SERPL-MCNC: 18 MG/DL — SIGNIFICANT CHANGE UP (ref 7–23)
CALCIUM SERPL-MCNC: 9.8 MG/DL — SIGNIFICANT CHANGE UP (ref 8.5–10.1)
CHLORIDE SERPL-SCNC: 112 MMOL/L — HIGH (ref 96–108)
CO2 SERPL-SCNC: 26 MMOL/L — SIGNIFICANT CHANGE UP (ref 22–31)
CREAT SERPL-MCNC: 0.96 MG/DL — SIGNIFICANT CHANGE UP (ref 0.5–1.3)
EOSINOPHIL # BLD AUTO: 0.03 K/UL — SIGNIFICANT CHANGE UP (ref 0–0.5)
EOSINOPHIL NFR BLD AUTO: 0.3 % — SIGNIFICANT CHANGE UP (ref 0–6)
GLUCOSE SERPL-MCNC: 110 MG/DL — HIGH (ref 70–99)
HCT VFR BLD CALC: 27.9 % — LOW (ref 39–50)
HGB BLD-MCNC: 9.4 G/DL — LOW (ref 13–17)
IMM GRANULOCYTES NFR BLD AUTO: 0.2 % — SIGNIFICANT CHANGE UP (ref 0–1.5)
INR BLD: 6.11 RATIO — CRITICAL HIGH (ref 0.88–1.16)
LYMPHOCYTES # BLD AUTO: 1.35 K/UL — SIGNIFICANT CHANGE UP (ref 1–3.3)
LYMPHOCYTES # BLD AUTO: 15.7 % — SIGNIFICANT CHANGE UP (ref 13–44)
MCHC RBC-ENTMCNC: 33.1 PG — SIGNIFICANT CHANGE UP (ref 27–34)
MCHC RBC-ENTMCNC: 33.7 GM/DL — SIGNIFICANT CHANGE UP (ref 32–36)
MCV RBC AUTO: 98.2 FL — SIGNIFICANT CHANGE UP (ref 80–100)
MONOCYTES # BLD AUTO: 0.94 K/UL — HIGH (ref 0–0.9)
MONOCYTES NFR BLD AUTO: 10.9 % — SIGNIFICANT CHANGE UP (ref 2–14)
NEUTROPHILS # BLD AUTO: 6.25 K/UL — SIGNIFICANT CHANGE UP (ref 1.8–7.4)
NEUTROPHILS NFR BLD AUTO: 72.7 % — SIGNIFICANT CHANGE UP (ref 43–77)
NRBC # BLD: 0 /100 WBCS — SIGNIFICANT CHANGE UP (ref 0–0)
PLATELET # BLD AUTO: 213 K/UL — SIGNIFICANT CHANGE UP (ref 150–400)
POTASSIUM SERPL-MCNC: 3.9 MMOL/L — SIGNIFICANT CHANGE UP (ref 3.5–5.3)
POTASSIUM SERPL-SCNC: 3.9 MMOL/L — SIGNIFICANT CHANGE UP (ref 3.5–5.3)
PROT SERPL-MCNC: 7.3 G/DL — SIGNIFICANT CHANGE UP (ref 6–8.3)
PROTHROM AB SERPL-ACNC: 73 SEC — HIGH (ref 10–12.9)
RBC # BLD: 2.84 M/UL — LOW (ref 4.2–5.8)
RBC # FLD: 15.4 % — HIGH (ref 10.3–14.5)
SODIUM SERPL-SCNC: 144 MMOL/L — SIGNIFICANT CHANGE UP (ref 135–145)
TROPONIN I SERPL-MCNC: 0.02 NG/ML — SIGNIFICANT CHANGE UP (ref 0.01–0.04)
WBC # BLD: 8.61 K/UL — SIGNIFICANT CHANGE UP (ref 3.8–10.5)
WBC # FLD AUTO: 8.61 K/UL — SIGNIFICANT CHANGE UP (ref 3.8–10.5)

## 2019-05-07 PROCEDURE — 99223 1ST HOSP IP/OBS HIGH 75: CPT | Mod: AI,GC

## 2019-05-07 PROCEDURE — 99223 1ST HOSP IP/OBS HIGH 75: CPT

## 2019-05-07 PROCEDURE — 71045 X-RAY EXAM CHEST 1 VIEW: CPT | Mod: 26

## 2019-05-07 PROCEDURE — 70450 CT HEAD/BRAIN W/O DYE: CPT | Mod: 26

## 2019-05-07 PROCEDURE — 93010 ELECTROCARDIOGRAM REPORT: CPT

## 2019-05-07 PROCEDURE — 99284 EMERGENCY DEPT VISIT MOD MDM: CPT

## 2019-05-07 RX ORDER — PANTOPRAZOLE SODIUM 20 MG/1
40 TABLET, DELAYED RELEASE ORAL EVERY 12 HOURS
Qty: 0 | Refills: 0 | Status: DISCONTINUED | OUTPATIENT
Start: 2019-05-07 | End: 2019-05-09

## 2019-05-07 RX ORDER — TAMSULOSIN HYDROCHLORIDE 0.4 MG/1
0.4 CAPSULE ORAL AT BEDTIME
Qty: 0 | Refills: 0 | Status: DISCONTINUED | OUTPATIENT
Start: 2019-05-07 | End: 2019-05-14

## 2019-05-07 RX ORDER — ENTACAPONE 200 MG/1
100 TABLET, FILM COATED ORAL
Qty: 0 | Refills: 0 | Status: DISCONTINUED | OUTPATIENT
Start: 2019-05-07 | End: 2019-05-14

## 2019-05-07 RX ORDER — DOCUSATE SODIUM 100 MG
100 CAPSULE ORAL
Qty: 0 | Refills: 0 | Status: DISCONTINUED | OUTPATIENT
Start: 2019-05-07 | End: 2019-05-14

## 2019-05-07 RX ORDER — CARBIDOPA AND LEVODOPA 25; 100 MG/1; MG/1
1 TABLET ORAL
Qty: 0 | Refills: 0 | Status: DISCONTINUED | OUTPATIENT
Start: 2019-05-07 | End: 2019-05-07

## 2019-05-07 RX ORDER — SODIUM CHLORIDE 9 MG/ML
1000 INJECTION INTRAMUSCULAR; INTRAVENOUS; SUBCUTANEOUS ONCE
Qty: 0 | Refills: 0 | Status: COMPLETED | OUTPATIENT
Start: 2019-05-07 | End: 2019-05-07

## 2019-05-07 RX ORDER — METOPROLOL TARTRATE 50 MG
25 TABLET ORAL
Qty: 0 | Refills: 0 | Status: DISCONTINUED | OUTPATIENT
Start: 2019-05-07 | End: 2019-05-14

## 2019-05-07 RX ORDER — AMOXICILLIN 500 MG/1
500 CAPSULE ORAL
Qty: 90 | Refills: 5 | Status: DISCONTINUED | COMMUNITY
Start: 2019-05-03 | End: 2019-05-07

## 2019-05-07 RX ORDER — SENNA PLUS 8.6 MG/1
2 TABLET ORAL AT BEDTIME
Qty: 0 | Refills: 0 | Status: DISCONTINUED | OUTPATIENT
Start: 2019-05-07 | End: 2019-05-14

## 2019-05-07 RX ORDER — ENTACAPONE 200 MG/1
1 TABLET, FILM COATED ORAL
Qty: 0 | Refills: 0 | COMMUNITY

## 2019-05-07 RX ORDER — CEFTRIAXONE 500 MG/1
1 INJECTION, POWDER, FOR SOLUTION INTRAMUSCULAR; INTRAVENOUS EVERY 24 HOURS
Qty: 0 | Refills: 0 | Status: DISCONTINUED | OUTPATIENT
Start: 2019-05-08 | End: 2019-05-08

## 2019-05-07 RX ORDER — CEFTRIAXONE 500 MG/1
INJECTION, POWDER, FOR SOLUTION INTRAMUSCULAR; INTRAVENOUS
Qty: 0 | Refills: 0 | Status: DISCONTINUED | OUTPATIENT
Start: 2019-05-07 | End: 2019-05-08

## 2019-05-07 RX ORDER — ENTACAPONE 200 MG/1
200 TABLET, FILM COATED ORAL
Qty: 0 | Refills: 0 | Status: DISCONTINUED | OUTPATIENT
Start: 2019-05-07 | End: 2019-05-07

## 2019-05-07 RX ORDER — CEFTRIAXONE 500 MG/1
1 INJECTION, POWDER, FOR SOLUTION INTRAMUSCULAR; INTRAVENOUS ONCE
Qty: 0 | Refills: 0 | Status: COMPLETED | OUTPATIENT
Start: 2019-05-07 | End: 2019-05-07

## 2019-05-07 RX ORDER — ENOXAPARIN SODIUM 100 MG/ML
60 INJECTION SUBCUTANEOUS
Refills: 0 | Status: DISCONTINUED | COMMUNITY
Start: 2019-05-03 | End: 2019-05-07

## 2019-05-07 RX ORDER — CARBIDOPA AND LEVODOPA 25; 100 MG/1; MG/1
2 TABLET ORAL
Qty: 0 | Refills: 0 | Status: DISCONTINUED | OUTPATIENT
Start: 2019-05-07 | End: 2019-05-07

## 2019-05-07 RX ORDER — SIMVASTATIN 20 MG/1
10 TABLET, FILM COATED ORAL AT BEDTIME
Qty: 0 | Refills: 0 | Status: DISCONTINUED | OUTPATIENT
Start: 2019-05-07 | End: 2019-05-07

## 2019-05-07 RX ORDER — ATORVASTATIN CALCIUM 80 MG/1
10 TABLET, FILM COATED ORAL AT BEDTIME
Qty: 0 | Refills: 0 | Status: DISCONTINUED | OUTPATIENT
Start: 2019-05-07 | End: 2019-05-07

## 2019-05-07 RX ORDER — ENTACAPONE 200 MG/1
100 TABLET, FILM COATED ORAL
Qty: 0 | Refills: 0 | Status: DISCONTINUED | OUTPATIENT
Start: 2019-05-07 | End: 2019-05-07

## 2019-05-07 RX ORDER — ACETAMINOPHEN 500 MG
650 TABLET ORAL ONCE
Qty: 0 | Refills: 0 | Status: COMPLETED | OUTPATIENT
Start: 2019-05-07 | End: 2019-05-07

## 2019-05-07 RX ORDER — ENOXAPARIN SODIUM 100 MG/ML
40 INJECTION SUBCUTANEOUS DAILY
Qty: 0 | Refills: 0 | Status: DISCONTINUED | OUTPATIENT
Start: 2019-05-07 | End: 2019-05-07

## 2019-05-07 RX ORDER — CARBIDOPA AND LEVODOPA 25; 100 MG/1; MG/1
2 TABLET ORAL
Qty: 0 | Refills: 0 | Status: DISCONTINUED | OUTPATIENT
Start: 2019-05-07 | End: 2019-05-14

## 2019-05-07 RX ORDER — SODIUM CHLORIDE 2250 MG/500ML
0.45 INJECTION, SOLUTION INTRAVENOUS
Qty: 2000 | Refills: 0 | Status: DISCONTINUED | COMMUNITY
Start: 2019-04-23 | End: 2019-05-07

## 2019-05-07 RX ORDER — SERTRALINE 25 MG/1
50 TABLET, FILM COATED ORAL DAILY
Qty: 0 | Refills: 0 | Status: DISCONTINUED | OUTPATIENT
Start: 2019-05-07 | End: 2019-05-14

## 2019-05-07 RX ORDER — CARBIDOPA AND LEVODOPA 25; 100 MG/1; MG/1
1 TABLET ORAL
Qty: 0 | Refills: 0 | COMMUNITY

## 2019-05-07 RX ADMIN — Medication 650 MILLIGRAM(S): at 22:23

## 2019-05-07 RX ADMIN — SENNA PLUS 2 TABLET(S): 8.6 TABLET ORAL at 22:01

## 2019-05-07 RX ADMIN — SODIUM CHLORIDE 1000 MILLILITER(S): 9 INJECTION INTRAMUSCULAR; INTRAVENOUS; SUBCUTANEOUS at 19:15

## 2019-05-07 RX ADMIN — TAMSULOSIN HYDROCHLORIDE 0.4 MILLIGRAM(S): 0.4 CAPSULE ORAL at 22:00

## 2019-05-07 RX ADMIN — CEFTRIAXONE 100 GRAM(S): 500 INJECTION, POWDER, FOR SOLUTION INTRAMUSCULAR; INTRAVENOUS at 22:43

## 2019-05-07 RX ADMIN — SODIUM CHLORIDE 1000 MILLILITER(S): 9 INJECTION INTRAMUSCULAR; INTRAVENOUS; SUBCUTANEOUS at 17:59

## 2019-05-07 NOTE — ED ADULT NURSE NOTE - NSIMPLEMENTINTERV_GEN_ALL_ED
Implemented All Fall with Harm Risk Interventions:  [place] to call system. Call bell, personal items and telephone within reach. Instruct patient to call for assistance. Room bathroom lighting operational. Non-slip footwear when patient is off stretcher. Physically safe environment: no spills, clutter or unnecessary equipment. Stretcher in lowest position, wheels locked, appropriate side rails in place. Provide visual cue, wrist band, yellow gown, etc. Monitor gait and stability. Monitor for mental status changes and reorient to person, place, and time. Review medications for side effects contributing to fall risk. Reinforce activity limits and safety measures with patient and family. Provide visual clues: red socks.

## 2019-05-07 NOTE — ED ADULT NURSE NOTE - OBJECTIVE STATEMENT
a/ox0-1m patient with parkinsons BIBEMS for syncope. Per patients wife, patient has been syncopizing frequently. Hypotensive when EMS arrived, patient currently taking blood pressure medication. Patient recently discharged two days ago after 1 week of admission 2/2 syncope/uti. Pending further orders, afebrile, no nonverbal indicators of pain at this time.

## 2019-05-07 NOTE — CONSULT NOTE ADULT - SUBJECTIVE AND OBJECTIVE BOX
[facility]         Rogelio Robb, Flor, Sandi, Amie, Rick, Matilda        [phone] (office)    Reason for Consult: Syncope    Interval HPI: Patient seen and examined at bedside. No acute events overnight.     HPI: 78y M PMH Afib (on coumadin), CVA (2012, 2016), Parkinson's disease, dementia, HTN, urinary retention, and multiple TIAs presents s/p syncope. Patient is     Cardiologist: Dr. Grey  Last echocardiogram in 11/2017 with borderline LVH, EF 65%, moderate MR, normal pulmonary pressures.     PAST MEDICAL & SURGICAL HISTORY:  Syncope  Parkinson disease  Constipation  Dementia  Urinary retention  Cerebrovascular accident  Hypertension  Atrial fibrillation  No significant past surgical history      SOCIAL HISTORY: No active tobacco, alcohol or illicit drug use    FAMILY HISTORY:  No pertinent family history in first degree relatives      Home Medications:  Colace 100 mg oral capsule: 1 cap(s) orally 2 times a day (25 Apr 2019 20:38)  entacapone 200 mg oral tablet: 1 tab(s) orally 4 times a day (25 Apr 2019 20:38)  finasteride 5 mg oral tablet: 1 tab(s) orally once a day (25 Apr 2019 20:38)  Metoprolol Tartrate 25 mg oral tablet: 1 tab(s) orally 2 times a day (25 Apr 2019 20:38)  Senna 8.6 mg oral tablet: 1 tab(s) orally 2 times a day (25 Apr 2019 20:38)  Sinemet 25 mg-100 mg oral tablet: 1 tab(s) orally 5 times a day (25 Apr 2019 20:38)  tamsulosin 0.4 mg oral capsule: 1 cap(s) orally once a day (at bedtime) (25 Apr 2019 20:38)  warfarin 3 mg oral tablet: 1 tab(s) orally once a day (25 Apr 2019 20:38)  Zetia 10 mg oral tablet: 1 tab(s) orally once a day (25 Apr 2019 20:38)      MEDICATIONS  (STANDING):    MEDICATIONS  (PRN):      Allergies    No Known Allergies    Intolerances        REVIEW OF SYSTEMS: Unable to obtain ROS due to dementia.  VITAL SIGNS:   Vital Signs Last 24 Hrs  T(C): 35.8 (07 May 2019 16:42), Max: 35.8 (07 May 2019 16:42)  T(F): 96.5 (07 May 2019 16:42), Max: 96.5 (07 May 2019 16:42)  HR: 100 (07 May 2019 16:42) (100 - 100)  BP: 118/87 (07 May 2019 16:42) (118/87 - 118/87)  BP(mean): --  RR: 18 (07 May 2019 16:42) (18 - 18)  SpO2: 100% (07 May 2019 16:42) (100% - 100%)    I&O's Summary      PHYSICAL EXAM:  Constitutional: NAD, well-developed  HEENT NC/AT, moist mucous membranes  Pulmonary: Non-labored, breath sounds are clear bilaterally, no wheezing, rales or rhonchi  Cardiovascular: +S1, S2, RRR, no murmur  Gastrointestinal: Soft, nontender, nondistended, normoactive bowel sounds  Extremities: No peripheral edema   Neurological: Alert, strength and sensitivity are grossly intact  Skin: No obvious lesions/rashes  Psych: Mood & affect appropriate    LABS: All Labs Reviewed:                        9.4    8.61  )-----------( 213      ( 07 May 2019 17:49 )             27.9                         11.4   7.10  )-----------( 227      ( 05 May 2019 16:26 )             33.1     07 May 2019 17:49    144    |  112    |  18     ----------------------------<  110    3.9     |  26     |  0.96   05 May 2019 16:26    145    |  113    |  17     ----------------------------<  109    3.8     |  26     |  0.95     Ca    9.8        07 May 2019 17:49  Ca    10.0       05 May 2019 16:26    TPro  7.3    /  Alb  3.0    /  TBili  0.9    /  DBili  x      /  AST  93     /  ALT  9      /  AlkPhos  59     07 May 2019 17:49  TPro  7.3    /  Alb  3.3    /  TBili  0.7    /  DBili  x      /  AST  54     /  ALT  9      /  AlkPhos  63     05 May 2019 16:26    PT/INR - ( 07 May 2019 17:49 )   PT: 73.0 sec;   INR: 6.11 ratio         PTT - ( 07 May 2019 17:49 )  PTT:43.5 sec  CARDIAC MARKERS ( 07 May 2019 17:49 )  .016 ng/mL / x     / x     / x     / x          Blood Culture: Organism --  Gram Stain Blood -- Gram Stain --  Specimen Source .Urine Catheterized  Culture-Blood --      EKG:    RADIOLOGY:    CXR: [facility]         Rogelio Robb, Flor, Sandi, Amie, Rick, Matilda        [phone] (office)    Reason for Consult: Syncope    Interval HPI: Patient seen and examined at bedside. No acute events overnight.     HPI: 78y M PMH Afib (on coumadin), CVA (2012, 2016), Parkinson's disease, dementia, HTN, urinary retention, and multiple TIAs presents s/p syncope. Patient is     Cardiologist: Dr. Grey  Last echocardiogram in 11/2017 with borderline LVH, EF 65%, moderate MR, normal pulmonary pressures.     PAST MEDICAL & SURGICAL HISTORY:  Syncope  Parkinson disease  Constipation  Dementia  Urinary retention  Cerebrovascular accident  Hypertension  Atrial fibrillation  No significant past surgical history      SOCIAL HISTORY: No active tobacco, alcohol or illicit drug use    FAMILY HISTORY:  No pertinent family history in first degree relatives      Home Medications:  Colace 100 mg oral capsule: 1 cap(s) orally 2 times a day (25 Apr 2019 20:38)  entacapone 200 mg oral tablet: 1 tab(s) orally 4 times a day (25 Apr 2019 20:38)  finasteride 5 mg oral tablet: 1 tab(s) orally once a day (25 Apr 2019 20:38)  Metoprolol Tartrate 25 mg oral tablet: 1 tab(s) orally 2 times a day (25 Apr 2019 20:38)  Senna 8.6 mg oral tablet: 1 tab(s) orally 2 times a day (25 Apr 2019 20:38)  Sinemet 25 mg-100 mg oral tablet: 1 tab(s) orally 5 times a day (25 Apr 2019 20:38)  tamsulosin 0.4 mg oral capsule: 1 cap(s) orally once a day (at bedtime) (25 Apr 2019 20:38)  warfarin 3 mg oral tablet: 1 tab(s) orally once a day (25 Apr 2019 20:38)  Zetia 10 mg oral tablet: 1 tab(s) orally once a day (25 Apr 2019 20:38)      MEDICATIONS  (STANDING):    MEDICATIONS  (PRN):      Allergies    No Known Allergies    Intolerances        REVIEW OF SYSTEMS: Unable to obtain ROS due to dementia.  VITAL SIGNS:   Vital Signs Last 24 Hrs  T(C): 35.8 (07 May 2019 16:42), Max: 35.8 (07 May 2019 16:42)  T(F): 96.5 (07 May 2019 16:42), Max: 96.5 (07 May 2019 16:42)  HR: 100 (07 May 2019 16:42) (100 - 100)  BP: 118/87 (07 May 2019 16:42) (118/87 - 118/87)  BP(mean): --  RR: 18 (07 May 2019 16:42) (18 - 18)  SpO2: 100% (07 May 2019 16:42) (100% - 100%)    I&O's Summary      PHYSICAL EXAM:  Constitutional: NAD, well-developed  HEENT NC/AT, moist mucous membranes  Pulmonary: Non-labored, breath sounds are clear bilaterally, no wheezing, rales or rhonchi  Cardiovascular: +S1, S2, RRR, no murmur  Gastrointestinal: Soft, nontender, nondistended, normoactive bowel sounds  Extremities: No peripheral edema   Neurological: Alert, strength and sensitivity are grossly intact  Skin: No obvious lesions/rashes  Psych: Mood & affect appropriate    LABS: All Labs Reviewed:                        9.4    8.61  )-----------( 213      ( 07 May 2019 17:49 )             27.9                         11.4   7.10  )-----------( 227      ( 05 May 2019 16:26 )             33.1     07 May 2019 17:49    144    |  112    |  18     ----------------------------<  110    3.9     |  26     |  0.96   05 May 2019 16:26    145    |  113    |  17     ----------------------------<  109    3.8     |  26     |  0.95     Ca    9.8        07 May 2019 17:49  Ca    10.0       05 May 2019 16:26    TPro  7.3    /  Alb  3.0    /  TBili  0.9    /  DBili  x      /  AST  93     /  ALT  9      /  AlkPhos  59     07 May 2019 17:49  TPro  7.3    /  Alb  3.3    /  TBili  0.7    /  DBili  x      /  AST  54     /  ALT  9      /  AlkPhos  63     05 May 2019 16:26    PT/INR - ( 07 May 2019 17:49 )   PT: 73.0 sec;   INR: 6.11 ratio         PTT - ( 07 May 2019 17:49 )  PTT:43.5 sec  CARDIAC MARKERS ( 07 May 2019 17:49 )  .016 ng/mL / x     / x     / x     / x          Blood Culture: Organism --  Gram Stain Blood -- Gram Stain --  Specimen Source .Urine Catheterized  Culture-Blood --      EKG:    RADIOLOGY:  CT head noncontrast:   IMPRESSION:   Motion limited examination. No acute intracranial hemorrhage or calvarial   fracture within the limitations of the exam.    CXR: [facility]         Rogelio Robb, Sandi Ray, Amie, Matilda Cabrera        [phone] (office)    Reason for Consult: Syncope    Interval HPI: Patient seen and examined at bedside. Patient is back to baseline.    HPI: 78y M PMH Afib (on coumadin), CVA (2012, 2016), Parkinson's disease, dementia, HTN, urinary retention, and multiple TIAs presents s/p syncope. Patient is a poor historian due to dementia. Per wife, she found patient unresponsive while sitting in a chair x 15 minutes. Patient was unresponsive when wife tried to wake him up. Patient was back to baseline once awake. Patient has had seen in the ED for several episodes of questionable syncope over the past month with the last one on 5/6/19 and discharged home.  At that time, per chart patient has no h/o syncope and neg EEG. Patient is seen by [facility] house calls today /60. Ambulates with PT and walker. His cardiologist is Dr. Grey but he does not follow up with him. DNI/DNR    Last echocardiogram in 11/2017 with borderline LVH, EF 65%, moderate MR, normal pulmonary pressures.     PAST MEDICAL & SURGICAL HISTORY:  Syncope  Parkinson disease  Constipation  Dementia  Urinary retention  Cerebrovascular accident  Hypertension  Atrial fibrillation  No significant past surgical history      SOCIAL HISTORY: No active tobacco, alcohol or illicit drug use    FAMILY HISTORY:  No pertinent family history in first degree relatives      Home Medications:  Colace 100 mg oral capsule: 1 cap(s) orally 2 times a day (25 Apr 2019 20:38)  entacapone 200 mg oral tablet: 1 tab(s) orally 4 times a day (25 Apr 2019 20:38)  finasteride 5 mg oral tablet: 1 tab(s) orally once a day (25 Apr 2019 20:38)  Metoprolol Tartrate 25 mg oral tablet: 1 tab(s) orally 2 times a day (25 Apr 2019 20:38)  Senna 8.6 mg oral tablet: 1 tab(s) orally 2 times a day (25 Apr 2019 20:38)  Sinemet 25 mg-100 mg oral tablet: 1 tab(s) orally 5 times a day (25 Apr 2019 20:38)  tamsulosin 0.4 mg oral capsule: 1 cap(s) orally once a day (at bedtime) (25 Apr 2019 20:38)  warfarin 3 mg oral tablet: 1 tab(s) orally once a day (25 Apr 2019 20:38)  Zetia 10 mg oral tablet: 1 tab(s) orally once a day (25 Apr 2019 20:38)      MEDICATIONS  (STANDING):    MEDICATIONS  (PRN):      Allergies    No Known Allergies    Intolerances        REVIEW OF SYSTEMS: Unable to obtain ROS due to dementia.  VITAL SIGNS:   Vital Signs Last 24 Hrs  T(C): 35.8 (07 May 2019 16:42), Max: 35.8 (07 May 2019 16:42)  T(F): 96.5 (07 May 2019 16:42), Max: 96.5 (07 May 2019 16:42)  HR: 100 (07 May 2019 16:42) (100 - 100)  BP: 118/87 (07 May 2019 16:42) (118/87 - 118/87)  BP(mean): --  RR: 18 (07 May 2019 16:42) (18 - 18)  SpO2: 100% (07 May 2019 16:42) (100% - 100%)    I&O's Summary      PHYSICAL EXAM:  Constitutional: NAD, contracted extremities  HEENT NC/AT, dry mucous membranes  Pulmonary: Non-labored, breath sounds are clear bilaterally, no wheezing, rales or rhonchi  Cardiovascular: +S1, S2, RRR, no murmur  Gastrointestinal: Soft, nontender, nondistended, normoactive bowel sounds  Extremities: No peripheral edema   Neurological: awake, alert, nonfocal  Skin: No obvious lesions/rashes      LABS: All Labs Reviewed:                        9.4    8.61  )-----------( 213      ( 07 May 2019 17:49 )             27.9                         11.4   7.10  )-----------( 227      ( 05 May 2019 16:26 )             33.1     07 May 2019 17:49    144    |  112    |  18     ----------------------------<  110    3.9     |  26     |  0.96   05 May 2019 16:26    145    |  113    |  17     ----------------------------<  109    3.8     |  26     |  0.95     Ca    9.8        07 May 2019 17:49  Ca    10.0       05 May 2019 16:26    TPro  7.3    /  Alb  3.0    /  TBili  0.9    /  DBili  x      /  AST  93     /  ALT  9      /  AlkPhos  59     07 May 2019 17:49  TPro  7.3    /  Alb  3.3    /  TBili  0.7    /  DBili  x      /  AST  54     /  ALT  9      /  AlkPhos  63     05 May 2019 16:26    PT/INR - ( 07 May 2019 17:49 )   PT: 73.0 sec;   INR: 6.11 ratio         PTT - ( 07 May 2019 17:49 )  PTT:43.5 sec  CARDIAC MARKERS ( 07 May 2019 17:49 )  .016 ng/mL / x     / x     / x     / x          Blood Culture: Organism --  Gram Stain Blood -- Gram Stain --  Specimen Source .Urine Catheterized  Culture-Blood --      EKG: afib w/pvcs @ 98BPM     RADIOLOGY:  CT head noncontrast:   IMPRESSION:   Motion limited examination. No acute intracranial hemorrhage or calvarial   fracture within the limitations of the exam.    CXR:  Impression: No active pulmonary disease. No change. [facility]         Rogelio Robb, Sandi Ray, Amie, Matilda Cabrera        [phone] (office)    Reason for Consult: Syncope    Interval HPI: Patient seen and examined at bedside. Patient is back to baseline.    HPI: 78y M PMH Afib (on coumadin), CVA (2012, 2016), Parkinson's disease, dementia, HTN, urinary retention, and multiple TIAs presents s/p syncope. Patient is a poor historian due to dementia. Per wife, she found patient unresponsive while sitting in a chair x 15 minutes. Patient was unresponsive when wife tried to wake him up. Patient was back to baseline once awake. Patient has had seen in the ED for several episodes of questionable syncope over the past month with the last one on 5/6/19 and discharged home. At that time, per chart patient has no h/o syncope and neg EEG. Wife states her  her blood clots in his diaper about 1 week ago  Patient is seen by [facility] house calls today /60. Ambulates with PT and walker. His cardiologist is Dr. Grey but he does not follow up with him. DNI/DNR    Last echocardiogram in 11/2017 with borderline LVH, EF 65%, moderate MR, normal pulmonary pressures.     PAST MEDICAL & SURGICAL HISTORY:  Syncope  Parkinson disease  Constipation  Dementia  Urinary retention  Cerebrovascular accident  Hypertension  Atrial fibrillation  No significant past surgical history      SOCIAL HISTORY: No active tobacco, alcohol or illicit drug use    FAMILY HISTORY:  No pertinent family history in first degree relatives      Home Medications:  Colace 100 mg oral capsule: 1 cap(s) orally 2 times a day (25 Apr 2019 20:38)  entacapone 200 mg oral tablet: 1 tab(s) orally 4 times a day (25 Apr 2019 20:38)  finasteride 5 mg oral tablet: 1 tab(s) orally once a day (25 Apr 2019 20:38)  Metoprolol Tartrate 25 mg oral tablet: 1 tab(s) orally 2 times a day (25 Apr 2019 20:38)  Senna 8.6 mg oral tablet: 1 tab(s) orally 2 times a day (25 Apr 2019 20:38)  Sinemet 25 mg-100 mg oral tablet: 1 tab(s) orally 5 times a day (25 Apr 2019 20:38)  tamsulosin 0.4 mg oral capsule: 1 cap(s) orally once a day (at bedtime) (25 Apr 2019 20:38)  warfarin 3 mg oral tablet: 1 tab(s) orally once a day (25 Apr 2019 20:38)  Zetia 10 mg oral tablet: 1 tab(s) orally once a day (25 Apr 2019 20:38)      MEDICATIONS  (STANDING):    MEDICATIONS  (PRN):      Allergies    No Known Allergies    Intolerances        REVIEW OF SYSTEMS: Unable to obtain ROS due to dementia.  VITAL SIGNS:   Vital Signs Last 24 Hrs  T(C): 35.8 (07 May 2019 16:42), Max: 35.8 (07 May 2019 16:42)  T(F): 96.5 (07 May 2019 16:42), Max: 96.5 (07 May 2019 16:42)  HR: 100 (07 May 2019 16:42) (100 - 100)  BP: 118/87 (07 May 2019 16:42) (118/87 - 118/87)  BP(mean): --  RR: 18 (07 May 2019 16:42) (18 - 18)  SpO2: 100% (07 May 2019 16:42) (100% - 100%)    I&O's Summary      PHYSICAL EXAM:  Constitutional: NAD, contracted extremities  HEENT NC/AT, dry mucous membranes  Pulmonary: Non-labored, breath sounds are clear bilaterally, no wheezing, rales or rhonchi  Cardiovascular: +S1, S2, RRR, no murmur  Gastrointestinal: Soft, nontender, nondistended, normoactive bowel sounds  Extremities: No peripheral edema   Neurological: awake, alert, nonfocal  Skin: No obvious lesions/rashes      LABS: All Labs Reviewed:                        9.4    8.61  )-----------( 213      ( 07 May 2019 17:49 )             27.9                         11.4   7.10  )-----------( 227      ( 05 May 2019 16:26 )             33.1     07 May 2019 17:49    144    |  112    |  18     ----------------------------<  110    3.9     |  26     |  0.96   05 May 2019 16:26    145    |  113    |  17     ----------------------------<  109    3.8     |  26     |  0.95     Ca    9.8        07 May 2019 17:49  Ca    10.0       05 May 2019 16:26    TPro  7.3    /  Alb  3.0    /  TBili  0.9    /  DBili  x      /  AST  93     /  ALT  9      /  AlkPhos  59     07 May 2019 17:49  TPro  7.3    /  Alb  3.3    /  TBili  0.7    /  DBili  x      /  AST  54     /  ALT  9      /  AlkPhos  63     05 May 2019 16:26    PT/INR - ( 07 May 2019 17:49 )   PT: 73.0 sec;   INR: 6.11 ratio         PTT - ( 07 May 2019 17:49 )  PTT:43.5 sec  CARDIAC MARKERS ( 07 May 2019 17:49 )  .016 ng/mL / x     / x     / x     / x          Blood Culture: Organism --  Gram Stain Blood -- Gram Stain --  Specimen Source .Urine Catheterized  Culture-Blood --      EKG: afib w/pvcs @ 98BPM     RADIOLOGY:  CT head noncontrast:   IMPRESSION:   Motion limited examination. No acute intracranial hemorrhage or calvarial   fracture within the limitations of the exam.    CXR:  Impression: No active pulmonary disease. No change. [facility]         Rogelio Robb, Sandi Ray, Amie, Matilda Cabrera        [phone] (office)    Reason for Consult: Syncope    Interval HPI: Patient seen and examined at bedside. Patient is back to baseline.    HPI: 78y M PMH Afib (on coumadin), CVA (2012, 2016), Parkinson's disease, dementia, HTN, urinary retention, and multiple TIAs presents s/p syncope. Patient is a poor historian due to dementia. Per wife, she found patient unresponsive while sitting in a chair x 15 minutes. Patient was unresponsive when wife tried to wake him up. Patient was back to baseline once awake. Patient has had seen in the ED for several episodes of questionable syncope over the past month with the last one on 5/6/19 and discharged home. At that time, per chart patient has no h/o syncope and neg EEG. Wife states her  her blood clots in his diaper about 1 week ago  Patient is seen by [facility] house calls today /60. Ambulates with PT and walker. His cardiologist is Dr. Grey but he does not follow up with him. DNI/DNR    Last echocardiogram in 11/2017 with borderline LVH, EF 65%, moderate MR, normal pulmonary pressures.     PAST MEDICAL & SURGICAL HISTORY:  Syncope  Parkinson disease  Constipation  Dementia  Urinary retention  Cerebrovascular accident  Hypertension  Atrial fibrillation  No significant past surgical history      SOCIAL HISTORY: No active tobacco, alcohol or illicit drug use    FAMILY HISTORY:  No pertinent family history in first degree relatives      Home Medications:  Colace 100 mg oral capsule: 1 cap(s) orally 2 times a day (25 Apr 2019 20:38)  entacapone 200 mg oral tablet: 1 tab(s) orally 4 times a day (25 Apr 2019 20:38)  finasteride 5 mg oral tablet: 1 tab(s) orally once a day (25 Apr 2019 20:38)  Metoprolol Tartrate 25 mg oral tablet: 1 tab(s) orally 2 times a day (25 Apr 2019 20:38)  Senna 8.6 mg oral tablet: 1 tab(s) orally 2 times a day (25 Apr 2019 20:38)  Sinemet 25 mg-100 mg oral tablet: 1 tab(s) orally 5 times a day (25 Apr 2019 20:38)  tamsulosin 0.4 mg oral capsule: 1 cap(s) orally once a day (at bedtime) (25 Apr 2019 20:38)  warfarin 3 mg oral tablet: 1 tab(s) orally once a day (25 Apr 2019 20:38)  Zetia 10 mg oral tablet: 1 tab(s) orally once a day (25 Apr 2019 20:38)    Allergies    No Known Allergies    Intolerances        REVIEW OF SYSTEMS: Unable to obtain ROS due to dementia.  VITAL SIGNS:   Vital Signs Last 24 Hrs  T(C): 35.8 (07 May 2019 16:42), Max: 35.8 (07 May 2019 16:42)  T(F): 96.5 (07 May 2019 16:42), Max: 96.5 (07 May 2019 16:42)  HR: 100 (07 May 2019 16:42) (100 - 100)  BP: 118/87 (07 May 2019 16:42) (118/87 - 118/87)  BP(mean): --  RR: 18 (07 May 2019 16:42) (18 - 18)  SpO2: 100% (07 May 2019 16:42) (100% - 100%)    I&O's Summary      PHYSICAL EXAM:  Constitutional: NAD, contracted extremities  HEENT NC/AT, dry mucous membranes  Pulmonary: Non-labored, breath sounds are clear bilaterally, no wheezing, rales or rhonchi  Cardiovascular: +S1, S2, RRR, no murmur  Gastrointestinal: Soft, nontender, nondistended, normoactive bowel sounds  Extremities: No peripheral edema   Neurological: awake, alert, nonfocal  Skin: No obvious lesions/rashes      LABS: All Labs Reviewed:                        9.4    8.61  )-----------( 213      ( 07 May 2019 17:49 )             27.9                         11.4   7.10  )-----------( 227      ( 05 May 2019 16:26 )             33.1     07 May 2019 17:49    144    |  112    |  18     ----------------------------<  110    3.9     |  26     |  0.96   05 May 2019 16:26    145    |  113    |  17     ----------------------------<  109    3.8     |  26     |  0.95     Ca    9.8        07 May 2019 17:49  Ca    10.0       05 May 2019 16:26    TPro  7.3    /  Alb  3.0    /  TBili  0.9    /  DBili  x      /  AST  93     /  ALT  9      /  AlkPhos  59     07 May 2019 17:49  TPro  7.3    /  Alb  3.3    /  TBili  0.7    /  DBili  x      /  AST  54     /  ALT  9      /  AlkPhos  63     05 May 2019 16:26    PT/INR - ( 07 May 2019 17:49 )   PT: 73.0 sec;   INR: 6.11 ratio         PTT - ( 07 May 2019 17:49 )  PTT:43.5 sec  CARDIAC MARKERS ( 07 May 2019 17:49 )  .016 ng/mL / x     / x     / x     / x          Blood Culture: Organism --  Gram Stain Blood -- Gram Stain --  Specimen Source .Urine Catheterized  Culture-Blood --      EKG: afib w/pvcs @ 98BPM     RADIOLOGY:  CT head noncontrast:   IMPRESSION:   Motion limited examination. No acute intracranial hemorrhage or calvarial   fracture within the limitations of the exam.    CXR:  Impression: No active pulmonary disease. No change.

## 2019-05-07 NOTE — ED ADULT NURSE NOTE - CHPI ED NUR SYMPTOMS NEG
no chest pain/no back pain/no chills/no vomiting/no fever/no dizziness/no congestion/no nausea/no shortness of breath/no diaphoresis

## 2019-05-07 NOTE — H&P ADULT - PROBLEM SELECTOR PLAN 3
Hx of blood clots in stool 1 week ago per wife  H/H lower compared to baseline  Occult feces ordered Hx of blood clots in stool 1 week ago per wife  H/H lower compared to baseline  Occult feces ordered  PPI ordered - no current active bleeding Patient found to be hypothermic while in ED  Has hx of UTI recently treated with abx  Will order UA, blood and urine cultures  Ordered dose of Rocephin.  f/u procal in AM

## 2019-05-07 NOTE — CONSULT NOTE ADULT - ASSESSMENT
78y M PMH Afib (on coumadin), CVA (2012, 2016), Parkinson's disease, dementia, HTN, urinary retention, and multiple TIAs presents with syncope and supratherapeutic INR.    - No clear evidence of acute ischemia, trops negative x 2. Will follow up third set.  - His CKs are flat, suggesting against acute atherosclerotic plaque rupture.  - No acute changes on EKG compared to previous.  - No meaningful evidence of volume overload.  - Previous TTE shows ___.  - BP well controlled, monitor routine hemodynamics.  - Continue ___. 78y M PMH Afib (on coumadin), CVA (2012, 2016), Parkinson's disease, dementia, HTN, urinary retention, and multiple TIAs presents with syncope likely and supratherapeutic INR.    - No clear evidence of acute ischemia, trops negative x 2. Will follow up third set.  - No acute changes on EKG compared to previous.  - Previous TTE shows 11/2017 with borderline LVH, EF 65%, moderate MR, normal pulmonary pressures.   - BP well controlled, monitor routine hemodynamics.  - Continue   - Hold coumadin 2/2 elevated INR  - 78y M PMH Afib (on coumadin), CVA (2012, 2016), Parkinson's disease, dementia, HTN, urinary retention, and multiple TIAs presents with syncope likely vasovagal due to anemia with possible GI bleed vs dehydration vs orthostatics due urinary medications and supratherapeutic INR.    - No clear evidence of acute ischemia, trops negative x 1. trend x 3.  - No acute changes on EKG compared to previous  - Hold coumadin 2/2 elevated INR  - Consider GI consult R/O GI bleed  - Transfuse prn maintain H/H > 7   - Previous TTE shows 11/2017 with borderline LVH, EF 65%, moderate MR, normal pulmonary pressures.   - Repeat TTE  - Afibr rate controlled  - BP well controlled, monitor routine hemodynamics.  - Continue metoprolol with hold parameters  - Continue zetia   - orthostatics --> if positive consider d/c flomax and finasteride  - will continue to follow 78y M PMH Afib (on coumadin), CVA (2012, 2016), Parkinson's disease, dementia, HTN, urinary retention, and multiple TIAs presents with syncope likely vasovagal due to anemia with possible GI bleed vs dehydration vs orthostatics due urinary medications and supratherapeutic INR.    - No clear evidence of acute ischemia, trops negative x 1. trend    - No acute changes on EKG compared to previous    - Syncope could be from blood loss  - Hold coumadin 2/2 elevated INR  - Consider GI consult R/O GI bleed  - Transfuse prn maintain H/H > 7   - MOnitor tele   - Check orthostatics  - orthostatics --> if positive consider d/c flomax and finasteride    - Previous TTE shows 11/2017 with borderline LVH, EF 65%, moderate MR, normal pulmonary pressures.   - Repeat TTE    - Afib rate controlled  - No signs of ischemia.   - Continue metoprolol with hold parameters  - Continue zetia     - Further cardiac workup will depend on clinical course.   - will continue to follow

## 2019-05-07 NOTE — H&P ADULT - ASSESSMENT
78y M PMH Afib (on coumadin), CVA (2012, 2016), Parkinson's disease, dementia, HTN, urinary retention, and multiple TIAs presents s/p syncope. Admitted for syncopal workup

## 2019-05-07 NOTE — H&P ADULT - PROBLEM SELECTOR PLAN 5
On Tamsulosin and Finasteride at home  May contribute to syncope  Hold Finasteride for now Stable  Has hx of sundowning. Required Ativan and Zyprexa on previous hospitalization  Wife asking to avoid sedating meds if possible. Risks vs benefits explained    Continue Zoloft

## 2019-05-07 NOTE — H&P ADULT - ATTENDING COMMENTS
patient seen and examined at bedside with resident.  dementia, sundowning, prior CVA, parkinson's - baseline does not ambulate  apparent syncope episodes.  possible orthostasis vs BPH meds related.  hold proscar for now.  Also mild hypothermia - no apparent infection.  will check UA r/o UTI.  f/up cultures.  Rocephin empirically for now - may stop if procalcitonin negative in AM.  POOR overall prognosis.  telemonitor, TTE, carotid duplex for syncope work up.  Poor baseline quality of life.  consider pall care consult.  PT / swallow eval.

## 2019-05-07 NOTE — ED PROVIDER NOTE - OBJECTIVE STATEMENT
pt brought in for syncope while seated in wheelchair. pt unable to provide hx secondary to dementia.  pmd- [facility]  johnnie - savella

## 2019-05-07 NOTE — H&P ADULT - NSHPPHYSICALEXAM_GEN_ALL_CORE
Physical Exam:  General: Frail elderly, baseline dementia, alert but does not respond to questions  HEENT: NCAT, PERRLA, EOMI bl, moist mucous membranes   Neck: Supple, nontender, no mass  Neurology: A&Ox1, nonfocal  Respiratory: grossly CTA B/L, No W  CV: irregular, +S1/S2, no murmurs  Abdominal: Soft, NT, ND +BS  Extremities: No C/C/E, + 2 peripheral pulses  MSK: ROM intact, difficult exam given poor compliance    Skin: warm, dry, normal color

## 2019-05-07 NOTE — H&P ADULT - PROBLEM SELECTOR PLAN 8
Continue rose miranda Stable,  Cardio following  INR 6.11, takes coumadin 3mg daily. HOLD for tonight

## 2019-05-07 NOTE — H&P ADULT - PROBLEM SELECTOR PLAN 10
IMPROVE VTE Individual Risk Assessment          RISK                                                          Points  [  ] Previous VTE                                                3  [  ] Thrombophilia                                             2  [  ] Lower limb paralysis                                   2        (unable to hold up >15 seconds)    [  ] Current Cancer                                             2         (within 6 months)  [x  ] Immobilization > 24 hrs                              1  [  ] ICU/CCU stay > 24 hours                             1  [ x ] Age > 60                                                         1    IMPROVE VTE Score: 2  On coumadin.

## 2019-05-07 NOTE — ED PROVIDER NOTE - MUSCULOSKELETAL, MLM
Spine appears normal, right arm contracted (chronic per family), otherwise range of motion is not limited, no muscle or joint tenderness

## 2019-05-07 NOTE — H&P ADULT - PROBLEM SELECTOR PLAN 4
Stable  Has hx of sundowning. Required Ativan and Zyprexa on previous hospitalization  Wife asking to avoid sedating meds if possible. Risks vs benefits explained    Continue Zoloft Patient found to be hypothermic while in ED  Has hx of UTI recently treated with abx  Will order UA, blood and urine cultures  Ordered dose of Rocephin.  f/u procal in AM Patient has hx of hematuria with clots in urine 1 week ago  No current bleeding  Repeat H/H as current levels are low compared to baseline  Occult feces ordered

## 2019-05-07 NOTE — H&P ADULT - HISTORY OF PRESENT ILLNESS
78y M PMH Afib (on coumadin), CVA (2012, 2016), Parkinson's disease, dementia, HTN, urinary retention, and multiple TIAs presents s/p syncope. Patient is a poor historian due to dementia. Per wife, patient has been having multiple syncopal episodes over past 3 days. No precipitating events or triggers. Episodes are not positional or associated with movement or activity. Today, patient was at home when he had an episode. OT was visiting and took patients pulse which was not detected for about 15 seconds per wife. Wife then tried to wake him up and noticed he was unresponsive. Patient was back to baseline once awake with no residual symptoms. Patient is not ambulatory and typically sits in a wheel-chair unless participating in PT. PO intake has not changed compared to baseline but wife notes it is difficulty to hydrate given he is on a nectar thickened diet ROS not obtainable secondary to mental status but wife denies recent fevers, chills, vomiting, diarrhea.      In the ED: Vitals T(F): 96.6F HR: 106 BP: 122/72 RR: 18 SpO2: 96% RA. HGB 9.4, HCT 27.9, INR 6.11, Cl 112, Glucose 110, Albumin 3.0, AST 93, ALT 9, CT Head: No acute intracranial hemorrhage or calvarial fracture within the limitations of the exam. CXR: No active pulmonary disease. No change. Seen by Cardio: TTE ordered, check orthostatics, continue to trend troponin 78y M PMH Afib (on coumadin), CVA (2012, 2016), Parkinson's disease, dementia, HTN, urinary retention, and multiple TIAs presents s/p syncope. Patient is a poor historian due to dementia. Per wife, patient has been having multiple syncopal episodes over past 3 days. No precipitating events or triggers. Episodes are not positional or associated with movement or activity. Today, patient was at home when he had an episode. Wife then tried to wake him up and noticed he was unresponsive. Patient was back to baseline once awake with no residual symptoms. Patient is not ambulatory and typically sits in a wheel-chair unless participating in PT. PO intake has not changed compared to baseline but wife notes it is difficulty to hydrate given he is on a nectar thickened diet ROS not obtainable secondary to mental status but wife denies recent fevers, chills, vomiting, diarrhea.      In the ED: Vitals T(F): 96.6F HR: 106 BP: 122/72 RR: 18 SpO2: 96% RA. HGB 9.4, HCT 27.9, INR 6.11, Cl 112, Glucose 110, Albumin 3.0, AST 93, ALT 9, CT Head: No acute intracranial hemorrhage or calvarial fracture within the limitations of the exam. CXR: No active pulmonary disease. No change. Seen by Cardio: TTE ordered, check orthostatics, continue to trend troponin

## 2019-05-07 NOTE — ED CLERICAL - NS ED CLERK NOTE PRE-ARRIVAL INFORMATION; ADDITIONAL PRE-ARRIVAL INFORMATION

## 2019-05-07 NOTE — H&P ADULT - PROBLEM SELECTOR PLAN 7
Stable,  Cardio following  INR 6.11, takes coumadin 3mg daily. HOLD for tonight Stable,   Continue metoprolol with hold parameters

## 2019-05-07 NOTE — H&P ADULT - PROBLEM SELECTOR PLAN 9
IMPROVE VTE Individual Risk Assessment          RISK                                                          Points  [  ] Previous VTE                                                3  [  ] Thrombophilia                                             2  [  ] Lower limb paralysis                                   2        (unable to hold up >15 seconds)    [  ] Current Cancer                                             2         (within 6 months)  [x  ] Immobilization > 24 hrs                              1  [  ] ICU/CCU stay > 24 hours                             1  [ x ] Age > 60                                                         1    IMPROVE VTE Score: 2  On coumadin. Continue rose miranda

## 2019-05-07 NOTE — ED ADULT NURSE NOTE - GASTROINTESTINAL ASSESSMENT
Pt received in #22, aaox3 with c/o "I saw some blood and I am not sure where it was from". Pt states that she has an abd wound from a previous surgery which has been bleeding and draining. Pt went to the bathroom earlier tonight and noticed blood on the toilet paper "but I didn't check the toilet to see if there was blood". Denies any known bleeding with the exception of her abd dressing. Pt denies sob, dizziness or lightheadedness.
WDL

## 2019-05-07 NOTE — H&P ADULT - PROBLEM SELECTOR PLAN 1
Admit to tele  Cardio consulted, reccs appreciated  Neuro consulted Dr. Nance  TTE, US carotid  Tele monitoring  UA, procal ordered  Check orthostatics  Contiune to trend CE  Fall precautions, PT eval

## 2019-05-07 NOTE — H&P ADULT - NSHPSOCIALHISTORY_GEN_ALL_CORE
Pt lives with wife, uses wheelchair at baseline, is able to ambulate with walker  Dependent on all ADLs  No tobacco, alcohol or drug use

## 2019-05-07 NOTE — H&P ADULT - PROBLEM SELECTOR PLAN 6
Stable,   Continue metoprolol with hold parameters On Tamsulosin and Finasteride at home  May contribute to syncope  Hold Finasteride for now

## 2019-05-08 ENCOUNTER — APPOINTMENT (OUTPATIENT)
Dept: HOME HEALTH SERVICES | Facility: HOME HEALTH | Age: 79
End: 2019-05-08

## 2019-05-08 LAB
ALBUMIN SERPL ELPH-MCNC: 3 G/DL — LOW (ref 3.3–5)
ALP SERPL-CCNC: 61 U/L — SIGNIFICANT CHANGE UP (ref 40–120)
ALT FLD-CCNC: 10 U/L — LOW (ref 12–78)
ANION GAP SERPL CALC-SCNC: 6 MMOL/L — SIGNIFICANT CHANGE UP (ref 5–17)
APPEARANCE UR: ABNORMAL
AST SERPL-CCNC: 92 U/L — HIGH (ref 15–37)
BACTERIA # UR AUTO: ABNORMAL
BASOPHILS # BLD AUTO: 0.02 K/UL — SIGNIFICANT CHANGE UP (ref 0–0.2)
BASOPHILS NFR BLD AUTO: 0.3 % — SIGNIFICANT CHANGE UP (ref 0–2)
BILIRUB SERPL-MCNC: 0.9 MG/DL — SIGNIFICANT CHANGE UP (ref 0.2–1.2)
BILIRUB UR-MCNC: NEGATIVE — SIGNIFICANT CHANGE UP
BUN SERPL-MCNC: 16 MG/DL — SIGNIFICANT CHANGE UP (ref 7–23)
CALCIUM SERPL-MCNC: 9.5 MG/DL — SIGNIFICANT CHANGE UP (ref 8.5–10.1)
CHLORIDE SERPL-SCNC: 114 MMOL/L — HIGH (ref 96–108)
CO2 SERPL-SCNC: 25 MMOL/L — SIGNIFICANT CHANGE UP (ref 22–31)
COD CRY URNS QL: ABNORMAL
COLOR SPEC: ABNORMAL
CORTIS AM PEAK SERPL-MCNC: 23.5 UG/DL — HIGH (ref 6–18.4)
CREAT SERPL-MCNC: 0.9 MG/DL — SIGNIFICANT CHANGE UP (ref 0.5–1.3)
DIFF PNL FLD: ABNORMAL
EOSINOPHIL # BLD AUTO: 0.08 K/UL — SIGNIFICANT CHANGE UP (ref 0–0.5)
EOSINOPHIL NFR BLD AUTO: 1.3 % — SIGNIFICANT CHANGE UP (ref 0–6)
EPI CELLS # UR: SIGNIFICANT CHANGE UP
FERRITIN SERPL-MCNC: 653 NG/ML — HIGH (ref 30–400)
FOLATE SERPL-MCNC: 17.3 NG/ML — SIGNIFICANT CHANGE UP
GLUCOSE SERPL-MCNC: 95 MG/DL — SIGNIFICANT CHANGE UP (ref 70–99)
GLUCOSE UR QL: NEGATIVE — SIGNIFICANT CHANGE UP
GRAN CASTS # UR COMP ASSIST: ABNORMAL /LPF
HCT VFR BLD CALC: 24.8 % — LOW (ref 39–50)
HCT VFR BLD CALC: 24.9 % — LOW (ref 39–50)
HGB BLD-MCNC: 8.5 G/DL — LOW (ref 13–17)
HGB BLD-MCNC: 8.6 G/DL — LOW (ref 13–17)
HYALINE CASTS # UR AUTO: ABNORMAL /LPF
IMM GRANULOCYTES NFR BLD AUTO: 0.3 % — SIGNIFICANT CHANGE UP (ref 0–1.5)
INR BLD: 5.1 RATIO — CRITICAL HIGH (ref 0.88–1.16)
IRON SATN MFR SERPL: 21 % — SIGNIFICANT CHANGE UP (ref 16–55)
IRON SATN MFR SERPL: 36 UG/DL — LOW (ref 45–165)
KETONES UR-MCNC: ABNORMAL
LACTATE SERPL-SCNC: 1.3 MMOL/L — SIGNIFICANT CHANGE UP (ref 0.7–2)
LACTATE SERPL-SCNC: 2.2 MMOL/L — HIGH (ref 0.7–2)
LACTATE SERPL-SCNC: 2.4 MMOL/L — HIGH (ref 0.7–2)
LEUKOCYTE ESTERASE UR-ACNC: ABNORMAL
LYMPHOCYTES # BLD AUTO: 1.43 K/UL — SIGNIFICANT CHANGE UP (ref 1–3.3)
LYMPHOCYTES # BLD AUTO: 23.1 % — SIGNIFICANT CHANGE UP (ref 13–44)
MCHC RBC-ENTMCNC: 32.3 PG — SIGNIFICANT CHANGE UP (ref 27–34)
MCHC RBC-ENTMCNC: 33.2 PG — SIGNIFICANT CHANGE UP (ref 27–34)
MCHC RBC-ENTMCNC: 34.1 GM/DL — SIGNIFICANT CHANGE UP (ref 32–36)
MCHC RBC-ENTMCNC: 34.7 GM/DL — SIGNIFICANT CHANGE UP (ref 32–36)
MCV RBC AUTO: 94.7 FL — SIGNIFICANT CHANGE UP (ref 80–100)
MCV RBC AUTO: 95.8 FL — SIGNIFICANT CHANGE UP (ref 80–100)
MONOCYTES # BLD AUTO: 0.5 K/UL — SIGNIFICANT CHANGE UP (ref 0–0.9)
MONOCYTES NFR BLD AUTO: 8.1 % — SIGNIFICANT CHANGE UP (ref 2–14)
NEUTROPHILS # BLD AUTO: 4.13 K/UL — SIGNIFICANT CHANGE UP (ref 1.8–7.4)
NEUTROPHILS NFR BLD AUTO: 66.9 % — SIGNIFICANT CHANGE UP (ref 43–77)
NITRITE UR-MCNC: NEGATIVE — SIGNIFICANT CHANGE UP
NRBC # BLD: 0 /100 WBCS — SIGNIFICANT CHANGE UP (ref 0–0)
NRBC # BLD: 0 /100 WBCS — SIGNIFICANT CHANGE UP (ref 0–0)
OB PNL STL: NEGATIVE — SIGNIFICANT CHANGE UP
PH UR: 5 — SIGNIFICANT CHANGE UP (ref 5–8)
PLATELET # BLD AUTO: 191 K/UL — SIGNIFICANT CHANGE UP (ref 150–400)
PLATELET # BLD AUTO: 199 K/UL — SIGNIFICANT CHANGE UP (ref 150–400)
POTASSIUM SERPL-MCNC: 3.8 MMOL/L — SIGNIFICANT CHANGE UP (ref 3.5–5.3)
POTASSIUM SERPL-SCNC: 3.8 MMOL/L — SIGNIFICANT CHANGE UP (ref 3.5–5.3)
PROCALCITONIN SERPL-MCNC: 0.06 NG/ML — HIGH (ref 0–0.04)
PROT SERPL-MCNC: 7.3 G/DL — SIGNIFICANT CHANGE UP (ref 6–8.3)
PROT UR-MCNC: 25 MG/DL
PROTHROM AB SERPL-ACNC: 61.1 SEC — HIGH (ref 10–12.9)
RBC # BLD: 2.59 M/UL — LOW (ref 4.2–5.8)
RBC # BLD: 2.63 M/UL — LOW (ref 4.2–5.8)
RBC # FLD: 15 % — HIGH (ref 10.3–14.5)
RBC # FLD: 15.4 % — HIGH (ref 10.3–14.5)
RBC CASTS # UR COMP ASSIST: ABNORMAL /HPF (ref 0–4)
SODIUM SERPL-SCNC: 145 MMOL/L — SIGNIFICANT CHANGE UP (ref 135–145)
SP GR SPEC: 1.02 — SIGNIFICANT CHANGE UP (ref 1.01–1.02)
TIBC SERPL-MCNC: 172 UG/DL — LOW (ref 220–430)
TROPONIN I SERPL-MCNC: 0.02 NG/ML — SIGNIFICANT CHANGE UP (ref 0.01–0.04)
TROPONIN I SERPL-MCNC: 0.03 NG/ML — SIGNIFICANT CHANGE UP (ref 0.01–0.04)
UIBC SERPL-MCNC: 136 UG/DL — SIGNIFICANT CHANGE UP (ref 110–370)
UROBILINOGEN FLD QL: NEGATIVE — SIGNIFICANT CHANGE UP
VIT B12 SERPL-MCNC: 460 PG/ML — SIGNIFICANT CHANGE UP (ref 232–1245)
WBC # BLD: 6.18 K/UL — SIGNIFICANT CHANGE UP (ref 3.8–10.5)
WBC # BLD: 6.52 K/UL — SIGNIFICANT CHANGE UP (ref 3.8–10.5)
WBC # FLD AUTO: 6.18 K/UL — SIGNIFICANT CHANGE UP (ref 3.8–10.5)
WBC # FLD AUTO: 6.52 K/UL — SIGNIFICANT CHANGE UP (ref 3.8–10.5)
WBC UR QL: ABNORMAL

## 2019-05-08 PROCEDURE — 93880 EXTRACRANIAL BILAT STUDY: CPT | Mod: 26

## 2019-05-08 PROCEDURE — 99232 SBSQ HOSP IP/OBS MODERATE 35: CPT

## 2019-05-08 PROCEDURE — 99233 SBSQ HOSP IP/OBS HIGH 50: CPT | Mod: GC

## 2019-05-08 PROCEDURE — 93306 TTE W/DOPPLER COMPLETE: CPT | Mod: 26

## 2019-05-08 PROCEDURE — 92610 EVALUATE SWALLOWING FUNCTION: CPT

## 2019-05-08 RX ORDER — PIPERACILLIN AND TAZOBACTAM 4; .5 G/20ML; G/20ML
3.38 INJECTION, POWDER, LYOPHILIZED, FOR SOLUTION INTRAVENOUS ONCE
Qty: 0 | Refills: 0 | Status: COMPLETED | OUTPATIENT
Start: 2019-05-08 | End: 2019-05-08

## 2019-05-08 RX ORDER — SODIUM CHLORIDE 9 MG/ML
500 INJECTION INTRAMUSCULAR; INTRAVENOUS; SUBCUTANEOUS ONCE
Qty: 0 | Refills: 0 | Status: COMPLETED | OUTPATIENT
Start: 2019-05-08 | End: 2019-05-08

## 2019-05-08 RX ORDER — DEXTROSE MONOHYDRATE, SODIUM CHLORIDE, AND POTASSIUM CHLORIDE 50; .745; 4.5 G/1000ML; G/1000ML; G/1000ML
1000 INJECTION, SOLUTION INTRAVENOUS
Qty: 0 | Refills: 0 | Status: DISCONTINUED | OUTPATIENT
Start: 2019-05-08 | End: 2019-05-10

## 2019-05-08 RX ORDER — METOPROLOL TARTRATE 50 MG
25 TABLET ORAL ONCE
Qty: 0 | Refills: 0 | Status: COMPLETED | OUTPATIENT
Start: 2019-05-08 | End: 2019-05-08

## 2019-05-08 RX ORDER — ACETAMINOPHEN 500 MG
650 TABLET ORAL EVERY 6 HOURS
Qty: 0 | Refills: 0 | Status: DISCONTINUED | OUTPATIENT
Start: 2019-05-08 | End: 2019-05-14

## 2019-05-08 RX ORDER — PIPERACILLIN AND TAZOBACTAM 4; .5 G/20ML; G/20ML
3.38 INJECTION, POWDER, LYOPHILIZED, FOR SOLUTION INTRAVENOUS EVERY 8 HOURS
Qty: 0 | Refills: 0 | Status: DISCONTINUED | OUTPATIENT
Start: 2019-05-09 | End: 2019-05-10

## 2019-05-08 RX ADMIN — CARBIDOPA AND LEVODOPA 2 TABLET(S): 25; 100 TABLET ORAL at 17:39

## 2019-05-08 RX ADMIN — ENTACAPONE 100 MILLIGRAM(S): 200 TABLET, FILM COATED ORAL at 12:45

## 2019-05-08 RX ADMIN — CARBIDOPA AND LEVODOPA 2 TABLET(S): 25; 100 TABLET ORAL at 12:45

## 2019-05-08 RX ADMIN — TAMSULOSIN HYDROCHLORIDE 0.4 MILLIGRAM(S): 0.4 CAPSULE ORAL at 22:09

## 2019-05-08 RX ADMIN — ENTACAPONE 100 MILLIGRAM(S): 200 TABLET, FILM COATED ORAL at 17:39

## 2019-05-08 RX ADMIN — Medication 100 MILLIGRAM(S): at 17:39

## 2019-05-08 RX ADMIN — CARBIDOPA AND LEVODOPA 2 TABLET(S): 25; 100 TABLET ORAL at 10:43

## 2019-05-08 RX ADMIN — DEXTROSE MONOHYDRATE, SODIUM CHLORIDE, AND POTASSIUM CHLORIDE 75 MILLILITER(S): 50; .745; 4.5 INJECTION, SOLUTION INTRAVENOUS at 17:47

## 2019-05-08 RX ADMIN — Medication 650 MILLIGRAM(S): at 10:06

## 2019-05-08 RX ADMIN — Medication 25 MILLIGRAM(S): at 03:09

## 2019-05-08 RX ADMIN — Medication 650 MILLIGRAM(S): at 10:45

## 2019-05-08 RX ADMIN — ENTACAPONE 100 MILLIGRAM(S): 200 TABLET, FILM COATED ORAL at 10:44

## 2019-05-08 RX ADMIN — SERTRALINE 50 MILLIGRAM(S): 25 TABLET, FILM COATED ORAL at 12:45

## 2019-05-08 RX ADMIN — CARBIDOPA AND LEVODOPA 2 TABLET(S): 25; 100 TABLET ORAL at 00:06

## 2019-05-08 RX ADMIN — CARBIDOPA AND LEVODOPA 2 TABLET(S): 25; 100 TABLET ORAL at 18:33

## 2019-05-08 RX ADMIN — PANTOPRAZOLE SODIUM 40 MILLIGRAM(S): 20 TABLET, DELAYED RELEASE ORAL at 06:27

## 2019-05-08 RX ADMIN — SODIUM CHLORIDE 1000 MILLILITER(S): 9 INJECTION INTRAMUSCULAR; INTRAVENOUS; SUBCUTANEOUS at 06:54

## 2019-05-08 RX ADMIN — SODIUM CHLORIDE 1000 MILLILITER(S): 9 INJECTION INTRAMUSCULAR; INTRAVENOUS; SUBCUTANEOUS at 02:03

## 2019-05-08 RX ADMIN — SENNA PLUS 2 TABLET(S): 8.6 TABLET ORAL at 22:09

## 2019-05-08 RX ADMIN — Medication 100 MILLIGRAM(S): at 06:27

## 2019-05-08 RX ADMIN — PANTOPRAZOLE SODIUM 40 MILLIGRAM(S): 20 TABLET, DELAYED RELEASE ORAL at 17:38

## 2019-05-08 RX ADMIN — PIPERACILLIN AND TAZOBACTAM 200 GRAM(S): 4; .5 INJECTION, POWDER, LYOPHILIZED, FOR SOLUTION INTRAVENOUS at 18:47

## 2019-05-08 NOTE — DIETITIAN INITIAL EVALUATION ADULT. - PROBLEM SELECTOR PLAN 3
Patient found to be hypothermic while in ED  Has hx of UTI recently treated with abx  Will order UA, blood and urine cultures  Ordered dose of Rocephin.  f/u procal in AM

## 2019-05-08 NOTE — SWALLOW BEDSIDE ASSESSMENT ADULT - SWALLOW EVAL: RECOMMENDED FEEDING/EATING TECHNIQUES
allow for swallow between intakes/oral hygiene/no straws/check mouth frequently for oral residue/pocketing/crush medication (when feasible)/position upright (90 degrees)/maintain upright posture during/after eating for 30 mins/alternate food with liquid/hard swallow w/ each bite or sip/small sips/bites

## 2019-05-08 NOTE — PHARMACOTHERAPY INTERVENTION NOTE - COMMENTS
Patient receiving IV ceftriaxone prior to ID consult.  ID MD switched patient to Zosyn.  Spoke with MD to discontinue ceftriaxone still active in profile.  Dr. Cole agreed - order discontinued.

## 2019-05-08 NOTE — SWALLOW BEDSIDE ASSESSMENT ADULT - COMMENTS
Consult received and chart reviewed. Attempted to see patient this AM for a re-assessment of swallow function, at which time patient was being transported off the unit to ultrasound. The patient is known to this department as he was seen during his previous hospital admission. This department to re-attempt as schedule permits.

## 2019-05-08 NOTE — PROGRESS NOTE ADULT - PROBLEM SELECTOR PLAN 1
likely 2/2 anemia from ?GIB vs orthostatic hypotension in setting of decreased PO intake. FOBT neg.  Hgb 7.6, will give 1 unit pRBCs.   obtain iron studies, B12, folate, TSH, reticulocytes  Cardio Dr. Ray consulted, awaiting recs  Neuro consulted Dr. Nance, awaiting recs  f/u TTE, US carotid  c/w Tele monitoring  UA, procal ordered  Check orthostatics, f/u trops, US carotid  Continue to trend CE  Fall precautions, PT eval

## 2019-05-08 NOTE — DIETITIAN INITIAL EVALUATION ADULT. - PROBLEM SELECTOR PLAN 5
Stable  Has hx of sundowning. Required Ativan and Zyprexa on previous hospitalization  Wife asking to avoid sedating meds if possible. Risks vs benefits explained    Continue Zoloft

## 2019-05-08 NOTE — CONSULT NOTE ADULT - SUBJECTIVE AND OBJECTIVE BOX
HPI:  78y M PMH Afib (on coumadin), CVA (2012, 2016), Parkinson's disease, dementia, HTN, urinary retention, and multiple TIAs recent admission for hematuria just discharged 5/2/19 now readmitted sec to recurrent syncopal episodes at home. Pt is unable to provide history. No n/v/d CP SOB. Febrile to 101 overnight. UA +. CXR  clear.    Infectious Disease consult was called to evaluate pt due to fever.    Past Medical & Surgical Hx:  PAST MEDICAL & SURGICAL HISTORY:  Syncope  Parkinson disease  Constipation  Dementia  Urinary retention  Cerebrovascular accident  Hypertension  Atrial fibrillation  No significant past surgical history    Social History--  EtOH: denies   Tobacco: denies  Drug Use: denies    FAMILY HISTORY:  No pertinent family history in first degree relatives    Allergies  No Known Allergies    Home Medications:  Colace 100 mg oral capsule: 1 cap(s) orally 2 times a day (07 May 2019 21:10)  entacapone 200 mg oral tablet: 0.5 tab(s) orally 4 times a day (07 May 2019 21:10)  finasteride 5 mg oral tablet: 1 tab(s) orally once a day (07 May 2019 21:10)  Metoprolol Tartrate 25 mg oral tablet: 1 tab(s) orally 2 times a day (07 May 2019 21:10)  Senna 8.6 mg oral tablet: 1 tab(s) orally 2 times a day (07 May 2019 21:10)  Sinemet 25 mg-100 mg oral tablet: 2 tab(s) orally 5 times a day (07 May 2019 21:10)  tamsulosin 0.4 mg oral capsule: 1 cap(s) orally once a day (at bedtime) (07 May 2019 21:10)  warfarin 3 mg oral tablet: 1 tab(s) orally once a day (07 May 2019 21:10)  Zetia 10 mg oral tablet: 1 tab(s) orally once a day (07 May 2019 21:10)  Zoloft 50 mg oral tablet: 1 tab(s) orally once a day (07 May 2019 21:10)      Current Inpatient Medications :    ANTIBIOTICS:   cefTRIAXone   IVPB      cefTRIAXone   IVPB 1 Gram(s) IV Intermittent every 24 hours      OTHER RELEVANT MEDICATIONS :  acetaminophen  Suppository .. 650 milliGRAM(s) Rectal every 6 hours PRN  carbidopa/levodopa  25/100 2 Tablet(s) Oral <User Schedule>  dextrose 5% + sodium chloride 0.45% with potassium chloride 20 mEq/L 1000 milliLiter(s) IV Continuous <Continuous>  docusate sodium 100 milliGRAM(s) Oral two times a day  entacapone 100 milliGRAM(s) Oral <User Schedule>  metoprolol tartrate 25 milliGRAM(s) Oral two times a day  pantoprazole  Injectable 40 milliGRAM(s) IV Push every 12 hours  senna 2 Tablet(s) Oral at bedtime  sertraline 50 milliGRAM(s) Oral daily  tamsulosin 0.4 milliGRAM(s) Oral at bedtime    ROS:  Unable to obtain due to : Dementia    I&O's Detail    07 May 2019 07:01  -  08 May 2019 07:00  --------------------------------------------------------  IN:    Sodium Chloride 0.9% IV Bolus: 1000 mL  Total IN: 1000 mL    OUT:  Total OUT: 0 mL    Total NET: 1000 mL    Physical Exam:  Vital Signs Last 24 Hrs  T(C): 37.1 (08 May 2019 16:01), Max: 38.6 (08 May 2019 00:00)  T(F): 98.7 (08 May 2019 16:01), Max: 101.4 (08 May 2019 00:00)  HR: 100 (08 May 2019 16:01) (94 - 132)  BP: 104/69 (08 May 2019 16:01) (99/68 - 163/85)  RR: 16 (08 May 2019 16:01) (16 - 19)  SpO2: 98% (08 May 2019 16:01) (92% - 100%)    General:  no acute distress  Eyes: sclera anicteric, pupils equal and reactive to light  ENMT: buccal mucosa moist, pharynx not injected  Neck: supple, trachea midline  Lungs: clear, no wheeze/rhonchi  Cardiovascular: regular rate and rhythm, S1 S2  Abdomen: soft, nontender, no organomegaly present, bowel sounds normal  Neurological:  awake confused Cranial Nerves II-XII grossly intact  Skin:no increased ecchymosis/petechiae/purpura  Lymph Nodes: no palpable cervical/supraclavicular lymph nodes enlargements  Extremities: no cyanosis/clubbing/edema    Labs:                         7.6    6.33  )-----------( 188      ( 08 May 2019 08:38 )             22.3   05-08    145  |  114<H>  |  16  ----------------------------<  95  3.8   |  25  |  0.90    Ca    9.5      08 May 2019 04:57    TPro  7.3  /  Alb  3.0<L>  /  TBili  0.9  /  DBili  x   /  AST  92<H>  /  ALT  10<L>  /  AlkPhos  61  05-08      RECENT CULTURES:  PENDING    RADIOLOGY & ADDITIONAL STUDIES:    EXAM:  XR CHEST AP OR PA 1V                            PROCEDURE DATE:  05/07/2019          INTERPRETATION:  History: Chest pain    Semiupright portable chest x-ray is compared to 5/5/2019.    The study is slightly limited by rotation. The heart isnot enlarged. The   lungs are clear. There is osteopenia and degenerative change of the bony   structures.    Impression: No active pulmonary disease. No change.    Assessment :   78y M PMH Afib (on coumadin), CVA (2012, 2016), Parkinson's disease, dementia, HTN, urinary retention, and multiple TIAs recent admission for hematuria just discharged 5/2/19 now readmitted sec to recurrent syncopal episodes at home. Now with fever Tmax 101 and UA positive rule out UTI.   Also rule out retention of urine    Plan :   Change to Zosyn  Bladder scan   Trend temps and wbc  Fu cultures  Asp precautions    D/w Dr Schrader    Continue with present regime .  Approptiate use of antibiotics and adverse effects reviewed.      I have discussed the above plan of care with patient's family in detail. They expressed understanding of the treatment plan . Risks, benefits and alternatives discussed in detail. I have asked if they have any questions or concerns and appropriately addressed them to the best of my ability .      > 45 minutes spent in direct patient care reviewing  the notes, lab data/ imaging , discussion with multidisciplinary team. All questions were addressed and answered to the best of my capacity .    Thank you for allowing me to participate in the care of your patient .      Sanjeev Cole MD  Infectious Disease  [phone]

## 2019-05-08 NOTE — SWALLOW BEDSIDE ASSESSMENT ADULT - SWALLOW EVAL: RECOMMENDED DIET
1. Dysphagia 1 with nectar thick liquids, as tolerated. 2. Ensure patient is fulling awake before feeding.

## 2019-05-08 NOTE — DIETITIAN INITIAL EVALUATION ADULT. - OTHER INFO
Wife reports pt eats solid food at home, needs nectar thick liquids.  Swallow eval attempted today but pt n/a.  Food preferences obtained, Ensure shakes not wanted.  Pt to receive 1U PRBC today due to low H/H. Wife reports pt eats solid food at home, needs nectar thick liquids.  Swallow eval attempted today but pt n/a.  Food preferences obtained, Ensure shakes not wanted.  Magic Cup fortified ice cream trialled last admission- pt does not like. Pt to receive 1U PRBC today due to low H/H.  Nutrition focused physical exam conducted.  + signs of muscle wasting found at temples, thighs, calf, tricep; fat loss observed in buccal region.  Pt with intake of <75% of estd needs for > 1 month.  Pt meets criteria for chronic severe malnutrition.

## 2019-05-08 NOTE — CHART NOTE - NSCHARTNOTEFT_GEN_A_CORE
Upon Nutritional Assessment by the Registered Dietitian your patient was determined to meet criteria / has evidence of the following diagnosis/diagnoses:          [ ]  Mild Protein Calorie Malnutrition        [ ]  Moderate Protein Calorie Malnutrition        [x] Severe Protein Calorie Malnutrition        [ ] Unspecified Protein Calorie Malnutrition        [ ] Underweight / BMI <19        [ ] Morbid Obesity / BMI > 40      Findings as based on:  •  Comprehensive nutrition assessment and consultation  •  Food acceptance and intake status from observations by staff  •  Follow up     Nutrition Focused Physical Exam (NFPE) performed 5/8/19.  Pt meets criteria for severe malnutrition in context of chronic illness evidenced by +fat loss (buccal and tricep region), +muscle wasting (temporal, thigh, calf) and intake of <75% of estd needs for > 1 month.  Pt also with 10# (6%) wt loss since November (~6mo).         Treatment:    The following diet has been recommended:  Await swallow eval for diet consistency. Pt was on nectar thick liquids PTA. Pt does not like Magic Cup ice cream (trialed last admission) or Ensure shakes.  Discussed other nutrient dense snacks with wife that pt likes to add to meal trays.  Recommend daily MVI.     PROVIDER Section:     By signing this assessment you are acknowledging and agree with the diagnosis/diagnoses assigned by the Registered Dietitian    Comments:

## 2019-05-08 NOTE — SWALLOW BEDSIDE ASSESSMENT ADULT - SWALLOW EVAL: BUCCAL STRENGTH AND MOBILITY
Unable to formally assess oral-motor function given patient's difficulty following low-level verbal and visual directives.

## 2019-05-08 NOTE — CHART NOTE - NSCHARTNOTEFT_GEN_A_CORE
Was called by RN earlier tonight as patient was febrile. Tylenol rectal given x1. Lactate and repeat CBC ordered. Lactate elevated 2.2 and CBC shows drop in HGB to 8.6    Patient has no obvious source of bleeding, may be dilutional as pt received fluid bolus earlier tonight  occult feces ordered  Repeat CBC at 5am    Lactate 2.2   Ordered additional 500cc bolus. Repeat lactate with am labs  UA, UC, blood cultures pending  On empiric tx for UTI

## 2019-05-08 NOTE — PROGRESS NOTE ADULT - SUBJECTIVE AND OBJECTIVE BOX
CHIEF COMPLAINT/INTERVAL HISTORY: Pt seen and examined at bedside. Pt tolerating diet at time of exam, with wife present at bedside. Pt nonverbal at time of exam.    REVIEW OF SYSTEMS: Unable to attain due to demented status.    Vital Signs Last 24 Hrs  T(C): 37.9 (08 May 2019 08:00), Max: 38.6 (08 May 2019 00:00)  T(F): 100.2 (08 May 2019 08:00), Max: 101.4 (08 May 2019 00:00)  HR: 94 (08 May 2019 08:00) (94 - 132)  BP: 133/89 (08 May 2019 08:00) (99/68 - 163/85)  BP(mean): --  RR: 16 (08 May 2019 08:00) (16 - 19)  SpO2: 100% (08 May 2019 08:00) (92% - 100%)    PHYSICAL EXAM:  GENERAL: elderly M in NAD  HEENT: EOMI, conjunctiva and sclera clear  Chest: CTA bilaterally, no wheezing  CV: S1S2, RRR,   GI: soft, +BS, NT/ND  Musculoskeletal: no edema  Psychiatric: nonverbal  Skin: warm and dry    LABS:                        7.6    6.33  )-----------( 188      ( 08 May 2019 08:38 )             22.3     05-08    145  |  114<H>  |  16  ----------------------------<  95  3.8   |  25  |  0.90    Ca    9.5      08 May 2019 04:57    TPro  7.3  /  Alb  3.0<L>  /  TBili  0.9  /  DBili  x   /  AST  92<H>  /  ALT  10<L>  /  AlkPhos  61  05-08    PT/INR - ( 08 May 2019 04:57 )   PT: 61.1 sec;   INR: 5.10 ratio         PTT - ( 07 May 2019 17:49 )  PTT:43.5 sec  Urinalysis Basic - ( 08 May 2019 07:53 )    Color: Charis / Appearance: Slightly Turbid / S.020 / pH: x  Gluc: x / Ketone: Small  / Bili: Negative / Urobili: Negative   Blood: x / Protein: 25 mg/dL / Nitrite: Negative   Leuk Esterase: Small / RBC: 11-25 /HPF / WBC 11-25   Sq Epi: x / Non Sq Epi: Few / Bacteria: Moderate CHIEF COMPLAINT/INTERVAL HISTORY: Pt seen and examined at bedside. Pt tolerating diet at time of exam, with wife present at bedside. Pt nonverbal at time of exam.    REVIEW OF SYSTEMS: Unable to attain due to demented status.    Vital Signs Last 24 Hrs  T(C): 37.9 (08 May 2019 08:00), Max: 38.6 (08 May 2019 00:00)  T(F): 100.2 (08 May 2019 08:00), Max: 101.4 (08 May 2019 00:00)  HR: 94 (08 May 2019 08:00) (94 - 132)  BP: 133/89 (08 May 2019 08:00) (99/68 - 163/85)  BP(mean): --  RR: 16 (08 May 2019 08:00) (16 - 19)  SpO2: 100% (08 May 2019 08:00) (92% - 100%)    PHYSICAL EXAM:  GENERAL: elderly M in NAD  HEENT: EOMI, conjunctiva and sclera clear  Chest: Diminished BS bilaterally, no wheezing  CV: S1S2, RRR,   GI: soft, +BS, NT/ND  Musculoskeletal: no edema  Psychiatric: nonverbal  Skin: warm and dry    LABS:                        7.6    6.33  )-----------( 188      ( 08 May 2019 08:38 )             22.3     05-08    145  |  114<H>  |  16  ----------------------------<  95  3.8   |  25  |  0.90    Ca    9.5      08 May 2019 04:57    TPro  7.3  /  Alb  3.0<L>  /  TBili  0.9  /  DBili  x   /  AST  92<H>  /  ALT  10<L>  /  AlkPhos  61  05-08    PT/INR - ( 08 May 2019 04:57 )   PT: 61.1 sec;   INR: 5.10 ratio         PTT - ( 07 May 2019 17:49 )  PTT:43.5 sec  Urinalysis Basic - ( 08 May 2019 07:53 )    Color: Charis / Appearance: Slightly Turbid / S.020 / pH: x  Gluc: x / Ketone: Small  / Bili: Negative / Urobili: Negative   Blood: x / Protein: 25 mg/dL / Nitrite: Negative   Leuk Esterase: Small / RBC: 11-25 /HPF / WBC 11-25   Sq Epi: x / Non Sq Epi: Few / Bacteria: Moderate

## 2019-05-08 NOTE — PROGRESS NOTE ADULT - SUBJECTIVE AND OBJECTIVE BOX
[facility] -- Rogelio Robb, Flor, Sandi, Rick Holloway Savella  Office # [phone]    Follow Up:  Syncope, Afib with RVR, Supratherapeutic INR    Subjective/Observations:     REVIEW OF SYSTEMS: All other review of systems is negative unless indicated above    PAST MEDICAL & SURGICAL HISTORY:  Syncope  Parkinson disease  Constipation  Dementia  Urinary retention  Cerebrovascular accident  Hypertension  Atrial fibrillation  No significant past surgical history    MEDICATIONS  (STANDING):  carbidopa/levodopa  25/100 2 Tablet(s) Oral <User Schedule>  cefTRIAXone   IVPB      cefTRIAXone   IVPB 1 Gram(s) IV Intermittent every 24 hours  docusate sodium 100 milliGRAM(s) Oral two times a day  entacapone 100 milliGRAM(s) Oral <User Schedule>  metoprolol tartrate 25 milliGRAM(s) Oral two times a day  pantoprazole  Injectable 40 milliGRAM(s) IV Push every 12 hours  senna 2 Tablet(s) Oral at bedtime  sertraline 50 milliGRAM(s) Oral daily  tamsulosin 0.4 milliGRAM(s) Oral at bedtime    MEDICATIONS  (PRN):  acetaminophen  Suppository .. 650 milliGRAM(s) Rectal every 6 hours PRN Temp greater or equal to 38C (100.4F)    Allergies    No Known Allergies    Intolerances  Vital Signs Last 24 Hrs  T(C): 37.9 (08 May 2019 08:00), Max: 38.6 (08 May 2019 00:00)  T(F): 100.2 (08 May 2019 08:00), Max: 101.4 (08 May 2019 00:00)  HR: 94 (08 May 2019 08:00) (94 - 132)  BP: 133/89 (08 May 2019 08:00) (99/68 - 163/85)  BP(mean): --  RR: 16 (08 May 2019 08:00) (16 - 19)  SpO2: 100% (08 May 2019 08:00) (92% - 100%)    I&O's Summary    07 May 2019 07:01  -  08 May 2019 07:00  --------------------------------------------------------  IN: 1000 mL / OUT: 0 mL / NET: 1000 mL      Weight (kg): 72.6 (05-07 @ 16:42)    PHYSICAL EXAM:  TELE: Afib  Constitutional: NAD, awake and alert, well-developed  HEENT: Moist Mucous Membranes, Anicteric  Pulmonary: Non-labored, breath sounds are clear bilaterally, No wheezing, rales or rhonchi  Cardiovascular: Irregularly Irregular, S1 and S2, No murmurs, rubs, gallops or clicks  Gastrointestinal: Bowel Sounds present, soft, nontender.   Lymph: No peripheral edema. No lymphadenopathy.  Skin: No visible rashes or ulcers.  Psych:  Mood & affect appropriate    LABS: All Labs Reviewed:                        7.6    6.33  )-----------( 188      ( 08 May 2019 08:38 )             22.3                         8.6    6.52  )-----------( 199      ( 08 May 2019 00:05 )             24.8                         9.4    8.61  )-----------( 213      ( 07 May 2019 17:49 )             27.9     08 May 2019 04:57    145    |  114    |  16     ----------------------------<  95     3.8     |  25     |  0.90   07 May 2019 17:49    144    |  112    |  18     ----------------------------<  110    3.9     |  26     |  0.96   05 May 2019 16:26    145    |  113    |  17     ----------------------------<  109    3.8     |  26     |  0.95     Ca    9.5        08 May 2019 04:57  Ca    9.8        07 May 2019 17:49  Ca    10.0       05 May 2019 16:26    TPro  7.3    /  Alb  3.0    /  TBili  0.9    /  DBili  x      /  AST  92     /  ALT  10     /  AlkPhos  61     08 May 2019 04:57  TPro  7.3    /  Alb  3.0    /  TBili  0.9    /  DBili  x      /  AST  93     /  ALT  9      /  AlkPhos  59     07 May 2019 17:49  TPro  7.3    /  Alb  3.3    /  TBili  0.7    /  DBili  x      /  AST  54     /  ALT  9      /  AlkPhos  63     05 May 2019 16:26    PT/INR - ( 08 May 2019 04:57 )   PT: 61.1 sec;   INR: 5.10 ratio      PTT - ( 07 May 2019 17:49 )  PTT:43.5 sec  CARDIAC MARKERS ( 08 May 2019 04:57 )  .027 ng/mL / x     / x     / x     / x      CARDIAC MARKERS ( 08 May 2019 00:05 )  .018 ng/mL / x     / x     / x     / x      CARDIAC MARKERS ( 07 May 2019 17:49 )  .016 ng/mL / x     / x     / x     / x        < from: TTE Echo Doppler w/o Cont (11.30.17 @ 15:14) >     EXAM:  ECHO TTE W/O CON COMP W/DOPPLR         PROCEDURE DATE:  11/30/2017        INTERPRETATION:  INDICATION: Syncope    Blood Pressure 152/100    Height 172.7     Weight 70.7       BSA 1.84    Dimensions:    LA 3.6       Normal Values: 2.0 - 4.0 cm    Ao 3.2        Normal Values: 2.0 - 3.8 cm  SEPTUM 1.2       Normal Values: 0.6 - 1.2 cm  PWT 1.1       Normal Values: 0.6 - 1.1 cm  LVIDd 4.8         Normal Values: 3.0 - 5.6 cm  LVIDs 3.5         Normal Values: 1.8 - 4.0 cm    Derived Variables:  LVMI     g/m2  RWT      Fractional Short      Ejection Fraction 65    Doppler Peak v. AoV=   (m/sec)    OBSERVATIONS:    Mitral Valve: normal, 2+ MR.  Aortic Valve/Aorta: Calcified trileaflet aortic valve.  Tricuspid Valve: normal with trace TR.  Pulmonic Valve: normal  Left Atrium: normal  Right Atrium: normal  Left Ventricle: Borderline concentric LVH with normal systolic function,   estimated LVEF of 65%.  Right Ventricle: normal size and systolic function.  Pericardium/Pleura: no significant pleural effusion, no significant   pericardial effusion.  Pulmonary/RV Pressure: estimated PA systolic pressure of 28 mmHg assuming   an RA pressure of 10 mmHg.  LV Diastolic Function: normal     Technically limited study. Borderline concentric LVH with normal systolic   function, estimated LVEF of 65%. Normal right ventricular size and   systolic function. Diastolic function is normal.. Normal biatrial size.   The aortic root is normal in size. The mitral valve is structurally   normal, 2+ MR. The aortic valve is ossified without stenosis or   insufficiency. Trace physiologic TR. Estimated PA systolic pressure is 28   mm Hg, assuming an RA pressure of 10 mmHg. No significant pericardial   effusion.      NIK ORTIZ M.D., ATTENDING CARDIOLOGIST  This document has been electronically signed. Dec  1 2017  6:39PM      < end of copied text >     < from: CT Head No Cont (05.07.19 @ 18:19) >    EXAM:  CT BRAIN                            PROCEDURE DATE:  05/07/2019          INTERPRETATION:  CLINICAL INFORMATION: Syncope. On anticoagulation.    COMPARISON: CT head of 5/5/2019.    PROCEDURE:   Noncontrast CT of the Head was performed from the skull base to the   vertex. Coronal and Sagittal reformats were obtained.    FINDINGS:    Motion degraded examination.    There is no acute intracranial hemorrhage, mass effect, midline shift,   extra-axial collection, hydrocephalus, or evidence of acute vascular   territorial infarction.    Moderate patchy hypodensities within the periventricular and subcortical   white matter, although nonspecific, likely reflect chronic microvascular   disease. Hypodensities within the bilateral basal gangliaand right   thalamus, consistent with chronic lacunar infarcts. Cerebral white loss   results in prominence of the ventricles and sulci.    The visualized paranasal sinuses and mastoid air cells are clear.   Intraorbital contents are unremarkable. Nocalvarial fracture.    IMPRESSION:     Motion limited examination. No acute intracranial hemorrhage or calvarial   fracture within the limitations of the exam.    If clinical suspicion persists, consider short-term repeat noncontrast CT   evaluation.    ANDRY DELAROSA M.D., ATTENDING RADIOLOGIST  This document has been electronically signed. May  7 2019  6:25PM      < end of copied text >    < from: Xray Chest 1 View AP/PA (05.07.19 @ 17:36) >    EXAM:  XR CHEST AP OR PA 1V                          PROCEDURE DATE:  05/07/2019      INTERPRETATION:  History: Chest pain    Semiupright portable chest x-ray is compared to 5/5/2019.    The study is slightly limited by rotation. The heart isnot enlarged. The   lungs are clear. There is osteopenia and degenerative change of the bony   structures.    Impression: No active pulmonary disease. No change.    MICHELLE LOUIS M.D.,ATTENDING RADIOLOGIST  This document has been electronically signed. May  7 2019  6:42PM      < end of copied text > [facility] -- Rogelio Robb, Flor, Sandi, Rick Holloway Savella  Office # [phone]    Follow Up:  Syncope, Afib with RVR, Supratherapeutic INR    Subjective/Observations: Confused and disoriented but pleasant.  Not in any distress.  Had BM's x 2 but no evidence of melena per RN    REVIEW OF SYSTEMS: All other review of systems is negative unless indicated above    PAST MEDICAL & SURGICAL HISTORY:  Syncope  Parkinson disease  Constipation  Dementia  Urinary retention  Cerebrovascular accident  Hypertension  Atrial fibrillation  No significant past surgical history    MEDICATIONS  (STANDING):  carbidopa/levodopa  25/100 2 Tablet(s) Oral <User Schedule>  cefTRIAXone   IVPB      cefTRIAXone   IVPB 1 Gram(s) IV Intermittent every 24 hours  docusate sodium 100 milliGRAM(s) Oral two times a day  entacapone 100 milliGRAM(s) Oral <User Schedule>  metoprolol tartrate 25 milliGRAM(s) Oral two times a day  pantoprazole  Injectable 40 milliGRAM(s) IV Push every 12 hours  senna 2 Tablet(s) Oral at bedtime  sertraline 50 milliGRAM(s) Oral daily  tamsulosin 0.4 milliGRAM(s) Oral at bedtime    MEDICATIONS  (PRN):  acetaminophen  Suppository .. 650 milliGRAM(s) Rectal every 6 hours PRN Temp greater or equal to 38C (100.4F)    Allergies    No Known Allergies    Intolerances  Vital Signs Last 24 Hrs  T(C): 37.9 (08 May 2019 08:00), Max: 38.6 (08 May 2019 00:00)  T(F): 100.2 (08 May 2019 08:00), Max: 101.4 (08 May 2019 00:00)  HR: 94 (08 May 2019 08:00) (94 - 132)  BP: 133/89 (08 May 2019 08:00) (99/68 - 163/85)  BP(mean): --  RR: 16 (08 May 2019 08:00) (16 - 19)  SpO2: 100% (08 May 2019 08:00) (92% - 100%)    I&O's Summary    07 May 2019 07:01  -  08 May 2019 07:00  --------------------------------------------------------  IN: 1000 mL / OUT: 0 mL / NET: 1000 mL      Weight (kg): 72.6 (05-07 @ 16:42)    PHYSICAL EXAM:  TELE: Afib  Constitutional: NAD, awake and alert, well-developed  HEENT: Moist Mucous Membranes, Anicteric  Pulmonary: Non-labored, breath sounds are clear bilaterally, No wheezing, rales or rhonchi  Cardiovascular: Irregularly Irregular, S1 and S2, No murmurs, rubs, gallops or clicks  Gastrointestinal: Bowel Sounds present, soft, nontender.   Lymph: No peripheral edema. No lymphadenopathy.  Skin: No visible rashes or ulcers.  Psych:  Mood & affect appropriate    LABS: All Labs Reviewed:                        7.6    6.33  )-----------( 188      ( 08 May 2019 08:38 )             22.3                         8.6    6.52  )-----------( 199      ( 08 May 2019 00:05 )             24.8                         9.4    8.61  )-----------( 213      ( 07 May 2019 17:49 )             27.9     08 May 2019 04:57    145    |  114    |  16     ----------------------------<  95     3.8     |  25     |  0.90   07 May 2019 17:49    144    |  112    |  18     ----------------------------<  110    3.9     |  26     |  0.96   05 May 2019 16:26    145    |  113    |  17     ----------------------------<  109    3.8     |  26     |  0.95     Ca    9.5        08 May 2019 04:57  Ca    9.8        07 May 2019 17:49  Ca    10.0       05 May 2019 16:26    TPro  7.3    /  Alb  3.0    /  TBili  0.9    /  DBili  x      /  AST  92     /  ALT  10     /  AlkPhos  61     08 May 2019 04:57  TPro  7.3    /  Alb  3.0    /  TBili  0.9    /  DBili  x      /  AST  93     /  ALT  9      /  AlkPhos  59     07 May 2019 17:49  TPro  7.3    /  Alb  3.3    /  TBili  0.7    /  DBili  x      /  AST  54     /  ALT  9      /  AlkPhos  63     05 May 2019 16:26    PT/INR - ( 08 May 2019 04:57 )   PT: 61.1 sec;   INR: 5.10 ratio      PTT - ( 07 May 2019 17:49 )  PTT:43.5 sec  CARDIAC MARKERS ( 08 May 2019 04:57 )  .027 ng/mL / x     / x     / x     / x      CARDIAC MARKERS ( 08 May 2019 00:05 )  .018 ng/mL / x     / x     / x     / x      CARDIAC MARKERS ( 07 May 2019 17:49 )  .016 ng/mL / x     / x     / x     / x        < from: TTE Echo Doppler w/o Cont (11.30.17 @ 15:14) >     EXAM:  ECHO TTE W/O CON COMP W/DOPPLR         PROCEDURE DATE:  11/30/2017        INTERPRETATION:  INDICATION: Syncope    Blood Pressure 152/100    Height 172.7     Weight 70.7       BSA 1.84    Dimensions:    LA 3.6       Normal Values: 2.0 - 4.0 cm    Ao 3.2        Normal Values: 2.0 - 3.8 cm  SEPTUM 1.2       Normal Values: 0.6 - 1.2 cm  PWT 1.1       Normal Values: 0.6 - 1.1 cm  LVIDd 4.8         Normal Values: 3.0 - 5.6 cm  LVIDs 3.5         Normal Values: 1.8 - 4.0 cm    Derived Variables:  LVMI     g/m2  RWT      Fractional Short      Ejection Fraction 65    Doppler Peak v. AoV=   (m/sec)    OBSERVATIONS:    Mitral Valve: normal, 2+ MR.  Aortic Valve/Aorta: Calcified trileaflet aortic valve.  Tricuspid Valve: normal with trace TR.  Pulmonic Valve: normal  Left Atrium: normal  Right Atrium: normal  Left Ventricle: Borderline concentric LVH with normal systolic function,   estimated LVEF of 65%.  Right Ventricle: normal size and systolic function.  Pericardium/Pleura: no significant pleural effusion, no significant   pericardial effusion.  Pulmonary/RV Pressure: estimated PA systolic pressure of 28 mmHg assuming   an RA pressure of 10 mmHg.  LV Diastolic Function: normal     Technically limited study. Borderline concentric LVH with normal systolic   function, estimated LVEF of 65%. Normal right ventricular size and   systolic function. Diastolic function is normal.. Normal biatrial size.   The aortic root is normal in size. The mitral valve is structurally   normal, 2+ MR. The aortic valve is ossified without stenosis or   insufficiency. Trace physiologic TR. Estimated PA systolic pressure is 28   mm Hg, assuming an RA pressure of 10 mmHg. No significant pericardial   effusion.      NIK ORTIZ M.D., ATTENDING CARDIOLOGIST  This document has been electronically signed. Dec  1 2017  6:39PM      < end of copied text >     < from: CT Head No Cont (05.07.19 @ 18:19) >    EXAM:  CT BRAIN                            PROCEDURE DATE:  05/07/2019          INTERPRETATION:  CLINICAL INFORMATION: Syncope. On anticoagulation.    COMPARISON: CT head of 5/5/2019.    PROCEDURE:   Noncontrast CT of the Head was performed from the skull base to the   vertex. Coronal and Sagittal reformats were obtained.    FINDINGS:    Motion degraded examination.    There is no acute intracranial hemorrhage, mass effect, midline shift,   extra-axial collection, hydrocephalus, or evidence of acute vascular   territorial infarction.    Moderate patchy hypodensities within the periventricular and subcortical   white matter, although nonspecific, likely reflect chronic microvascular   disease. Hypodensities within the bilateral basal gangliaand right   thalamus, consistent with chronic lacunar infarcts. Cerebral white loss   results in prominence of the ventricles and sulci.    The visualized paranasal sinuses and mastoid air cells are clear.   Intraorbital contents are unremarkable. Nocalvarial fracture.    IMPRESSION:     Motion limited examination. No acute intracranial hemorrhage or calvarial   fracture within the limitations of the exam.    If clinical suspicion persists, consider short-term repeat noncontrast CT   evaluation.    ANDRY DELAROSA M.D., ATTENDING RADIOLOGIST  This document has been electronically signed. May  7 2019  6:25PM      < end of copied text >    < from: Xray Chest 1 View AP/PA (05.07.19 @ 17:36) >    EXAM:  XR CHEST AP OR PA 1V                          PROCEDURE DATE:  05/07/2019      INTERPRETATION:  History: Chest pain    Semiupright portable chest x-ray is compared to 5/5/2019.    The study is slightly limited by rotation. The heart isnot enlarged. The   lungs are clear. There is osteopenia and degenerative change of the bony   structures.    Impression: No active pulmonary disease. No change.    MICHELLE LOUIS M.D.,ATTENDING RADIOLOGIST  This document has been electronically signed. May  7 2019  6:42PM      < end of copied text >

## 2019-05-08 NOTE — SWALLOW BEDSIDE ASSESSMENT ADULT - SWALLOW EVAL: DIAGNOSIS
1. The patient demonstrated a mild-moderate oral dysphagia for puree and nectar thick liquid textures marked by reduced orientation to the utensil, reduced stripping of the bolus, reduced labial seal to the cup, and delayed bolus collection, transfer, and posterior transport. 2. The patient demonstrated a mild-moderate pharyngeal dysphagia for puree and nectar thick liquid textures marked by a delayed pharyngeal swallow trigger with reduced hyolaryngeal elevation upon digital palpation w/o evidence of airway penetration. *It should be noted that the patient required maximum verbal/tactile prompting to remain awake during this evaluation.

## 2019-05-08 NOTE — DIETITIAN INITIAL EVALUATION ADULT. - PROBLEM SELECTOR PLAN 4
Patient has hx of hematuria with clots in urine 1 week ago  No current bleeding  Repeat H/H as current levels are low compared to baseline  Occult feces ordered

## 2019-05-08 NOTE — SWALLOW BEDSIDE ASSESSMENT ADULT - COMMENTS
The patient was seen this afternoon for a re-assessment of swallow function, at which time he was lethargic, though arouseable for short durations of time. According to the patient's wife, the patient has been exhibiting decreased levels of alertness over the past few weeks. The patient is known to this department as he was seen during his previous admission, at which time a dysphagia 1 with nectar thick liquid diet was recommended. The patient presented back to the ED on 5/7/19 due to syncopal episodes. Per charting, The patient was seen this afternoon for a re-assessment of swallow function, at which time he was lethargic, though arouseable for short durations of time. Patient's wife present at bedside and feeding the patient lunch. According to the patient's wife, the patient has been exhibiting decreased levels of alertness over the past few weeks. The patient is known to this department as he was seen during his previous admission, at which time a dysphagia 1 with nectar thick liquid diet was recommended. The patient presented back to the ED on 5/7/19 due to syncopal episodes. Per charting, the patient is a "79 y/o M with PMH Afib (on coumadin), CVA (2012, 2016), Parkinson's disease, dementia, HTN, urinary retention, and multiple TIAs presents s/p syncope. Patient is a poor historian due to dementia. Per wife, patient has been having multiple syncopal episodes over past 3 days. No precipitating events or triggers. Episodes are not positional or associated with movement or activity. Today, patient was at home when he had an episode. Wife then tried to wake him up and noticed he was unresponsive. Patient was back to baseline once awake with no residual symptoms. Patient is not ambulatory and typically sits in a wheel-chair unless participating in PT. PO intake has not changed compared to baseline but wife notes it is difficulty to hydrate given he is on a nectar thickened diet ROS not obtainable secondary to mental status but wife denies recent fevers, chills, vomiting, diarrhea." Recent CT of the head revealed, "No acute intracranial hemorrhage or calvarial fracture within the limitations of the exam." Discussed results and recommendations from this evaluation with the patient's wife, RN, and MD on unit.

## 2019-05-08 NOTE — DIETITIAN INITIAL EVALUATION ADULT. - ETIOLOGY
related to inconsistent meal intake, frequent hospitalizations related to motor causes (hx PD, dementia)

## 2019-05-08 NOTE — DIETITIAN INITIAL EVALUATION ADULT. - ORAL INTAKE PTA
fair/eats well when feeling ok.  Has been in and out of hospital multiple times recently, also is medicated for agitation due to sundowning therefore is lethargic the next day often.

## 2019-05-08 NOTE — PROGRESS NOTE ADULT - ASSESSMENT
78y M PMH Afib (on coumadin), CVA (2012, 2016), Parkinson's disease, dementia, HTN, urinary retention, and multiple TIAs presents s/p syncope, admitted for syncopal workup

## 2019-05-08 NOTE — DIETITIAN INITIAL EVALUATION ADULT. - ENERGY NEEDS
Nutrition Dx #3- Malnutrition severe in setting of chronic illness related to inandequate protein/energy intake evidenced by fat loss, muscle wasting, intake of <75% estd. needs for > 1 month.

## 2019-05-08 NOTE — PROGRESS NOTE ADULT - ASSESSMENT
Dali Carl NP  Cardiology 78y M PMH Afib (on coumadin), CVA (2012, 2016), Parkinson's disease, dementia, HTN, urinary retention, and multiple TIAs presents with syncope likely vasovagal due to anemia with possible GI bleed vs dehydration vs orthostatics due urinary medications and supratherapeutic INR.    Syncope  - The etiology of his syncope could be from blood loss  - No clear evidence of acute ischemia, trops negative x 1. trend    - No acute changes on EKG compared to previous  - Continue to hold Coumadin 2/2 elevated INR (today's = 5.1)  - Consider GI consult R/O GI bleed  - Transfuse prn maintain H/H > 7   - Check orthostatics, if positive, consider d/c flomax or finasteride  - Previous TTE shows 11/2017 with borderline LVH, EF 65%, moderate MR, normal pulmonary pressures.   - Repeat TTE    Chronic Atrial Fibrillation  - Continue to hold Coumadin  - Afib rate controlled  - No signs of ischemia.   - Continue low dose Metoprolol with hold parameters    HLD  - Continue zetia     DVT ppx  - PAS    - Further cardiac workup will depend on clinical course.   - will continue to follow    Dali Carl NP  Cardiology 78y M PMH Afib (on coumadin), CVA (2012, 2016), Parkinson's disease, dementia, HTN, urinary retention, and multiple TIAs presents with syncope likely vasovagal due to anemia with possible GI bleed vs dehydration vs orthostatics due urinary medications and supratherapeutic INR.    Syncope  - The etiology of his syncope could be from blood loss  - No clear evidence of acute ischemia, trops negative x 1. trend    - No acute changes on EKG compared to previous  - Continue to hold Coumadin 2/2 elevated INR (today's = 5.1)  - Consider GI consult R/O GI bleed  - Transfuse prn maintain H/H > 7.  Receiving PRBC for Hgb of 7.6  - Check orthostatics, if positive, consider d/c Flomax or Finasteride  - Previous TTE shows 11/2017 with borderline LVH, EF 65%, moderate MR, normal pulmonary pressures.   - Repeat TTE    Chronic Atrial Fibrillation  - Continue to hold Coumadin  - Afib rate controlled  - No signs of ischemia.   - Continue low dose Metoprolol with hold parameters    HLD  - Continue zetia     DVT ppx  - PAS    - Further cardiac workup will depend on clinical course.   - will continue to follow    Dali Carl NP  Cardiology

## 2019-05-08 NOTE — SWALLOW BEDSIDE ASSESSMENT ADULT - ASR SWALLOW ASPIRATION MONITOR
pneumonia/change of breathing pattern/cough/gurgly voice/fever/upper respiratory infection/oral hygiene/position upright (90Y)/throat clearing

## 2019-05-08 NOTE — DIETITIAN INITIAL EVALUATION ADULT. - SIGNS/SYMPTOMS
evidenced by ~10# wt loss past 6 mo. evidenced by use of thickened liquids PTA, pending swallow eval.

## 2019-05-09 ENCOUNTER — TRANSCRIPTION ENCOUNTER (OUTPATIENT)
Age: 79
End: 2019-05-09

## 2019-05-09 LAB
ALBUMIN SERPL ELPH-MCNC: 2.5 G/DL — LOW (ref 3.3–5)
ALP SERPL-CCNC: 51 U/L — SIGNIFICANT CHANGE UP (ref 40–120)
ALT FLD-CCNC: 25 U/L — SIGNIFICANT CHANGE UP (ref 12–78)
ANION GAP SERPL CALC-SCNC: 6 MMOL/L — SIGNIFICANT CHANGE UP (ref 5–17)
APTT BLD: 40.6 SEC — HIGH (ref 27.5–36.3)
AST SERPL-CCNC: 62 U/L — HIGH (ref 15–37)
BASOPHILS # BLD AUTO: 0.02 K/UL — SIGNIFICANT CHANGE UP (ref 0–0.2)
BASOPHILS NFR BLD AUTO: 0.3 % — SIGNIFICANT CHANGE UP (ref 0–2)
BILIRUB SERPL-MCNC: 1.3 MG/DL — HIGH (ref 0.2–1.2)
BUN SERPL-MCNC: 12 MG/DL — SIGNIFICANT CHANGE UP (ref 7–23)
CALCIUM SERPL-MCNC: 9.3 MG/DL — SIGNIFICANT CHANGE UP (ref 8.5–10.1)
CHLORIDE SERPL-SCNC: 114 MMOL/L — HIGH (ref 96–108)
CO2 SERPL-SCNC: 26 MMOL/L — SIGNIFICANT CHANGE UP (ref 22–31)
CREAT SERPL-MCNC: 0.72 MG/DL — SIGNIFICANT CHANGE UP (ref 0.5–1.3)
CULTURE RESULTS: NO GROWTH — SIGNIFICANT CHANGE UP
EOSINOPHIL # BLD AUTO: 0.1 K/UL — SIGNIFICANT CHANGE UP (ref 0–0.5)
EOSINOPHIL NFR BLD AUTO: 1.7 % — SIGNIFICANT CHANGE UP (ref 0–6)
GLUCOSE SERPL-MCNC: 97 MG/DL — SIGNIFICANT CHANGE UP (ref 70–99)
HCT VFR BLD CALC: 27.7 % — LOW (ref 39–50)
HGB BLD-MCNC: 9.3 G/DL — LOW (ref 13–17)
IMM GRANULOCYTES NFR BLD AUTO: 0.3 % — SIGNIFICANT CHANGE UP (ref 0–1.5)
INR BLD: 2.59 RATIO — HIGH (ref 0.88–1.16)
LYMPHOCYTES # BLD AUTO: 1.3 K/UL — SIGNIFICANT CHANGE UP (ref 1–3.3)
LYMPHOCYTES # BLD AUTO: 22.7 % — SIGNIFICANT CHANGE UP (ref 13–44)
MAGNESIUM SERPL-MCNC: 2.1 MG/DL — SIGNIFICANT CHANGE UP (ref 1.6–2.6)
MCHC RBC-ENTMCNC: 32.1 PG — SIGNIFICANT CHANGE UP (ref 27–34)
MCHC RBC-ENTMCNC: 33.6 GM/DL — SIGNIFICANT CHANGE UP (ref 32–36)
MCV RBC AUTO: 95.5 FL — SIGNIFICANT CHANGE UP (ref 80–100)
MONOCYTES # BLD AUTO: 0.48 K/UL — SIGNIFICANT CHANGE UP (ref 0–0.9)
MONOCYTES NFR BLD AUTO: 8.4 % — SIGNIFICANT CHANGE UP (ref 2–14)
NEUTROPHILS # BLD AUTO: 3.8 K/UL — SIGNIFICANT CHANGE UP (ref 1.8–7.4)
NEUTROPHILS NFR BLD AUTO: 66.6 % — SIGNIFICANT CHANGE UP (ref 43–77)
NRBC # BLD: 0 /100 WBCS — SIGNIFICANT CHANGE UP (ref 0–0)
PLATELET # BLD AUTO: 211 K/UL — SIGNIFICANT CHANGE UP (ref 150–400)
POTASSIUM SERPL-MCNC: 3.5 MMOL/L — SIGNIFICANT CHANGE UP (ref 3.5–5.3)
POTASSIUM SERPL-SCNC: 3.5 MMOL/L — SIGNIFICANT CHANGE UP (ref 3.5–5.3)
PROT SERPL-MCNC: 6.1 G/DL — SIGNIFICANT CHANGE UP (ref 6–8.3)
PROTHROM AB SERPL-ACNC: 30.2 SEC — HIGH (ref 10–12.9)
RBC # BLD: 2.9 M/UL — LOW (ref 4.2–5.8)
RBC # FLD: 15.6 % — HIGH (ref 10.3–14.5)
SODIUM SERPL-SCNC: 146 MMOL/L — HIGH (ref 135–145)
SPECIMEN SOURCE: SIGNIFICANT CHANGE UP
WBC # BLD: 5.72 K/UL — SIGNIFICANT CHANGE UP (ref 3.8–10.5)
WBC # FLD AUTO: 5.72 K/UL — SIGNIFICANT CHANGE UP (ref 3.8–10.5)

## 2019-05-09 PROCEDURE — 99232 SBSQ HOSP IP/OBS MODERATE 35: CPT

## 2019-05-09 PROCEDURE — 99232 SBSQ HOSP IP/OBS MODERATE 35: CPT | Mod: GC

## 2019-05-09 RX ORDER — PANTOPRAZOLE SODIUM 20 MG/1
40 TABLET, DELAYED RELEASE ORAL
Refills: 0 | Status: DISCONTINUED | OUTPATIENT
Start: 2019-05-10 | End: 2019-05-10

## 2019-05-09 RX ORDER — WARFARIN SODIUM 2.5 MG/1
1 TABLET ORAL ONCE
Refills: 0 | Status: COMPLETED | OUTPATIENT
Start: 2019-05-09 | End: 2019-05-09

## 2019-05-09 RX ORDER — WARFARIN SODIUM 2.5 MG/1
2 TABLET ORAL ONCE
Refills: 0 | Status: DISCONTINUED | OUTPATIENT
Start: 2019-05-09 | End: 2019-05-09

## 2019-05-09 RX ADMIN — CARBIDOPA AND LEVODOPA 2 TABLET(S): 25; 100 TABLET ORAL at 15:14

## 2019-05-09 RX ADMIN — ENTACAPONE 100 MILLIGRAM(S): 200 TABLET, FILM COATED ORAL at 08:52

## 2019-05-09 RX ADMIN — Medication 100 MILLIGRAM(S): at 17:21

## 2019-05-09 RX ADMIN — Medication 100 MILLIGRAM(S): at 05:20

## 2019-05-09 RX ADMIN — PIPERACILLIN AND TAZOBACTAM 25 GRAM(S): 4; .5 INJECTION, POWDER, LYOPHILIZED, FOR SOLUTION INTRAVENOUS at 03:53

## 2019-05-09 RX ADMIN — PANTOPRAZOLE SODIUM 40 MILLIGRAM(S): 20 TABLET, DELAYED RELEASE ORAL at 05:20

## 2019-05-09 RX ADMIN — SENNA PLUS 2 TABLET(S): 8.6 TABLET ORAL at 21:55

## 2019-05-09 RX ADMIN — CARBIDOPA AND LEVODOPA 2 TABLET(S): 25; 100 TABLET ORAL at 17:21

## 2019-05-09 RX ADMIN — ENTACAPONE 100 MILLIGRAM(S): 200 TABLET, FILM COATED ORAL at 15:14

## 2019-05-09 RX ADMIN — TAMSULOSIN HYDROCHLORIDE 0.4 MILLIGRAM(S): 0.4 CAPSULE ORAL at 21:55

## 2019-05-09 RX ADMIN — PIPERACILLIN AND TAZOBACTAM 25 GRAM(S): 4; .5 INJECTION, POWDER, LYOPHILIZED, FOR SOLUTION INTRAVENOUS at 11:19

## 2019-05-09 RX ADMIN — SERTRALINE 50 MILLIGRAM(S): 25 TABLET, FILM COATED ORAL at 11:19

## 2019-05-09 RX ADMIN — ENTACAPONE 100 MILLIGRAM(S): 200 TABLET, FILM COATED ORAL at 11:19

## 2019-05-09 RX ADMIN — PIPERACILLIN AND TAZOBACTAM 25 GRAM(S): 4; .5 INJECTION, POWDER, LYOPHILIZED, FOR SOLUTION INTRAVENOUS at 19:10

## 2019-05-09 RX ADMIN — ENTACAPONE 100 MILLIGRAM(S): 200 TABLET, FILM COATED ORAL at 14:31

## 2019-05-09 RX ADMIN — CARBIDOPA AND LEVODOPA 2 TABLET(S): 25; 100 TABLET ORAL at 11:19

## 2019-05-09 RX ADMIN — WARFARIN SODIUM 1 MILLIGRAM(S): 2.5 TABLET ORAL at 21:55

## 2019-05-09 RX ADMIN — CARBIDOPA AND LEVODOPA 2 TABLET(S): 25; 100 TABLET ORAL at 08:52

## 2019-05-09 RX ADMIN — Medication 25 MILLIGRAM(S): at 17:21

## 2019-05-09 RX ADMIN — CARBIDOPA AND LEVODOPA 2 TABLET(S): 25; 100 TABLET ORAL at 14:31

## 2019-05-09 RX ADMIN — Medication 25 MILLIGRAM(S): at 05:20

## 2019-05-09 NOTE — PROGRESS NOTE ADULT - SUBJECTIVE AND OBJECTIVE BOX
Neurology Follow up note    JOSÉ MGIUEL COOPER78yMale    HPI:  78y M PMH Afib (on coumadin), CVA (2012, 2016), Parkinson's disease, dementia, HTN, urinary retention, and multiple TIAs presents s/p syncope. Patient is a poor historian due to dementia. Per wife, patient has been having multiple syncopal episodes over past 3 days. No precipitating events or triggers. Episodes are not positional or associated with movement or activity. Today, patient was at home when he had an episode. Wife then tried to wake him up and noticed he was unresponsive. Patient was back to baseline once awake with no residual symptoms. Patient is not ambulatory and typically sits in a wheel-chair unless participating in PT. PO intake has not changed compared to baseline but wife notes it is difficulty to hydrate given he is on a nectar thickened diet ROS not obtainable secondary to mental status but wife denies recent fevers, chills, vomiting, diarrhea.      In the ED: Vitals T(F): 96.6F HR: 106 BP: 122/72 RR: 18 SpO2: 96% RA. HGB 9.4, HCT 27.9, INR 6.11, Cl 112, Glucose 110, Albumin 3.0, AST 93, ALT 9, CT Head: No acute intracranial hemorrhage or calvarial fracture within the limitations of the exam. CXR: No active pulmonary disease. No change. Seen by Cardio: TTE ordered, check orthostatics, continue to trend troponin (07 May 2019 20:27)      Interval History - no new events.    Patient is seen, chart was reviewed and case was discussed with the treatment team.  Pt is not in any distress.   Lying on bed comfortably.   No events reported overnight.   No clinical seizure was reported.  Sitting on chair bed comfortably.    is at bedside.    Vital Signs Last 24 Hrs  T(C): 36.8 (09 May 2019 16:27), Max: 36.8 (09 May 2019 16:27)  T(F): 98.2 (09 May 2019 16:27), Max: 98.2 (09 May 2019 16:27)  HR: 82 (09 May 2019 16:27) (82 - 148)  BP: 94/65 (09 May 2019 16:27) (94/65 - 134/86)  BP(mean): --  RR: 17 (09 May 2019 16:27) (16 - 18)  SpO2: 98% (09 May 2019 16:27) (97% - 99%)        REVIEW OF SYSTEMS:    limited;\not in any distress    On Neurological Examination:    Mental Status - Pt is alert, awake, not following commands,      Speech - dysarthria.    Cranial Nerves - Pupils 3 mm equal and reactive to light, extraocular eye movements intact.   Pt has no facial asymmetry. Facial sensation is intact.Tongue - is in midline.    Muscle tone - cogwheel rigidity    Motor Exam - 4/5 of UE  LE 3-4/5.    Sensory Exam -. Pt withdraws all extremities equally on stimulation. No asymmetry seen.        Deep tendon Reflexes - 2 plus all over.      Neck Supple -  Yes.     MEDICATIONS    acetaminophen  Suppository .. 650 milliGRAM(s) Rectal every 6 hours PRN  carbidopa/levodopa  25/100 2 Tablet(s) Oral <User Schedule>  dextrose 5% + sodium chloride 0.45% with potassium chloride 20 mEq/L 1000 milliLiter(s) IV Continuous <Continuous>  docusate sodium 100 milliGRAM(s) Oral two times a day  entacapone 100 milliGRAM(s) Oral <User Schedule>  metoprolol tartrate 25 milliGRAM(s) Oral two times a day  piperacillin/tazobactam IVPB. 3.375 Gram(s) IV Intermittent every 8 hours  senna 2 Tablet(s) Oral at bedtime  sertraline 50 milliGRAM(s) Oral daily  tamsulosin 0.4 milliGRAM(s) Oral at bedtime  warfarin 1 milliGRAM(s) Oral once      Allergies    No Known Allergies    Intolerances        LABS:  CBC Full  -  ( 09 May 2019 06:59 )  WBC Count : 5.72 K/uL  RBC Count : 2.90 M/uL  Hemoglobin : 9.3 g/dL  Hematocrit : 27.7 %  Platelet Count - Automated : 211 K/uL  Mean Cell Volume : 95.5 fl  Mean Cell Hemoglobin : 32.1 pg  Mean Cell Hemoglobin Concentration : 33.6 gm/dL  Auto Neutrophil # : 3.80 K/uL  Auto Lymphocyte # : 1.30 K/uL  Auto Monocyte # : 0.48 K/uL  Auto Eosinophil # : 0.10 K/uL      Urinalysis Basic - ( 08 May 2019 07:53 )    Color: Charis / Appearance: Slightly Turbid / S.020 / pH: x  Gluc: x / Ketone: Small  / Bili: Negative / Urobili: Negative   Blood: x / Protein: 25 mg/dL / Nitrite: Negative   Leuk Esterase: Small / RBC: 11-25 /HPF / WBC 11-25   Sq Epi: x / Non Sq Epi: Few / Bacteria: Moderate          146<H>  |  114<H>  |  12  ----------------------------<  97  3.5   |  26  |  0.72    Ca    9.3      09 May 2019 06:59  Mg     2.1         TPro  6.1  /  Alb  2.5<L>  /  TBili  1.3<H>  /  DBili  x   /  AST  62<H>  /  ALT  25  /  AlkPhos  51      Hemoglobin A1C:     Vitamin B12     RADIOLOGY    ASSESSMENT AND PLAN:      SYNCOPE.  HO OF PD.  HX OF CVA.  RECURRENT UTI.    CONTINUE SINEMET.  Physical therapy evaluation.  OOB to chair/ambulation with assistance only.  Advanced care planning was discussed with family.  Pain is accessed and addressed.  Plan of care was discussed with family. Questions answered.  Would continue to follow.

## 2019-05-09 NOTE — PHYSICAL THERAPY INITIAL EVALUATION ADULT - MANUAL MUSCLE TESTING RESULTS, REHAB EVAL
inability to follow commands; pt able to kick B LE about 1/2 through range./not tested due to not tested due to/inability to follow commands; grossly <3/5,

## 2019-05-09 NOTE — PROGRESS NOTE ADULT - SUBJECTIVE AND OBJECTIVE BOX
[facility] -- Rogelio Robb, Sandi Ray, Rick Holloway Savella  Office # [phone]      Follow Up:      Subjective/Observations:       REVIEW OF SYSTEMS: All other review of systems is negative unless indicated above    PAST MEDICAL & SURGICAL HISTORY:  Syncope  Parkinson disease  Constipation  Dementia  Urinary retention  Cerebrovascular accident  Hypertension  Atrial fibrillation  No significant past surgical history      MEDICATIONS  (STANDING):  carbidopa/levodopa  25/100 2 Tablet(s) Oral <User Schedule>  dextrose 5% + sodium chloride 0.45% with potassium chloride 20 mEq/L 1000 milliLiter(s) (75 mL/Hr) IV Continuous <Continuous>  docusate sodium 100 milliGRAM(s) Oral two times a day  entacapone 100 milliGRAM(s) Oral <User Schedule>  metoprolol tartrate 25 milliGRAM(s) Oral two times a day  pantoprazole  Injectable 40 milliGRAM(s) IV Push every 12 hours  piperacillin/tazobactam IVPB. 3.375 Gram(s) IV Intermittent every 8 hours  senna 2 Tablet(s) Oral at bedtime  sertraline 50 milliGRAM(s) Oral daily  tamsulosin 0.4 milliGRAM(s) Oral at bedtime    MEDICATIONS  (PRN):  acetaminophen  Suppository .. 650 milliGRAM(s) Rectal every 6 hours PRN Temp greater or equal to 38C (100.4F)      Allergies    No Known Allergies    Intolerances        Vital Signs Last 24 Hrs  T(C): 36.6 (09 May 2019 08:37), Max: 37.1 (08 May 2019 16:01)  T(F): 97.8 (09 May 2019 08:37), Max: 98.7 (08 May 2019 16:01)  HR: 98 (09 May 2019 08:37) (82 - 100)  BP: 128/86 (09 May 2019 08:37) (100/68 - 134/86)  BP(mean): --  RR: 16 (09 May 2019 08:37) (16 - 18)  SpO2: 99% (09 May 2019 08:37) (97% - 99%)    I&O's Summary    08 May 2019 07:01  -  09 May 2019 07:00  --------------------------------------------------------  IN: 0 mL / OUT: 1 mL / NET: -1 mL          PHYSICAL EXAM:  TELE:   Constitutional: NAD, awake and alert, well-developed  HEENT: Moist Mucous Membranes, Anicteric  Pulmonary: Non-labored, breath sounds are clear bilaterally, No wheezing, crackles or rhonchi  Cardiovascular: Regular, S1 and S2 nl, No murmurs, rubs, gallops or clicks  Gastrointestinal: Bowel Sounds present, soft, nontender.   Lymph: No lymphadenopathy. No peripheral edema.  Skin: No visible rashes or ulcers.  Psych:  Mood & affect appropriate    LABS: All Labs Reviewed:                        9.3    5.72  )-----------( 211      ( 09 May 2019 06:59 )             27.7                         8.5    6.18  )-----------( 191      ( 08 May 2019 18:53 )             24.9                         7.6    6.33  )-----------( 188      ( 08 May 2019 08:38 )             22.3     09 May 2019 06:59    146    |  114    |  12     ----------------------------<  97     3.5     |  26     |  0.72   08 May 2019 04:57    145    |  114    |  16     ----------------------------<  95     3.8     |  25     |  0.90   07 May 2019 17:49    144    |  112    |  18     ----------------------------<  110    3.9     |  26     |  0.96     Ca    9.3        09 May 2019 06:59  Ca    9.5        08 May 2019 04:57  Ca    9.8        07 May 2019 17:49  Mg     2.1       09 May 2019 06:59    TPro  6.1    /  Alb  2.5    /  TBili  1.3    /  DBili  x      /  AST  62     /  ALT  25     /  AlkPhos  51     09 May 2019 06:59  TPro  7.3    /  Alb  3.0    /  TBili  0.9    /  DBili  x      /  AST  92     /  ALT  10     /  AlkPhos  61     08 May 2019 04:57  TPro  7.3    /  Alb  3.0    /  TBili  0.9    /  DBili  x      /  AST  93     /  ALT  9      /  AlkPhos  59     07 May 2019 17:49    PT/INR - ( 09 May 2019 06:59 )   PT: 30.2 sec;   INR: 2.59 ratio         PTT - ( 09 May 2019 06:59 )  PTT:40.6 sec  CARDIAC MARKERS ( 08 May 2019 04:57 )  .027 ng/mL / x     / x     / x     / x      CARDIAC MARKERS ( 08 May 2019 00:05 )  .018 ng/mL / x     / x     / x     / x      CARDIAC MARKERS ( 07 May 2019 17:49 )  .016 ng/mL / x     / x     / x     / x             ECG:  < from: 12 Lead ECG (05.07.19 @ 16:58) >  Ventricular Rate 98 BPM    Atrial Rate 227 BPM    QRS Duration 84 ms    Q-T Interval 354 ms    QTC Calculation(Bezet) 451 ms    R Axis -16 degrees    T Axis 10 degrees    Diagnosis Line Atrial fibrillation with premature ventricular or aberrantly conducted complexes  Poor data quality  Nonspecific T wave abnormality  Abnormal ECG  When compared with ECG of 05-MAY-2019 17:16, (Unconfirmed)  No significant change was found  Confirmed by Flor MENDEZ, Kolton ([id number]) on 5/8/2019 11:02:22 AM    < end of copied text >    Echo:  < from: TTE Echo Doppler w/o Cont (05.08.19 @ 09:47) >   EXAM:  ECHO TTE WO CON COMP W DOPPLR         PROCEDURE DATE:  05/08/2019        INTERPRETATION:  INDICATION: Syncope    Blood Pressure 99/68    Height 176 cm     Weight 72 kg       BSA        Dimensions:    LA 4.4       Normal Values: 2.0 - 4.0 cm   Ao 3.3        Normal Values: 2.0 - 3.8 cm  SEPTUM 0.9       Normal Values: 0.6 - 1.2 cm  PWT 0.9       Normal Values: 0.6 - 1.1 cm  LVIDd 4.9         Normal Values: 3.0 - 5.6 cm  LVIDs 2.4         Normal Values: 1.8 - 4.0 cm      OBSERVATIONS:    Mitral Valve: Thickened leaflets, mild MR.  Aortic Valve/Aorta: Calcified trileaflet aortic valve with decreased   opening. Mild aortic stenosis, peak aortic valve gradient is 15.2 mmHg   with a mean gradient of 8.2 mmHg. Aortic valve area calculated by   continuity equation is 1.23 sq cm  Tricuspid Valve: normal with trace TR.  Pulmonic Valve: normal  Left Atrium: Dilated  Right Atrium: normal  Left Ventricle: normal LV size and systolic function, estimated LVEF of   65%.  Right Ventricle: normalsize and systolic function.  Pericardium/Pleura: normal, no significant pericardial effusion.      Conclusion:     Normal left ventricular internal dimensions and systolic function,   estimated LVEF of 65%.    Calcified trileaflet aortic valve with decreased opening. Mild aortic   stenosis, peak aortic valve gradient is 15.2 mmHg with a mean gradient of   8.2 mmHg. Aortic valve area calculated by continuity equation is 1.23 sq   cm  Mitral valve with thickened leaflets, mild mitral regurgitation  Normal RV size and systolic function.   Trace physiologic TR.      No significant pericardial effusion.                  SKYLA MATAMOROS   This document has been electronically signed. May  9 2019  8:45AM          < end of copied text >    Radiology:  < from: US Duplex Carotid Arteries Complete, Bilateral (05.08.19 @ 09:20) >  EXAM:  US DPLX CAROTIDS COMPL BI                            PROCEDURE DATE:  05/08/2019          INTERPRETATION:  History: Syncope.    Bilateral extracranial carotid Doppler. Prior dated 2/16/2017.    Small amount of partially calcified plaque in the distal left CCA. No   significant subjective luminal narrowing bilaterally. Peak systolic flow   velocities in bilateral CCA, ICA and ECA fall within normal range. Normal   bilateral ICA/CCA velocity ratios. Antegrade flow each vertebral artery.    Impression: No flow-limiting stenosis.                COY BARRIOS M.D., ATTENDING RADIOLOGIST  This document has been electronically signed. May  8 2019  9:58AM        < end of copied text >  < from: CT Head No Cont (05.07.19 @ 18:19) >  EXAM:  CT BRAIN                            PROCEDURE DATE:  05/07/2019          INTERPRETATION:  CLINICAL INFORMATION: Syncope. On anticoagulation.    COMPARISON: CT head of 5/5/2019.    PROCEDURE:   Noncontrast CT of the Head was performed from the skull base to the   vertex. Coronal and Sagittal reformats were obtained.    FINDINGS:    Motion degraded examination.    There is no acute intracranial hemorrhage, mass effect, midline shift,   extra-axial collection, hydrocephalus, or evidence of acute vascular   territorial infarction.    Moderate patchy hypodensities within the periventricular and subcortical   white matter, although nonspecific, likely reflect chronic microvascular   disease. Hypodensities within the bilateral basal gangliaand right   thalamus, consistent with chronic lacunar infarcts. Cerebral white loss   results in prominence of the ventricles and sulci.    The visualized paranasal sinuses and mastoid air cells are clear.   Intraorbital contents are unremarkable. Nocalvarial fracture.    IMPRESSION:     Motion limited examination. No acute intracranial hemorrhage or calvarial   fracture within the limitations of the exam.    If clinical suspicion persists, consider short-term repeat noncontrast CT   evaluation.                ANDRY DELAROSA M.D., ATTENDING RADIOLOGIST  This document has been electronically signed. May  7 2019  6:25PM    < end of copied text >           Kolton John [facility]   Cardiology [facility] -- Rogelio Robb, Flor, Sandi, Rick Holloway Savella  Office # [phone]      Follow Up:    syncope  Subjective/Observations:   No events overnight resting comfortably in bed.  No complaints of chest pain, dyspnea, or palpitations reported. No signs of orthopnea or PND.     REVIEW OF SYSTEMS: All other review of systems is negative unless indicated above    PAST MEDICAL & SURGICAL HISTORY:  Syncope  Parkinson disease  Constipation  Dementia  Urinary retention  Cerebrovascular accident  Hypertension  Atrial fibrillation  No significant past surgical history      MEDICATIONS  (STANDING):  carbidopa/levodopa  25/100 2 Tablet(s) Oral <User Schedule>  dextrose 5% + sodium chloride 0.45% with potassium chloride 20 mEq/L 1000 milliLiter(s) (75 mL/Hr) IV Continuous <Continuous>  docusate sodium 100 milliGRAM(s) Oral two times a day  entacapone 100 milliGRAM(s) Oral <User Schedule>  metoprolol tartrate 25 milliGRAM(s) Oral two times a day  pantoprazole  Injectable 40 milliGRAM(s) IV Push every 12 hours  piperacillin/tazobactam IVPB. 3.375 Gram(s) IV Intermittent every 8 hours  senna 2 Tablet(s) Oral at bedtime  sertraline 50 milliGRAM(s) Oral daily  tamsulosin 0.4 milliGRAM(s) Oral at bedtime    MEDICATIONS  (PRN):  acetaminophen  Suppository .. 650 milliGRAM(s) Rectal every 6 hours PRN Temp greater or equal to 38C (100.4F)      Allergies    No Known Allergies    Intolerances        Vital Signs Last 24 Hrs  T(C): 36.6 (09 May 2019 08:37), Max: 37.1 (08 May 2019 16:01)  T(F): 97.8 (09 May 2019 08:37), Max: 98.7 (08 May 2019 16:01)  HR: 98 (09 May 2019 08:37) (82 - 100)  BP: 128/86 (09 May 2019 08:37) (100/68 - 134/86)  BP(mean): --  RR: 16 (09 May 2019 08:37) (16 - 18)  SpO2: 99% (09 May 2019 08:37) (97% - 99%)    I&O's Summary    08 May 2019 07:01  -  09 May 2019 07:00  --------------------------------------------------------  IN: 0 mL / OUT: 1 mL / NET: -1 mL          PHYSICAL EXAM:  TELE: Afib   Constitutional: NAD, awake and alert, well-developed  HEENT: Moist Mucous Membranes, Anicteric  Pulmonary: Non-labored, breath sounds are clear bilaterally, No wheezing, crackles or rhonchi  Cardiovascular: Irregular, S1 and S2 nl, + murmur No rubs, gallops or clicks   Gastrointestinal: Bowel Sounds present, soft, nontender.   Lymph: No lymphadenopathy. No peripheral edema.  Skin: No visible rashes or ulcers.  Psych:  Mood & affect appropriate    LABS: All Labs Reviewed:                        9.3    5.72  )-----------( 211      ( 09 May 2019 06:59 )             27.7                         8.5    6.18  )-----------( 191      ( 08 May 2019 18:53 )             24.9                         7.6    6.33  )-----------( 188      ( 08 May 2019 08:38 )             22.3     09 May 2019 06:59    146    |  114    |  12     ----------------------------<  97     3.5     |  26     |  0.72   08 May 2019 04:57    145    |  114    |  16     ----------------------------<  95     3.8     |  25     |  0.90   07 May 2019 17:49    144    |  112    |  18     ----------------------------<  110    3.9     |  26     |  0.96     Ca    9.3        09 May 2019 06:59  Ca    9.5        08 May 2019 04:57  Ca    9.8        07 May 2019 17:49  Mg     2.1       09 May 2019 06:59    TPro  6.1    /  Alb  2.5    /  TBili  1.3    /  DBili  x      /  AST  62     /  ALT  25     /  AlkPhos  51     09 May 2019 06:59  TPro  7.3    /  Alb  3.0    /  TBili  0.9    /  DBili  x      /  AST  92     /  ALT  10     /  AlkPhos  61     08 May 2019 04:57  TPro  7.3    /  Alb  3.0    /  TBili  0.9    /  DBili  x      /  AST  93     /  ALT  9      /  AlkPhos  59     07 May 2019 17:49    PT/INR - ( 09 May 2019 06:59 )   PT: 30.2 sec;   INR: 2.59 ratio         PTT - ( 09 May 2019 06:59 )  PTT:40.6 sec  CARDIAC MARKERS ( 08 May 2019 04:57 )  .027 ng/mL / x     / x     / x     / x      CARDIAC MARKERS ( 08 May 2019 00:05 )  .018 ng/mL / x     / x     / x     / x      CARDIAC MARKERS ( 07 May 2019 17:49 )  .016 ng/mL / x     / x     / x     / x             ECG:  < from: 12 Lead ECG (05.07.19 @ 16:58) >  Ventricular Rate 98 BPM    Atrial Rate 227 BPM    QRS Duration 84 ms    Q-T Interval 354 ms    QTC Calculation(Bezet) 451 ms    R Axis -16 degrees    T Axis 10 degrees    Diagnosis Line Atrial fibrillation with premature ventricular or aberrantly conducted complexes  Poor data quality  Nonspecific T wave abnormality  Abnormal ECG  When compared with ECG of 05-MAY-2019 17:16, (Unconfirmed)  No significant change was found  Confirmed by Kolton Ray MD ([id number]) on 5/8/2019 11:02:22 AM    < end of copied text >    Echo:  < from: TTE Echo Doppler w/o Cont (05.08.19 @ 09:47) >   EXAM:  ECHO TTE WO CON COMP W DOPPLR         PROCEDURE DATE:  05/08/2019        INTERPRETATION:  INDICATION: Syncope    Blood Pressure 99/68    Height 176 cm     Weight 72 kg       BSA        Dimensions:    LA 4.4       Normal Values: 2.0 - 4.0 cm   Ao 3.3        Normal Values: 2.0 - 3.8 cm  SEPTUM 0.9       Normal Values: 0.6 - 1.2 cm  PWT 0.9       Normal Values: 0.6 - 1.1 cm  LVIDd 4.9         Normal Values: 3.0 - 5.6 cm  LVIDs 2.4         Normal Values: 1.8 - 4.0 cm      OBSERVATIONS:    Mitral Valve: Thickened leaflets, mild MR.  Aortic Valve/Aorta: Calcified trileaflet aortic valve with decreased   opening. Mild aortic stenosis, peak aortic valve gradient is 15.2 mmHg   with a mean gradient of 8.2 mmHg. Aortic valve area calculated by   continuity equation is 1.23 sq cm  Tricuspid Valve: normal with trace TR.  Pulmonic Valve: normal  Left Atrium: Dilated  Right Atrium: normal  Left Ventricle: normal LV size and systolic function, estimated LVEF of   65%.  Right Ventricle: normalsize and systolic function.  Pericardium/Pleura: normal, no significant pericardial effusion.      Conclusion:     Normal left ventricular internal dimensions and systolic function,   estimated LVEF of 65%.    Calcified trileaflet aortic valve with decreased opening. Mild aortic   stenosis, peak aortic valve gradient is 15.2 mmHg with a mean gradient of   8.2 mmHg. Aortic valve area calculated by continuity equation is 1.23 sq   cm  Mitral valve with thickened leaflets, mild mitral regurgitation  Normal RV size and systolic function.   Trace physiologic TR.      No significant pericardial effusion.                  SKYLA MATAMOROS   This document has been electronically signed. May  9 2019  8:45AM          < end of copied text >    Radiology:  < from: US Duplex Carotid Arteries Complete, Bilateral (05.08.19 @ 09:20) >  EXAM:  US DPLX CAROTIDS COMPL BI                            PROCEDURE DATE:  05/08/2019          INTERPRETATION:  History: Syncope.    Bilateral extracranial carotid Doppler. Prior dated 2/16/2017.    Small amount of partially calcified plaque in the distal left CCA. No   significant subjective luminal narrowing bilaterally. Peak systolic flow   velocities in bilateral CCA, ICA and ECA fall within normal range. Normal   bilateral ICA/CCA velocity ratios. Antegrade flow each vertebral artery.    Impression: No flow-limiting stenosis.                COY BARRIOS M.D., ATTENDING RADIOLOGIST  This document has been electronically signed. May  8 2019  9:58AM        < end of copied text >  < from: CT Head No Cont (05.07.19 @ 18:19) >  EXAM:  CT BRAIN                            PROCEDURE DATE:  05/07/2019          INTERPRETATION:  CLINICAL INFORMATION: Syncope. On anticoagulation.    COMPARISON: CT head of 5/5/2019.    PROCEDURE:   Noncontrast CT of the Head was performed from the skull base to the   vertex. Coronal and Sagittal reformats were obtained.    FINDINGS:    Motion degraded examination.    There is no acute intracranial hemorrhage, mass effect, midline shift,   extra-axial collection, hydrocephalus, or evidence of acute vascular   territorial infarction.    Moderate patchy hypodensities within the periventricular and subcortical   white matter, although nonspecific, likely reflect chronic microvascular   disease. Hypodensities within the bilateral basal gangliaand right   thalamus, consistent with chronic lacunar infarcts. Cerebral white loss   results in prominence of the ventricles and sulci.    The visualized paranasal sinuses and mastoid air cells are clear.   Intraorbital contents are unremarkable. Nocalvarial fracture.    IMPRESSION:     Motion limited examination. No acute intracranial hemorrhage or calvarial   fracture within the limitations of the exam.    If clinical suspicion persists, consider short-term repeat noncontrast CT   evaluation.                ANDRY DELAROSA M.D., ATTENDING RADIOLOGIST  This document has been electronically signed. May  7 2019  6:25PM    < end of copied text >           Kolton John ANP   Cardiology

## 2019-05-09 NOTE — PHYSICAL THERAPY INITIAL EVALUATION ADULT - DID THE PATIENT HAVE SURGERY?
n/a/Chest X-ray: (-) active acute pathology; CT Head: (-) acute hemorrhagic or infarct, (-)fx, chronic lacunar infarcts & cerebral white matter loss INR 2.59, Chest X-ray: (-) active acute pathology; CT Head: (-) acute hemorrhagic or infarct, (-)fx, chronic lacunar infarcts & cerebral white matter loss/n/a

## 2019-05-09 NOTE — PHYSICAL THERAPY INITIAL EVALUATION ADULT - TRANSFER SAFETY CONCERNS NOTED: SIT/STAND, REHAB EVAL
decreased balance during turns/decreased proprioception/decreased weight-shifting ability/decreased safety awareness/decreased sequencing ability/losing balance/decreased step length

## 2019-05-09 NOTE — PHYSICAL THERAPY INITIAL EVALUATION ADULT - GENERAL OBSERVATIONS, REHAB EVAL
Pt received semi-bruno in tele bed, (+)Heplock IV, wife present. Pt is very lethargic/sleepy but arousalable after repeated stimulus. NAD. Pt is known to this PT from last admission.

## 2019-05-09 NOTE — PROGRESS NOTE ADULT - SUBJECTIVE AND OBJECTIVE BOX
KENNETHJOSÉ MIGUEL is a 78yMale , patient examined and chart reviewed.    INTERVAL HPI/ OVERNIGHT EVENTS:   Afebrile. More awake alert.    PAST MEDICAL & SURGICAL HISTORY:  Syncope  Parkinson disease  Constipation  Dementia  Urinary retention  Cerebrovascular accident  Hypertension  Atrial fibrillation  No significant past surgical history    For details regarding the patient's social history, family history, and other miscellaneous elements, please refer the initial infectious diseases consultation and/or the admitting history and physical examination for this admission.    ROS:  Unable to obtain due to : confusion    Current inpatient medications :    ANTIBIOTICS/RELEVANT:  piperacillin/tazobactam IVPB. 3.375 Gram(s) IV Intermittent every 8 hours    acetaminophen  Suppository .. 650 milliGRAM(s) Rectal every 6 hours PRN  carbidopa/levodopa  25/100 2 Tablet(s) Oral <User Schedule>  dextrose 5% + sodium chloride 0.45% with potassium chloride 20 mEq/L 1000 milliLiter(s) IV Continuous <Continuous>  docusate sodium 100 milliGRAM(s) Oral two times a day  entacapone 100 milliGRAM(s) Oral <User Schedule>  metoprolol tartrate 25 milliGRAM(s) Oral two times a day  senna 2 Tablet(s) Oral at bedtime  sertraline 50 milliGRAM(s) Oral daily  tamsulosin 0.4 milliGRAM(s) Oral at bedtime  warfarin 1 milliGRAM(s) Oral once      Objective:     @ 07:01  -   @ 07:00  --------------------------------------------------------  IN: 0 mL / OUT: 1 mL / NET: -1 mL      T(C): 36.8 (19 @ 16:27), Max: 36.8 (19 @ 16:27)  HR: 82 (19 @ 16:27) (82 - 148)  BP: 94/65 (19 @ 16:27) (94/65 - 134/86)  RR: 17 (19 @ 16:27) (16 - 18)  SpO2: 98% (19 @ 16:27) (97% - 99%)    Physical Exam:  General:  no acute distress  Eyes: sclera anicteric, pupils equal and reactive to light  ENMT: buccal mucosa moist, pharynx not injected  Neck: supple, trachea midline  Lungs: clear, no wheeze/rhonchi  Cardiovascular: regular rate and rhythm, S1 S2  Abdomen: soft, nontender, no organomegaly present, bowel sounds normal  Neurological: alert and oriented x0, Cranial Nerves II-XII grossly intact  Skin: no increased ecchymosis/petechiae/purpura  Lymph Nodes: no palpable cervical/supraclavicular lymph nodes enlargements  Extremities: no cyanosis/clubbing/edema      LABS:                   9.3    5.72  )-----------( 211      ( 09 May 2019 06:59 )             27.7       05-    146<H>  |  114<H>  |  12  ----------------------------<  97  3.5   |  26  |  0.72    Ca    9.3      09 May 2019 06:59  Mg     2.1     -    TPro  6.1  /  Alb  2.5<L>  /  TBili  1.3<H>  /  DBili  x   /  AST  62<H>  /  ALT  25  /  AlkPhos  51  05-      PT/INR - ( 09 May 2019 06:59 )   PT: 30.2 sec;   INR: 2.59 ratio         PTT - ( 09 May 2019 06:59 )  PTT:40.6 sec  Urinalysis Basic - ( 08 May 2019 07:53 )    Color: Charis / Appearance: Slightly Turbid / S.020 / pH: x  Gluc: x / Ketone: Small  / Bili: Negative / Urobili: Negative   Blood: x / Protein: 25 mg/dL / Nitrite: Negative   Leuk Esterase: Small / RBC: 11-25 /HPF / WBC 11-25   Sq Epi: x / Non Sq Epi: Few / Bacteria: Moderate    MICROBIOLOGY:    Culture - Blood (collected 08 May 2019 12:38)  Source: .Blood Blood-Peripheral  Preliminary Report (09 May 2019 13:01):    No growth to date.    Culture - Blood (collected 08 May 2019 12:38)  Source: .Blood Blood-Peripheral  Preliminary Report (09 May 2019 13:01):    No growth to date.    Culture - Urine (collected 08 May 2019 11:36)  Source: .Urine Clean Catch (Midstream)  Final Report (09 May 2019 14:53):    No growth      RADIOLOGY & ADDITIONAL STUDIES:  EXAM:  XR CHEST AP OR PA 1V                          PROCEDURE DATE:  2019      INTERPRETATION:  History: Chest pain    Semiupright portable chest x-ray is compared to 2019.    The study is slightly limited by rotation. The heart isnot enlarged. The   lungs are clear. There is osteopenia and degenerative change of the bony   structures.    Impression: No active pulmonary disease. No change.    Assessment :  78y M PMH Afib (on coumadin), CVA (, ), Parkinson's disease, dementia, HTN, urinary retention, and multiple TIAs recent admission for hematuria just discharged 19 now readmitted sec to recurrent syncopal episodes at home. Sp fever Tmax 101    UTI ruled out  Cultures thus far ngtd    Plan :   Change to Zosyn  Trend temps and wbc  Fu final cultures  Asp precautions  If cultures remains neg will dc antibiotics    D/w Dr Schrader    Continue with present regiment.  Appropriate use of antibiotics and adverse effects reviewed.      I have discussed the above plan of care with patient/ family in detail. They expressed understanding of the  treatment plan . Risks, benefits and alternatives discussed in detail. I have asked if they have any questions or concerns and appropriately addressed them to the best of my ability .    > 35 minutes were spent in direct patient care reviewing notes, medications ,labs data/ imaging , discussion with multidisciplinary team.    Thank you for allowing me to participate in care of your patient .    Sanjeev Cole MD  Infectious Disease  [phone]

## 2019-05-09 NOTE — PHYSICAL THERAPY INITIAL EVALUATION ADULT - PLANNED THERAPY INTERVENTIONS, PT EVAL
balance training/strengthening/gait training/bed mobility training strengthening/gait training/transfer training/balance training/bed mobility training

## 2019-05-09 NOTE — PHYSICAL THERAPY INITIAL EVALUATION ADULT - BED MOBILITY TRAINING, PT EVAL
(3 - 5 sessions) Pt will be able to transfer supine to sit with min A. (3 -5 sessions) Pt will be able to transfer supine to sit with min A.

## 2019-05-09 NOTE — PHYSICAL THERAPY INITIAL EVALUATION ADULT - PASSIVE RANGE OF MOTION EXAMINATION, REHAB EVAL
Limitations in active knee extension; about 1/2 through range. Unable to assess further secondary to limitations with following commands. and /c hypertonicity, passively WFL and thorough functional mobility assessment/bilateral lower extremity Passive ROM was WFL (within functional limits)/bilateral upper extremity Passive ROM was WFL (within functional limits)/deficits as listed below

## 2019-05-09 NOTE — PHYSICAL THERAPY INITIAL EVALUATION ADULT - PHYSICAL ASSIST/NONPHYSICAL ASSIST: SUPINE/SIT, REHAB EVAL
1 person assist nonverbal cues (demo/gestures)/verbal cues/set-up required/1 person + 1 person to manage equipment/Tactile, verbal and manual cues needed

## 2019-05-09 NOTE — PHYSICAL THERAPY INITIAL EVALUATION ADULT - ADDITIONAL COMMENTS
History obtained from wife. Pt's wife states she is able to get him sitting edge of bed. She assists him with sit to stands using rolling walker. She helps dress him and bathes him, and transfers to the commode (pt stands while pts wife places commode behind him). Pt had physical therapy services in the home and would walk about 40 feet with the PT only. Otherwise pt mostly in the wheel chair. History obtained from wife. Pt's wife states she is able to get him sitting edge of bed. She assists him with sit to stands using rolling walker. She helps dress him and bathes him, and transfers to the commode (pt stands while pts wife places commode behind him). Pt had physical therapy services in the home and would walk about 40 feet with the PT only. Otherwise pt mostly in the wheel chair. Pt does have an aide in past. Pt owns rolling walker, commode, wheelchair, shower chair?

## 2019-05-09 NOTE — PHYSICAL THERAPY INITIAL EVALUATION ADULT - PERTINENT HX OF CURRENT PROBLEM, REHAB EVAL
As per H&P:" As per H&P:"78y M PMH Afib (on coumadin), CVA (2012, 2016), Parkinson's disease, dementia, HTN, urinary retention, and multiple TIAs presents s/p syncope. Patient is a poor historian due to dementia. Per wife, patient has been having multiple syncopal episodes over past 3 days. No precipitating events or triggers. Episodes are not positional or associated with movement or activity. Today, patient was at home when he had an episode. Wife then tried to wake him up and noticed he was unresponsive."

## 2019-05-09 NOTE — PHYSICAL THERAPY INITIAL EVALUATION ADULT - LEVEL OF INDEPENDENCE: SIT/STAND, REHAB EVAL
Pt not following commands, not appropriate to stand at this time./unable to perform maximum assist (25% patients effort)

## 2019-05-09 NOTE — PROGRESS NOTE ADULT - ASSESSMENT
78y M PMH Afib (on coumadin), CVA (2012, 2016), Parkinson's disease, dementia, HTN, urinary retention, and multiple TIAs presents with syncope likely vasovagal due to anemia with possible GI bleed vs dehydration vs orthostatics due urinary medications and supratherapeutic INR.    Syncope  - The etiology of his syncope could be from blood loss  - No clear evidence of acute ischemia, trops negative x 2.  - No acute changes on EKG compared to previous  - Consider GI consult R/O GI bleed  - Check orthostatics, if positive, consider d/c Flomax or Finasteride  -  TTE shows 11/2017 with borderline LVH, EF 65%, mild AS/ MR, normal pulmonary pressures.     Anemia  -S/P PRBC   -Resolved   - Transfuse prn maintain H/H > 7.     Chronic Atrial Fibrillation  - INR 2.59 today   - Dose coumadin to maintain INR of 2-3   - Afib rate controlled  - No signs of ischemia.   - Continue low dose Metoprolol with hold parameters    HLD  - Continue zetia     DVT ppx  - PAS    - Further cardiac workup will depend on clinical course.   - will continue to follow    Kolton RODRIGUEZ  Cardiology

## 2019-05-09 NOTE — DISCHARGE NOTE NURSING/CASE MANAGEMENT/SOCIAL WORK - NSDCDPATPORTLINK_GEN_ALL_CORE
You can access the [facility] Patient Portal, offered by [facility], by registering with the following website: [email or web address]

## 2019-05-09 NOTE — PHYSICAL THERAPY INITIAL EVALUATION ADULT - IMPAIRMENTS FOUND, PT EVAL
cognitive impairment/gross motor/muscle strength/ROM/poor safety awareness/arousal, attention, and cognition/posture/gait, locomotion, and balance decreased midline orientation/muscle strength/ROM/gross motor/gait, locomotion, and balance/poor safety awareness/cognitive impairment/aerobic capacity/endurance/posture/tone/arousal, attention, and cognition

## 2019-05-09 NOTE — PHYSICAL THERAPY INITIAL EVALUATION ADULT - IMPAIRED TRANSFERS: SIT/STAND, REHAB EVAL
impaired motor control/narrow base of support/decreased flexibility/abnormal muscle tone/decreased strength/impaired balance/cognition/impaired coordination/impaired postural control

## 2019-05-09 NOTE — PROGRESS NOTE ADULT - SUBJECTIVE AND OBJECTIVE BOX
CHIEF COMPLAINT/INTERVAL HISTORY: Pt seen and examined at bedside with wife present in room. Pt awake, however unresponsive to verbal command.     REVIEW OF SYSTEMS:    Vital Signs Last 24 Hrs  T(C): 36.4 (09 May 2019 11:44), Max: 37.1 (08 May 2019 16:01)  T(F): 97.5 (09 May 2019 11:44), Max: 98.7 (08 May 2019 16:01)  HR: 100 (09 May 2019 11:44) (82 - 148)  BP: 103/70 (09 May 2019 11:44) (100/68 - 134/86)  BP(mean): --  RR: 18 (09 May 2019 11:44) (16 - 18)  SpO2: 99% (09 May 2019 11:44) (97% - 99%)    PHYSICAL EXAM:  GENERAL: male in NAD  HEENT: EOMI, conjunctiva and sclera clear  Chest: CTA bilaterally, no wheezing  CV: S1S2, RRR,   GI: soft, +BS, NT/ND  Musculoskeletal: no edema  Psychiatric: affect nL, mood nL  Skin: warm and dry    LABS:                        9.3    5.72  )-----------( 211      ( 09 May 2019 06:59 )             27.7     05-09    146<H>  |  114<H>  |  12  ----------------------------<  97  3.5   |  26  |  0.72    Ca    9.3      09 May 2019 06:59  Mg     2.1     05-09    TPro  6.1  /  Alb  2.5<L>  /  TBili  1.3<H>  /  DBili  x   /  AST  62<H>  /  ALT  25  /  AlkPhos  51  05-09    PT/INR - ( 09 May 2019 06:59 )   PT: 30.2 sec;   INR: 2.59 ratio         PTT - ( 09 May 2019 06:59 )  PTT:40.6 sec  Urinalysis Basic - ( 08 May 2019 07:53 )    Color: Charis / Appearance: Slightly Turbid / S.020 / pH: x  Gluc: x / Ketone: Small  / Bili: Negative / Urobili: Negative   Blood: x / Protein: 25 mg/dL / Nitrite: Negative   Leuk Esterase: Small / RBC: 11-25 /HPF / WBC 11-25   Sq Epi: x / Non Sq Epi: Few / Bacteria: Moderate CHIEF COMPLAINT/INTERVAL HISTORY: Pt seen and examined at bedside with wife present in room. Pt awake, however unresponsive to verbal command.     REVIEW OF SYSTEMS: Unable to attain due to pt status.    Vital Signs Last 24 Hrs  T(C): 36.4 (09 May 2019 11:44), Max: 37.1 (08 May 2019 16:01)  T(F): 97.5 (09 May 2019 11:44), Max: 98.7 (08 May 2019 16:01)  HR: 100 (09 May 2019 11:44) (82 - 148)  BP: 103/70 (09 May 2019 11:44) (100/68 - 134/86)  BP(mean): --  RR: 18 (09 May 2019 11:44) (16 - 18)  SpO2: 99% (09 May 2019 11:44) (97% - 99%)    PHYSICAL EXAM:  GENERAL: male in NAD  HEENT: EOMI, conjunctiva and sclera clear  Chest: CTA bilaterally, no wheezing  CV: S1S2, RRR,   GI: soft, +BS, NT/ND  Musculoskeletal: no edema  Skin: warm and dry    LABS:                        9.3    5.72  )-----------( 211      ( 09 May 2019 06:59 )             27.7     05-09    146<H>  |  114<H>  |  12  ----------------------------<  97  3.5   |  26  |  0.72    Ca    9.3      09 May 2019 06:59  Mg     2.1     05-09    TPro  6.1  /  Alb  2.5<L>  /  TBili  1.3<H>  /  DBili  x   /  AST  62<H>  /  ALT  25  /  AlkPhos  51  05-09    PT/INR - ( 09 May 2019 06:59 )   PT: 30.2 sec;   INR: 2.59 ratio         PTT - ( 09 May 2019 06:59 )  PTT:40.6 sec  Urinalysis Basic - ( 08 May 2019 07:53 )    Color: Charis / Appearance: Slightly Turbid / S.020 / pH: x  Gluc: x / Ketone: Small  / Bili: Negative / Urobili: Negative   Blood: x / Protein: 25 mg/dL / Nitrite: Negative   Leuk Esterase: Small / RBC: 11-25 /HPF / WBC 11-25   Sq Epi: x / Non Sq Epi: Few / Bacteria: Moderate CHIEF COMPLAINT/INTERVAL HISTORY: Pt seen and examined at bedside with wife present in room. Pt awake, however unresponsive to verbal command.     REVIEW OF SYSTEMS: Unable to attain due to pt status.    Vital Signs Last 24 Hrs  T(C): 36.4 (09 May 2019 11:44), Max: 37.1 (08 May 2019 16:01)  T(F): 97.5 (09 May 2019 11:44), Max: 98.7 (08 May 2019 16:01)  HR: 100 (09 May 2019 11:44) (82 - 148)  BP: 103/70 (09 May 2019 11:44) (100/68 - 134/86)  BP(mean): --  RR: 18 (09 May 2019 11:44) (16 - 18)  SpO2: 99% (09 May 2019 11:44) (97% - 99%)    PHYSICAL EXAM:  GENERAL: male in NAD  HEENT: EOMI, conjunctiva and sclera clear  Chest: Diminished BS bilaterally, no wheezing  CV: S1S2, RRR,   GI: soft, +BS, NT/ND  Musculoskeletal: no edema  Skin: warm and dry    LABS:                        9.3    5.72  )-----------( 211      ( 09 May 2019 06:59 )             27.7     05-09    146<H>  |  114<H>  |  12  ----------------------------<  97  3.5   |  26  |  0.72    Ca    9.3      09 May 2019 06:59  Mg     2.1     05-09    TPro  6.1  /  Alb  2.5<L>  /  TBili  1.3<H>  /  DBili  x   /  AST  62<H>  /  ALT  25  /  AlkPhos  51  05-09    PT/INR - ( 09 May 2019 06:59 )   PT: 30.2 sec;   INR: 2.59 ratio         PTT - ( 09 May 2019 06:59 )  PTT:40.6 sec  Urinalysis Basic - ( 08 May 2019 07:53 )    Color: Charis / Appearance: Slightly Turbid / S.020 / pH: x  Gluc: x / Ketone: Small  / Bili: Negative / Urobili: Negative   Blood: x / Protein: 25 mg/dL / Nitrite: Negative   Leuk Esterase: Small / RBC: 11-25 /HPF / WBC 11-25   Sq Epi: x / Non Sq Epi: Few / Bacteria: Moderate

## 2019-05-09 NOTE — PHYSICAL THERAPY INITIAL EVALUATION ADULT - BALANCE TRAINING, PT EVAL
(3 - 5 sessions) Pt will improve sitting dynamic balance to Fair minus (will be able to sit edge of bed with CG). (4 -5 sessions) Pt will improve sitting dynamic balance to Fair minus (will be able to sit edge of bed with CG) and standing balance /c rolling walker to fair.

## 2019-05-09 NOTE — PROGRESS NOTE ADULT - PROBLEM SELECTOR PLAN 1
likely 2/2 anemia from ?GIB vs orthostatic hypotension in setting of decreased PO intake. FOBT neg.  Hgb improved to 8.5 s/p 1 unit overnight. Hgb stable this morning at 9.3.  iron studies showing ACD.   Cardio Dr. Ray consulted, awaiting recs  Neuro consulted Dr. Nance, awaiting recs  f/u TTE  c/w Tele monitoring  UA showing small LE, 11-25 WBCs, proCal 0.06  orthostatics neg, trops WNL, US carotid unremarkable  Fall precautions, PT eval

## 2019-05-09 NOTE — PHYSICAL THERAPY INITIAL EVALUATION ADULT - SITTING BALANCE: DYNAMIC
poor minus/Pt req mod A with sitting edge of bed for knee extension AROM. Denied dizziness with sitting. Pt req mod A with sitting edge of bed for knee extension AROM. Denied dizziness with sitting./poor plus

## 2019-05-09 NOTE — PHYSICAL THERAPY INITIAL EVALUATION ADULT - DISCHARGE DISPOSITION, PT EVAL
home w/ assist/home w/ home PT/rehabilitation facility/subacute rehab vs Home with Home PT pending progress and ability to follow commands subacute rehab vs Home with Home PT pending progress and ability to follow commands (wife does not want DEMETRIO)/home w/ assist/rehabilitation facility/home w/ home PT

## 2019-05-09 NOTE — PHYSICAL THERAPY INITIAL EVALUATION ADULT - FOLLOWS COMMANDS/ANSWERS QUESTIONS, REHAB EVAL
able to follow commands 10% of the time. Pt's wife states he was given medication yesterday and he still seems very sleepy from it; does not appear to be at his normal orientation./unable to answer questions able to follow commands ~20% of the time. Pt's still seems very sleepy most of day; does not appear to be at his normal orientation./unable to answer questions

## 2019-05-09 NOTE — PHYSICAL THERAPY INITIAL EVALUATION ADULT - AMBULATION SKILLS, REHAB EVAL
needs device and assist sapphirey wheelchair bound but can amb /c rolling walker short distance /c PT/needs device and assist

## 2019-05-10 DIAGNOSIS — D64.9 ANEMIA, UNSPECIFIED: ICD-10-CM

## 2019-05-10 LAB
ALBUMIN SERPL ELPH-MCNC: 2.2 G/DL — LOW (ref 3.3–5)
ALP SERPL-CCNC: 42 U/L — SIGNIFICANT CHANGE UP (ref 40–120)
ALT FLD-CCNC: 14 U/L — SIGNIFICANT CHANGE UP (ref 12–78)
ANION GAP SERPL CALC-SCNC: 8 MMOL/L — SIGNIFICANT CHANGE UP (ref 5–17)
APTT BLD: 34.3 SEC — SIGNIFICANT CHANGE UP (ref 28.5–37)
APTT BLD: 36.8 SEC — SIGNIFICANT CHANGE UP (ref 28.5–37)
AST SERPL-CCNC: 39 U/L — HIGH (ref 15–37)
BASOPHILS # BLD AUTO: 0.02 K/UL — SIGNIFICANT CHANGE UP (ref 0–0.2)
BASOPHILS # BLD AUTO: 0.02 K/UL — SIGNIFICANT CHANGE UP (ref 0–0.2)
BASOPHILS # BLD AUTO: 0.03 K/UL — SIGNIFICANT CHANGE UP (ref 0–0.2)
BASOPHILS NFR BLD AUTO: 0.3 % — SIGNIFICANT CHANGE UP (ref 0–2)
BASOPHILS NFR BLD AUTO: 0.4 % — SIGNIFICANT CHANGE UP (ref 0–2)
BASOPHILS NFR BLD AUTO: 0.5 % — SIGNIFICANT CHANGE UP (ref 0–2)
BILIRUB SERPL-MCNC: 0.9 MG/DL — SIGNIFICANT CHANGE UP (ref 0.2–1.2)
BUN SERPL-MCNC: 11 MG/DL — SIGNIFICANT CHANGE UP (ref 7–23)
CALCIUM SERPL-MCNC: 8.5 MG/DL — SIGNIFICANT CHANGE UP (ref 8.5–10.1)
CHLORIDE SERPL-SCNC: 113 MMOL/L — HIGH (ref 96–108)
CO2 SERPL-SCNC: 24 MMOL/L — SIGNIFICANT CHANGE UP (ref 22–31)
CREAT SERPL-MCNC: 0.68 MG/DL — SIGNIFICANT CHANGE UP (ref 0.5–1.3)
EOSINOPHIL # BLD AUTO: 0.14 K/UL — SIGNIFICANT CHANGE UP (ref 0–0.5)
EOSINOPHIL # BLD AUTO: 0.19 K/UL — SIGNIFICANT CHANGE UP (ref 0–0.5)
EOSINOPHIL # BLD AUTO: 0.23 K/UL — SIGNIFICANT CHANGE UP (ref 0–0.5)
EOSINOPHIL NFR BLD AUTO: 2.5 % — SIGNIFICANT CHANGE UP (ref 0–6)
EOSINOPHIL NFR BLD AUTO: 3.7 % — SIGNIFICANT CHANGE UP (ref 0–6)
EOSINOPHIL NFR BLD AUTO: 3.9 % — SIGNIFICANT CHANGE UP (ref 0–6)
GLUCOSE SERPL-MCNC: 99 MG/DL — SIGNIFICANT CHANGE UP (ref 70–99)
HCT VFR BLD CALC: 24.4 % — LOW (ref 39–50)
HCT VFR BLD CALC: 25.9 % — LOW (ref 39–50)
HCT VFR BLD CALC: 28.2 % — LOW (ref 39–50)
HGB BLD-MCNC: 8.5 G/DL — LOW (ref 13–17)
HGB BLD-MCNC: 8.8 G/DL — LOW (ref 13–17)
HGB BLD-MCNC: 9.5 G/DL — LOW (ref 13–17)
IMM GRANULOCYTES NFR BLD AUTO: 0.5 % — SIGNIFICANT CHANGE UP (ref 0–1.5)
IMM GRANULOCYTES NFR BLD AUTO: 0.6 % — SIGNIFICANT CHANGE UP (ref 0–1.5)
IMM GRANULOCYTES NFR BLD AUTO: 0.7 % — SIGNIFICANT CHANGE UP (ref 0–1.5)
INR BLD: 1.92 RATIO — HIGH (ref 0.88–1.16)
INR BLD: 2.26 RATIO — HIGH (ref 0.88–1.16)
LYMPHOCYTES # BLD AUTO: 1.11 K/UL — SIGNIFICANT CHANGE UP (ref 1–3.3)
LYMPHOCYTES # BLD AUTO: 1.14 K/UL — SIGNIFICANT CHANGE UP (ref 1–3.3)
LYMPHOCYTES # BLD AUTO: 1.51 K/UL — SIGNIFICANT CHANGE UP (ref 1–3.3)
LYMPHOCYTES # BLD AUTO: 20 % — SIGNIFICANT CHANGE UP (ref 13–44)
LYMPHOCYTES # BLD AUTO: 21.3 % — SIGNIFICANT CHANGE UP (ref 13–44)
LYMPHOCYTES # BLD AUTO: 25.5 % — SIGNIFICANT CHANGE UP (ref 13–44)
MCHC RBC-ENTMCNC: 32.4 PG — SIGNIFICANT CHANGE UP (ref 27–34)
MCHC RBC-ENTMCNC: 32.6 PG — SIGNIFICANT CHANGE UP (ref 27–34)
MCHC RBC-ENTMCNC: 32.9 PG — SIGNIFICANT CHANGE UP (ref 27–34)
MCHC RBC-ENTMCNC: 33.7 GM/DL — SIGNIFICANT CHANGE UP (ref 32–36)
MCHC RBC-ENTMCNC: 34 GM/DL — SIGNIFICANT CHANGE UP (ref 32–36)
MCHC RBC-ENTMCNC: 34.8 GM/DL — SIGNIFICANT CHANGE UP (ref 32–36)
MCV RBC AUTO: 94.6 FL — SIGNIFICANT CHANGE UP (ref 80–100)
MCV RBC AUTO: 95.2 FL — SIGNIFICANT CHANGE UP (ref 80–100)
MCV RBC AUTO: 96.9 FL — SIGNIFICANT CHANGE UP (ref 80–100)
MONOCYTES # BLD AUTO: 0.39 K/UL — SIGNIFICANT CHANGE UP (ref 0–0.9)
MONOCYTES # BLD AUTO: 0.51 K/UL — SIGNIFICANT CHANGE UP (ref 0–0.9)
MONOCYTES # BLD AUTO: 0.55 K/UL — SIGNIFICANT CHANGE UP (ref 0–0.9)
MONOCYTES NFR BLD AUTO: 7.5 % — SIGNIFICANT CHANGE UP (ref 2–14)
MONOCYTES NFR BLD AUTO: 8.6 % — SIGNIFICANT CHANGE UP (ref 2–14)
MONOCYTES NFR BLD AUTO: 9.6 % — SIGNIFICANT CHANGE UP (ref 2–14)
NEUTROPHILS # BLD AUTO: 3.46 K/UL — SIGNIFICANT CHANGE UP (ref 1.8–7.4)
NEUTROPHILS # BLD AUTO: 3.61 K/UL — SIGNIFICANT CHANGE UP (ref 1.8–7.4)
NEUTROPHILS # BLD AUTO: 3.8 K/UL — SIGNIFICANT CHANGE UP (ref 1.8–7.4)
NEUTROPHILS NFR BLD AUTO: 61.2 % — SIGNIFICANT CHANGE UP (ref 43–77)
NEUTROPHILS NFR BLD AUTO: 66.5 % — SIGNIFICANT CHANGE UP (ref 43–77)
NEUTROPHILS NFR BLD AUTO: 66.7 % — SIGNIFICANT CHANGE UP (ref 43–77)
NRBC # BLD: 0 /100 WBCS — SIGNIFICANT CHANGE UP (ref 0–0)
PLATELET # BLD AUTO: 195 K/UL — SIGNIFICANT CHANGE UP (ref 150–400)
PLATELET # BLD AUTO: 205 K/UL — SIGNIFICANT CHANGE UP (ref 150–400)
PLATELET # BLD AUTO: 241 K/UL — SIGNIFICANT CHANGE UP (ref 150–400)
POTASSIUM SERPL-MCNC: 3 MMOL/L — LOW (ref 3.5–5.3)
POTASSIUM SERPL-SCNC: 3 MMOL/L — LOW (ref 3.5–5.3)
PROT SERPL-MCNC: 5.4 G/DL — LOW (ref 6–8.3)
PROTHROM AB SERPL-ACNC: 22.3 SEC — HIGH (ref 10–12.9)
PROTHROM AB SERPL-ACNC: 26.4 SEC — HIGH (ref 10–12.9)
RBC # BLD: 2.58 M/UL — LOW (ref 4.2–5.8)
RBC # BLD: 2.72 M/UL — LOW (ref 4.2–5.8)
RBC # BLD: 2.91 M/UL — LOW (ref 4.2–5.8)
RBC # FLD: 15.4 % — HIGH (ref 10.3–14.5)
RBC # FLD: 15.7 % — HIGH (ref 10.3–14.5)
RBC # FLD: 15.7 % — HIGH (ref 10.3–14.5)
SODIUM SERPL-SCNC: 145 MMOL/L — SIGNIFICANT CHANGE UP (ref 135–145)
WBC # BLD: 5.2 K/UL — SIGNIFICANT CHANGE UP (ref 3.8–10.5)
WBC # BLD: 5.7 K/UL — SIGNIFICANT CHANGE UP (ref 3.8–10.5)
WBC # BLD: 5.91 K/UL — SIGNIFICANT CHANGE UP (ref 3.8–10.5)
WBC # FLD AUTO: 5.2 K/UL — SIGNIFICANT CHANGE UP (ref 3.8–10.5)
WBC # FLD AUTO: 5.7 K/UL — SIGNIFICANT CHANGE UP (ref 3.8–10.5)
WBC # FLD AUTO: 5.91 K/UL — SIGNIFICANT CHANGE UP (ref 3.8–10.5)

## 2019-05-10 PROCEDURE — 74176 CT ABD & PELVIS W/O CONTRAST: CPT | Mod: 26

## 2019-05-10 PROCEDURE — 99233 SBSQ HOSP IP/OBS HIGH 50: CPT | Mod: GC

## 2019-05-10 PROCEDURE — 99232 SBSQ HOSP IP/OBS MODERATE 35: CPT

## 2019-05-10 PROCEDURE — 92526 ORAL FUNCTION THERAPY: CPT | Mod: GN

## 2019-05-10 RX ORDER — SUCRALFATE 1 G
1 TABLET ORAL
Refills: 0 | Status: DISCONTINUED | OUTPATIENT
Start: 2019-05-10 | End: 2019-05-14

## 2019-05-10 RX ORDER — PANTOPRAZOLE SODIUM 20 MG/1
40 TABLET, DELAYED RELEASE ORAL
Refills: 0 | Status: DISCONTINUED | OUTPATIENT
Start: 2019-05-10 | End: 2019-05-14

## 2019-05-10 RX ORDER — POTASSIUM CHLORIDE 20 MEQ
40 PACKET (EA) ORAL EVERY 4 HOURS
Refills: 0 | Status: COMPLETED | OUTPATIENT
Start: 2019-05-10 | End: 2019-05-10

## 2019-05-10 RX ORDER — POTASSIUM CHLORIDE 20 MEQ
40 PACKET (EA) ORAL ONCE
Refills: 0 | Status: DISCONTINUED | OUTPATIENT
Start: 2019-05-10 | End: 2019-05-10

## 2019-05-10 RX ORDER — WARFARIN SODIUM 2.5 MG/1
1 TABLET ORAL ONCE
Refills: 0 | Status: DISCONTINUED | OUTPATIENT
Start: 2019-05-10 | End: 2019-05-10

## 2019-05-10 RX ADMIN — ENTACAPONE 100 MILLIGRAM(S): 200 TABLET, FILM COATED ORAL at 12:30

## 2019-05-10 RX ADMIN — SENNA PLUS 2 TABLET(S): 8.6 TABLET ORAL at 22:36

## 2019-05-10 RX ADMIN — Medication 1 GRAM(S): at 17:27

## 2019-05-10 RX ADMIN — CARBIDOPA AND LEVODOPA 2 TABLET(S): 25; 100 TABLET ORAL at 12:30

## 2019-05-10 RX ADMIN — PANTOPRAZOLE SODIUM 40 MILLIGRAM(S): 20 TABLET, DELAYED RELEASE ORAL at 06:41

## 2019-05-10 RX ADMIN — CARBIDOPA AND LEVODOPA 2 TABLET(S): 25; 100 TABLET ORAL at 17:15

## 2019-05-10 RX ADMIN — ENTACAPONE 100 MILLIGRAM(S): 200 TABLET, FILM COATED ORAL at 09:22

## 2019-05-10 RX ADMIN — Medication 40 MILLIEQUIVALENT(S): at 10:37

## 2019-05-10 RX ADMIN — TAMSULOSIN HYDROCHLORIDE 0.4 MILLIGRAM(S): 0.4 CAPSULE ORAL at 22:36

## 2019-05-10 RX ADMIN — ENTACAPONE 100 MILLIGRAM(S): 200 TABLET, FILM COATED ORAL at 17:15

## 2019-05-10 RX ADMIN — Medication 25 MILLIGRAM(S): at 17:15

## 2019-05-10 RX ADMIN — Medication 40 MILLIEQUIVALENT(S): at 17:12

## 2019-05-10 RX ADMIN — CARBIDOPA AND LEVODOPA 2 TABLET(S): 25; 100 TABLET ORAL at 09:23

## 2019-05-10 RX ADMIN — Medication 100 MILLIGRAM(S): at 06:41

## 2019-05-10 RX ADMIN — Medication 25 MILLIGRAM(S): at 06:41

## 2019-05-10 RX ADMIN — PIPERACILLIN AND TAZOBACTAM 25 GRAM(S): 4; .5 INJECTION, POWDER, LYOPHILIZED, FOR SOLUTION INTRAVENOUS at 04:29

## 2019-05-10 RX ADMIN — CARBIDOPA AND LEVODOPA 2 TABLET(S): 25; 100 TABLET ORAL at 10:37

## 2019-05-10 RX ADMIN — SERTRALINE 50 MILLIGRAM(S): 25 TABLET, FILM COATED ORAL at 12:30

## 2019-05-10 RX ADMIN — Medication 100 MILLIGRAM(S): at 17:15

## 2019-05-10 NOTE — PROGRESS NOTE ADULT - PROBLEM SELECTOR PLAN 6
On Tamsulosin and Finasteride at home  May contribute to syncope  Hold Finasteride for now

## 2019-05-10 NOTE — PROGRESS NOTE ADULT - PROBLEM SELECTOR PLAN 3
Patient found to be hypothermic while in ED, now noted to be Febrile to 101.4 overnight  Has hx of UTI recently treated with abx  f/u UA, blood and urine cultures  c/w daily Rocephin.  proCal noted at 0.06  ID (Dr. Cole) consulted, awaiting recs  cortisol studies unremarkable, unlikely adrenal crisis
resolved  Has hx of UTI recently treated with abx  proCal noted at 0.06  ID (Dr. Cole) consulted, recs DC ABx as blood, urine cultures NGTD  cortisol studies unremarkable, unlikely adrenal crisis
resolved  Has hx of UTI recently treated with abx  f/u UA, blood and urine cultures  proCal noted at 0.06  ID (Dr. Cole) consulted, recs change ABX to zosyn. BS noted to have 32cc postvoid residual.   cortisol studies unremarkable, unlikely adrenal crisis

## 2019-05-10 NOTE — PROGRESS NOTE ADULT - SUBJECTIVE AND OBJECTIVE BOX
KENNETH JOSÉ MIGUEL is a 78yMale , patient examined and chart reviewed.    INTERVAL HPI/ OVERNIGHT EVENTS:   Afebrile. More awake alert.  CT AP showed Right iliopsoas intramuscular hematoma and right trace free   retroperitoneal hemorrhage.    PAST MEDICAL & SURGICAL HISTORY:  Syncope  Parkinson disease  Constipation  Dementia  Urinary retention  Cerebrovascular accident  Hypertension  Atrial fibrillation  No significant past surgical history    For details regarding the patient's social history, family history, and other miscellaneous elements, please refer the initial infectious diseases consultation and/or the admitting history and physical examination for this admission.    ROS:  Unable to obtain due to : confusion    Current inpatient medications :  MEDICATIONS  (STANDING):  carbidopa/levodopa  25/100 2 Tablet(s) Oral <User Schedule>  docusate sodium 100 milliGRAM(s) Oral two times a day  entacapone 100 milliGRAM(s) Oral <User Schedule>  metoprolol tartrate 25 milliGRAM(s) Oral two times a day  pantoprazole   Suspension 40 milliGRAM(s) Oral before breakfast  potassium chloride   Powder 40 milliEquivalent(s) Oral every 4 hours  senna 2 Tablet(s) Oral at bedtime  sertraline 50 milliGRAM(s) Oral daily  sucralfate suspension 1 Gram(s) Oral two times a day  tamsulosin 0.4 milliGRAM(s) Oral at bedtime    MEDICATIONS  (PRN):  acetaminophen  Suppository .. 650 milliGRAM(s) Rectal every 6 hours PRN Temp greater or equal to 38C (100.4F)      Objective:  Vital Signs Last 24 Hrs  T(C): 36.9 (10 May 2019 16:40), Max: 37.3 (09 May 2019 19:18)  T(F): 98.4 (10 May 2019 16:40), Max: 99.1 (09 May 2019 19:18)  HR: 94 (10 May 2019 16:40) (89 - 101)  BP: 113/77 (10 May 2019 16:40) (93/61 - 113/77)  RR: 17 (10 May 2019 16:40) (16 - 18)  SpO2: 96% (10 May 2019 16:40) (96% - 98%)    Physical Exam:  General:  no acute distress  Eyes: sclera anicteric, pupils equal and reactive to light  ENMT: buccal mucosa moist, pharynx not injected  Neck: supple, trachea midline  Lungs: clear, no wheeze/rhonchi  Cardiovascular: regular rate and rhythm, S1 S2  Abdomen: soft, nontender, no organomegaly present, bowel sounds normal  Neurological: alert and oriented x0, Cranial Nerves II-XII grossly intact  Skin: no increased ecchymosis/petechiae/purpura  Lymph Nodes: no palpable cervical/supraclavicular lymph nodes enlargements  Extremities: no cyanosis/clubbing/edema    LABS:                               8.8    5.70  )-----------( 205      ( 10 May 2019 14:48 )             25.9   05-10    145  |  113<H>  |  11  ----------------------------<  99  3.0<L>   |  24  |  0.68    Ca    8.5      10 May 2019 06:15  Mg     2.1     05-09    TPro  5.4<L>  /  Alb  2.2<L>  /  TBili  0.9  /  DBili  x   /  AST  39<H>  /  ALT  14  /  AlkPhos  42  05-10      MICROBIOLOGY:    Culture - Blood (collected 08 May 2019 12:38)  Source: .Blood Blood-Peripheral  Preliminary Report (09 May 2019 13:01):    No growth to date.    Culture - Blood (collected 08 May 2019 12:38)  Source: .Blood Blood-Peripheral  Preliminary Report (09 May 2019 13:01):    No growth to date.    Culture - Urine (collected 08 May 2019 11:36)  Source: .Urine Clean Catch (Midstream)  Final Report (09 May 2019 14:53):    No growth      RADIOLOGY & ADDITIONAL STUDIES:    EXAM:  CT ABDOMEN AND PELVIS                            PROCEDURE DATE:  05/10/2019          INTERPRETATION:  CLINICAL INFORMATION: Anemia, rule out retroperitoneal   bleed.    COMPARISON: 5/12/2014    PROCEDURE:   CT of the Abdomen and Pelvis was performed without intravenous contrast.   Intravenous contrast: None.  Oral contrast: None.  Sagittal and coronal reformats were performed.    FINDINGS:    LOWER CHEST: Small bilateral pleural effusions. Bibasilar atelectasis,   left greater than right. Coronary and aortic atherosclerosis. Mild   gynecomastia.    LIVER: Hepatic cysts and additional hypodensities too small to   characterize. Scattered coarse calcifications.  BILE DUCTS: Normal caliber.  GALLBLADDER: Cholelithiasis.  SPLEEN: Within normal limits.  PANCREAS: Within normal limits.  ADRENALS: Within normal limits.  KIDNEYS/URETERS: Multifocal areas of scarring within the left kidney with   dystrophic calcifications. No hydronephrosis or obstructing stones.    BLADDER: Within normallimits.  REPRODUCTIVE ORGANS: Mildly enlarged prostate.    BOWEL: No bowel obstruction. Appendix within normal limits.  PERITONEUM: No ascites.  VESSELS:  Atherosclerotic calcifications of the aorta and branch vessels.   Infrarenal IVC filter.  LYMPHNODES: No lymphadenopathy.    ABDOMINAL WALL: Small fat-containing umbilical hernia. Small   fat-containing right inguinal hernia.  BONES: No suspicious osseous lesion. Diffuse osteopenia. Degenerative   changes within the spine and pelvis.  OTHER:  Marked asymmetric enlargement of the right iliacus muscle with   internal hyperdensity compatible with hematoma. Moderate asymmetric   enlargement of the right psoas muscle with heterogeneous attenuation also   suggestive of hematoma. Trace free hemorrhagic fluid and stranding within   the right retroperitoneal space.    IMPRESSION: Right iliopsoas intramuscular hematoma and right trace free   retroperitoneal hemorrhage.    EXAM:  XR CHEST AP OR PA 1V                          PROCEDURE DATE:  05/07/2019      INTERPRETATION:  History: Chest pain    Semiupright portable chest x-ray is compared to 5/5/2019.    The study is slightly limited by rotation. The heart isnot enlarged. The   lungs are clear. There is osteopenia and degenerative change of the bony   structures.    Impression: No active pulmonary disease. No change.    Assessment :  78y M PMH Afib (on coumadin), CVA (2012, 2016), Parkinson's disease, dementia, HTN, urinary retention, and multiple TIAs recent admission for hematuria just discharged 5/2/19 now readmitted sec to recurrent syncopal episodes at home. Sp fever Tmax 101 Likely reactive to right iliopsoas intramuscular hematoma and right trace free   retroperitoneal hemorrhage.  UTI ruled out  Cultures thus far ngtd      Plan :   Off Zosyn  Monitor off antibiotics  Trend temps and wbc  Trend H/H   Asp precautions    D/w Dr Schrader    Continue with present regiment.  Appropriate use of antibiotics and adverse effects reviewed.      I have discussed the above plan of care with patient/ family in detail. They expressed understanding of the  treatment plan . Risks, benefits and alternatives discussed in detail. I have asked if they have any questions or concerns and appropriately addressed them to the best of my ability .    > 35 minutes were spent in direct patient care reviewing notes, medications ,labs data/ imaging , discussion with multidisciplinary team.    Thank you for allowing me to participate in care of your patient .    Sanjeev Cole MD  Infectious Disease  [phone]

## 2019-05-10 NOTE — PROGRESS NOTE ADULT - SUBJECTIVE AND OBJECTIVE BOX
Neurology Follow up note    JOSÉ MIGUEL COOPER78yMale    HPI:  78y M PMH Afib (on coumadin), CVA (2012, 2016), Parkinson's disease, dementia, HTN, urinary retention, and multiple TIAs presents s/p syncope. Patient is a poor historian due to dementia. Per wife, patient has been having multiple syncopal episodes over past 3 days. No precipitating events or triggers. Episodes are not positional or associated with movement or activity. Today, patient was at home when he had an episode. Wife then tried to wake him up and noticed he was unresponsive. Patient was back to baseline once awake with no residual symptoms. Patient is not ambulatory and typically sits in a wheel-chair unless participating in PT. PO intake has not changed compared to baseline but wife notes it is difficulty to hydrate given he is on a nectar thickened diet ROS not obtainable secondary to mental status but wife denies recent fevers, chills, vomiting, diarrhea.      In the ED: Vitals T(F): 96.6F HR: 106 BP: 122/72 RR: 18 SpO2: 96% RA. HGB 9.4, HCT 27.9, INR 6.11, Cl 112, Glucose 110, Albumin 3.0, AST 93, ALT 9, CT Head: No acute intracranial hemorrhage or calvarial fracture within the limitations of the exam. CXR: No active pulmonary disease. No change. Seen by Cardio: TTE ordered, check orthostatics, continue to trend troponin (07 May 2019 20:27)      Interval History - more responsive today,    Patient is seen, chart was reviewed and case was discussed with the treatment team.  Pt is not in any distress.   Lying on bed comfortably.   No events reported overnight.   No clinical seizure was reported.  Sitting on chair bed comfortably.    is at bedside.    Vital Signs Last 24 Hrs  T(C): 36.7 (10 May 2019 12:42), Max: 37.3 (09 May 2019 19:18)  T(F): 98 (10 May 2019 12:42), Max: 99.1 (09 May 2019 19:18)  HR: 101 (10 May 2019 12:42) (82 - 101)  BP: 93/61 (10 May 2019 12:42) (93/61 - 110/72)  BP(mean): --  RR: 16 (10 May 2019 12:42) (16 - 18)  SpO2: 98% (10 May 2019 12:42) (96% - 98%))        REVIEW OF SYSTEMS:    limited;in any distress    On Neurological Examination:    Mental Status - Pt is alert, awake,  following simple commands,      Speech - dysarthria.    Cranial Nerves - Pupils 3 mm equal and reactive to light, extraocular eye movements intact.   Pt has no facial asymmetry. Facial sensation is intact.Tongue - is in midline.    Muscle tone - cogwheel rigidity    Motor Exam - 4/5 of UE  LE 3-4/5.    Sensory Exam -. Pt withdraws all extremities equally on stimulation. No asymmetry seen.        Deep tendon Reflexes - 2 plus all over.      Neck Supple -  Yes.     MEDICATIONS    acetaminophen  Suppository .. 650 milliGRAM(s) Rectal every 6 hours PRN  carbidopa/levodopa  25/100 2 Tablet(s) Oral <User Schedule>  dextrose 5% + sodium chloride 0.45% with potassium chloride 20 mEq/L 1000 milliLiter(s) IV Continuous <Continuous>  docusate sodium 100 milliGRAM(s) Oral two times a day  entacapone 100 milliGRAM(s) Oral <User Schedule>  metoprolol tartrate 25 milliGRAM(s) Oral two times a day  piperacillin/tazobactam IVPB. 3.375 Gram(s) IV Intermittent every 8 hours  senna 2 Tablet(s) Oral at bedtime  sertraline 50 milliGRAM(s) Oral daily  tamsulosin 0.4 milliGRAM(s) Oral at bedtime  warfarin 1 milliGRAM(s) Oral once      Allergies    No Known Allergies    Intolerances                              8.5    5.20  )-----------( 195      ( 10 May 2019 06:15 )             24.4         Urinalysis Basic - ( 08 May 2019 07:53 )    Color: Charis / Appearance: Slightly Turbid / S.020 / pH: x  Gluc: x / Ketone: Small  / Bili: Negative / Urobili: Negative   Blood: x / Protein: 25 mg/dL / Nitrite: Negative   Leuk Esterase: Small / RBC: 11-25 /HPF / WBC 11-25   Sq Epi: x / Non Sq Epi: Few / Bacteria: Moderate      05-09    146<H>  |  114<H>  |  12  ----------------------------<  97  3.5   |  26  |  0.72    Ca    9.3      09 May 2019 06:59  Mg     2.1         TPro  6.1  /  Alb  2.5<L>  /  TBili  1.3<H>  /  DBili  x   /  AST  62<H>  /  ALT  25  /  AlkPhos  51      Hemoglobin A1C:     Vitamin B12     RADIOLOGY    ASSESSMENT AND PLAN:      SYNCOPE.  HO OF PD.  HX OF CVA.  RECURRENT UTI.  ANEMIA.    ANEMIA WORK UP,  CONTINUE SINEMET.  Physical therapy evaluation.  OOB to chair/ambulation with assistance only.  Advanced care planning was discussed with family.  Pain is accessed and addressed.  Plan of care was discussed with family. Questions answered.  Would continue to follow.

## 2019-05-10 NOTE — PROGRESS NOTE ADULT - PROBLEM SELECTOR PLAN 4
Patient has hx of hematuria with clots in urine 1 week ago  No current bleeding  Daily CBCs  FOBT neg
Patient has hx of hematuria with clots in urine 1 week ago  No current bleeding  Repeat H/H as current levels are low compared to baseline  FOBT neg
Patient has hx of hematuria with clots in urine 1 week ago  No current bleeding  Daily CBCs  FOBT neg

## 2019-05-10 NOTE — CONSULT NOTE ADULT - ASSESSMENT
78y M PMH Afib (on coumadin), CVA (2012, 2016), Parkinson's disease, dementia, HTN, urinary retention, and multiple TIAs presents s/p syncope. gi consulted for anemia

## 2019-05-10 NOTE — PROGRESS NOTE ADULT - ASSESSMENT
78y M PMH Afib (on coumadin), CVA (2012, 2016), Parkinson's disease, dementia, HTN, urinary retention, and multiple TIAs presents s/p syncope, admitted for syncopal workup.

## 2019-05-10 NOTE — PROGRESS NOTE ADULT - ASSESSMENT
78y M PMH Afib (on coumadin), CVA (2012, 2016), Parkinson's disease, dementia, HTN, urinary retention, and multiple TIAs presents with syncope likely vasovagal due to anemia with possible GI bleed vs dehydration vs orthostatics due urinary medications and supratherapeutic INR.    Syncope  - The etiology of his syncope could be from blood loss  - No clear evidence of acute ischemia, trops negative x 2.  - No acute changes on EKG compared to previous  - Consider GI consult R/O GI bleed  - Check orthostatics, if positive, consider d/c Flomax or Finasteride  -  TTE shows 11/2017 with borderline LVH, EF 65%, mild AS/ MR, normal pulmonary pressures.     Anemia  -S/P PRBC   -Resolved   - Transfuse prn maintain H/H > 7.     Chronic Atrial Fibrillation  - Dose coumadin to maintain INR of 2-3   - Afib rate controlled  - No signs of ischemia.   - Continue low dose Metoprolol with hold parameters    HLD  - Continue zetia     DVT ppx  - PAS    - Further cardiac workup will depend on clinical course.   - will continue to follow    Kolton RODRIGUEZ  Cardiology 78y M PMH Afib (on coumadin), CVA (2012, 2016), Parkinson's disease, dementia, HTN, urinary retention, and multiple TIAs presents with syncope likely vasovagal due to anemia with possible GI bleed vs dehydration vs orthostatics due urinary medications and supratherapeutic INR.    Syncope  - The etiology of his syncope could be from blood loss  - No clear evidence of acute ischemia, trops negative x 2.  - No acute changes on EKG compared to previous  - Check orthostatics, if positive, consider d/c Flomax or Finasteride  - TTE shows 11/2017 with borderline LVH, EF 65%, mild AS/ MR, normal pulmonary pressures.     Anemia  -S/P PRBC   -Resolved   -Transfuse prn maintain H/H > 7.     Chronic Atrial Fibrillation  - Dose coumadin to maintain INR of 2-3   - Afib rate controlled  - No signs of ischemia.   - Continue low dose Metoprolol with hold parameters    HLD  - Continue zetia     DVT ppx  - PAS    - Further cardiac workup will depend on clinical course.   - will continue to follow    Kolton RODRIGUEZ  Cardiology

## 2019-05-10 NOTE — PROGRESS NOTE ADULT - PROBLEM SELECTOR PLAN 1
likely 2/2 anemia from ?GIB vs orthostatic hypotension in setting of decreased PO intake.   Hgb 8.5, FOBT neg. GI Dr. Hearn consulted, awaiting recs.    iron studies showing ACD.   Cardio Dr. Ray consulted, recs obtain orthostatics.  Neuro consulted Dr. Nance, recs c/w Sinemet.  f/u TTE  c/w Tele monitoring  trops WNL, US carotid unremarkable  Fall precautions, PT eval TBD  K 3.0, replete PRN likely 2/2 anemia from ?GIB vs orthostatic hypotension in setting of decreased PO intake.   Hgb 8.5, FOBT neg. GI Dr. Hearn consulted, awaiting recs.    iron studies showing ACD.   Cardio Dr. Ray consulted, recs obtain orthostatics.  Neuro consulted Dr. Nance, recs c/w Sinemet.  TTE showing EF ~65%, mild TR. LVF WNL  c/w Tele monitoring  trops WNL, US carotid unremarkable  Fall precautions, PT eval TBD  K 3.0, replete PRN

## 2019-05-10 NOTE — PROGRESS NOTE ADULT - SUBJECTIVE AND OBJECTIVE BOX
[facility] -- Rogelio Robb, Flor, Sandi, Rick Holloway Savella  Office # [phone]      Follow Up:    syncope  Subjective/Observations:   No events overnight resting comfortably in bed.  No complaints of chest pain, dyspnea, or palpitations reported. No signs of orthopnea or PND.     REVIEW OF SYSTEMS: All other review of systems is negative unless indicated above    PAST MEDICAL & SURGICAL HISTORY:  Syncope  Parkinson disease  Constipation  Dementia  Urinary retention  Cerebrovascular accident  Hypertension  Atrial fibrillation  No significant past surgical history      MEDICATIONS  (STANDING):  carbidopa/levodopa  25/100 2 Tablet(s) Oral <User Schedule>  docusate sodium 100 milliGRAM(s) Oral two times a day  entacapone 100 milliGRAM(s) Oral <User Schedule>  metoprolol tartrate 25 milliGRAM(s) Oral two times a day  pantoprazole   Suspension 40 milliGRAM(s) Oral before breakfast  potassium chloride   Powder 40 milliEquivalent(s) Oral every 4 hours  senna 2 Tablet(s) Oral at bedtime  sertraline 50 milliGRAM(s) Oral daily  tamsulosin 0.4 milliGRAM(s) Oral at bedtime  warfarin 1 milliGRAM(s) Oral once    MEDICATIONS  (PRN):  acetaminophen  Suppository .. 650 milliGRAM(s) Rectal every 6 hours PRN Temp greater or equal to 38C (100.4F)      Allergies    No Known Allergies    Intolerances        Vital Signs Last 24 Hrs  T(C): 36.9 (10 May 2019 08:00), Max: 37.3 (09 May 2019 19:18)  T(F): 98.5 (10 May 2019 08:00), Max: 99.1 (09 May 2019 19:18)  HR: 89 (10 May 2019 08:00) (82 - 148)  BP: 103/71 (10 May 2019 08:00) (94/62 - 126/75)  BP(mean): --  RR: 16 (10 May 2019 08:00) (16 - 18)  SpO2: 97% (10 May 2019 08:00) (96% - 99%)    I&O's Summary        PHYSICAL EXAM:  TELE: Afib   Constitutional: NAD, awake and alert, well-developed  HEENT: Moist Mucous Membranes, Anicteric  Pulmonary: Non-labored, breath sounds with L crackles at bases , No wheezing, or rhonchi   Cardiovascular: Irregular, S1 and S2 nl, No murmurs, rubs, gallops or clicks  Gastrointestinal: Bowel Sounds present, soft, nontender.   Lymph: No lymphadenopathy. No peripheral edema.  Skin: No visible rashes or ulcers.  Psych:  Mood & affect appropriate    LABS: All Labs Reviewed:                        8.5    5.20  )-----------( 195      ( 10 May 2019 06:15 )             24.4                         9.3    5.72  )-----------( 211      ( 09 May 2019 06:59 )             27.7                         8.5    6.18  )-----------( 191      ( 08 May 2019 18:53 )             24.9     10 May 2019 06:15    145    |  113    |  11     ----------------------------<  99     3.0     |  24     |  0.68   09 May 2019 06:59    146    |  114    |  12     ----------------------------<  97     3.5     |  26     |  0.72   08 May 2019 04:57    145    |  114    |  16     ----------------------------<  95     3.8     |  25     |  0.90     Ca    8.5        10 May 2019 06:15  Ca    9.3        09 May 2019 06:59  Ca    9.5        08 May 2019 04:57  Mg     2.1       09 May 2019 06:59    TPro  5.4    /  Alb  2.2    /  TBili  0.9    /  DBili  x      /  AST  39     /  ALT  14     /  AlkPhos  42     10 May 2019 06:15  TPro  6.1    /  Alb  2.5    /  TBili  1.3    /  DBili  x      /  AST  62     /  ALT  25     /  AlkPhos  51     09 May 2019 06:59  TPro  7.3    /  Alb  3.0    /  TBili  0.9    /  DBili  x      /  AST  92     /  ALT  10     /  AlkPhos  61     08 May 2019 04:57    PT/INR - ( 10 May 2019 06:15 )   PT: 26.4 sec;   INR: 2.26 ratio         PTT - ( 10 May 2019 06:15 )  PTT:36.8 sec    ECG:  < from: 12 Lead ECG (05.07.19 @ 16:58) >  Ventricular Rate 98 BPM    Atrial Rate 227 BPM    QRS Duration 84 ms    Q-T Interval 354 ms    QTC Calculation(Bezet) 451 ms    R Axis -16 degrees    T Axis 10 degrees    Diagnosis Line Atrial fibrillation with premature ventricular or aberrantly conducted complexes  Poor data quality  Nonspecific T wave abnormality  Abnormal ECG  When compared with ECG of 05-MAY-2019 17:16, (Unconfirmed)  No significant change was found  Confirmed by Kolton Ray MD ([id number]) on 5/8/2019 11:02:22 AM    < end of copied text >    Echo:  < from: TTE Echo Doppler w/o Cont (05.08.19 @ 09:47) >   EXAM:  ECHO TTE WO CON COMP W DOPPLR         PROCEDURE DATE:  05/08/2019        INTERPRETATION:  INDICATION: Syncope    Blood Pressure 99/68    Height 176 cm     Weight 72 kg       BSA        Dimensions:    LA 4.4       Normal Values: 2.0 - 4.0 cm   Ao 3.3        Normal Values: 2.0 - 3.8 cm  SEPTUM 0.9       Normal Values: 0.6 - 1.2 cm  PWT 0.9       Normal Values: 0.6 - 1.1 cm  LVIDd 4.9         Normal Values: 3.0 - 5.6 cm  LVIDs 2.4         Normal Values: 1.8 - 4.0 cm      OBSERVATIONS:    Mitral Valve: Thickened leaflets, mild MR.  Aortic Valve/Aorta: Calcified trileaflet aortic valve with decreased   opening. Mild aortic stenosis, peak aortic valve gradient is 15.2 mmHg   with a mean gradient of 8.2 mmHg. Aortic valve area calculated by   continuity equation is 1.23 sq cm  Tricuspid Valve: normal with trace TR.  Pulmonic Valve: normal  Left Atrium: Dilated  Right Atrium: normal  Left Ventricle: normal LV size and systolic function, estimated LVEF of   65%.  Right Ventricle: normalsize and systolic function.  Pericardium/Pleura: normal, no significant pericardial effusion.      Conclusion:     Normal left ventricular internal dimensions and systolic function,   estimated LVEF of 65%.    Calcified trileaflet aortic valve with decreased opening. Mild aortic   stenosis, peak aortic valve gradient is 15.2 mmHg with a mean gradient of   8.2 mmHg. Aortic valve area calculated by continuity equation is 1.23 sq   cm  Mitral valve with thickened leaflets, mild mitral regurgitation  Normal RV size and systolic function.   Trace physiologic TR.      No significant pericardial effusion.                  SKYLA GOODGER   This document has been electronically signed. May  9 2019  8:45AM          < end of copied text >    Radiology:  < from: US Duplex Carotid Arteries Complete, Bilateral (05.08.19 @ 09:20) >  EXAM:  US DPLX CAROTIDS COMPL BI                            PROCEDURE DATE:  05/08/2019          INTERPRETATION:  History: Syncope.    Bilateral extracranial carotid Doppler. Prior dated 2/16/2017.    Small amount of partially calcified plaque in the distal left CCA. No   significant subjective luminal narrowing bilaterally. Peak systolic flow   velocities in bilateral CCA, ICA and ECA fall within normal range. Normal   bilateral ICA/CCA velocity ratios. Antegrade flow each vertebral artery.    Impression: No flow-limiting stenosis.                COY BARRIOS M.D., ATTENDING RADIOLOGIST  This document has been electronically signed. May  8 2019  9:58AM        < end of copied text >  < from: CT Head No Cont (05.07.19 @ 18:19) >  EXAM:  CT BRAIN                            PROCEDURE DATE:  05/07/2019          INTERPRETATION:  CLINICAL INFORMATION: Syncope. On anticoagulation.    COMPARISON: CT head of 5/5/2019.    PROCEDURE:   Noncontrast CT of the Head was performed from the skull base to the   vertex. Coronal and Sagittal reformats were obtained.    FINDINGS:    Motion degraded examination.    There is no acute intracranial hemorrhage, mass effect, midline shift,   extra-axial collection, hydrocephalus, or evidence of acute vascular   territorial infarction.    Moderate patchy hypodensities within the periventricular and subcortical   white matter, although nonspecific, likely reflect chronic microvascular   disease. Hypodensities within the bilateral basal gangliaand right   thalamus, consistent with chronic lacunar infarcts. Cerebral white loss   results in prominence of the ventricles and sulci.    The visualized paranasal sinuses and mastoid air cells are clear.   Intraorbital contents are unremarkable. Nocalvarial fracture.    IMPRESSION:     Motion limited examination. No acute intracranial hemorrhage or calvarial   fracture within the limitations of the exam.    If clinical suspicion persists, consider short-term repeat noncontrast CT   evaluation.                ANDRY DELAROSA M.D., ATTENDING RADIOLOGIST  This document has been electronically signed. May  7 2019  6:25PM    < end of copied text >         Kolton John [facility]   Cardiology

## 2019-05-10 NOTE — CONSULT NOTE ADULT - SUBJECTIVE AND OBJECTIVE BOX
Chief Complaint:  Patient is a 78y old  Male who presents with a chief complaint of syncope (10 May 2019 11:01)    Syncope  Parkinson disease  Constipation  Dementia  Urinary retention  Cerebrovascular accident  Hypertension  Atrial fibrillation  No significant past surgical history     HPI:  78y M PMH Afib (on coumadin), CVA (2012, 2016), Parkinson's disease, dementia, HTN, urinary retention, and multiple TIAs presents s/p syncope. Patient is a poor historian due to dementia. Per wife, patient has been having multiple syncopal episodes over past 3 days. No precipitating events or triggers. Episodes are not positional or associated with movement or activity. Today, patient was at home when he had an episode. Wife then tried to wake him up and noticed he was unresponsive. Patient was back to baseline once awake with no residual symptoms. Patient is not ambulatory and typically sits in a wheel-chair unless participating in PT. PO intake has not changed compared to baseline but wife notes it is difficulty to hydrate given he is on a nectar thickened diet ROS not obtainable secondary to mental status but wife denies recent fevers, chills, vomiting, diarrhea.      In the ED: Vitals T(F): 96.6F HR: 106 BP: 122/72 RR: 18 SpO2: 96% RA. HGB 9.4, HCT 27.9, INR 6.11, Cl 112, Glucose 110, Albumin 3.0, AST 93, ALT 9, CT Head: No acute intracranial hemorrhage or calvarial fracture within the limitations of the exam. CXR: No active pulmonary disease. No change. Seen by Cardio: TTE ordered, check orthostatics, continue to trend troponin (07 May 2019 20:27)    gi consulted for anemia. chart reviewed. pt seen and examined.      recent vs/labs/imaging reviewed  afebrile soft bps  ob neg  hgb stable sp transfusion; although below prior baseline per [facility]  plts wnl  inr now w/in normal range for ac, initially supratherapeutic  iron studies suggestive of aocd  bld cxs ngtd  no abd imaging- prior ct renal stone hunt noted    No Known Allergies      acetaminophen  Suppository .. 650 milliGRAM(s) Rectal every 6 hours PRN  carbidopa/levodopa  25/100 2 Tablet(s) Oral <User Schedule>  docusate sodium 100 milliGRAM(s) Oral two times a day  entacapone 100 milliGRAM(s) Oral <User Schedule>  metoprolol tartrate 25 milliGRAM(s) Oral two times a day  pantoprazole   Suspension 40 milliGRAM(s) Oral before breakfast  potassium chloride   Powder 40 milliEquivalent(s) Oral every 4 hours  senna 2 Tablet(s) Oral at bedtime  sertraline 50 milliGRAM(s) Oral daily  tamsulosin 0.4 milliGRAM(s) Oral at bedtime  warfarin 1 milliGRAM(s) Oral once        FAMILY HISTORY:  No pertinent family history in first degree relatives        Review of Systems:    see above unable to obtain in entirety    Relevant Family History:   n/c    Relevant Social History: n/c      Physical Exam:    Vital Signs:  Vital Signs Last 24 Hrs  T(C): 36.9 (10 May 2019 08:00), Max: 37.3 (09 May 2019 19:18)  T(F): 98.5 (10 May 2019 08:00), Max: 99.1 (09 May 2019 19:18)  HR: 89 (10 May 2019 08:00) (82 - 94)  BP: 103/71 (10 May 2019 08:00) (94/62 - 110/72)  BP(mean): --  RR: 16 (10 May 2019 08:00) (16 - 18)  SpO2: 97% (10 May 2019 08:00) (96% - 98%)  Daily     Daily     General:  Appears stated age, well-groomed, nad  HEENT:  NC/AT,  conjunctivae clear and pink, no thyromegaly, nodules, adenopathy, no JVD  Chest:  Full & symmetric excursion, no increased effort, breath sounds clear  Cardiovascular:  Regular rhythm, S1, S2, no murmur/rub/S3/S4, no abdominal bruit, no edema  Abdomen:  Soft, non-tender, non-distended, normoactive bowel sounds,  no masses ,no hepatosplenomeagaly, no signs of chronic liver disease  Extremities:  no cyanosis,clubbing or edema  Skin:  No rash/erythema/ecchymoses/petechiae/wounds/abscess/warm/dry  Neuro/Psych:  A&O  , no asterixis, no tremor, no encephalopathy    Laboratory:                            8.5    5.20  )-----------( 195      ( 10 May 2019 06:15 )             24.4     05-10    145  |  113<H>  |  11  ----------------------------<  99  3.0<L>   |  24  |  0.68    Ca    8.5      10 May 2019 06:15  Mg     2.1     05-09    TPro  5.4<L>  /  Alb  2.2<L>  /  TBili  0.9  /  DBili  x   /  AST  39<H>  /  ALT  14  /  AlkPhos  42  05-10    LIVER FUNCTIONS - ( 10 May 2019 06:15 )  Alb: 2.2 g/dL / Pro: 5.4 g/dL / ALK PHOS: 42 U/L / ALT: 14 U/L / AST: 39 U/L / GGT: x           PT/INR - ( 10 May 2019 06:15 )   PT: 26.4 sec;   INR: 2.26 ratio         PTT - ( 10 May 2019 06:15 )  PTT:36.8 sec      Imaging: Chief Complaint:  Patient is a 78y old  Male who presents with a chief complaint of syncope (10 May 2019 11:01)    Syncope  Parkinson disease  Constipation  Dementia  Urinary retention  Cerebrovascular accident  Hypertension  Atrial fibrillation  No significant past surgical history     HPI:  78y M PMH Afib (on coumadin), CVA (2012, 2016), Parkinson's disease, dementia, HTN, urinary retention, and multiple TIAs presents s/p syncope. Patient is a poor historian due to dementia. Per wife, patient has been having multiple syncopal episodes over past 3 days. No precipitating events or triggers. Episodes are not positional or associated with movement or activity. Today, patient was at home when he had an episode. Wife then tried to wake him up and noticed he was unresponsive. Patient was back to baseline once awake with no residual symptoms. Patient is not ambulatory and typically sits in a wheel-chair unless participating in PT. PO intake has not changed compared to baseline but wife notes it is difficulty to hydrate given he is on a nectar thickened diet ROS not obtainable secondary to mental status but wife denies recent fevers, chills, vomiting, diarrhea.      In the ED: Vitals T(F): 96.6F HR: 106 BP: 122/72 RR: 18 SpO2: 96% RA. HGB 9.4, HCT 27.9, INR 6.11, Cl 112, Glucose 110, Albumin 3.0, AST 93, ALT 9, CT Head: No acute intracranial hemorrhage or calvarial fracture within the limitations of the exam. CXR: No active pulmonary disease. No change. Seen by Cardio: TTE ordered, check orthostatics, continue to trend troponin (07 May 2019 20:27)    gi consulted for anemia. chart reviewed. pt seen and examined. unable to provide meaningful hx, wife present provided hx. per wife no overt s/s gib, denies hematemesis/melena/brbpr, states stools have been brown. does endorse episode of blood/clots mixed w urine in diaper about 2 wks ago, said no stool.  remote hx of gastritis/duodenitis 2/2 inc asa use that required egd and 2u prbc; denies hx of pud. had colon about 4 years ago showed polyp and possible diverticulosis. denies abd sx. on coumadin. fhx nc. upon eval, pt oob in chair eating lunch w assistance    recent vs/labs/imaging reviewed  afebrile soft bps  ob neg  hgb stable sp transfusion; although below prior baseline per [facility]  plts wnl  inr now w/in normal range for ac, initially supratherapeutic  iron studies suggestive of aocd  bld cxs ngtd  no abd imaging- prior ct renal stone hunt noted    No Known Allergies      acetaminophen  Suppository .. 650 milliGRAM(s) Rectal every 6 hours PRN  carbidopa/levodopa  25/100 2 Tablet(s) Oral <User Schedule>  docusate sodium 100 milliGRAM(s) Oral two times a day  entacapone 100 milliGRAM(s) Oral <User Schedule>  metoprolol tartrate 25 milliGRAM(s) Oral two times a day  pantoprazole   Suspension 40 milliGRAM(s) Oral before breakfast  potassium chloride   Powder 40 milliEquivalent(s) Oral every 4 hours  senna 2 Tablet(s) Oral at bedtime  sertraline 50 milliGRAM(s) Oral daily  tamsulosin 0.4 milliGRAM(s) Oral at bedtime  warfarin 1 milliGRAM(s) Oral once        FAMILY HISTORY:  No pertinent family history in first degree relatives        Review of Systems:    see above unable to obtain     Relevant Family History:   n/c    Relevant Social History: n/c      Physical Exam:    Vital Signs:  Vital Signs Last 24 Hrs  T(C): 36.9 (10 May 2019 08:00), Max: 37.3 (09 May 2019 19:18)  T(F): 98.5 (10 May 2019 08:00), Max: 99.1 (09 May 2019 19:18)  HR: 89 (10 May 2019 08:00) (82 - 94)  BP: 103/71 (10 May 2019 08:00) (94/62 - 110/72)  BP(mean): --  RR: 16 (10 May 2019 08:00) (16 - 18)  SpO2: 97% (10 May 2019 08:00) (96% - 98%)  Daily     Daily     General:  nad  HEENT:  NC/AT  Chest:  dec bs  Cardiovascular:  Regular rhythm, S1, S2  Abdomen:  Soft, non-tender, non-distended  Extremities:  no edema  Skin:  No rash  Neuro/Psych:  awake alert    Laboratory:                            8.5    5.20  )-----------( 195      ( 10 May 2019 06:15 )             24.4     05-10    145  |  113<H>  |  11  ----------------------------<  99  3.0<L>   |  24  |  0.68    Ca    8.5      10 May 2019 06:15  Mg     2.1     05-09    TPro  5.4<L>  /  Alb  2.2<L>  /  TBili  0.9  /  DBili  x   /  AST  39<H>  /  ALT  14  /  AlkPhos  42  05-10    LIVER FUNCTIONS - ( 10 May 2019 06:15 )  Alb: 2.2 g/dL / Pro: 5.4 g/dL / ALK PHOS: 42 U/L / ALT: 14 U/L / AST: 39 U/L / GGT: x           PT/INR - ( 10 May 2019 06:15 )   PT: 26.4 sec;   INR: 2.26 ratio         PTT - ( 10 May 2019 06:15 )  PTT:36.8 sec      Imaging:

## 2019-05-10 NOTE — PROGRESS NOTE ADULT - PROBLEM SELECTOR PLAN 8
Stable,  Cardio following  INR 2.26, takes coumadin 3mg daily. Will give 1mg coumadin for tonight  f/u AM INR
Stable,  Cardio following  INR 5.10, takes coumadin 3mg daily. HOLD for tonight
Stable,  Cardio following  INR 2.59, takes coumadin 3mg daily. Will give 2mg coumadin for tonight

## 2019-05-10 NOTE — PROGRESS NOTE ADULT - PROBLEM SELECTOR PLAN 10
IMPROVE VTE Individual Risk Assessment          RISK                                                          Points  [  ] Previous VTE                                                3  [  ] Thrombophilia                                             2  [  ] Lower limb paralysis                                   2        (unable to hold up >15 seconds)    [  ] Current Cancer                                             2         (within 6 months)  [x  ] Immobilization > 24 hrs                              1  [  ] ICU/CCU stay > 24 hours                             1  [ x ] Age > 60                                                         1    IMPROVE VTE Score: 2  INR checks on coumadin
IMPROVE VTE Individual Risk Assessment          RISK                                                          Points  [  ] Previous VTE                                                3  [  ] Thrombophilia                                             2  [  ] Lower limb paralysis                                   2        (unable to hold up >15 seconds)    [  ] Current Cancer                                             2         (within 6 months)  [x  ] Immobilization > 24 hrs                              1  [  ] ICU/CCU stay > 24 hours                             1  [ x ] Age > 60                                                         1    IMPROVE VTE Score: 2  INR supratherapeutic, holding coumadin.
IMPROVE VTE Individual Risk Assessment          RISK                                                          Points  [  ] Previous VTE                                                3  [  ] Thrombophilia                                             2  [  ] Lower limb paralysis                                   2        (unable to hold up >15 seconds)    [  ] Current Cancer                                             2         (within 6 months)  [x  ] Immobilization > 24 hrs                              1  [  ] ICU/CCU stay > 24 hours                             1  [ x ] Age > 60                                                         1    IMPROVE VTE Score: 2  INR checks on coumadin

## 2019-05-10 NOTE — CONSULT NOTE ADULT - PROBLEM SELECTOR RECOMMENDATION 9
no evidence of overt gib  fobt neg; iron studies suggestive of aocd  hgb stable sp transfusion, although below baseline per [facility] records  monitor cbc, transfuse prn  cont ppi, add carafate bid  monitor stool color  dw wife at length at bedside, defers endoscopic w/u at present, does not want to subject pt to anesthesia, if w significant drop in hgb/signs of active gib, agreeable to reassess  consider non con ct a/p to r/o occult bleed  will follow, plan to be dw attg

## 2019-05-10 NOTE — PROGRESS NOTE ADULT - SUBJECTIVE AND OBJECTIVE BOX
CHIEF COMPLAINT/INTERVAL HISTORY: Pt seen and examined at bedside. Pt more alert than yesterday, responds to few questions.     REVIEW OF SYSTEMS: Unable to attain due to pt lethargic status.    Vital Signs Last 24 Hrs  T(C): 36.9 (10 May 2019 08:00), Max: 37.3 (09 May 2019 19:18)  T(F): 98.5 (10 May 2019 08:00), Max: 99.1 (09 May 2019 19:18)  HR: 89 (10 May 2019 08:00) (82 - 148)  BP: 103/71 (10 May 2019 08:00) (94/62 - 126/75)  BP(mean): --  RR: 16 (10 May 2019 08:00) (16 - 18)  SpO2: 97% (10 May 2019 08:00) (96% - 99%)    PHYSICAL EXAM:  GENERAL: lethargic male in NAD  HEENT: EOMI, hearing normal, conjunctiva and sclera clear  Chest: CTA bilaterally, no wheezing  CV: S1S2, RRR,   GI: soft, +BS, NT/ND  Musculoskeletal: no edema  Skin: warm and dry    LABS:                        8.5    5.20  )-----------( 195      ( 10 May 2019 06:15 )             24.4     05-10    145  |  113<H>  |  11  ----------------------------<  99  3.0<L>   |  24  |  0.68    Ca    8.5      10 May 2019 06:15  Mg     2.1     05-09    TPro  5.4<L>  /  Alb  2.2<L>  /  TBili  0.9  /  DBili  x   /  AST  39<H>  /  ALT  14  /  AlkPhos  42  05-10    PT/INR - ( 10 May 2019 06:15 )   PT: 26.4 sec;   INR: 2.26 ratio         PTT - ( 10 May 2019 06:15 )  PTT:36.8 sec CHIEF COMPLAINT/INTERVAL HISTORY: Pt seen and examined at bedside. Pt more alert than yesterday, responds to few questions.     REVIEW OF SYSTEMS: Unable to attain due to pt lethargic status.    Vital Signs Last 24 Hrs  T(C): 36.9 (10 May 2019 08:00), Max: 37.3 (09 May 2019 19:18)  T(F): 98.5 (10 May 2019 08:00), Max: 99.1 (09 May 2019 19:18)  HR: 89 (10 May 2019 08:00) (82 - 148)  BP: 103/71 (10 May 2019 08:00) (94/62 - 126/75)  BP(mean): --  RR: 16 (10 May 2019 08:00) (16 - 18)  SpO2: 97% (10 May 2019 08:00) (96% - 99%)    PHYSICAL EXAM:  GENERAL: lethargic male in NAD   HEENT: EOMI, hearing normal, conjunctiva and sclera clear  Chest: Diminished BS bilaterally, no wheezing  CV: S1S2, RRR,   GI: soft, +BS, NT/ND  Musculoskeletal: no edema  Skin: warm and dry    LABS:                        8.5    5.20  )-----------( 195      ( 10 May 2019 06:15 )             24.4     05-10    145  |  113<H>  |  11  ----------------------------<  99  3.0<L>   |  24  |  0.68    Ca    8.5      10 May 2019 06:15  Mg     2.1     05-09    TPro  5.4<L>  /  Alb  2.2<L>  /  TBili  0.9  /  DBili  x   /  AST  39<H>  /  ALT  14  /  AlkPhos  42  05-10    PT/INR - ( 10 May 2019 06:15 )   PT: 26.4 sec;   INR: 2.26 ratio         PTT - ( 10 May 2019 06:15 )  PTT:36.8 sec

## 2019-05-11 LAB
ALBUMIN SERPL ELPH-MCNC: 2.3 G/DL — LOW (ref 3.3–5)
ALP SERPL-CCNC: 47 U/L — SIGNIFICANT CHANGE UP (ref 40–120)
ALT FLD-CCNC: 24 U/L — SIGNIFICANT CHANGE UP (ref 12–78)
ANION GAP SERPL CALC-SCNC: 5 MMOL/L — SIGNIFICANT CHANGE UP (ref 5–17)
APTT BLD: 31.7 SEC — SIGNIFICANT CHANGE UP (ref 27.5–36.3)
AST SERPL-CCNC: 30 U/L — SIGNIFICANT CHANGE UP (ref 15–37)
BASOPHILS # BLD AUTO: 0.02 K/UL — SIGNIFICANT CHANGE UP (ref 0–0.2)
BASOPHILS NFR BLD AUTO: 0.4 % — SIGNIFICANT CHANGE UP (ref 0–2)
BILIRUB SERPL-MCNC: 0.8 MG/DL — SIGNIFICANT CHANGE UP (ref 0.2–1.2)
BUN SERPL-MCNC: 12 MG/DL — SIGNIFICANT CHANGE UP (ref 7–23)
CALCIUM SERPL-MCNC: 8.9 MG/DL — SIGNIFICANT CHANGE UP (ref 8.5–10.1)
CHLORIDE SERPL-SCNC: 111 MMOL/L — HIGH (ref 96–108)
CO2 SERPL-SCNC: 28 MMOL/L — SIGNIFICANT CHANGE UP (ref 22–31)
CREAT SERPL-MCNC: 0.81 MG/DL — SIGNIFICANT CHANGE UP (ref 0.5–1.3)
EOSINOPHIL # BLD AUTO: 0.24 K/UL — SIGNIFICANT CHANGE UP (ref 0–0.5)
EOSINOPHIL NFR BLD AUTO: 4.3 % — SIGNIFICANT CHANGE UP (ref 0–6)
GLUCOSE SERPL-MCNC: 93 MG/DL — SIGNIFICANT CHANGE UP (ref 70–99)
HCT VFR BLD CALC: 26.2 % — LOW (ref 39–50)
HGB BLD-MCNC: 8.8 G/DL — LOW (ref 13–17)
IMM GRANULOCYTES NFR BLD AUTO: 0.7 % — SIGNIFICANT CHANGE UP (ref 0–1.5)
INR BLD: 1.49 RATIO — HIGH (ref 0.88–1.16)
LYMPHOCYTES # BLD AUTO: 1.15 K/UL — SIGNIFICANT CHANGE UP (ref 1–3.3)
LYMPHOCYTES # BLD AUTO: 20.6 % — SIGNIFICANT CHANGE UP (ref 13–44)
MCHC RBC-ENTMCNC: 32.4 PG — SIGNIFICANT CHANGE UP (ref 27–34)
MCHC RBC-ENTMCNC: 33.6 GM/DL — SIGNIFICANT CHANGE UP (ref 32–36)
MCV RBC AUTO: 96.3 FL — SIGNIFICANT CHANGE UP (ref 80–100)
MONOCYTES # BLD AUTO: 0.45 K/UL — SIGNIFICANT CHANGE UP (ref 0–0.9)
MONOCYTES NFR BLD AUTO: 8.1 % — SIGNIFICANT CHANGE UP (ref 2–14)
NEUTROPHILS # BLD AUTO: 3.68 K/UL — SIGNIFICANT CHANGE UP (ref 1.8–7.4)
NEUTROPHILS NFR BLD AUTO: 65.9 % — SIGNIFICANT CHANGE UP (ref 43–77)
NRBC # BLD: 0 /100 WBCS — SIGNIFICANT CHANGE UP (ref 0–0)
PLATELET # BLD AUTO: 225 K/UL — SIGNIFICANT CHANGE UP (ref 150–400)
POTASSIUM SERPL-MCNC: 3.1 MMOL/L — LOW (ref 3.5–5.3)
POTASSIUM SERPL-SCNC: 3.1 MMOL/L — LOW (ref 3.5–5.3)
PROT SERPL-MCNC: 5.7 G/DL — LOW (ref 6–8.3)
PROTHROM AB SERPL-ACNC: 17 SEC — HIGH (ref 10–12.9)
RBC # BLD: 2.72 M/UL — LOW (ref 4.2–5.8)
RBC # FLD: 15.4 % — HIGH (ref 10.3–14.5)
SODIUM SERPL-SCNC: 144 MMOL/L — SIGNIFICANT CHANGE UP (ref 135–145)
WBC # BLD: 5.58 K/UL — SIGNIFICANT CHANGE UP (ref 3.8–10.5)
WBC # FLD AUTO: 5.58 K/UL — SIGNIFICANT CHANGE UP (ref 3.8–10.5)

## 2019-05-11 PROCEDURE — 99232 SBSQ HOSP IP/OBS MODERATE 35: CPT

## 2019-05-11 RX ORDER — POTASSIUM CHLORIDE 20 MEQ
10 PACKET (EA) ORAL
Refills: 0 | Status: COMPLETED | OUTPATIENT
Start: 2019-05-11 | End: 2019-05-11

## 2019-05-11 RX ORDER — POTASSIUM CHLORIDE 20 MEQ
40 PACKET (EA) ORAL ONCE
Refills: 0 | Status: COMPLETED | OUTPATIENT
Start: 2019-05-11 | End: 2019-05-11

## 2019-05-11 RX ADMIN — ENTACAPONE 100 MILLIGRAM(S): 200 TABLET, FILM COATED ORAL at 11:28

## 2019-05-11 RX ADMIN — SERTRALINE 50 MILLIGRAM(S): 25 TABLET, FILM COATED ORAL at 11:28

## 2019-05-11 RX ADMIN — CARBIDOPA AND LEVODOPA 2 TABLET(S): 25; 100 TABLET ORAL at 09:22

## 2019-05-11 RX ADMIN — CARBIDOPA AND LEVODOPA 2 TABLET(S): 25; 100 TABLET ORAL at 18:17

## 2019-05-11 RX ADMIN — Medication 25 MILLIGRAM(S): at 18:16

## 2019-05-11 RX ADMIN — Medication 100 MILLIEQUIVALENT(S): at 13:11

## 2019-05-11 RX ADMIN — SENNA PLUS 2 TABLET(S): 8.6 TABLET ORAL at 22:06

## 2019-05-11 RX ADMIN — CARBIDOPA AND LEVODOPA 2 TABLET(S): 25; 100 TABLET ORAL at 15:23

## 2019-05-11 RX ADMIN — Medication 25 MILLIGRAM(S): at 05:47

## 2019-05-11 RX ADMIN — Medication 100 MILLIEQUIVALENT(S): at 09:22

## 2019-05-11 RX ADMIN — Medication 100 MILLIGRAM(S): at 18:17

## 2019-05-11 RX ADMIN — Medication 100 MILLIEQUIVALENT(S): at 11:28

## 2019-05-11 RX ADMIN — TAMSULOSIN HYDROCHLORIDE 0.4 MILLIGRAM(S): 0.4 CAPSULE ORAL at 22:06

## 2019-05-11 RX ADMIN — ENTACAPONE 100 MILLIGRAM(S): 200 TABLET, FILM COATED ORAL at 15:23

## 2019-05-11 RX ADMIN — ENTACAPONE 100 MILLIGRAM(S): 200 TABLET, FILM COATED ORAL at 13:11

## 2019-05-11 RX ADMIN — Medication 40 MILLIEQUIVALENT(S): at 09:23

## 2019-05-11 RX ADMIN — Medication 1 GRAM(S): at 05:47

## 2019-05-11 RX ADMIN — Medication 100 MILLIGRAM(S): at 05:46

## 2019-05-11 RX ADMIN — CARBIDOPA AND LEVODOPA 2 TABLET(S): 25; 100 TABLET ORAL at 13:11

## 2019-05-11 RX ADMIN — ENTACAPONE 100 MILLIGRAM(S): 200 TABLET, FILM COATED ORAL at 09:22

## 2019-05-11 RX ADMIN — PANTOPRAZOLE SODIUM 40 MILLIGRAM(S): 20 TABLET, DELAYED RELEASE ORAL at 05:47

## 2019-05-11 RX ADMIN — CARBIDOPA AND LEVODOPA 2 TABLET(S): 25; 100 TABLET ORAL at 11:28

## 2019-05-11 RX ADMIN — Medication 1 GRAM(S): at 18:17

## 2019-05-11 NOTE — PROGRESS NOTE ADULT - SUBJECTIVE AND OBJECTIVE BOX
CHIEF COMPLAINT/INTERVAL HISTORY:  Pt. seen and evaluated for syncope likely in setting of RBP with supratherapeutic INR.  Pt. is in no distress.  Opens eyes to verbal stimuli this morning.      REVIEW OF SYSTEMS:  No fever.  Rest of ROS not obtained due to lethargy.     Vital Signs Last 24 Hrs  T(C): 37.2 (11 May 2019 08:00), Max: 37.2 (11 May 2019 08:00)  T(F): 99 (11 May 2019 08:00), Max: 99 (11 May 2019 08:00)  HR: 88 (11 May 2019 08:00) (76 - 101)  BP: 102/67 (11 May 2019 08:00) (93/61 - 128/87)  BP(mean): --  RR: 15 (11 May 2019 08:00) (15 - 17)  SpO2: 98% (11 May 2019 08:00) (90% - 100%)    PHYSICAL EXAM:  GENERAL: NAD, lethargic  HEENT: EOMI, hearing normal, conjunctiva and sclera clear  Chest: Diminished BS bilaterally, no wheezing  CV: S1S2, RRR,   GI: soft, +BS, NT/ND  Musculoskeletal: no edema  Psychiatric: lethargic but arousable  Skin: warm and dry    LABS:                        8.8    5.58  )-----------( 225      ( 11 May 2019 06:42 )             26.2     05-11    144  |  111<H>  |  12  ----------------------------<  93  3.1<L>   |  28  |  0.81    Ca    8.9      11 May 2019 06:42    TPro  5.7<L>  /  Alb  2.3<L>  /  TBili  0.8  /  DBili  x   /  AST  30  /  ALT  24  /  AlkPhos  47  05-11    PT/INR - ( 11 May 2019 06:42 )   PT: 17.0 sec;   INR: 1.49 ratio         PTT - ( 11 May 2019 06:42 )  PTT:31.7 sec      Assessment and Plan:  -Syncope: Likely 2/2 acute blood loss anemia/RPB.  Telemetry monitoring.  Echo with EF 65%.    Cardiology and Neurology f/u  -Fever:  Blood cx and urine cx negative.  2/2 hematoma.  Off IV Zosyn.  ID f/u  -Acute blood loss anemia:  stool OB negative.  CT A/P with Right iliopsoas intramuscular hematoma and right trace free   retroperitoneal hemorrhage. s/p 1 unit PRBC transfusion.  Hbg stable.  Holding coumadin.  PRBC transfusions PRN.  Vascular surgery consult.    -Parkinson's disease:  continue  Sinemet and comtan.  -Urinary retention:  continue Flomax 0.4mg PO QHS  -HTN:  continue metoprolol 25mg PO BID.   -Afib:  continue metoprolol 25mg PO BID.  Holding coumadin 2/2 RPB and hematoma.    -VTE ppx:  SCD

## 2019-05-11 NOTE — PROGRESS NOTE ADULT - SUBJECTIVE AND OBJECTIVE BOX
KENNETHJOSÉ MIGUEL is a 78yMale , patient examined and chart reviewed.    INTERVAL HPI/ OVERNIGHT EVENTS:   Afebrile. H/H stable.    PAST MEDICAL & SURGICAL HISTORY:  Syncope  Parkinson disease  Constipation  Dementia  Urinary retention  Cerebrovascular accident  Hypertension  Atrial fibrillation  No significant past surgical history    For details regarding the patient's social history, family history, and other miscellaneous elements, please refer the initial infectious diseases consultation and/or the admitting history and physical examination for this admission.    ROS:  Unable to obtain due to : Dementia    Current inpatient medications :  MEDICATIONS  (STANDING):  carbidopa/levodopa  25/100 2 Tablet(s) Oral <User Schedule>  docusate sodium 100 milliGRAM(s) Oral two times a day  entacapone 100 milliGRAM(s) Oral <User Schedule>  metoprolol tartrate 25 milliGRAM(s) Oral two times a day  pantoprazole   Suspension 40 milliGRAM(s) Oral before breakfast  senna 2 Tablet(s) Oral at bedtime  sertraline 50 milliGRAM(s) Oral daily  sucralfate suspension 1 Gram(s) Oral two times a day  tamsulosin 0.4 milliGRAM(s) Oral at bedtime    MEDICATIONS  (PRN):  acetaminophen  Suppository .. 650 milliGRAM(s) Rectal every 6 hours PRN Temp greater or equal to 38C (100.4F)      Objective:  Vital Signs Last 24 Hrs  T(C): 36.8 (11 May 2019 23:26), Max: 37.2 (11 May 2019 08:00)  T(F): 98.2 (11 May 2019 23:26), Max: 99 (11 May 2019 08:00)  HR: 86 (11 May 2019 23:26) (86 - 106)  BP: 104/70 (11 May 2019 23:26) (96/66 - 111/75)  RR: 17 (11 May 2019 23:26) (15 - 18)  SpO2: 98% (11 May 2019 23:26) (94% - 98%)    Physical Exam:  General:  no acute distress  Eyes: sclera anicteric, pupils equal and reactive to light  ENMT: buccal mucosa moist, pharynx not injected  Neck: supple, trachea midline  Lungs: clear, no wheeze/rhonchi  Cardiovascular: regular rate and rhythm, S1 S2  Abdomen: soft, nontender, no organomegaly present, bowel sounds normal  Neurological: alert and oriented x0, Cranial Nerves II-XII grossly intact  Skin: no increased ecchymosis/petechiae/purpura  Lymph Nodes: no palpable cervical/supraclavicular lymph nodes enlargements  Extremities: no cyanosis/clubbing/edema    LABS:                               8.8    5.70  )-----------( 205      ( 10 May 2019 14:48 )             25.9   05-10    145  |  113<H>  |  11  ----------------------------<  99  3.0<L>   |  24  |  0.68    Ca    8.5      10 May 2019 06:15  Mg     2.1     05-09    TPro  5.4<L>  /  Alb  2.2<L>  /  TBili  0.9  /  DBili  x   /  AST  39<H>  /  ALT  14  /  AlkPhos  42  05-10      MICROBIOLOGY:    Culture - Blood (collected 08 May 2019 12:38)  Source: .Blood Blood-Peripheral  Preliminary Report (09 May 2019 13:01):    No growth to date.    Culture - Blood (collected 08 May 2019 12:38)  Source: .Blood Blood-Peripheral  Preliminary Report (09 May 2019 13:01):    No growth to date.    Culture - Urine (collected 08 May 2019 11:36)  Source: .Urine Clean Catch (Midstream)  Final Report (09 May 2019 14:53):    No growth      RADIOLOGY & ADDITIONAL STUDIES:    EXAM:  CT ABDOMEN AND PELVIS                            PROCEDURE DATE:  05/10/2019          INTERPRETATION:  CLINICAL INFORMATION: Anemia, rule out retroperitoneal   bleed.    COMPARISON: 5/12/2014    PROCEDURE:   CT of the Abdomen and Pelvis was performed without intravenous contrast.   Intravenous contrast: None.  Oral contrast: None.  Sagittal and coronal reformats were performed.    FINDINGS:    LOWER CHEST: Small bilateral pleural effusions. Bibasilar atelectasis,   left greater than right. Coronary and aortic atherosclerosis. Mild   gynecomastia.    LIVER: Hepatic cysts and additional hypodensities too small to   characterize. Scattered coarse calcifications.  BILE DUCTS: Normal caliber.  GALLBLADDER: Cholelithiasis.  SPLEEN: Within normal limits.  PANCREAS: Within normal limits.  ADRENALS: Within normal limits.  KIDNEYS/URETERS: Multifocal areas of scarring within the left kidney with   dystrophic calcifications. No hydronephrosis or obstructing stones.    BLADDER: Within normallimits.  REPRODUCTIVE ORGANS: Mildly enlarged prostate.    BOWEL: No bowel obstruction. Appendix within normal limits.  PERITONEUM: No ascites.  VESSELS:  Atherosclerotic calcifications of the aorta and branch vessels.   Infrarenal IVC filter.  LYMPHNODES: No lymphadenopathy.    ABDOMINAL WALL: Small fat-containing umbilical hernia. Small   fat-containing right inguinal hernia.  BONES: No suspicious osseous lesion. Diffuse osteopenia. Degenerative   changes within the spine and pelvis.  OTHER:  Marked asymmetric enlargement of the right iliacus muscle with   internal hyperdensity compatible with hematoma. Moderate asymmetric   enlargement of the right psoas muscle with heterogeneous attenuation also   suggestive of hematoma. Trace free hemorrhagic fluid and stranding within   the right retroperitoneal space.    IMPRESSION: Right iliopsoas intramuscular hematoma and right trace free   retroperitoneal hemorrhage.    EXAM:  XR CHEST AP OR PA 1V                          PROCEDURE DATE:  05/07/2019      INTERPRETATION:  History: Chest pain    Semiupright portable chest x-ray is compared to 5/5/2019.    The study is slightly limited by rotation. The heart isnot enlarged. The   lungs are clear. There is osteopenia and degenerative change of the bony   structures.    Impression: No active pulmonary disease. No change.    Assessment :  78y M PMH Afib (on coumadin), CVA (2012, 2016), Parkinson's disease, dementia, HTN, urinary retention, and multiple TIAs recent admission for hematuria just discharged 5/2/19 readmitted sec to recurrent syncopal episodes at home. Sp fever Tmax 101 Likely reactive to right iliopsoas intramuscular hematoma and right trace free   retroperitoneal hemorrhage. UTI ruled out Cultures thus far ngtd Fever resolved.    Plan :   Monitor off antibiotics  Trend temps and wbc  Trend H/H   Asp precautions    D/w Dr Schrader    Continue with present regiment.  Appropriate use of antibiotics and adverse effects reviewed.      I have discussed the above plan of care with patient/ family in detail. They expressed understanding of the  treatment plan . Risks, benefits and alternatives discussed in detail. I have asked if they have any questions or concerns and appropriately addressed them to the best of my ability .    > 35 minutes were spent in direct patient care reviewing notes, medications ,labs data/ imaging , discussion with multidisciplinary team.    Thank you for allowing me to participate in care of your patient .    Sanjeev Cole MD  Infectious Disease  [phone]

## 2019-05-11 NOTE — PROGRESS NOTE ADULT - SUBJECTIVE AND OBJECTIVE BOX
[facility] -- Rogelio Robb, Flor, Sandi, Rick Holloway Savella  Office # [phone]    Follow Up:  Syncope    Subjective/Observations:   No events overnight resting comfortably in bed.  No complaints of chest pain, dyspnea, or palpitations reported. No signs of orthopnea or PND.    REVIEW OF SYSTEMS: All other review of systems is negative unless indicated above    PAST MEDICAL & SURGICAL HISTORY:  Syncope  Parkinson disease  Constipation  Dementia  Urinary retention  Cerebrovascular accident  Hypertension  Atrial fibrillation  No significant past surgical history      MEDICATIONS  (STANDING):  carbidopa/levodopa  25/100 2 Tablet(s) Oral <User Schedule>  docusate sodium 100 milliGRAM(s) Oral two times a day  entacapone 100 milliGRAM(s) Oral <User Schedule>  metoprolol tartrate 25 milliGRAM(s) Oral two times a day  pantoprazole   Suspension 40 milliGRAM(s) Oral before breakfast  potassium chloride   Powder 40 milliEquivalent(s) Oral once  potassium chloride  10 mEq/100 mL IVPB 10 milliEquivalent(s) IV Intermittent every 1 hour  senna 2 Tablet(s) Oral at bedtime  sertraline 50 milliGRAM(s) Oral daily  sucralfate suspension 1 Gram(s) Oral two times a day  tamsulosin 0.4 milliGRAM(s) Oral at bedtime    MEDICATIONS  (PRN):  acetaminophen  Suppository .. 650 milliGRAM(s) Rectal every 6 hours PRN Temp greater or equal to 38C (100.4F)      Allergies    No Known Allergies    Intolerances        Vital Signs Last 24 Hrs  T(C): 37.2 (11 May 2019 08:00), Max: 37.2 (11 May 2019 08:00)  T(F): 99 (11 May 2019 08:00), Max: 99 (11 May 2019 08:00)  HR: 88 (11 May 2019 08:00) (76 - 101)  BP: 102/67 (11 May 2019 08:00) (93/61 - 128/87)  BP(mean): --  RR: 15 (11 May 2019 08:00) (15 - 17)  SpO2: 98% (11 May 2019 08:00) (90% - 100%)    I&O's Summary        PHYSICAL EXAM:  TELE:  a fib  Constitutional: NAD, awake and alert, well-developed  HEENT: Moist Mucous Membranes, Anicteric  Pulmonary: Non-labored, breath sounds are clear bilaterally, No wheezing, rales or rhonchi  Cardiovascular: Regular, S1 and S2, No murmurs, rubs, gallops or clicks  Gastrointestinal: Bowel Sounds present, soft, nontender.   Lymph: No peripheral edema. No lymphadenopathy.  Skin: No visible rashes or ulcers.  Psych:  Mood & affect appropriate    LABS: All Labs Reviewed:                        8.8    5.58  )-----------( 225      ( 11 May 2019 06:42 )             26.2                         9.5    5.91  )-----------( 241      ( 10 May 2019 22:12 )             28.2                         8.8    5.70  )-----------( 205      ( 10 May 2019 14:48 )             25.9     11 May 2019 06:42    144    |  111    |  12     ----------------------------<  93     3.1     |  28     |  0.81   10 May 2019 06:15    145    |  113    |  11     ----------------------------<  99     3.0     |  24     |  0.68   09 May 2019 06:59    146    |  114    |  12     ----------------------------<  97     3.5     |  26     |  0.72     Ca    8.9        11 May 2019 06:42  Ca    8.5        10 May 2019 06:15  Ca    9.3        09 May 2019 06:59  Mg     2.1       09 May 2019 06:59    TPro  5.7    /  Alb  2.3    /  TBili  0.8    /  DBili  x      /  AST  30     /  ALT  24     /  AlkPhos  47     11 May 2019 06:42  TPro  5.4    /  Alb  2.2    /  TBili  0.9    /  DBili  x      /  AST  39     /  ALT  14     /  AlkPhos  42     10 May 2019 06:15  TPro  6.1    /  Alb  2.5    /  TBili  1.3    /  DBili  x      /  AST  62     /  ALT  25     /  AlkPhos  51     09 May 2019 06:59    PT/INR - ( 11 May 2019 06:42 )   PT: 17.0 sec;   INR: 1.49 ratio         PTT - ( 11 May 2019 06:42 )  PTT:31.7 sec        EXAM:  ECHO TTE WO CON COMP W DOPPLR         PROCEDURE DATE:  05/08/2019        INTERPRETATION:  INDICATION: Syncope    Blood Pressure 99/68    Height 176 cm     Weight 72 kg       BSA        Dimensions:    LA 4.4       Normal Values: 2.0 - 4.0 cm   Ao 3.3        Normal Values: 2.0 - 3.8 cm  SEPTUM 0.9       Normal Values: 0.6 - 1.2 cm  PWT 0.9       Normal Values: 0.6 - 1.1 cm  LVIDd 4.9         Normal Values: 3.0 - 5.6 cm  LVIDs 2.4         Normal Values: 1.8 - 4.0 cm      OBSERVATIONS:    Mitral Valve: Thickened leaflets, mild MR.  Aortic Valve/Aorta: Calcified trileaflet aortic valve with decreased   opening. Mild aortic stenosis, peak aortic valve gradient is 15.2 mmHg   with a mean gradient of 8.2 mmHg. Aortic valve area calculated by   continuity equation is 1.23 sq cm  Tricuspid Valve: normal with trace TR.  Pulmonic Valve: normal  Left Atrium: Dilated  Right Atrium: normal  Left Ventricle: normal LV size and systolic function, estimated LVEF of   65%.  Right Ventricle: normalsize and systolic function.  Pericardium/Pleura: normal, no significant pericardial effusion.      Conclusion:     Normal left ventricular internal dimensions and systolic function,   estimated LVEF of 65%.    Calcified trileaflet aortic valve with decreased opening. Mild aortic   stenosis, peak aortic valve gradient is 15.2 mmHg with a mean gradient of   8.2 mmHg. Aortic valve area calculated by continuity equation is 1.23 sq   cm  Mitral valve with thickened leaflets, mild mitral regurgitation  Normal RV size and systolic function.   Trace physiologic TR.      No significant pericardial effusion.        SKYLA MATAMOROS   This document has been electronically signed. May  9 2019  8:45AM [facility] -- Rogelio Robb, Flor, Sandi, Rick Holloway Savella  Office # [phone]    Follow Up:  Syncope    Subjective/Observations:   No events overnight resting comfortably in bed.  No complaints of chest pain, dyspnea, or palpitations reported. No signs of orthopnea or PND.    REVIEW OF SYSTEMS: All other review of systems is negative unless indicated above    PAST MEDICAL & SURGICAL HISTORY:  Syncope  Parkinson disease  Constipation  Dementia  Urinary retention  Cerebrovascular accident  Hypertension  Atrial fibrillation  No significant past surgical history      MEDICATIONS  (STANDING):  carbidopa/levodopa  25/100 2 Tablet(s) Oral <User Schedule>  docusate sodium 100 milliGRAM(s) Oral two times a day  entacapone 100 milliGRAM(s) Oral <User Schedule>  metoprolol tartrate 25 milliGRAM(s) Oral two times a day  pantoprazole   Suspension 40 milliGRAM(s) Oral before breakfast  potassium chloride   Powder 40 milliEquivalent(s) Oral once  potassium chloride  10 mEq/100 mL IVPB 10 milliEquivalent(s) IV Intermittent every 1 hour  senna 2 Tablet(s) Oral at bedtime  sertraline 50 milliGRAM(s) Oral daily  sucralfate suspension 1 Gram(s) Oral two times a day  tamsulosin 0.4 milliGRAM(s) Oral at bedtime    MEDICATIONS  (PRN):  acetaminophen  Suppository .. 650 milliGRAM(s) Rectal every 6 hours PRN Temp greater or equal to 38C (100.4F)      Allergies    No Known Allergies    Intolerances        Vital Signs Last 24 Hrs  T(C): 37.2 (11 May 2019 08:00), Max: 37.2 (11 May 2019 08:00)  T(F): 99 (11 May 2019 08:00), Max: 99 (11 May 2019 08:00)  HR: 88 (11 May 2019 08:00) (76 - 101)  BP: 102/67 (11 May 2019 08:00) (93/61 - 128/87)  BP(mean): --  RR: 15 (11 May 2019 08:00) (15 - 17)  SpO2: 98% (11 May 2019 08:00) (90% - 100%)    I&O's Summary        PHYSICAL EXAM:  TELE:  a fib  Constitutional: NAD, awake and alert, well-developed  HEENT: Moist Mucous Membranes, Anicteric  Pulmonary: Non-labored, breath sounds are clear bilaterally, No wheezing, rales or rhonchi  Cardiovascular: Regular, S1 and S2, No murmurs, rubs, gallops or clicks  Gastrointestinal: Bowel Sounds present, soft, nontender.   Lymph: No peripheral edema. No lymphadenopathy.  Skin: No visible rashes or ulcers.  Psych:  lethargic    LABS: All Labs Reviewed:                        8.8    5.58  )-----------( 225      ( 11 May 2019 06:42 )             26.2                         9.5    5.91  )-----------( 241      ( 10 May 2019 22:12 )             28.2                         8.8    5.70  )-----------( 205      ( 10 May 2019 14:48 )             25.9     11 May 2019 06:42    144    |  111    |  12     ----------------------------<  93     3.1     |  28     |  0.81   10 May 2019 06:15    145    |  113    |  11     ----------------------------<  99     3.0     |  24     |  0.68   09 May 2019 06:59    146    |  114    |  12     ----------------------------<  97     3.5     |  26     |  0.72     Ca    8.9        11 May 2019 06:42  Ca    8.5        10 May 2019 06:15  Ca    9.3        09 May 2019 06:59  Mg     2.1       09 May 2019 06:59    TPro  5.7    /  Alb  2.3    /  TBili  0.8    /  DBili  x      /  AST  30     /  ALT  24     /  AlkPhos  47     11 May 2019 06:42  TPro  5.4    /  Alb  2.2    /  TBili  0.9    /  DBili  x      /  AST  39     /  ALT  14     /  AlkPhos  42     10 May 2019 06:15  TPro  6.1    /  Alb  2.5    /  TBili  1.3    /  DBili  x      /  AST  62     /  ALT  25     /  AlkPhos  51     09 May 2019 06:59    PT/INR - ( 11 May 2019 06:42 )   PT: 17.0 sec;   INR: 1.49 ratio         PTT - ( 11 May 2019 06:42 )  PTT:31.7 sec        EXAM:  ECHO TTE WO CON COMP W DOPPLR         PROCEDURE DATE:  05/08/2019        INTERPRETATION:  INDICATION: Syncope    Blood Pressure 99/68    Height 176 cm     Weight 72 kg       BSA        Dimensions:    LA 4.4       Normal Values: 2.0 - 4.0 cm   Ao 3.3        Normal Values: 2.0 - 3.8 cm  SEPTUM 0.9       Normal Values: 0.6 - 1.2 cm  PWT 0.9       Normal Values: 0.6 - 1.1 cm  LVIDd 4.9         Normal Values: 3.0 - 5.6 cm  LVIDs 2.4         Normal Values: 1.8 - 4.0 cm      OBSERVATIONS:    Mitral Valve: Thickened leaflets, mild MR.  Aortic Valve/Aorta: Calcified trileaflet aortic valve with decreased   opening. Mild aortic stenosis, peak aortic valve gradient is 15.2 mmHg   with a mean gradient of 8.2 mmHg. Aortic valve area calculated by   continuity equation is 1.23 sq cm  Tricuspid Valve: normal with trace TR.  Pulmonic Valve: normal  Left Atrium: Dilated  Right Atrium: normal  Left Ventricle: normal LV size and systolic function, estimated LVEF of   65%.  Right Ventricle: normalsize and systolic function.  Pericardium/Pleura: normal, no significant pericardial effusion.      Conclusion:     Normal left ventricular internal dimensions and systolic function,   estimated LVEF of 65%.    Calcified trileaflet aortic valve with decreased opening. Mild aortic   stenosis, peak aortic valve gradient is 15.2 mmHg with a mean gradient of   8.2 mmHg. Aortic valve area calculated by continuity equation is 1.23 sq   cm  Mitral valve with thickened leaflets, mild mitral regurgitation  Normal RV size and systolic function.   Trace physiologic TR.      No significant pericardial effusion.        SKYLA MATAMOROS   This document has been electronically signed. May  9 2019  8:45AM

## 2019-05-11 NOTE — PROGRESS NOTE ADULT - ASSESSMENT
78y M PMH Afib (on coumadin), CVA (2012, 2016), Parkinson's disease, dementia, HTN, urinary retention, and multiple TIAs presents with syncope likely vasovagal due to anemia with possible GI bleed vs dehydration vs orthostatics due urinary medications and supratherapeutic INR.    Syncope  - The etiology of his syncope could be from blood loss  - No clear evidence of acute ischemia, trops negative x 2.  - No acute changes on EKG compared to previous  - Check orthostatics, if positive, consider d/c Flomax or Finasteride  - TTE shows 11/2017 with borderline LVH, EF 65%, mild AS/ MR, normal pulmonary pressures.     Anemia  -S/P PRBC   -Resolved   -Transfuse prn maintain H/H > 7.     Chronic Atrial Fibrillation  - Dose coumadin to maintain INR of 2-3   - Afib rate controlled  - No signs of ischemia.   - Continue low dose Metoprolol with hold parameters    HLD  - Continue zetia     DVT ppx  - PAS    - Further cardiac workup will depend on clinical course.   - will continue to follow    Ladan Banks NP  Cardiology 78y M PMH Afib (on coumadin), CVA (2012, 2016), Parkinson's disease, dementia, HTN, urinary retention, and multiple TIAs presents with syncope likely vasovagal due to anemia with possible GI bleed vs dehydration vs orthostatics due urinary medications and supratherapeutic INR.  He has been found to have Right iliopsoas intramuscular hematoma and right trace free retroperitoneal hemorrhage.    Syncope  - The etiology of his syncope could be from blood loss  - No clear evidence of acute ischemia, trops negative x 2.  - No acute changes on EKG compared to previous  - Check orthostatics, if positive, consider d/c Flomax  - TTE shows 11/2017 with borderline LVH, EF 65%, mild AS/ MR, normal pulmonary pressures.     Anemia  -S/P PRBC   -Resolved   -Transfuse prn maintain H/H > 7.     Chronic Atrial Fibrillation  - hold coumadin in setting of iliopsoas hematoma  - Afib rate controlled  - No signs of ischemia.   - Continue low dose Metoprolol with hold parameters    HLD  - Continue zetia     DVT ppx  - PAS    - Further cardiac workup will depend on clinical course.   - will continue to follow    Ladan Banks NP  Cardiology

## 2019-05-11 NOTE — PROGRESS NOTE ADULT - PROBLEM SELECTOR PLAN 1
ct w r iliopsoas intramuscular hematoma  f/u sx recs  no evidence of overt gib; fobt neg  monitor cbc, transfuse prn  cont ppi, carafate bid  dw wife, defers endoscopic w/u at present  will follow ct w r iliopsoas intramuscular hematoma  f/u sx recs  hold ac  no evidence of overt gib; fobt neg  monitor cbc, transfuse prn  cont ppi, carafate bid  dw wife, defers endoscopic w/u at present  will follow

## 2019-05-11 NOTE — PROGRESS NOTE ADULT - SUBJECTIVE AND OBJECTIVE BOX
Neurology Follow up note    JOSÉ MIGUEL COOPER78yMale    HPI:  78y M PMH Afib (on coumadin), CVA (2012, 2016), Parkinson's disease, dementia, HTN, urinary retention, and multiple TIAs presents s/p syncope. Patient is a poor historian due to dementia. Per wife, patient has been having multiple syncopal episodes over past 3 days. No precipitating events or triggers. Episodes are not positional or associated with movement or activity. Today, patient was at home when he had an episode. Wife then tried to wake him up and noticed he was unresponsive. Patient was back to baseline once awake with no residual symptoms. Patient is not ambulatory and typically sits in a wheel-chair unless participating in PT. PO intake has not changed compared to baseline but wife notes it is difficulty to hydrate given he is on a nectar thickened diet ROS not obtainable secondary to mental status but wife denies recent fevers, chills, vomiting, diarrhea.      In the ED: Vitals T(F): 96.6F HR: 106 BP: 122/72 RR: 18 SpO2: 96% RA. HGB 9.4, HCT 27.9, INR 6.11, Cl 112, Glucose 110, Albumin 3.0, AST 93, ALT 9, CT Head: No acute intracranial hemorrhage or calvarial fracture within the limitations of the exam. CXR: No active pulmonary disease. No change. Seen by Cardio: TTE ordered, check orthostatics, continue to trend troponin (07 May 2019 20:27)      Interval History - no new events.    Patient is seen, chart was reviewed and case was discussed with the treatment team.  Pt is not in any distress.   Lying on bed comfortably.   No events reported overnight.   No clinical seizure was reported.  Sitting on chair bed comfortably.    is at bedside.    Vital Signs Last 24 Hrs  T(C): 36.7 (11 May 2019 12:24), Max: 37.2 (11 May 2019 08:00)  T(F): 98 (11 May 2019 12:24), Max: 99 (11 May 2019 08:00)  HR: 106 (11 May 2019 12:24) (76 - 106)  BP: 111/75 (11 May 2019 12:24) (97/71 - 128/87)  BP(mean): --  RR: 18 (11 May 2019 12:24) (15 - 18)  SpO2: 98% (11 May 2019 12:24) (90% - 100%))        REVIEW OF SYSTEMS:    limited;in any distress    On Neurological Examination:    Mental Status - Pt is alert, awake,  following simple commands,      Speech - dysarthria.    Cranial Nerves - Pupils 3 mm equal and reactive to light, extraocular eye movements intact.   Pt has no facial asymmetry. Facial sensation is intact.Tongue - is in midline.    Muscle tone - cogwheel rigidity    Motor Exam - 4/5 of UE  LE 4/5, EXCEPT R, HIP.    Sensory Exam -. Pt withdraws all extremities equally on stimulation. No asymmetry seen.        Deep tendon Reflexes - 2 plus all over.      Neck Supple -  Yes.     MEDICATIONS    acetaminophen  Suppository .. 650 milliGRAM(s) Rectal every 6 hours PRN  carbidopa/levodopa  25/100 2 Tablet(s) Oral <User Schedule>  dextrose 5% + sodium chloride 0.45% with potassium chloride 20 mEq/L 1000 milliLiter(s) IV Continuous <Continuous>  docusate sodium 100 milliGRAM(s) Oral two times a day  entacapone 100 milliGRAM(s) Oral <User Schedule>  metoprolol tartrate 25 milliGRAM(s) Oral two times a day  piperacillin/tazobactam IVPB. 3.375 Gram(s) IV Intermittent every 8 hours  senna 2 Tablet(s) Oral at bedtime  sertraline 50 milliGRAM(s) Oral daily  tamsulosin 0.4 milliGRAM(s) Oral at bedtime  warfarin 1 milliGRAM(s) Oral once      Allergies    No Known Allergies    Intolerances                              8.5    5.20  )-----------( 195      ( 10 May 2019 06:15 )             24.4         Urinalysis Basic - ( 08 May 2019 07:53 )    Color: Charis / Appearance: Slightly Turbid / S.020 / pH: x  Gluc: x / Ketone: Small  / Bili: Negative / Urobili: Negative   Blood: x / Protein: 25 mg/dL / Nitrite: Negative   Leuk Esterase: Small / RBC: 11-25 /HPF / WBC 11-25   Sq Epi: x / Non Sq Epi: Few / Bacteria: Moderate      05-09    146<H>  |  114<H>  |  12  ----------------------------<  97  3.5   |  26  |  0.72    Ca    9.3      09 May 2019 06:59  Mg     2.1         TPro  6.1  /  Alb  2.5<L>  /  TBili  1.3<H>  /  DBili  x   /  AST  62<H>  /  ALT  25  /  AlkPhos  51      Hemoglobin A1C:     Vitamin B12     RADIOLOGY  < from: CT Abdomen and Pelvis No Cont (05.10.19 @ 13:58) >    EXAM:  CT ABDOMEN AND PELVIS                            PROCEDURE DATE:  05/10/2019          INTERPRETATION:  CLINICAL INFORMATION: Anemia, rule out retroperitoneal   bleed.    COMPARISON: 2014    PROCEDURE:   CT of the Abdomen and Pelvis was performed without intravenous contrast.   Intravenous contrast: None.  Oral contrast: None.  Sagittal and coronal reformats were performed.    FINDINGS:    LOWER CHEST: Small bilateral pleural effusions. Bibasilar atelectasis,   left greater than right. Coronary and aortic atherosclerosis. Mild   gynecomastia.    LIVER: Hepatic cysts and additional hypodensities too small to   characterize. Scattered coarse calcifications.  BILE DUCTS: Normal caliber.  GALLBLADDER: Cholelithiasis.  SPLEEN: Within normal limits.  PANCREAS: Within normal limits.  ADRENALS: Within normal limits.  KIDNEYS/URETERS: Multifocal areas of scarring within the left kidney with   dystrophic calcifications. No hydronephrosis or obstructing stones.    BLADDER: Within normallimits.  REPRODUCTIVE ORGANS: Mildly enlarged prostate.    BOWEL: No bowel obstruction. Appendix within normal limits.  PERITONEUM: No ascites.  VESSELS:  Atherosclerotic calcifications of the aorta and branch vessels.   Infrarenal IVC filter.  LYMPHNODES: No lymphadenopathy.    ABDOMINAL WALL: Small fat-containing umbilical hernia. Small   fat-containing right inguinal hernia.  BONES: No suspicious osseous lesion. Diffuse osteopenia. Degenerative   changes within the spine and pelvis.  OTHER:  Marked asymmetric enlargement of the right iliacus muscle with   internal hyperdensity compatible with hematoma. Moderate asymmetric   enlargement of the right psoas muscle with heterogeneous attenuation also   suggestive of hematoma. Trace free hemorrhagic fluid and stranding within   the right retroperitoneal space.    IMPRESSION: Right iliopsoas intramuscular hematoma and right trace free   retroperitoneal hemorrhage. Findings were discussed with Dr. Ramirez at   2:15PM on 19.        GERALDO SAMANIEGO M.D., ATTENDING RADIOLOGIST  This document has been electronically signed. May 10 2019  2:19PM          ASSESSMENT AND PLAN:      SYNCOPE.  HO OF PD.  HX OF CVA.  RECURRENT UTI.  ANEMIA.  right ILIOPSOAS HEMATOMA.    VASCULAR SURGERY THAI PENDING,  ANEMIA WORK UP,  CONTINUE SINEMET.  Physical therapy evaluation.  OOB to chair/ambulation with assistance only.  Advanced care planning was discussed with family.  Pain is accessed and addressed.  Plan of care was discussed with family. Questions answered.  Would continue to follow.

## 2019-05-12 LAB
ANION GAP SERPL CALC-SCNC: 6 MMOL/L — SIGNIFICANT CHANGE UP (ref 5–17)
BUN SERPL-MCNC: 14 MG/DL — SIGNIFICANT CHANGE UP (ref 7–23)
CALCIUM SERPL-MCNC: 9 MG/DL — SIGNIFICANT CHANGE UP (ref 8.5–10.1)
CHLORIDE SERPL-SCNC: 110 MMOL/L — HIGH (ref 96–108)
CO2 SERPL-SCNC: 25 MMOL/L — SIGNIFICANT CHANGE UP (ref 22–31)
CREAT SERPL-MCNC: 0.74 MG/DL — SIGNIFICANT CHANGE UP (ref 0.5–1.3)
GLUCOSE SERPL-MCNC: 78 MG/DL — SIGNIFICANT CHANGE UP (ref 70–99)
HCT VFR BLD CALC: 29.9 % — LOW (ref 39–50)
HGB BLD-MCNC: 9.9 G/DL — LOW (ref 13–17)
INR BLD: 1.24 RATIO — HIGH (ref 0.88–1.16)
MAGNESIUM SERPL-MCNC: 2.2 MG/DL — SIGNIFICANT CHANGE UP (ref 1.6–2.6)
MCHC RBC-ENTMCNC: 32.8 PG — SIGNIFICANT CHANGE UP (ref 27–34)
MCHC RBC-ENTMCNC: 33.1 GM/DL — SIGNIFICANT CHANGE UP (ref 32–36)
MCV RBC AUTO: 99 FL — SIGNIFICANT CHANGE UP (ref 80–100)
NRBC # BLD: 0 /100 WBCS — SIGNIFICANT CHANGE UP (ref 0–0)
PLATELET # BLD AUTO: 229 K/UL — SIGNIFICANT CHANGE UP (ref 150–400)
POTASSIUM SERPL-MCNC: 4.2 MMOL/L — SIGNIFICANT CHANGE UP (ref 3.5–5.3)
POTASSIUM SERPL-SCNC: 4.2 MMOL/L — SIGNIFICANT CHANGE UP (ref 3.5–5.3)
PROTHROM AB SERPL-ACNC: 14.2 SEC — HIGH (ref 10–12.9)
RBC # BLD: 3.02 M/UL — LOW (ref 4.2–5.8)
RBC # FLD: 15.8 % — HIGH (ref 10.3–14.5)
SODIUM SERPL-SCNC: 141 MMOL/L — SIGNIFICANT CHANGE UP (ref 135–145)
WBC # BLD: 5.77 K/UL — SIGNIFICANT CHANGE UP (ref 3.8–10.5)
WBC # FLD AUTO: 5.77 K/UL — SIGNIFICANT CHANGE UP (ref 3.8–10.5)

## 2019-05-12 PROCEDURE — 99232 SBSQ HOSP IP/OBS MODERATE 35: CPT

## 2019-05-12 RX ORDER — POLYETHYLENE GLYCOL 3350 17 G/17G
17 POWDER, FOR SOLUTION ORAL DAILY
Refills: 0 | Status: DISCONTINUED | OUTPATIENT
Start: 2019-05-12 | End: 2019-05-13

## 2019-05-12 RX ADMIN — CARBIDOPA AND LEVODOPA 2 TABLET(S): 25; 100 TABLET ORAL at 11:28

## 2019-05-12 RX ADMIN — SERTRALINE 50 MILLIGRAM(S): 25 TABLET, FILM COATED ORAL at 11:28

## 2019-05-12 RX ADMIN — Medication 1 GRAM(S): at 18:38

## 2019-05-12 RX ADMIN — Medication 100 MILLIGRAM(S): at 06:03

## 2019-05-12 RX ADMIN — CARBIDOPA AND LEVODOPA 2 TABLET(S): 25; 100 TABLET ORAL at 15:58

## 2019-05-12 RX ADMIN — Medication 25 MILLIGRAM(S): at 18:38

## 2019-05-12 RX ADMIN — PANTOPRAZOLE SODIUM 40 MILLIGRAM(S): 20 TABLET, DELAYED RELEASE ORAL at 06:03

## 2019-05-12 RX ADMIN — Medication 1 GRAM(S): at 06:03

## 2019-05-12 RX ADMIN — ENTACAPONE 100 MILLIGRAM(S): 200 TABLET, FILM COATED ORAL at 13:20

## 2019-05-12 RX ADMIN — CARBIDOPA AND LEVODOPA 2 TABLET(S): 25; 100 TABLET ORAL at 09:28

## 2019-05-12 RX ADMIN — POLYETHYLENE GLYCOL 3350 17 GRAM(S): 17 POWDER, FOR SOLUTION ORAL at 11:28

## 2019-05-12 RX ADMIN — ENTACAPONE 100 MILLIGRAM(S): 200 TABLET, FILM COATED ORAL at 09:28

## 2019-05-12 RX ADMIN — CARBIDOPA AND LEVODOPA 2 TABLET(S): 25; 100 TABLET ORAL at 18:38

## 2019-05-12 RX ADMIN — Medication 100 MILLIGRAM(S): at 18:38

## 2019-05-12 RX ADMIN — Medication 25 MILLIGRAM(S): at 06:03

## 2019-05-12 RX ADMIN — CARBIDOPA AND LEVODOPA 2 TABLET(S): 25; 100 TABLET ORAL at 13:20

## 2019-05-12 RX ADMIN — ENTACAPONE 100 MILLIGRAM(S): 200 TABLET, FILM COATED ORAL at 15:58

## 2019-05-12 RX ADMIN — SENNA PLUS 2 TABLET(S): 8.6 TABLET ORAL at 21:11

## 2019-05-12 RX ADMIN — ENTACAPONE 100 MILLIGRAM(S): 200 TABLET, FILM COATED ORAL at 11:28

## 2019-05-12 RX ADMIN — TAMSULOSIN HYDROCHLORIDE 0.4 MILLIGRAM(S): 0.4 CAPSULE ORAL at 21:11

## 2019-05-12 NOTE — PROGRESS NOTE ADULT - PROBLEM SELECTOR PLAN 1
ct w r iliopsoas intramuscular hematoma  f/u sx recs  no evidence of overt gib; fobt neg  monitor cbc, transfuse prn  cont ppi, carafate bid  dw wife, defers endoscopic w/u at present  will follow

## 2019-05-12 NOTE — PROGRESS NOTE ADULT - SUBJECTIVE AND OBJECTIVE BOX
INTERVAL HPI/OVERNIGHT EVENTS:  pt seen and examined  more awake alert this am, denies abd pain  per rn no acute gi issues overnight    MEDICATIONS  (STANDING):  carbidopa/levodopa  25/100 2 Tablet(s) Oral <User Schedule>  docusate sodium 100 milliGRAM(s) Oral two times a day  entacapone 100 milliGRAM(s) Oral <User Schedule>  metoprolol tartrate 25 milliGRAM(s) Oral two times a day  pantoprazole   Suspension 40 milliGRAM(s) Oral before breakfast  senna 2 Tablet(s) Oral at bedtime  sertraline 50 milliGRAM(s) Oral daily  sucralfate suspension 1 Gram(s) Oral two times a day  tamsulosin 0.4 milliGRAM(s) Oral at bedtime    MEDICATIONS  (PRN):  acetaminophen  Suppository .. 650 milliGRAM(s) Rectal every 6 hours PRN Temp greater or equal to 38C (100.4F)      Allergies    No Known Allergies    Intolerances        Review of Systems:    see above unable to obtain in entirety    Vital Signs Last 24 Hrs  T(C): 37.3 (12 May 2019 07:55), Max: 37.3 (12 May 2019 07:55)  T(F): 99.2 (12 May 2019 07:55), Max: 99.2 (12 May 2019 07:55)  HR: 93 (12 May 2019 07:55) (84 - 106)  BP: 92/66 (12 May 2019 07:55) (92/66 - 118/81)  BP(mean): --  RR: 15 (12 May 2019 07:55) (15 - 18)  SpO2: 94% (12 May 2019 07:55) (94% - 98%)    PHYSICAL EXAM:    General:  nad  HEENT:  NC/AT  Chest:  dec bs  Cardiovascular:  Regular rhythm, S1, S2  Abdomen:  Soft, non-tender, non-distended  Extremities:  no edema  Skin:  No rash  Neuro/Psych:  awake alert      LABS:                        9.9    5.77  )-----------( 229      ( 12 May 2019 07:29 )             29.9     05-12    141  |  110<H>  |  14  ----------------------------<  78  4.2   |  25  |  0.74    Ca    9.0      12 May 2019 07:29  Mg     2.2     05-12    TPro  5.7<L>  /  Alb  2.3<L>  /  TBili  0.8  /  DBili  x   /  AST  30  /  ALT  24  /  AlkPhos  47  05-11    PT/INR - ( 12 May 2019 07:29 )   PT: 14.2 sec;   INR: 1.24 ratio         PTT - ( 11 May 2019 06:42 )  PTT:31.7 sec      RADIOLOGY & ADDITIONAL TESTS:

## 2019-05-12 NOTE — PROGRESS NOTE ADULT - ASSESSMENT
78y M PMH Afib (on coumadin), CVA (2012, 2016), Parkinson's disease, dementia, HTN, urinary retention, and multiple TIAs presents with syncope likely vasovagal due to anemia with possible GI bleed vs dehydration vs orthostatics due urinary medications and supratherapeutic INR.  He has been found to have Right iliopsoas intramuscular hematoma and right trace free retroperitoneal hemorrhage.    Syncope  - The etiology of his syncope could be from blood loss  - No clear evidence of acute ischemia, trops negative x 2.  - No acute changes on EKG compared to previous  - Negative orthostatics from supine to fowlers position, will cont Flomax for now  - TTE shows 11/2017 with borderline LVH, EF 65%, mild AS/ MR, normal pulmonary pressures.     Anemia   -S/P PRBC   -Resolved   -Transfuse prn maintain H/H > 7. Hgb stable  -Vascular Surgery consult pending    Chronic Atrial Fibrillation  - hold coumadin in setting of iliopsoas hematoma  - Afib rate controlled   - No signs of ischemia.   - Continue low dose Metoprolol with hold parameters    HLD  - Continue zetia     DVT ppx  - PAS    - Further cardiac workup will depend on clinical course.   - will continue to follow    Melissa Neves NP-C  Cardiology

## 2019-05-12 NOTE — CONSULT NOTE ADULT - SUBJECTIVE AND OBJECTIVE BOX
History of Present Illness:  78y.o. male with a history of atrial fibrillation and hx. of syncope was admitted with a elevated INR > 6  coupled with anemia that required transfusions. CT scan showed a right psoas hematoma. Warfarin was stopped. I was requested to evaluate the hematoma. The patient denies abdomen or flank pain.     PAST MEDICAL & SURGICAL HISTORY:  Syncope  Parkinson disease  Constipation  Dementia  Urinary retention  Cerebrovascular accident  Hypertension  Atrial fibrillation  No significant past surgical history      Allergies    No Known Allergies    Intolerances        MEDICATIONS  (STANDING):  carbidopa/levodopa  25/100 2 Tablet(s) Oral <User Schedule>  docusate sodium 100 milliGRAM(s) Oral two times a day  entacapone 100 milliGRAM(s) Oral <User Schedule>  metoprolol tartrate 25 milliGRAM(s) Oral two times a day  pantoprazole   Suspension 40 milliGRAM(s) Oral before breakfast  polyethylene glycol 3350 17 Gram(s) Oral daily  senna 2 Tablet(s) Oral at bedtime  sertraline 50 milliGRAM(s) Oral daily  sucralfate suspension 1 Gram(s) Oral two times a day  tamsulosin 0.4 milliGRAM(s) Oral at bedtime    MEDICATIONS  (PRN):  acetaminophen  Suppository .. 650 milliGRAM(s) Rectal every 6 hours PRN Temp greater or equal to 38C (100.4F)      Social History:  Smoking History: denies  Alcohol Use: denies    REVIEW OF SYSTEMS:  CONSTITUTIONAL: No weakness, fevers or chills  EYES/ENT: No visual changes;  No vertigo or throat pain   NECK: No pain or stiffness  RESPIRATORY: No cough, wheezing, hemoptysis; No shortness of breath  CARDIOVASCULAR: No chest pain or palpitations. ++ syncope  GASTROINTESTINAL: No abdominal or epigastric pain. No nausea, vomiting, or hematemesis; No diarrhea or constipation. No melena or hematochezia.  GENITOURINARY: No dysuria, frequency or hematuria  NEUROLOGICAL: No numbness or weakness  SKIN: No itching, burning, rashes, or lesions   Vascular:  No lower extremity claudication, pedal rest pain or digital ulcers  All other review of systems is negative unless indicated above.    PHYSICAL EXAM:  General:  On exam, the patient is a awake nontoxic appearing Male in no acute distress.  Vital Signs Last 24 Hrs  T(C): 36.5 (12 May 2019 12:25), Max: 37.3 (12 May 2019 07:55)  T(F): 97.7 (12 May 2019 12:25), Max: 99.2 (12 May 2019 07:55)  HR: 79 (12 May 2019 12:25) (79 - 95)  BP: 110/75 (12 May 2019 12:25) (92/66 - 118/81)  BP(mean): --  RR: 18 (12 May 2019 12:25) (15 - 18)  SpO2: 98% (12 May 2019 12:25) (94% - 98%)    Neck:  4+/4+ bilateral carotid pulses; no carotid bruit, no palpable cervical masses.  Heart:  irregular, no murmurs, rubs or gallops.    Lungs:  decreased BS at both bases   Chest:  No chest wall deformities  Symmetrical chest expansion.   Abdomen: Soft and nontender.  No palpable masses.  No rebound, guarding or rigidity.  Extremities:  Feet are warm, pink with normal capillary refill times.  There are no digital ulcers or heel decubiti.  The calf and thigh muscles are soft and nontender.  There are no palpable cords or limb cellulitis.  Melissa's sign is negative bilaterally.  There is no lower extremity edema, cyanosis, or rubor.  On examination of the peripheral pulses:  Left leg femoral pulse is  4/4  , popliteal pulse is 4/4   ,PT Pulse is  3/4 , DP Pulse is 3/4  Right leg femoral pulse is 4/4   ,popliteal pulse is  4/4  , PT Pulse is 3/4  , DP Pulse is 3/4   Neurological:  There are no motor or sensory deficits in either lower extremity.                          9.9    5.77  )-----------( 229      ( 12 May 2019 07:29 )             29.9     05-12    141  |  110<H>  |  14  ----------------------------<  78  4.2   |  25  |  0.74    Ca    9.0      12 May 2019 07:29  Mg     2.2     05-12    TPro  5.7<L>  /  Alb  2.3<L>  /  TBili  0.8  /  DBili  x   /  AST  30  /  ALT  24  /  AlkPhos  47  05-11        PT/INR - ( 12 May 2019 07:29 )   PT: 14.2 sec;   INR: 1.24 ratio         PTT - ( 11 May 2019 06:42 )  PTT:31.7 sec    Radiology:

## 2019-05-12 NOTE — PROGRESS NOTE ADULT - SUBJECTIVE AND OBJECTIVE BOX
CHIEF COMPLAINT/INTERVAL HISTORY:  Pt. seen and evaluated for syncope likely secondary to acute blood loss anemia.  Pt. is in no distress.  Awake in bed being fed breakfast.  Hbg stable.      REVIEW OF SYSTEMS:  No fever.  Rest of ROS not obtained as patient refusing to verbalize.      Vital Signs Last 24 Hrs  T(C): 37.3 (12 May 2019 07:55), Max: 37.3 (12 May 2019 07:55)  T(F): 99.2 (12 May 2019 07:55), Max: 99.2 (12 May 2019 07:55)  HR: 93 (12 May 2019 07:55) (84 - 106)  BP: 92/66 (12 May 2019 07:55) (92/66 - 118/81)  BP(mean): --  RR: 15 (12 May 2019 07:55) (15 - 18)  SpO2: 94% (12 May 2019 07:55) (94% - 98%)    PHYSICAL EXAM:  GENERAL: NAD  HEENT: EOMI, conjunctiva and sclera clear  Chest: Diminished BS bilaterally, no wheezing  CV: S1S2, RRR,   GI: soft, +BS, NT/ND  Musculoskeletal: no edema  Psychiatric: awake.  not anxious  Skin: warm and dry    LABS:                        9.9    5.77  )-----------( 229      ( 12 May 2019 07:29 )             29.9     05-12    141  |  110<H>  |  14  ----------------------------<  78  4.2   |  25  |  0.74    Ca    9.0      12 May 2019 07:29  Mg     2.2     05-12    TPro  5.7<L>  /  Alb  2.3<L>  /  TBili  0.8  /  DBili  x   /  AST  30  /  ALT  24  /  AlkPhos  47  05-11    PT/INR - ( 12 May 2019 07:29 )   PT: 14.2 sec;   INR: 1.24 ratio         PTT - ( 11 May 2019 06:42 )  PTT:31.7 sec      Assessment and Plan:  -Syncope: Likely 2/2 acute blood loss anemia/RPB.  Telemetry monitoring.  Echo with EF 65%.    Cardiology and Neurology f/u  -Fever:  Blood cx and urine cx negative.  2/2 hematoma.  Off IV Zosyn.  ID f/u  -Acute blood loss anemia:  stool OB negative.  CT A/P with Right iliopsoas intramuscular hematoma and right trace free   retroperitoneal hemorrhage. s/p 1 unit PRBC transfusion.  Hbg stable.  Holding coumadin.  PRBC transfusions PRN.  Vascular surgery consult.    -Parkinson's disease:  continue  Sinemet and Comtan.  -Urinary retention:  continue Flomax 0.4mg PO QHS  -HTN:  continue metoprolol 25mg PO BID.   -Afib:  continue metoprolol 25mg PO BID.  Holding coumadin 2/2 RPB and hematoma.    -VTE ppx:  SCD

## 2019-05-12 NOTE — CONSULT NOTE ADULT - ASSESSMENT
78 y.o. male with atrial fibrillation developed a sizeable right iliopsoas hematoma on Coumadin  with a INR > 6.  He is hemodynamically stable. I would recommend a cardiology consult regarding his atrial fibrillation and the need for AC therapy??. I would recommend holding the AC therapy at this time

## 2019-05-12 NOTE — PROGRESS NOTE ADULT - SUBJECTIVE AND OBJECTIVE BOX
[facility] -- Rogelio Robb, Flor, Sandi, Rick Holloway Savella  Office # [phone]      Follow Up:  Syncope    Subjective/Observations: Seen and examined.  Awake and alert unable to obtain meaningful history from patient.  Occasional Cough.  NAD.        REVIEW OF SYSTEMS: All other review of systems is negative unless indicated above    PAST MEDICAL & SURGICAL HISTORY:  Syncope  Parkinson disease  Constipation  Dementia  Urinary retention  Cerebrovascular accident  Hypertension  Atrial fibrillation  No significant past surgical history      MEDICATIONS  (STANDING):  carbidopa/levodopa  25/100 2 Tablet(s) Oral <User Schedule>  docusate sodium 100 milliGRAM(s) Oral two times a day  entacapone 100 milliGRAM(s) Oral <User Schedule>  metoprolol tartrate 25 milliGRAM(s) Oral two times a day  pantoprazole   Suspension 40 milliGRAM(s) Oral before breakfast  senna 2 Tablet(s) Oral at bedtime  sertraline 50 milliGRAM(s) Oral daily  sucralfate suspension 1 Gram(s) Oral two times a day  tamsulosin 0.4 milliGRAM(s) Oral at bedtime    MEDICATIONS  (PRN):  acetaminophen  Suppository .. 650 milliGRAM(s) Rectal every 6 hours PRN Temp greater or equal to 38C (100.4F)      Allergies    No Known Allergies    Intolerances            Vital Signs Last 24 Hrs  T(C): 37.3 (12 May 2019 07:55), Max: 37.3 (12 May 2019 07:55)  T(F): 99.2 (12 May 2019 07:55), Max: 99.2 (12 May 2019 07:55)  HR: 93 (12 May 2019 07:55) (84 - 106)  BP: 92/66 (12 May 2019 07:55) (92/66 - 118/81)  BP(mean): --  RR: 15 (12 May 2019 07:55) (15 - 18)  SpO2: 94% (12 May 2019 07:55) (94% - 98%)    I&O's Summary        PHYSICAL EXAM:  TELE: SR 90-100s  Constitutional: NAD, awake and alert, well-developed, frail  HEENT: Moist Mucous Membranes, Anicteric  Pulmonary: Non-labored, breath sounds with rhonchi that clears with cough anteriorly. No wheezing or rales noted  Cardiovascular: Regular, S1 and S2, No murmurs, rubs, gallops or clicks  Gastrointestinal: Bowel Sounds present, soft, nontender.   Lymph: No peripheral edema. No lymphadenopathy.  Skin: No visible rashes or ulcers.  Psych:  Mood & affect appropriate    LABS: All Labs Reviewed:                        9.9    5.77  )-----------( 229      ( 12 May 2019 07:29 )             29.9                         8.8    5.58  )-----------( 225      ( 11 May 2019 06:42 )             26.2                         9.5    5.91  )-----------( 241      ( 10 May 2019 22:12 )             28.2     12 May 2019 07:29    141    |  110    |  14     ----------------------------<  78     4.2     |  25     |  0.74   11 May 2019 06:42    144    |  111    |  12     ----------------------------<  93     3.1     |  28     |  0.81   10 May 2019 06:15    145    |  113    |  11     ----------------------------<  99     3.0     |  24     |  0.68     Ca    9.0        12 May 2019 07:29  Ca    8.9        11 May 2019 06:42  Ca    8.5        10 May 2019 06:15  Mg     2.2       12 May 2019 07:29    TPro  5.7    /  Alb  2.3    /  TBili  0.8    /  DBili  x      /  AST  30     /  ALT  24     /  AlkPhos  47     11 May 2019 06:42  TPro  5.4    /  Alb  2.2    /  TBili  0.9    /  DBili  x      /  AST  39     /  ALT  14     /  AlkPhos  42     10 May 2019 06:15    PT/INR - ( 12 May 2019 07:29 )   PT: 14.2 sec;   INR: 1.24 ratio      PTT - ( 11 May 2019 06:42 )  PTT:31.7 sec    < from: 12 Lead ECG (05.07.19 @ 16:58) >  Ventricular Rate 98 BPM    Atrial Rate 227 BPM    QRS Duration 84 ms    Q-T Interval 354 ms    QTC Calculation(Bezet) 451 ms    R Axis -16 degrees    T Axis 10 degrees    Diagnosis Line Atrial fibrillation with premature ventricular or aberrantly conducted complexes  Poor data quality  Nonspecific T wave abnormality  Abnormal ECG  When compared with ECG of 05-MAY-2019 17:16, (Unconfirmed)  No significant change was found  Confirmed by Kolton Ray MD ([id number]) on 5/8/2019 11:02:22 AM    < end of copied text >  < from: TTE Echo Doppler w/o Cont (05.08.19 @ 09:47) >   EXAM:  ECHO TTE WO CON COMP W DOPPLR         PROCEDURE DATE:  05/08/2019        INTERPRETATION:  INDICATION: Syncope    Blood Pressure 99/68    Height 176 cm     Weight 72 kg       BSA        Dimensions:    LA 4.4       Normal Values: 2.0 - 4.0 cm   Ao 3.3        Normal Values: 2.0 - 3.8 cm  SEPTUM 0.9       Normal Values: 0.6 - 1.2 cm  PWT 0.9       Normal Values: 0.6 - 1.1 cm  LVIDd 4.9         Normal Values: 3.0 - 5.6 cm  LVIDs 2.4         Normal Values: 1.8 - 4.0 cm      OBSERVATIONS:    Mitral Valve: Thickened leaflets, mild MR.  Aortic Valve/Aorta: Calcified trileaflet aortic valve with decreased   opening. Mild aortic stenosis, peak aortic valve gradient is 15.2 mmHg   with a mean gradient of 8.2 mmHg. Aortic valve area calculated by   continuity equation is 1.23 sq cm  Tricuspid Valve: normal with trace TR.  Pulmonic Valve: normal  Left Atrium: Dilated  Right Atrium: normal  Left Ventricle: normal LV size and systolic function, estimated LVEF of   65%.  Right Ventricle: normalsize and systolic function.  Pericardium/Pleura: normal, no significant pericardial effusion.      Conclusion:     Normal left ventricular internal dimensions and systolic function,   estimated LVEF of 65%.    Calcified trileaflet aortic valve with decreased opening. Mild aortic   stenosis, peak aortic valve gradient is 15.2 mmHg with a mean gradient of   8.2 mmHg. Aortic valve area calculated by continuity equation is 1.23 sq   cm  Mitral valve with thickened leaflets, mild mitral regurgitation  Normal RV size and systolic function.   Trace physiologic TR.      No significant pericardial effusion.                  SKYLA MATAMOROS   This document has been electronically signed. May  9 2019  8:45AM              < end of copied text >      < from: CT Abdomen and Pelvis No Cont (05.10.19 @ 13:58) >  EXAM:  CT ABDOMEN AND PELVIS                            PROCEDURE DATE:  05/10/2019          INTERPRETATION:  CLINICAL INFORMATION: Anemia, rule out retroperitoneal   bleed.    COMPARISON: 5/12/2014    PROCEDURE:   CT of the Abdomen and Pelvis was performed without intravenous contrast.   Intravenous contrast: None.  Oral contrast: None.  Sagittal and coronal reformats were performed.    FINDINGS:    LOWER CHEST: Small bilateral pleural effusions. Bibasilar atelectasis,   left greater than right. Coronary and aortic atherosclerosis. Mild   gynecomastia.    LIVER: Hepatic cysts and additional hypodensities too small to   characterize. Scattered coarse calcifications.  BILE DUCTS: Normal caliber.  GALLBLADDER: Cholelithiasis.  SPLEEN: Within normal limits.  PANCREAS: Within normal limits.  ADRENALS: Within normal limits.  KIDNEYS/URETERS: Multifocal areas of scarring within the left kidney with   dystrophic calcifications. No hydronephrosis or obstructing stones.    BLADDER: Within normallimits.  REPRODUCTIVE ORGANS: Mildly enlarged prostate.    BOWEL: No bowel obstruction. Appendix within normal limits.  PERITONEUM: No ascites.  VESSELS:  Atherosclerotic calcifications of the aorta and branch vessels.   Infrarenal IVC filter.  LYMPHNODES: No lymphadenopathy.    ABDOMINAL WALL: Small fat-containing umbilical hernia. Small   fat-containing right inguinal hernia.  BONES: No suspicious osseous lesion. Diffuse osteopenia. Degenerative   changes within the spine and pelvis.  OTHER:  Marked asymmetric enlargement of the right iliacus muscle with   internal hyperdensity compatible with hematoma. Moderate asymmetric   enlargement of the right psoas muscle with heterogeneous attenuation also   suggestive of hematoma. Trace free hemorrhagic fluid and stranding within   the right retroperitoneal space.    IMPRESSION: Right iliopsoas intramuscular hematoma and right trace free   retroperitoneal hemorrhage. Findings were discussed with Dr. Ramirez at   2:15PM on 5/11/19.                    GERALDO SAMANIEGO M.D., ATTENDING RADIOLOGIST  This document has been electronically signed. May 10 2019  2:19PM    < end of copied text >     < from: US Duplex Carotid Arteries Complete, Bilateral (05.08.19 @ 09:20) >  EXAM:  US DPLX CAROTIDS COMPL BI                            PROCEDURE DATE:  05/08/2019          INTERPRETATION:  History: Syncope.    Bilateral extracranial carotid Doppler. Prior dated 2/16/2017.    Small amount of partially calcified plaque in the distal left CCA. No   significant subjective luminal narrowing bilaterally. Peak systolic flow   velocities in bilateral CCA, ICA and ECA fall within normal range. Normal   bilateral ICA/CCA velocity ratios. Antegrade flow each vertebral artery.    Impression: No flow-limiting stenosis.                COY BARRIOS M.D., ATTENDING RADIOLOGIST  This document has been electronically signed. May  8 2019  9:58AM          < end of copied text >  < from: CT Head No Cont (05.07.19 @ 18:19) >  EXAM:  CT BRAIN                            PROCEDURE DATE:  05/07/2019          INTERPRETATION:  CLINICAL INFORMATION: Syncope. On anticoagulation.    COMPARISON: CT head of 5/5/2019.    PROCEDURE:   Noncontrast CT of the Head was performed from the skull base to the   vertex. Coronal and Sagittal reformats were obtained.    FINDINGS:    Motion degraded examination.    There is no acute intracranial hemorrhage, mass effect, midline shift,   extra-axial collection, hydrocephalus, or evidence of acute vascular   territorial infarction.    Moderate patchy hypodensities within the periventricular and subcortical   white matter, although nonspecific, likely reflect chronic microvascular   disease. Hypodensities within the bilateral basal gangliaand right   thalamus, consistent with chronic lacunar infarcts. Cerebral white loss   results in prominence of the ventricles and sulci.    The visualized paranasal sinuses and mastoid air cells are clear.   Intraorbital contents are unremarkable. Nocalvarial fracture.    IMPRESSION:     Motion limited examination. No acute intracranial hemorrhage or calvarial   fracture within the limitations of the exam.    If clinical suspicion persists, consider short-term repeat noncontrast CT   evaluation.                ANDRY DELAROSA M.D., ATTENDING RADIOLOGIST  This document has been electronically signed. May  7 2019  6:25PM                < end of copied text >    < from: Xray Chest 1 View AP/PA (04.25.19 @ 16:10) >  EXAM:  XR CHEST AP OR PA 1V                            PROCEDURE DATE:  04/25/2019          INTERPRETATION:  Portable chest x-ray     Indication: Sepsis.    2 portable chest x-ray are compared to a previous examination dated   11/14/2018.    Impression: The lung apices are obscured by the patient's chin. No gross   pulmonary consolidation, pleural effusion or pneumothorax.    New small left basilar atelectasis.    Stable cardiac silhouette.                JAIME PRINCE M.D., ATTENDING RADIOLOGIST  This document has been electronically signed. Apr 25 2019  4:20PM    < end of copied text >

## 2019-05-12 NOTE — PROGRESS NOTE ADULT - SUBJECTIVE AND OBJECTIVE BOX
Neurology Follow up note    JOSÉ MIGUEL COOPER78yMale    HPI:  78y M PMH Afib (on coumadin), CVA (2012, 2016), Parkinson's disease, dementia, HTN, urinary retention, and multiple TIAs presents s/p syncope. Patient is a poor historian due to dementia. Per wife, patient has been having multiple syncopal episodes over past 3 days. No precipitating events or triggers. Episodes are not positional or associated with movement or activity. Today, patient was at home when he had an episode. Wife then tried to wake him up and noticed he was unresponsive. Patient was back to baseline once awake with no residual symptoms. Patient is not ambulatory and typically sits in a wheel-chair unless participating in PT. PO intake has not changed compared to baseline but wife notes it is difficulty to hydrate given he is on a nectar thickened diet ROS not obtainable secondary to mental status but wife denies recent fevers, chills, vomiting, diarrhea.      In the ED: Vitals T(F): 96.6F HR: 106 BP: 122/72 RR: 18 SpO2: 96% RA. HGB 9.4, HCT 27.9, INR 6.11, Cl 112, Glucose 110, Albumin 3.0, AST 93, ALT 9, CT Head: No acute intracranial hemorrhage or calvarial fracture within the limitations of the exam. CXR: No active pulmonary disease. No change. Seen by Cardio: TTE ordered, check orthostatics, continue to trend troponin (07 May 2019 20:27)      Interval History - no new weakness    Patient is seen, chart was reviewed and case was discussed with the treatment team.  Pt is not in any distress.   Lying on bed comfortably.   No events reported overnight.   No clinical seizure was reported.  Sitting on chair bed comfortably.    is at bedside.    Vital Signs Last 24 Hrs  T(C): 36.5 (12 May 2019 12:25), Max: 37.3 (12 May 2019 07:55)  T(F): 97.7 (12 May 2019 12:25), Max: 99.2 (12 May 2019 07:55)  HR: 79 (12 May 2019 12:25) (79 - 95)  BP: 110/75 (12 May 2019 12:25) (92/66 - 118/81)  BP(mean): --  RR: 18 (12 May 2019 12:25) (15 - 18)  SpO2: 98% (12 May 2019 12:25) (94% - 98%)        REVIEW OF SYSTEMS:    limited;in any distress    On Neurological Examination:    Mental Status - Pt is alert, awake,  following simple commands,      Speech - dysarthria.    Cranial Nerves - Pupils 3 mm equal and reactive to light, extraocular eye movements intact.   Pt has no facial asymmetry. Facial sensation is intact.Tongue - is in midline.    Muscle tone - cogwheel rigidity    Motor Exam - 4/5 of UE  LE 4/5, EXCEPT R, HIP.    Sensory Exam -. Pt withdraws all extremities equally on stimulation. No asymmetry seen.        Deep tendon Reflexes - 2 plus all over.      Neck Supple -  Yes.     MEDICATIONS    acetaminophen  Suppository .. 650 milliGRAM(s) Rectal every 6 hours PRN  carbidopa/levodopa  25/100 2 Tablet(s) Oral <User Schedule>  dextrose 5% + sodium chloride 0.45% with potassium chloride 20 mEq/L 1000 milliLiter(s) IV Continuous <Continuous>  docusate sodium 100 milliGRAM(s) Oral two times a day  entacapone 100 milliGRAM(s) Oral <User Schedule>  metoprolol tartrate 25 milliGRAM(s) Oral two times a day  piperacillin/tazobactam IVPB. 3.375 Gram(s) IV Intermittent every 8 hours  senna 2 Tablet(s) Oral at bedtime  sertraline 50 milliGRAM(s) Oral daily  tamsulosin 0.4 milliGRAM(s) Oral at bedtime  warfarin 1 milliGRAM(s) Oral once      Allergies    No Known Allergies    Intolerances                              8.5    5.20  )-----------( 195      ( 10 May 2019 06:15 )             24.4         Urinalysis Basic - ( 08 May 2019 07:53 )    Color: Charis / Appearance: Slightly Turbid / S.020 / pH: x  Gluc: x / Ketone: Small  / Bili: Negative / Urobili: Negative   Blood: x / Protein: 25 mg/dL / Nitrite: Negative   Leuk Esterase: Small / RBC: 11-25 /HPF / WBC 11-25   Sq Epi: x / Non Sq Epi: Few / Bacteria: Moderate      05-09    146<H>  |  114<H>  |  12  ----------------------------<  97  3.5   |  26  |  0.72    Ca    9.3      09 May 2019 06:59  Mg     2.1         TPro  6.1  /  Alb  2.5<L>  /  TBili  1.3<H>  /  DBili  x   /  AST  62<H>  /  ALT  25  /  AlkPhos  51      Hemoglobin A1C:     Vitamin B12     RADIOLOGY  < from: CT Abdomen and Pelvis No Cont (05.10.19 @ 13:58) >    EXAM:  CT ABDOMEN AND PELVIS                            PROCEDURE DATE:  05/10/2019          INTERPRETATION:  CLINICAL INFORMATION: Anemia, rule out retroperitoneal   bleed.    COMPARISON: 2014    PROCEDURE:   CT of the Abdomen and Pelvis was performed without intravenous contrast.   Intravenous contrast: None.  Oral contrast: None.  Sagittal and coronal reformats were performed.    FINDINGS:    LOWER CHEST: Small bilateral pleural effusions. Bibasilar atelectasis,   left greater than right. Coronary and aortic atherosclerosis. Mild   gynecomastia.    LIVER: Hepatic cysts and additional hypodensities too small to   characterize. Scattered coarse calcifications.  BILE DUCTS: Normal caliber.  GALLBLADDER: Cholelithiasis.  SPLEEN: Within normal limits.  PANCREAS: Within normal limits.  ADRENALS: Within normal limits.  KIDNEYS/URETERS: Multifocal areas of scarring within the left kidney with   dystrophic calcifications. No hydronephrosis or obstructing stones.    BLADDER: Within normallimits.  REPRODUCTIVE ORGANS: Mildly enlarged prostate.    BOWEL: No bowel obstruction. Appendix within normal limits.  PERITONEUM: No ascites.  VESSELS:  Atherosclerotic calcifications of the aorta and branch vessels.   Infrarenal IVC filter.  LYMPHNODES: No lymphadenopathy.    ABDOMINAL WALL: Small fat-containing umbilical hernia. Small   fat-containing right inguinal hernia.  BONES: No suspicious osseous lesion. Diffuse osteopenia. Degenerative   changes within the spine and pelvis.  OTHER:  Marked asymmetric enlargement of the right iliacus muscle with   internal hyperdensity compatible with hematoma. Moderate asymmetric   enlargement of the right psoas muscle with heterogeneous attenuation also   suggestive of hematoma. Trace free hemorrhagic fluid and stranding within   the right retroperitoneal space.    IMPRESSION: Right iliopsoas intramuscular hematoma and right trace free   retroperitoneal hemorrhage. Findings were discussed with Dr. Ramirez at   2:15PM on 19.        GERALDO SAMANIEGO M.D., ATTENDING RADIOLOGIST  This document has been electronically signed. May 10 2019  2:19PM          ASSESSMENT AND PLAN:      SYNCOPE.  HO OF PD.  HX OF CVA.  RECURRENT UTI.  ANEMIA.  right ILIOPSOAS HEMATOMA.      WIFE NOT IN FAVOR OF DEMETRIO,  CONTINUE SINEMET.  Physical therapy evaluation.  OOB to chair/ambulation with assistance only.  Advanced care planning was discussed with family.  Pain is accessed and addressed.  Plan of care was discussed with family. Questions answered.  Would continue to follow.

## 2019-05-12 NOTE — PROGRESS NOTE ADULT - SUBJECTIVE AND OBJECTIVE BOX
KENNETH JOSÉ MIGUEL is a 78yMale , patient examined and chart reviewed.    INTERVAL HPI/ OVERNIGHT EVENTS:   Afebrile. H/H stable.  No events. Awake confused.    PAST MEDICAL & SURGICAL HISTORY:  Syncope  Parkinson disease  Constipation  Dementia  Urinary retention  Cerebrovascular accident  Hypertension  Atrial fibrillation  No significant past surgical history    For details regarding the patient's social history, family history, and other miscellaneous elements, please refer the initial infectious diseases consultation and/or the admitting history and physical examination for this admission.    ROS:  Unable to obtain due to : Dementia    Current inpatient medications :  MEDICATIONS  (STANDING):  carbidopa/levodopa  25/100 2 Tablet(s) Oral <User Schedule>  docusate sodium 100 milliGRAM(s) Oral two times a day  entacapone 100 milliGRAM(s) Oral <User Schedule>  metoprolol tartrate 25 milliGRAM(s) Oral two times a day  pantoprazole   Suspension 40 milliGRAM(s) Oral before breakfast  senna 2 Tablet(s) Oral at bedtime  sertraline 50 milliGRAM(s) Oral daily  sucralfate suspension 1 Gram(s) Oral two times a day  tamsulosin 0.4 milliGRAM(s) Oral at bedtime    MEDICATIONS  (PRN):  acetaminophen  Suppository .. 650 milliGRAM(s) Rectal every 6 hours PRN Temp greater or equal to 38C (100.4F)      Objective:  Vital Signs Last 24 Hrs  T(C): 36.6 (12 May 2019 15:54), Max: 37.3 (12 May 2019 07:55)  T(F): 97.9 (12 May 2019 15:54), Max: 99.2 (12 May 2019 07:55)  HR: 82 (12 May 2019 15:54) (79 - 93)  BP: 104/72 (12 May 2019 15:54) (92/66 - 118/81)  RR: 18 (12 May 2019 15:54) (15 - 18)  SpO2: 99% (12 May 2019 15:54) (94% - 99%)    Physical Exam:  General:  no acute distress  Eyes: sclera anicteric, pupils equal and reactive to light  ENMT: buccal mucosa moist, pharynx not injected  Neck: supple, trachea midline  Lungs: clear, no wheeze/rhonchi  Cardiovascular: regular rate and rhythm, S1 S2  Abdomen: soft, nontender, no organomegaly present, bowel sounds normal  Neurological: alert and oriented x0, Cranial Nerves II-XII grossly intact  Skin: no increased ecchymosis/petechiae/purpura  Lymph Nodes: no palpable cervical/supraclavicular lymph nodes enlargements  Extremities: no cyanosis/clubbing/edema    LABS:                              9.9    5.77  )-----------( 229      ( 12 May 2019 07:29 )             29.9   05-12    141  |  110<H>  |  14  ----------------------------<  78  4.2   |  25  |  0.74    Ca    9.0      12 May 2019 07:29  Mg     2.2     05-12    TPro  5.7<L>  /  Alb  2.3<L>  /  TBili  0.8  /  DBili  x   /  AST  30  /  ALT  24  /  AlkPhos  47  05-11      MICROBIOLOGY:    Culture - Blood (collected 08 May 2019 12:38)  Source: .Blood Blood-Peripheral  Preliminary Report (09 May 2019 13:01):    No growth to date.    Culture - Blood (collected 08 May 2019 12:38)  Source: .Blood Blood-Peripheral  Preliminary Report (09 May 2019 13:01):    No growth to date.    Culture - Urine (collected 08 May 2019 11:36)  Source: .Urine Clean Catch (Midstream)  Final Report (09 May 2019 14:53):    No growth      RADIOLOGY & ADDITIONAL STUDIES:    EXAM:  CT ABDOMEN AND PELVIS                            PROCEDURE DATE:  05/10/2019          INTERPRETATION:  CLINICAL INFORMATION: Anemia, rule out retroperitoneal   bleed.    COMPARISON: 5/12/2014    PROCEDURE:   CT of the Abdomen and Pelvis was performed without intravenous contrast.   Intravenous contrast: None.  Oral contrast: None.  Sagittal and coronal reformats were performed.    FINDINGS:    LOWER CHEST: Small bilateral pleural effusions. Bibasilar atelectasis,   left greater than right. Coronary and aortic atherosclerosis. Mild   gynecomastia.    LIVER: Hepatic cysts and additional hypodensities too small to   characterize. Scattered coarse calcifications.  BILE DUCTS: Normal caliber.  GALLBLADDER: Cholelithiasis.  SPLEEN: Within normal limits.  PANCREAS: Within normal limits.  ADRENALS: Within normal limits.  KIDNEYS/URETERS: Multifocal areas of scarring within the left kidney with   dystrophic calcifications. No hydronephrosis or obstructing stones.    BLADDER: Within normallimits.  REPRODUCTIVE ORGANS: Mildly enlarged prostate.    BOWEL: No bowel obstruction. Appendix within normal limits.  PERITONEUM: No ascites.  VESSELS:  Atherosclerotic calcifications of the aorta and branch vessels.   Infrarenal IVC filter.  LYMPHNODES: No lymphadenopathy.    ABDOMINAL WALL: Small fat-containing umbilical hernia. Small   fat-containing right inguinal hernia.  BONES: No suspicious osseous lesion. Diffuse osteopenia. Degenerative   changes within the spine and pelvis.  OTHER:  Marked asymmetric enlargement of the right iliacus muscle with   internal hyperdensity compatible with hematoma. Moderate asymmetric   enlargement of the right psoas muscle with heterogeneous attenuation also   suggestive of hematoma. Trace free hemorrhagic fluid and stranding within   the right retroperitoneal space.    IMPRESSION: Right iliopsoas intramuscular hematoma and right trace free   retroperitoneal hemorrhage.    EXAM:  XR CHEST AP OR PA 1V                          PROCEDURE DATE:  05/07/2019      INTERPRETATION:  History: Chest pain    Semiupright portable chest x-ray is compared to 5/5/2019.    The study is slightly limited by rotation. The heart isnot enlarged. The   lungs are clear. There is osteopenia and degenerative change of the bony   structures.    Impression: No active pulmonary disease. No change.    Assessment :  78y M PMH Afib (on coumadin), CVA (2012, 2016), Parkinson's disease, dementia, HTN, urinary retention, and multiple TIAs recent admission for hematuria just discharged 5/2/19 readmitted sec to recurrent syncopal episodes at home. Sp fever Tmax 101 Likely reactive to right iliopsoas intramuscular hematoma and right trace free retroperitoneal hemorrhage. UTI ruled out Cultures thus far ngtd. Fever resolved. H/H stable. Overall stable.    Plan :   Monitor off antibiotics  Trend temps and wbc  Asp precautions  Stable from ID standpoint    Continue with present regiment.  Appropriate use of antibiotics and adverse effects reviewed.      I have discussed the above plan of care with patient/ family in detail. They expressed understanding of the  treatment plan . Risks, benefits and alternatives discussed in detail. I have asked if they have any questions or concerns and appropriately addressed them to the best of my ability .    > 35 minutes were spent in direct patient care reviewing notes, medications ,labs data/ imaging , discussion with multidisciplinary team.    Thank you for allowing me to participate in care of your patient .    Sanjeev Cole MD  Infectious Disease  [phone]

## 2019-05-13 LAB
ANION GAP SERPL CALC-SCNC: 7 MMOL/L — SIGNIFICANT CHANGE UP (ref 5–17)
BUN SERPL-MCNC: 16 MG/DL — SIGNIFICANT CHANGE UP (ref 7–23)
CALCIUM SERPL-MCNC: 8.9 MG/DL — SIGNIFICANT CHANGE UP (ref 8.5–10.1)
CHLORIDE SERPL-SCNC: 110 MMOL/L — HIGH (ref 96–108)
CO2 SERPL-SCNC: 25 MMOL/L — SIGNIFICANT CHANGE UP (ref 22–31)
CREAT SERPL-MCNC: 0.82 MG/DL — SIGNIFICANT CHANGE UP (ref 0.5–1.3)
CULTURE RESULTS: SIGNIFICANT CHANGE UP
CULTURE RESULTS: SIGNIFICANT CHANGE UP
GLUCOSE SERPL-MCNC: 100 MG/DL — HIGH (ref 70–99)
HCT VFR BLD CALC: 28.7 % — LOW (ref 39–50)
HGB BLD-MCNC: 9.8 G/DL — LOW (ref 13–17)
INR BLD: 1.31 RATIO — HIGH (ref 0.88–1.16)
MAGNESIUM SERPL-MCNC: 2.3 MG/DL — SIGNIFICANT CHANGE UP (ref 1.6–2.6)
MCHC RBC-ENTMCNC: 33 PG — SIGNIFICANT CHANGE UP (ref 27–34)
MCHC RBC-ENTMCNC: 34.1 GM/DL — SIGNIFICANT CHANGE UP (ref 32–36)
MCV RBC AUTO: 96.6 FL — SIGNIFICANT CHANGE UP (ref 80–100)
NRBC # BLD: 0 /100 WBCS — SIGNIFICANT CHANGE UP (ref 0–0)
PLATELET # BLD AUTO: 255 K/UL — SIGNIFICANT CHANGE UP (ref 150–400)
POTASSIUM SERPL-MCNC: 3.5 MMOL/L — SIGNIFICANT CHANGE UP (ref 3.5–5.3)
POTASSIUM SERPL-SCNC: 3.5 MMOL/L — SIGNIFICANT CHANGE UP (ref 3.5–5.3)
PROTHROM AB SERPL-ACNC: 14.9 SEC — HIGH (ref 10–12.9)
RBC # BLD: 2.97 M/UL — LOW (ref 4.2–5.8)
RBC # FLD: 15.2 % — HIGH (ref 10.3–14.5)
SODIUM SERPL-SCNC: 142 MMOL/L — SIGNIFICANT CHANGE UP (ref 135–145)
SPECIMEN SOURCE: SIGNIFICANT CHANGE UP
SPECIMEN SOURCE: SIGNIFICANT CHANGE UP
WBC # BLD: 5.65 K/UL — SIGNIFICANT CHANGE UP (ref 3.8–10.5)
WBC # FLD AUTO: 5.65 K/UL — SIGNIFICANT CHANGE UP (ref 3.8–10.5)

## 2019-05-13 PROCEDURE — 99232 SBSQ HOSP IP/OBS MODERATE 35: CPT

## 2019-05-13 PROCEDURE — 99232 SBSQ HOSP IP/OBS MODERATE 35: CPT | Mod: GC

## 2019-05-13 PROCEDURE — 70450 CT HEAD/BRAIN W/O DYE: CPT | Mod: 26

## 2019-05-13 RX ORDER — LANOLIN ALCOHOL/MO/W.PET/CERES
5 CREAM (GRAM) TOPICAL ONCE
Refills: 0 | Status: COMPLETED | OUTPATIENT
Start: 2019-05-13 | End: 2019-05-13

## 2019-05-13 RX ORDER — ACETAMINOPHEN 500 MG
650 TABLET ORAL ONCE
Refills: 0 | Status: COMPLETED | OUTPATIENT
Start: 2019-05-13 | End: 2019-05-13

## 2019-05-13 RX ADMIN — CARBIDOPA AND LEVODOPA 2 TABLET(S): 25; 100 TABLET ORAL at 19:00

## 2019-05-13 RX ADMIN — PANTOPRAZOLE SODIUM 40 MILLIGRAM(S): 20 TABLET, DELAYED RELEASE ORAL at 05:12

## 2019-05-13 RX ADMIN — TAMSULOSIN HYDROCHLORIDE 0.4 MILLIGRAM(S): 0.4 CAPSULE ORAL at 21:33

## 2019-05-13 RX ADMIN — Medication 100 MILLIGRAM(S): at 17:27

## 2019-05-13 RX ADMIN — SERTRALINE 50 MILLIGRAM(S): 25 TABLET, FILM COATED ORAL at 11:31

## 2019-05-13 RX ADMIN — SENNA PLUS 2 TABLET(S): 8.6 TABLET ORAL at 21:33

## 2019-05-13 RX ADMIN — Medication 25 MILLIGRAM(S): at 17:27

## 2019-05-13 RX ADMIN — Medication 1 GRAM(S): at 05:12

## 2019-05-13 RX ADMIN — POLYETHYLENE GLYCOL 3350 17 GRAM(S): 17 POWDER, FOR SOLUTION ORAL at 09:11

## 2019-05-13 RX ADMIN — CARBIDOPA AND LEVODOPA 2 TABLET(S): 25; 100 TABLET ORAL at 17:28

## 2019-05-13 RX ADMIN — Medication 1 GRAM(S): at 17:27

## 2019-05-13 RX ADMIN — ENTACAPONE 100 MILLIGRAM(S): 200 TABLET, FILM COATED ORAL at 11:31

## 2019-05-13 RX ADMIN — Medication 25 MILLIGRAM(S): at 05:12

## 2019-05-13 RX ADMIN — ENTACAPONE 100 MILLIGRAM(S): 200 TABLET, FILM COATED ORAL at 09:10

## 2019-05-13 RX ADMIN — Medication 100 MILLIGRAM(S): at 05:12

## 2019-05-13 RX ADMIN — CARBIDOPA AND LEVODOPA 2 TABLET(S): 25; 100 TABLET ORAL at 11:31

## 2019-05-13 RX ADMIN — ENTACAPONE 100 MILLIGRAM(S): 200 TABLET, FILM COATED ORAL at 19:00

## 2019-05-13 RX ADMIN — CARBIDOPA AND LEVODOPA 2 TABLET(S): 25; 100 TABLET ORAL at 09:11

## 2019-05-13 RX ADMIN — Medication 650 MILLIGRAM(S): at 00:52

## 2019-05-13 NOTE — PROGRESS NOTE ADULT - SUBJECTIVE AND OBJECTIVE BOX
INTERVAL HPI/OVERNIGHT EVENTS:  pt seen and examined  resting in bed  hgb stable    MEDICATIONS  (STANDING):  carbidopa/levodopa  25/100 2 Tablet(s) Oral <User Schedule>  docusate sodium 100 milliGRAM(s) Oral two times a day  entacapone 100 milliGRAM(s) Oral <User Schedule>  metoprolol tartrate 25 milliGRAM(s) Oral two times a day  pantoprazole   Suspension 40 milliGRAM(s) Oral before breakfast  polyethylene glycol 3350 17 Gram(s) Oral daily  senna 2 Tablet(s) Oral at bedtime  sertraline 50 milliGRAM(s) Oral daily  sucralfate suspension 1 Gram(s) Oral two times a day  tamsulosin 0.4 milliGRAM(s) Oral at bedtime    MEDICATIONS  (PRN):  acetaminophen  Suppository .. 650 milliGRAM(s) Rectal every 6 hours PRN Temp greater or equal to 38C (100.4F)      Allergies    No Known Allergies    Intolerances        Review of Systems:    unable to obtain      Vital Signs Last 24 Hrs  T(C): 36.6 (13 May 2019 07:25), Max: 36.9 (13 May 2019 05:34)  T(F): 97.9 (13 May 2019 07:25), Max: 98.4 (13 May 2019 05:34)  HR: 99 (13 May 2019 07:25) (79 - 99)  BP: 142/92 (13 May 2019 07:25) (104/72 - 142/92)  BP(mean): --  RR: 18 (13 May 2019 07:25) (18 - 18)  SpO2: 99% (13 May 2019 07:25) (97% - 99%)    PHYSICAL EXAM:    General:  nad  HEENT:  NC/AT  Chest:  dec bs  Cardiovascular:  Regular rhythm, S1, S2  Abdomen:  Soft, non-tender, non-distended  Extremities:  no edema  Skin:  No rash  Neuro/Psych:  awake alert        LABS:                        9.8    5.65  )-----------( 255      ( 13 May 2019 07:15 )             28.7     05-13    142  |  110<H>  |  16  ----------------------------<  100<H>  3.5   |  25  |  0.82    Ca    8.9      13 May 2019 07:15  Mg     2.3     05-13      PT/INR - ( 13 May 2019 07:15 )   PT: 14.9 sec;   INR: 1.31 ratio               RADIOLOGY & ADDITIONAL TESTS:

## 2019-05-13 NOTE — PROGRESS NOTE ADULT - PROBLEM SELECTOR PLAN 1
ct w r iliopsoas intramuscular hematoma  surgery eval noted, hold ac  no evidence of overt gib; fobt neg  monitor cbc, transfuse prn  cont ppi, carafate bid  dw wife, defers endoscopic w/u  will follow ct w r iliopsoas intramuscular hematoma  surgery eval noted, hold ac  no evidence of overt gib; fobt neg  monitor cbc, transfuse prn  cont ppi, carafate bid  dw wife, defers endoscopic w/u if needed  will follow

## 2019-05-13 NOTE — PROGRESS NOTE ADULT - SUBJECTIVE AND OBJECTIVE BOX
[facility] -- Rogelio Robb, Flor, Sandi, Rick Holloway Savella  Office # [phone]    Follow Up:  Afib on Coumadin    Subjective/Observations: Asleep, comfortable on RA.  Spouse at bedside, states, patient never c/o discomfort    REVIEW OF SYSTEMS: All other review of systems is negative unless indicated above    PAST MEDICAL & SURGICAL HISTORY:  Syncope  Parkinson disease  Constipation  Dementia  Urinary retention  Cerebrovascular accident  Hypertension  Atrial fibrillation  No significant past surgical history    MEDICATIONS  (STANDING):  carbidopa/levodopa  25/100 2 Tablet(s) Oral <User Schedule>  docusate sodium 100 milliGRAM(s) Oral two times a day  entacapone 100 milliGRAM(s) Oral <User Schedule>  metoprolol tartrate 25 milliGRAM(s) Oral two times a day  pantoprazole   Suspension 40 milliGRAM(s) Oral before breakfast  polyethylene glycol 3350 17 Gram(s) Oral daily  senna 2 Tablet(s) Oral at bedtime  sertraline 50 milliGRAM(s) Oral daily  sucralfate suspension 1 Gram(s) Oral two times a day  tamsulosin 0.4 milliGRAM(s) Oral at bedtime    MEDICATIONS  (PRN):  acetaminophen  Suppository .. 650 milliGRAM(s) Rectal every 6 hours PRN Temp greater or equal to 38C (100.4F)    Allergies    No Known Allergies    Intolerances    Vital Signs Last 24 Hrs  T(C): 36.6 (13 May 2019 07:25), Max: 36.9 (13 May 2019 05:34)  T(F): 97.9 (13 May 2019 07:25), Max: 98.4 (13 May 2019 05:34)  HR: 99 (13 May 2019 07:25) (79 - 99)  BP: 142/92 (13 May 2019 07:25) (104/72 - 142/92)  BP(mean): --  RR: 18 (13 May 2019 07:25) (18 - 18)  SpO2: 99% (13 May 2019 07:25) (97% - 99%)    I&O's Summary    12 May 2019 07:01  -  13 May 2019 07:00  --------------------------------------------------------  IN: 120 mL / OUT: 3 mL / NET: 117 mL    PHYSICAL EXAM:  TELE: Afib  Constitutional: NAD, asleep, well-developed  HEENT: Moist Mucous Membranes, Anicteric  Pulmonary: Non-labored, breath sounds are clear bilaterally, No wheezing, rales or rhonchi  Cardiovascular: Irregularly irregular, S1 and S2, No murmurs, rubs, gallops or clicks  Gastrointestinal: Bowel Sounds present, soft, nontender.   Lymph: No peripheral edema. No lymphadenopathy.  Skin: No visible rashes or ulcers.  Psych:  Mood & affect.  Unable to assess at this time    LABS: All Labs Reviewed:                        9.8    5.65  )-----------( 255      ( 13 May 2019 07:15 )             28.7                         9.9    5.77  )-----------( 229      ( 12 May 2019 07:29 )             29.9                         8.8    5.58  )-----------( 225      ( 11 May 2019 06:42 )             26.2     13 May 2019 07:15    142    |  110    |  16     ----------------------------<  100    3.5     |  25     |  0.82   12 May 2019 07:29    141    |  110    |  14     ----------------------------<  78     4.2     |  25     |  0.74   11 May 2019 06:42    144    |  111    |  12     ----------------------------<  93     3.1     |  28     |  0.81     Ca    8.9        13 May 2019 07:15  Ca    9.0        12 May 2019 07:29  Ca    8.9        11 May 2019 06:42  Mg     2.3       13 May 2019 07:15  Mg     2.2       12 May 2019 07:29    TPro  5.7    /  Alb  2.3    /  TBili  0.8    /  DBili  x      /  AST  30     /  ALT  24     /  AlkPhos  47     11 May 2019 06:42    PT/INR - ( 13 May 2019 07:15 )   PT: 14.9 sec;   INR: 1.31 ratio      < from: TTE Echo Doppler w/o Cont (05.08.19 @ 09:47) >     EXAM:  ECHO TTE WO CON COMP W DOPPLR         PROCEDURE DATE:  05/08/2019        INTERPRETATION:  INDICATION: Syncope    Blood Pressure 99/68    Height 176 cm     Weight 72 kg       BSA        Dimensions:    LA 4.4       Normal Values: 2.0 - 4.0 cm   Ao 3.3        Normal Values: 2.0 - 3.8 cm  SEPTUM 0.9       Normal Values: 0.6 - 1.2 cm  PWT 0.9       Normal Values: 0.6 - 1.1 cm  LVIDd 4.9         Normal Values: 3.0 - 5.6 cm  LVIDs 2.4         Normal Values: 1.8 - 4.0 cm      OBSERVATIONS:    Mitral Valve: Thickened leaflets, mild MR.  Aortic Valve/Aorta: Calcified trileaflet aortic valve with decreased   opening. Mild aortic stenosis, peak aortic valve gradient is 15.2 mmHg   with a mean gradient of 8.2 mmHg. Aortic valve area calculated by   continuity equation is 1.23 sq cm  Tricuspid Valve: normal with trace TR.  Pulmonic Valve: normal  Left Atrium: Dilated  Right Atrium: normal  Left Ventricle: normal LV size and systolic function, estimated LVEF of   65%.  Right Ventricle: normalsize and systolic function.  Pericardium/Pleura: normal, no significant pericardial effusion.      Conclusion:     Normal left ventricular internal dimensions and systolic function,   estimated LVEF of 65%.    Calcified trileaflet aortic valve with decreased opening. Mild aortic   stenosis, peak aortic valve gradient is 15.2 mmHg with a mean gradient of   8.2 mmHg. Aortic valve area calculated by continuity equation is 1.23 sq   cm  Mitral valve with thickened leaflets, mild mitral regurgitation  Normal RV size and systolic function.   Trace physiologic TR.      No significant pericardial effusion.     SKYLA MATAMOROS   This document has been electronically signed. May  9 2019  8:45AM      < end of copied text >    < from: Xray Chest 1 View AP/PA (05.07.19 @ 17:36) >    EXAM:  XR CHEST AP OR PA 1V                            PROCEDURE DATE:  05/07/2019          INTERPRETATION:  History: Chest pain    Semiupright portable chest x-ray is compared to 5/5/2019.    The study is slightly limited by rotation. The heart isnot enlarged. The   lungs are clear. There is osteopenia and degenerative change of the bony   structures.    Impression: No active pulmonary disease. No change.    MICHELLE LOUIS M.D.,ATTENDING RADIOLOGIST  This document has been electronically signed. May  7 2019  6:42PM      < end of copied text >    < from: CT Abdomen and Pelvis No Cont (05.10.19 @ 13:58) >    EXAM:  CT ABDOMEN AND PELVIS                            PROCEDURE DATE:  05/10/2019          INTERPRETATION:  CLINICAL INFORMATION: Anemia, rule out retroperitoneal   bleed.    COMPARISON: 5/12/2014    PROCEDURE:   CT of the Abdomen and Pelvis was performed without intravenous contrast.   Intravenous contrast: None.  Oral contrast: None.  Sagittal and coronal reformats were performed.    FINDINGS:    LOWER CHEST: Small bilateral pleural effusions. Bibasilar atelectasis,   left greater than right. Coronary and aortic atherosclerosis. Mild   gynecomastia.    LIVER: Hepatic cysts and additional hypodensities too small to   characterize. Scattered coarse calcifications.  BILE DUCTS: Normal caliber.  GALLBLADDER: Cholelithiasis.  SPLEEN: Within normal limits.  PANCREAS: Within normal limits.  ADRENALS: Within normal limits.  KIDNEYS/URETERS: Multifocal areas of scarring within the left kidney with   dystrophic calcifications. No hydronephrosis or obstructing stones.    BLADDER: Within normallimits.  REPRODUCTIVE ORGANS: Mildly enlarged prostate.    BOWEL: No bowel obstruction. Appendix within normal limits.  PERITONEUM: No ascites.  VESSELS:  Atherosclerotic calcifications of the aorta and branch vessels.   Infrarenal IVC filter.  LYMPHNODES: No lymphadenopathy.    ABDOMINAL WALL: Small fat-containing umbilical hernia. Small   fat-containing right inguinal hernia.  BONES: No suspicious osseous lesion. Diffuse osteopenia. Degenerative   changes within the spine and pelvis.  OTHER:  Marked asymmetric enlargement of the right iliacus muscle with   internal hyperdensity compatible with hematoma. Moderate asymmetric   enlargement of the right psoas muscle with heterogeneous attenuation also   suggestive of hematoma. Trace free hemorrhagic fluid and stranding within   the right retroperitoneal space.    IMPRESSION: Right iliopsoas intramuscular hematoma and right trace free   retroperitoneal hemorrhage. Findings were discussed with Dr. Ramirez at   2:15PM on 5/11/19.    GERALDO SAMANIEGO M.D., ATTENDING RADIOLOGIST  This document has been electronically signed. May 10 2019  2:19PM      < end of copied text > [facility] -- Rogelio Robb, Flor, Sandi, Rick Holloway Savella  Office # [phone]    Follow Up:  Afib on Coumadin    Subjective/Observations: Asleep, comfortable on RA.  Spouse at bedside, states, patient never c/o discomfort. Pt unable to provide meaningful information.     REVIEW OF SYSTEMS: Limited 2/2 comorbidities     PAST MEDICAL & SURGICAL HISTORY:  Syncope  Parkinson disease  Constipation  Dementia  Urinary retention  Cerebrovascular accident  Hypertension  Atrial fibrillation  No significant past surgical history    MEDICATIONS  (STANDING):  carbidopa/levodopa  25/100 2 Tablet(s) Oral <User Schedule>  docusate sodium 100 milliGRAM(s) Oral two times a day  entacapone 100 milliGRAM(s) Oral <User Schedule>  metoprolol tartrate 25 milliGRAM(s) Oral two times a day  pantoprazole   Suspension 40 milliGRAM(s) Oral before breakfast  polyethylene glycol 3350 17 Gram(s) Oral daily  senna 2 Tablet(s) Oral at bedtime  sertraline 50 milliGRAM(s) Oral daily  sucralfate suspension 1 Gram(s) Oral two times a day  tamsulosin 0.4 milliGRAM(s) Oral at bedtime    MEDICATIONS  (PRN):  acetaminophen  Suppository .. 650 milliGRAM(s) Rectal every 6 hours PRN Temp greater or equal to 38C (100.4F)    Allergies    No Known Allergies    Intolerances    Vital Signs Last 24 Hrs  T(C): 36.6 (13 May 2019 07:25), Max: 36.9 (13 May 2019 05:34)  T(F): 97.9 (13 May 2019 07:25), Max: 98.4 (13 May 2019 05:34)  HR: 99 (13 May 2019 07:25) (79 - 99)  BP: 142/92 (13 May 2019 07:25) (104/72 - 142/92)  BP(mean): --  RR: 18 (13 May 2019 07:25) (18 - 18)  SpO2: 99% (13 May 2019 07:25) (97% - 99%)    I&O's Summary    12 May 2019 07:01  -  13 May 2019 07:00  --------------------------------------------------------  IN: 120 mL / OUT: 3 mL / NET: 117 mL    PHYSICAL EXAM:  TELE: Afib  Constitutional: NAD, asleep   HEENT: Moist Mucous Membranes, Anicteric  Pulmonary: Decreased breath sounds b/l. No rales, crackles or wheeze appreciated.   Cardiovascular: Irregularly irregular, S1 and S2, No murmurs, rubs, gallops or clicks  Gastrointestinal: Bowel Sounds present, soft, nontender.   Lymph: No peripheral edema. No lymphadenopathy.  Skin: No visible rashes or ulcers.  Psych: Unable to assess at this time    LABS: All Labs Reviewed:                        9.8    5.65  )-----------( 255      ( 13 May 2019 07:15 )             28.7                         9.9    5.77  )-----------( 229      ( 12 May 2019 07:29 )             29.9                         8.8    5.58  )-----------( 225      ( 11 May 2019 06:42 )             26.2     13 May 2019 07:15    142    |  110    |  16     ----------------------------<  100    3.5     |  25     |  0.82   12 May 2019 07:29    141    |  110    |  14     ----------------------------<  78     4.2     |  25     |  0.74   11 May 2019 06:42    144    |  111    |  12     ----------------------------<  93     3.1     |  28     |  0.81     Ca    8.9        13 May 2019 07:15  Ca    9.0        12 May 2019 07:29  Ca    8.9        11 May 2019 06:42  Mg     2.3       13 May 2019 07:15  Mg     2.2       12 May 2019 07:29    TPro  5.7    /  Alb  2.3    /  TBili  0.8    /  DBili  x      /  AST  30     /  ALT  24     /  AlkPhos  47     11 May 2019 06:42    PT/INR - ( 13 May 2019 07:15 )   PT: 14.9 sec;   INR: 1.31 ratio      < from: TTE Echo Doppler w/o Cont (05.08.19 @ 09:47) >     EXAM:  ECHO TTE WO CON COMP W DOPPLR         PROCEDURE DATE:  05/08/2019        INTERPRETATION:  INDICATION: Syncope    Blood Pressure 99/68    Height 176 cm     Weight 72 kg       BSA        Dimensions:    LA 4.4       Normal Values: 2.0 - 4.0 cm   Ao 3.3        Normal Values: 2.0 - 3.8 cm  SEPTUM 0.9       Normal Values: 0.6 - 1.2 cm  PWT 0.9       Normal Values: 0.6 - 1.1 cm  LVIDd 4.9         Normal Values: 3.0 - 5.6 cm  LVIDs 2.4         Normal Values: 1.8 - 4.0 cm      OBSERVATIONS:    Mitral Valve: Thickened leaflets, mild MR.  Aortic Valve/Aorta: Calcified trileaflet aortic valve with decreased   opening. Mild aortic stenosis, peak aortic valve gradient is 15.2 mmHg   with a mean gradient of 8.2 mmHg. Aortic valve area calculated by   continuity equation is 1.23 sq cm  Tricuspid Valve: normal with trace TR.  Pulmonic Valve: normal  Left Atrium: Dilated  Right Atrium: normal  Left Ventricle: normal LV size and systolic function, estimated LVEF of   65%.  Right Ventricle: normalsize and systolic function.  Pericardium/Pleura: normal, no significant pericardial effusion.      Conclusion:     Normal left ventricular internal dimensions and systolic function,   estimated LVEF of 65%.    Calcified trileaflet aortic valve with decreased opening. Mild aortic   stenosis, peak aortic valve gradient is 15.2 mmHg with a mean gradient of   8.2 mmHg. Aortic valve area calculated by continuity equation is 1.23 sq   cm  Mitral valve with thickened leaflets, mild mitral regurgitation  Normal RV size and systolic function.   Trace physiologic TR.      No significant pericardial effusion.     SKYLA MATAMOROS   This document has been electronically signed. May  9 2019  8:45AM      < end of copied text >    < from: Xray Chest 1 View AP/PA (05.07.19 @ 17:36) >    EXAM:  XR CHEST AP OR PA 1V                            PROCEDURE DATE:  05/07/2019          INTERPRETATION:  History: Chest pain    Semiupright portable chest x-ray is compared to 5/5/2019.    The study is slightly limited by rotation. The heart isnot enlarged. The   lungs are clear. There is osteopenia and degenerative change of the bony   structures.    Impression: No active pulmonary disease. No change.    MICHELLE LOUIS M.D.,ATTENDING RADIOLOGIST  This document has been electronically signed. May  7 2019  6:42PM      < end of copied text >    < from: CT Abdomen and Pelvis No Cont (05.10.19 @ 13:58) >    EXAM:  CT ABDOMEN AND PELVIS                            PROCEDURE DATE:  05/10/2019          INTERPRETATION:  CLINICAL INFORMATION: Anemia, rule out retroperitoneal   bleed.    COMPARISON: 5/12/2014    PROCEDURE:   CT of the Abdomen and Pelvis was performed without intravenous contrast.   Intravenous contrast: None.  Oral contrast: None.  Sagittal and coronal reformats were performed.    FINDINGS:    LOWER CHEST: Small bilateral pleural effusions. Bibasilar atelectasis,   left greater than right. Coronary and aortic atherosclerosis. Mild   gynecomastia.    LIVER: Hepatic cysts and additional hypodensities too small to   characterize. Scattered coarse calcifications.  BILE DUCTS: Normal caliber.  GALLBLADDER: Cholelithiasis.  SPLEEN: Within normal limits.  PANCREAS: Within normal limits.  ADRENALS: Within normal limits.  KIDNEYS/URETERS: Multifocal areas of scarring within the left kidney with   dystrophic calcifications. No hydronephrosis or obstructing stones.    BLADDER: Within normallimits.  REPRODUCTIVE ORGANS: Mildly enlarged prostate.    BOWEL: No bowel obstruction. Appendix within normal limits.  PERITONEUM: No ascites.  VESSELS:  Atherosclerotic calcifications of the aorta and branch vessels.   Infrarenal IVC filter.  LYMPHNODES: No lymphadenopathy.    ABDOMINAL WALL: Small fat-containing umbilical hernia. Small   fat-containing right inguinal hernia.  BONES: No suspicious osseous lesion. Diffuse osteopenia. Degenerative   changes within the spine and pelvis.  OTHER:  Marked asymmetric enlargement of the right iliacus muscle with   internal hyperdensity compatible with hematoma. Moderate asymmetric   enlargement of the right psoas muscle with heterogeneous attenuation also   suggestive of hematoma. Trace free hemorrhagic fluid and stranding within   the right retroperitoneal space.    IMPRESSION: Right iliopsoas intramuscular hematoma and right trace free   retroperitoneal hemorrhage. Findings were discussed with Dr. Ramirez at   2:15PM on 5/11/19.    GERALDO SAMANIEGO M.D., ATTENDING RADIOLOGIST  This document has been electronically signed. May 10 2019  2:19PM      < end of copied text >

## 2019-05-13 NOTE — PROGRESS NOTE ADULT - SUBJECTIVE AND OBJECTIVE BOX
KENNETHJOSÉ MIGUEL GUPTA is a 78yMale , patient examined and chart reviewed.    INTERVAL HPI/ OVERNIGHT EVENTS:   Poorly responsive today.  Afebrile. Wife at bedside.    PAST MEDICAL & SURGICAL HISTORY:  Syncope  Parkinson disease  Constipation  Dementia  Urinary retention  Cerebrovascular accident  Hypertension  Atrial fibrillation  No significant past surgical history    For details regarding the patient's social history, family history, and other miscellaneous elements, please refer the initial infectious diseases consultation and/or the admitting history and physical examination for this admission.    ROS:  Unable to obtain due to : Dementia    Current inpatient medications :  MEDICATIONS  (STANDING):  carbidopa/levodopa  25/100 2 Tablet(s) Oral <User Schedule>  docusate sodium 100 milliGRAM(s) Oral two times a day  entacapone 100 milliGRAM(s) Oral <User Schedule>  metoprolol tartrate 25 milliGRAM(s) Oral two times a day  pantoprazole   Suspension 40 milliGRAM(s) Oral before breakfast  senna 2 Tablet(s) Oral at bedtime  sertraline 50 milliGRAM(s) Oral daily  sucralfate suspension 1 Gram(s) Oral two times a day  tamsulosin 0.4 milliGRAM(s) Oral at bedtime    MEDICATIONS  (PRN):  acetaminophen  Suppository .. 650 milliGRAM(s) Rectal every 6 hours PRN Temp greater or equal to 38C (100.4F)    Objective:  Vital Signs Last 24 Hrs  T(C): 36.8 (13 May 2019 11:30), Max: 36.9 (13 May 2019 05:34)  T(F): 98.2 (13 May 2019 11:30), Max: 98.4 (13 May 2019 05:34)  HR: 77 (13 May 2019 11:30) (77 - 99)  BP: 124/77 (13 May 2019 11:30) (104/72 - 142/92)  RR: 18 (13 May 2019 11:30) (18 - 18)  SpO2: 100% (13 May 2019 11:30) (97% - 100%)    Physical Exam:  General: poorly responsive  Eyes: sclera anicteric, pupils equal and reactive to light  ENMT: buccal mucosa moist, pharynx not injected  Neck: supple, trachea midline  Lungs: clear, no wheeze/rhonchi  Cardiovascular: regular rate and rhythm, S1 S2  Abdomen: soft, nontender, no organomegaly present, bowel sounds normal  Neurological: Poorly responsive Cranial Nerves II-XII grossly intact  Skin: no increased ecchymosis/petechiae/purpura  Lymph Nodes: no palpable cervical/supraclavicular lymph nodes enlargements  Extremities: no cyanosis/clubbing/edema    LABS:                              9.8    5.65  )-----------( 255      ( 13 May 2019 07:15 )             28.7   05-13    142  |  110<H>  |  16  ----------------------------<  100<H>  3.5   |  25  |  0.82    Ca    8.9      13 May 2019 07:15  Mg     2.3     05-13    MICROBIOLOGY:    Culture - Blood (collected 08 May 2019 12:38)  Source: .Blood Blood-Peripheral  Preliminary Report (09 May 2019 13:01):    No growth to date.    Culture - Blood (collected 08 May 2019 12:38)  Source: .Blood Blood-Peripheral  Preliminary Report (09 May 2019 13:01):    No growth to date.    Culture - Urine (collected 08 May 2019 11:36)  Source: .Urine Clean Catch (Midstream)  Final Report (09 May 2019 14:53):    No growth      RADIOLOGY & ADDITIONAL STUDIES:    EXAM:  CT ABDOMEN AND PELVIS                            PROCEDURE DATE:  05/10/2019          INTERPRETATION:  CLINICAL INFORMATION: Anemia, rule out retroperitoneal   bleed.    COMPARISON: 5/12/2014    PROCEDURE:   CT of the Abdomen and Pelvis was performed without intravenous contrast.   Intravenous contrast: None.  Oral contrast: None.  Sagittal and coronal reformats were performed.    FINDINGS:    LOWER CHEST: Small bilateral pleural effusions. Bibasilar atelectasis,   left greater than right. Coronary and aortic atherosclerosis. Mild   gynecomastia.    LIVER: Hepatic cysts and additional hypodensities too small to   characterize. Scattered coarse calcifications.  BILE DUCTS: Normal caliber.  GALLBLADDER: Cholelithiasis.  SPLEEN: Within normal limits.  PANCREAS: Within normal limits.  ADRENALS: Within normal limits.  KIDNEYS/URETERS: Multifocal areas of scarring within the left kidney with   dystrophic calcifications. No hydronephrosis or obstructing stones.    BLADDER: Within normallimits.  REPRODUCTIVE ORGANS: Mildly enlarged prostate.    BOWEL: No bowel obstruction. Appendix within normal limits.  PERITONEUM: No ascites.  VESSELS:  Atherosclerotic calcifications of the aorta and branch vessels.   Infrarenal IVC filter.  LYMPHNODES: No lymphadenopathy.    ABDOMINAL WALL: Small fat-containing umbilical hernia. Small   fat-containing right inguinal hernia.  BONES: No suspicious osseous lesion. Diffuse osteopenia. Degenerative   changes within the spine and pelvis.  OTHER:  Marked asymmetric enlargement of the right iliacus muscle with   internal hyperdensity compatible with hematoma. Moderate asymmetric   enlargement of the right psoas muscle with heterogeneous attenuation also   suggestive of hematoma. Trace free hemorrhagic fluid and stranding within   the right retroperitoneal space.    IMPRESSION: Right iliopsoas intramuscular hematoma and right trace free   retroperitoneal hemorrhage.    EXAM:  XR CHEST AP OR PA 1V                          PROCEDURE DATE:  05/07/2019      INTERPRETATION:  History: Chest pain    Semiupright portable chest x-ray is compared to 5/5/2019.    The study is slightly limited by rotation. The heart isnot enlarged. The   lungs are clear. There is osteopenia and degenerative change of the bony   structures.    Impression: No active pulmonary disease. No change.    Assessment :  78y M PMH Afib (on coumadin), CVA (2012, 2016), Parkinson's disease, dementia, HTN, urinary retention, and multiple TIAs recent admission for hematuria just discharged 5/2/19 readmitted sec to recurrent syncopal episodes at home. Sp fever Tmax 101 Likely reactive to right iliopsoas intramuscular hematoma and right trace free retroperitoneal hemorrhage. UTI ruled out Cultures thus far ngtd. Fever resolved. H/H stable. Mental status change with obtundation today.    Plan :   Fu CT HEAD  Monitor off antibiotics  Trend temps and wbc  Asp precautions  Stable from ID standpoint    Continue with present regiment.  Appropriate use of antibiotics and adverse effects reviewed.      I have discussed the above plan of care with patient's family in detail. They expressed understanding of the  treatment plan . Risks, benefits and alternatives discussed in detail. I have asked if they have any questions or concerns and appropriately addressed them to the best of my ability .    > 35 minutes were spent in direct patient care reviewing notes, medications ,labs data/ imaging , discussion with multidisciplinary team.    Thank you for allowing me to participate in care of your patient .    Sanjeev Cole MD  Infectious Disease  [phone]

## 2019-05-13 NOTE — PROGRESS NOTE ADULT - ASSESSMENT
78y M PMH Afib (on coumadin), CVA (2012, 2016), Parkinson's disease, dementia, HTN, urinary retention, and multiple TIAs presents s/p syncope, admitted for syncopal workup.    1. Syncope   - Likely 2/2 ABLA from RPB. pt with iliopsoas hematoma.  - vascular surg consulted, recs no int at this time. trend CBCs.  - hold coumadin in setting of bleed, INR 1.31. May DC AC altogether, pending cardiology recs. Pt to f/w with Dr. Grey as outpt on DC.  - DC Telemetry monitoring per cardio. Echo with EF 65%.    - Cardiology and Neurology f/u  - pt with acute AMS this afternoon, CTH ordered. F/u report.    2. Fever:    - Blood cx and urine cx negative.  2/2 hematoma.   - Off IV Zosyn.  ID f/u    3. Acute blood loss anemia:    - stool OB negative.  CT A/P with Right iliopsoas intramuscular hematoma and right trace free   - retroperitoneal hemorrhage. s/p 1 unit PRBC transfusion.  Hbg stable.  Holding coumadin.  PRBC transfusions PRN. Vascular surgery consult.      4. Parkinson's disease: chronic    - continue Sinemet and Comtan.    5. Urinary retention: stable  - continue Flomax 0.4mg PO QHS    6. HTN: stable  - continue metoprolol 25mg PO BID w/ hold parameters    7. Afib: stable  - continue metoprolol 25mg PO BID. Holding coumadin 2/2 RPB and hematoma.  - DC TELE monitoring per cardio      8. VTE ppx:     IMPROVE VTE Individual Risk Assessment          RISK                                                          Points  [  ] Previous VTE                                                3  [ x ] Thrombophilia                                             2  [  ] Lower limb paralysis                                   2        (unable to hold up >15 seconds)    [  ] Current Cancer                                             2         (within 6 months)  [  ] Immobilization > 24 hrs                              1  [  ] ICU/CCU stay > 24 hours                             1  [ x ] Age > 60                                                         1    IMPROVE VTE Score: 3. SCDs for DVT ppx.

## 2019-05-13 NOTE — PROGRESS NOTE ADULT - SUBJECTIVE AND OBJECTIVE BOX
Neurology Follow up note    JOSÉ MIGUEL COOPER78yMale    HPI:  78y M PMH Afib (on coumadin), CVA (2012, 2016), Parkinson's disease, dementia, HTN, urinary retention, and multiple TIAs presents s/p syncope. Patient is a poor historian due to dementia. Per wife, patient has been having multiple syncopal episodes over past 3 days. No precipitating events or triggers. Episodes are not positional or associated with movement or activity. Today, patient was at home when he had an episode. Wife then tried to wake him up and noticed he was unresponsive. Patient was back to baseline once awake with no residual symptoms. Patient is not ambulatory and typically sits in a wheel-chair unless participating in PT. PO intake has not changed compared to baseline but wife notes it is difficulty to hydrate given he is on a nectar thickened diet ROS not obtainable secondary to mental status but wife denies recent fevers, chills, vomiting, diarrhea.      In the ED: Vitals T(F): 96.6F HR: 106 BP: 122/72 RR: 18 SpO2: 96% RA. HGB 9.4, HCT 27.9, INR 6.11, Cl 112, Glucose 110, Albumin 3.0, AST 93, ALT 9, CT Head: No acute intracranial hemorrhage or calvarial fracture within the limitations of the exam. CXR: No active pulmonary disease. No change. Seen by Cardio: TTE ordered, check orthostatics, continue to trend troponin (07 May 2019 20:27)      Interval History - no new events,    Patient is seen, chart was reviewed and case was discussed with the treatment team.  Pt is not in any distress.   Lying on bed comfortably.   No events reported overnight.   No clinical seizure was reported.  Sitting on chair bed comfortably.    is at bedside.    Vital Signs Last 24 Hrs  T(C): 36.6 (13 May 2019 07:25), Max: 36.9 (13 May 2019 05:34)  T(F): 97.9 (13 May 2019 07:25), Max: 98.4 (13 May 2019 05:34)  HR: 99 (13 May 2019 07:25) (79 - 99)  BP: 142/92 (13 May 2019 07:25) (104/72 - 142/92)  BP(mean): --  RR: 18 (13 May 2019 07:25) (18 - 18)  SpO2: 99% (13 May 2019 07:25) (97% - 99%)        REVIEW OF SYSTEMS:    limited;in any distress    On Neurological Examination:    Mental Status - Pt is alert, awake,  following simple commands,      Speech - dysarthria.    Cranial Nerves - Pupils 3 mm equal and reactive to light, extraocular eye movements intact.   Pt has no facial asymmetry. Facial sensation is intact.Tongue - is in midline.    Muscle tone - cogwheel rigidity    Motor Exam - 4/5 of UE  LE 4/5, EXCEPT R, HIP.    Sensory Exam -. Pt withdraws all extremities equally on stimulation. No asymmetry seen.        Deep tendon Reflexes - 2 plus all over.      Neck Supple -  Yes.     MEDICATIONS    acetaminophen  Suppository .. 650 milliGRAM(s) Rectal every 6 hours PRN  carbidopa/levodopa  25/100 2 Tablet(s) Oral <User Schedule>  dextrose 5% + sodium chloride 0.45% with potassium chloride 20 mEq/L 1000 milliLiter(s) IV Continuous <Continuous>  docusate sodium 100 milliGRAM(s) Oral two times a day  entacapone 100 milliGRAM(s) Oral <User Schedule>  metoprolol tartrate 25 milliGRAM(s) Oral two times a day  piperacillin/tazobactam IVPB. 3.375 Gram(s) IV Intermittent every 8 hours  senna 2 Tablet(s) Oral at bedtime  sertraline 50 milliGRAM(s) Oral daily  tamsulosin 0.4 milliGRAM(s) Oral at bedtime  warfarin 1 milliGRAM(s) Oral once      Allergies    No Known Allergies    Intolerances                                          9.8    5.65  )-----------( 255      ( 13 May 2019 07:15 )             28.7   05-13    142  |  110<H>  |  16  ----------------------------<  100<H>  3.5   |  25  |  0.82    Ca    8.9      13 May 2019 07:15  Mg     2.3     05-13      Mg     2.1     05-09    TPro  6.1  /  Alb  2.5<L>  /  TBili  1.3<H>  /  DBili  x   /  AST  62<H>  /  ALT  25  /  AlkPhos  51  05-09    Hemoglobin A1C:     Vitamin B12     RADIOLOGY  < from: CT Abdomen and Pelvis No Cont (05.10.19 @ 13:58) >    EXAM:  CT ABDOMEN AND PELVIS                            PROCEDURE DATE:  05/10/2019          INTERPRETATION:  CLINICAL INFORMATION: Anemia, rule out retroperitoneal   bleed.    COMPARISON: 5/12/2014    PROCEDURE:   CT of the Abdomen and Pelvis was performed without intravenous contrast.   Intravenous contrast: None.  Oral contrast: None.  Sagittal and coronal reformats were performed.    FINDINGS:    LOWER CHEST: Small bilateral pleural effusions. Bibasilar atelectasis,   left greater than right. Coronary and aortic atherosclerosis. Mild   gynecomastia.    LIVER: Hepatic cysts and additional hypodensities too small to   characterize. Scattered coarse calcifications.  BILE DUCTS: Normal caliber.  GALLBLADDER: Cholelithiasis.  SPLEEN: Within normal limits.  PANCREAS: Within normal limits.  ADRENALS: Within normal limits.  KIDNEYS/URETERS: Multifocal areas of scarring within the left kidney with   dystrophic calcifications. No hydronephrosis or obstructing stones.    BLADDER: Within normallimits.  REPRODUCTIVE ORGANS: Mildly enlarged prostate.    BOWEL: No bowel obstruction. Appendix within normal limits.  PERITONEUM: No ascites.  VESSELS:  Atherosclerotic calcifications of the aorta and branch vessels.   Infrarenal IVC filter.  LYMPHNODES: No lymphadenopathy.    ABDOMINAL WALL: Small fat-containing umbilical hernia. Small   fat-containing right inguinal hernia.  BONES: No suspicious osseous lesion. Diffuse osteopenia. Degenerative   changes within the spine and pelvis.  OTHER:  Marked asymmetric enlargement of the right iliacus muscle with   internal hyperdensity compatible with hematoma. Moderate asymmetric   enlargement of the right psoas muscle with heterogeneous attenuation also   suggestive of hematoma. Trace free hemorrhagic fluid and stranding within   the right retroperitoneal space.    IMPRESSION: Right iliopsoas intramuscular hematoma and right trace free   retroperitoneal hemorrhage. Findings were discussed with Dr. Ramirez at   2:15PM on 5/11/19.        GERALDO SAMANIEGO M.D., ATTENDING RADIOLOGIST  This document has been electronically signed. May 10 2019  2:19PM          ASSESSMENT AND PLAN:      SYNCOPE.  HO OF PD.  HX OF CVA.  RECURRENT UTI.  ANEMIA.  right ILIOPSOAS HEMATOMA.    NO FURTHER NEURO WORK UP  WIFE NOT IN FAVOR OF DEMETRIO,  CONTINUE SINEMET.  Physical therapy evaluation.  OOB to chair/ambulation with assistance only.  Advanced care planning was discussed with family.  Pain is accessed and addressed.  Plan of care was discussed with family. Questions answered.  Would continue to follow.

## 2019-05-13 NOTE — PROVIDER CONTACT NOTE (OTHER) - SITUATION
Pt does not open eyes when wife speaks to him although he was awake and alert this morning, eating breakfast and accepting medications.  Blood sugar done, was  130.

## 2019-05-13 NOTE — PROGRESS NOTE ADULT - ASSESSMENT
78y M PMH Afib (on coumadin), CVA (2012, 2016), Parkinson's disease, dementia, HTN, urinary retention, and multiple TIAs presents with syncope likely vasovagal due to anemia with possible GI bleed vs dehydration vs orthostatics due urinary medications and supratherapeutic INR.  He has been found to have Right iliopsoas intramuscular hematoma and right trace free retroperitoneal hemorrhage.    Syncope  - The etiology of his syncope could be from blood loss  - CT abdomen showed right ilipsoas intramuscular hematoma and right trace RPB  - Continue to hold Coumadin  - No clear evidence of acute ischemia, trops negative x 2.  - No acute changes on EKG compared to previous  - Continue Flomax as he is negative orthostatic  - TTE shows 11/2017 with borderline LVH, EF 65%, mild AS/ MR, normal pulmonary pressures.     Anemia   -S/P PRBC. CT abdomen/pelvis noted  - Monitor H/H closely.  Transfuse as needed to keep Hgb>7  -Vascular Surgery consult pending    Chronic Atrial Fibrillation  - Presented with supratherapeutic INR.  Now subtherapeutic, however, continue to hold Coumadin in setting of iliopsoas hematoma and RBP  - Afib rate controlled.  Can discotninue telemetry   - Continue low dose Metoprolol with hold parameters    HLD  - Continue zetia     DVT ppx  - PAS    - Further cardiac workup will depend on clinical course.  To follow up with Dr. Grey as outpatient.  He will need a repeat CT abdomen/pelvis as outpatient  - Will continue to follow    Dali Carl NP  Cardiology 78y M PMH Afib (on coumadin), CVA (2012, 2016), Parkinson's disease, dementia, HTN, urinary retention, and multiple TIAs presents with syncope likely vasovagal due to anemia with possible GI bleed vs dehydration vs orthostatics due urinary medications and supratherapeutic INR.  He has been found to have Right iliopsoas intramuscular hematoma and right trace free retroperitoneal hemorrhage.    Syncope  - The etiology of his syncope could be from blood loss  - CT abdomen showed right ilipsoas intramuscular hematoma and right trace RPB  - Continue to hold Coumadin    - No clear evidence of acute ischemia, trops negative x 2.  - No acute changes on EKG compared to previous  - Continue Flomax as he is negative orthostatic  - TTE shows 11/2017 with borderline LVH, EF 65%, mild AS/ MR, normal pulmonary pressures.     Anemia   -S/P PRBC. CT abdomen/pelvis noted  - Monitor H/H closely.  Transfuse as needed to keep Hgb>7  -Vascular Surgery consult pending    Chronic Atrial Fibrillation  - Presented with supratherapeutic INR.  Now subtherapeutic, however, continue to hold Coumadin in setting of iliopsoas hematoma and RBP  - Afib rate controlled.  Can discontinue telemetry   - Continue low dose Metoprolol with hold parameters    HLD  - Continue zetia     DVT ppx  - PAS    - Further cardiac workup will depend on clinical course.  To follow up with Dr. Grey as outpatient.  He will need a repeat CT abdomen/pelvis as outpatient  - Will continue to follow    Dali Carl NP  Cardiology

## 2019-05-13 NOTE — CHART NOTE - NSCHARTNOTEFT_GEN_A_CORE
Assessment: patient seen with wife at bedside . patient eats well per family and RN . diet as per speech rx. patient with history dementia noted    Factors impacting intake: [ ] none [ ] nausea  [ ] vomiting [ ] diarrhea [ ] constipation  [ ]chewing problems [x ] swallowing issues  [ ] other:     Diet Presciption: Diet, Dysphagia 1 Pureed-Nectar Consistency Fluid (05-07-19 @ 21:21)    Intake: good PO intake reported    Current Weight: Weight 5/12 153/2#      Pertinent Medications: MEDICATIONS  (STANDING):  carbidopa/levodopa  25/100 2 Tablet(s) Oral <User Schedule>  docusate sodium 100 milliGRAM(s) Oral two times a day  entacapone 100 milliGRAM(s) Oral <User Schedule>  metoprolol tartrate 25 milliGRAM(s) Oral two times a day  pantoprazole   Suspension 40 milliGRAM(s) Oral before breakfast  senna 2 Tablet(s) Oral at bedtime  sertraline 50 milliGRAM(s) Oral daily  sucralfate suspension 1 Gram(s) Oral two times a day  tamsulosin 0.4 milliGRAM(s) Oral at bedtime    MEDICATIONS  (PRN):  acetaminophen  Suppository .. 650 milliGRAM(s) Rectal every 6 hours PRN Temp greater or equal to 38C (100.4F)    Pertinent Labs: 05-13 Na142 mmol/L Glu 100 mg/dL<H> K+ 3.5 mmol/L Cr  0.82 mg/dL BUN 16 mg/dL 05-11 Alb 2.3 g/dL<L>      Skin: rima 12 no edema no pressure injury    Estimated Needs:   [x ] no change since previous assessment  [ ] recalculated:     Previous Nutrition Diagnosis:   [ ] Inadequate Energy Intake [ ]Inadequate Oral Intake [ ] Excessive Energy Intake   [ ] Underweight [ ] Increased Nutrient Needs [ ] Overweight/Obesity   [ ] Altered GI Function [x ] Unintended Weight Loss [x ] swallow difficulty  [x ] Malnutrition severe chronic    Nutrition Diagnosis is [x ] ongoing  [ ] resolved [ ] not applicable     New Nutrition Diagnosis: [x ] not applicable       Interventions:   Recommend  [ ] Change Diet To:  [ ] Nutrition Supplement  [ ] Nutrition Support  [x ] Other: continue diet as ordered noted from previous RD note dislikes supplements     Monitoring and Evaluation:   [x ] PO intake [ x ] Tolerance to diet prescription [ x ] weights [ x ] labs[ x ] follow up per protocol  [ ] other:

## 2019-05-13 NOTE — PROGRESS NOTE ADULT - SUBJECTIVE AND OBJECTIVE BOX
CHIEF COMPLAINT/INTERVAL HISTORY: Pt seen and examined at bedside. Pt nonverbal. Per overnight team, pt was agitated and hallucinating overnight. Tylenol and melatonin was attempted to be given, but pt refused.     REVIEW OF SYSTEMS: unable to attain due to pt nonverbal status.    Vital Signs Last 24 Hrs  T(C): 36.8 (13 May 2019 11:30), Max: 36.9 (13 May 2019 05:34)  T(F): 98.2 (13 May 2019 11:30), Max: 98.4 (13 May 2019 05:34)  HR: 77 (13 May 2019 11:30) (77 - 99)  BP: 124/77 (13 May 2019 11:30) (104/72 - 142/92)  BP(mean): --  RR: 18 (13 May 2019 11:30) (18 - 18)  SpO2: 100% (13 May 2019 11:30) (97% - 100%)    PHYSICAL EXAM:  GENERAL: M in NAD  HEENT: EOMI, conjunctiva and sclera clear  Chest: CTA bilaterally, no wheezing  CV: S1S2, RRR,   GI: soft, +BS, NT/ND  Musculoskeletal: no edema  Psychiatric: nonverbal  Skin: warm and dry    LABS:                        9.8    5.65  )-----------( 255      ( 13 May 2019 07:15 )             28.7     05-13    142  |  110<H>  |  16  ----------------------------<  100<H>  3.5   |  25  |  0.82    Ca    8.9      13 May 2019 07:15  Mg     2.3     05-13      PT/INR - ( 13 May 2019 07:15 )   PT: 14.9 sec;   INR: 1.31 ratio CHIEF COMPLAINT/INTERVAL HISTORY: Pt seen and examined at bedside. Pt nonverbal. Per overnight team, pt was agitated and hallucinating overnight. Tylenol and melatonin was attempted to be given, but pt refused.     REVIEW OF SYSTEMS: unable to attain due to pt nonverbal status.    Vital Signs Last 24 Hrs  T(C): 36.8 (13 May 2019 11:30), Max: 36.9 (13 May 2019 05:34)  T(F): 98.2 (13 May 2019 11:30), Max: 98.4 (13 May 2019 05:34)  HR: 77 (13 May 2019 11:30) (77 - 99)  BP: 124/77 (13 May 2019 11:30) (104/72 - 142/92)  BP(mean): --  RR: 18 (13 May 2019 11:30) (18 - 18)  SpO2: 100% (13 May 2019 11:30) (97% - 100%)    PHYSICAL EXAM:  GENERAL: M in NAD  HEENT: EOMI, conjunctiva and sclera clear  Chest: Diminished BS bilaterally, no wheezing  CV: S1S2, RRR,   GI: soft, +BS, NT/ND  Musculoskeletal: no edema  Psychiatric: nonverbal  Skin: warm and dry    LABS:                        9.8    5.65  )-----------( 255      ( 13 May 2019 07:15 )             28.7     05-13    142  |  110<H>  |  16  ----------------------------<  100<H>  3.5   |  25  |  0.82    Ca    8.9      13 May 2019 07:15  Mg     2.3     05-13      PT/INR - ( 13 May 2019 07:15 )   PT: 14.9 sec;   INR: 1.31 ratio

## 2019-05-14 ENCOUNTER — TRANSCRIPTION ENCOUNTER (OUTPATIENT)
Age: 79
End: 2019-05-14

## 2019-05-14 VITALS
DIASTOLIC BLOOD PRESSURE: 99 MMHG | TEMPERATURE: 97 F | HEART RATE: 90 BPM | RESPIRATION RATE: 18 BRPM | SYSTOLIC BLOOD PRESSURE: 149 MMHG | OXYGEN SATURATION: 93 %

## 2019-05-14 DIAGNOSIS — S70.11XA CONTUSION OF RIGHT THIGH, INITIAL ENCOUNTER: ICD-10-CM

## 2019-05-14 LAB
ALBUMIN SERPL ELPH-MCNC: 2.9 G/DL — LOW (ref 3.3–5)
ALP SERPL-CCNC: 59 U/L — SIGNIFICANT CHANGE UP (ref 40–120)
ALT FLD-CCNC: 27 U/L — SIGNIFICANT CHANGE UP (ref 12–78)
ANION GAP SERPL CALC-SCNC: 8 MMOL/L — SIGNIFICANT CHANGE UP (ref 5–17)
APTT BLD: 30.2 SEC — SIGNIFICANT CHANGE UP (ref 28.5–37)
AST SERPL-CCNC: 29 U/L — SIGNIFICANT CHANGE UP (ref 15–37)
BASOPHILS # BLD AUTO: 0.03 K/UL — SIGNIFICANT CHANGE UP (ref 0–0.2)
BASOPHILS NFR BLD AUTO: 0.5 % — SIGNIFICANT CHANGE UP (ref 0–2)
BILIRUB SERPL-MCNC: 0.9 MG/DL — SIGNIFICANT CHANGE UP (ref 0.2–1.2)
BUN SERPL-MCNC: 16 MG/DL — SIGNIFICANT CHANGE UP (ref 7–23)
CALCIUM SERPL-MCNC: 9.3 MG/DL — SIGNIFICANT CHANGE UP (ref 8.5–10.1)
CHLORIDE SERPL-SCNC: 107 MMOL/L — SIGNIFICANT CHANGE UP (ref 96–108)
CO2 SERPL-SCNC: 26 MMOL/L — SIGNIFICANT CHANGE UP (ref 22–31)
CREAT SERPL-MCNC: 0.88 MG/DL — SIGNIFICANT CHANGE UP (ref 0.5–1.3)
EOSINOPHIL # BLD AUTO: 0.13 K/UL — SIGNIFICANT CHANGE UP (ref 0–0.5)
EOSINOPHIL NFR BLD AUTO: 2.1 % — SIGNIFICANT CHANGE UP (ref 0–6)
GLUCOSE SERPL-MCNC: 97 MG/DL — SIGNIFICANT CHANGE UP (ref 70–99)
HCT VFR BLD CALC: 33.4 % — LOW (ref 39–50)
HGB BLD-MCNC: 11.2 G/DL — LOW (ref 13–17)
IMM GRANULOCYTES NFR BLD AUTO: 0.6 % — SIGNIFICANT CHANGE UP (ref 0–1.5)
INR BLD: 1.16 RATIO — SIGNIFICANT CHANGE UP (ref 0.88–1.16)
LYMPHOCYTES # BLD AUTO: 1.07 K/UL — SIGNIFICANT CHANGE UP (ref 1–3.3)
LYMPHOCYTES # BLD AUTO: 17.3 % — SIGNIFICANT CHANGE UP (ref 13–44)
MCHC RBC-ENTMCNC: 32.7 PG — SIGNIFICANT CHANGE UP (ref 27–34)
MCHC RBC-ENTMCNC: 33.5 GM/DL — SIGNIFICANT CHANGE UP (ref 32–36)
MCV RBC AUTO: 97.4 FL — SIGNIFICANT CHANGE UP (ref 80–100)
MONOCYTES # BLD AUTO: 0.63 K/UL — SIGNIFICANT CHANGE UP (ref 0–0.9)
MONOCYTES NFR BLD AUTO: 10.2 % — SIGNIFICANT CHANGE UP (ref 2–14)
NEUTROPHILS # BLD AUTO: 4.28 K/UL — SIGNIFICANT CHANGE UP (ref 1.8–7.4)
NEUTROPHILS NFR BLD AUTO: 69.3 % — SIGNIFICANT CHANGE UP (ref 43–77)
NRBC # BLD: 0 /100 WBCS — SIGNIFICANT CHANGE UP (ref 0–0)
PLATELET # BLD AUTO: 279 K/UL — SIGNIFICANT CHANGE UP (ref 150–400)
POTASSIUM SERPL-MCNC: 3.4 MMOL/L — LOW (ref 3.5–5.3)
POTASSIUM SERPL-SCNC: 3.4 MMOL/L — LOW (ref 3.5–5.3)
PROT SERPL-MCNC: 6.9 G/DL — SIGNIFICANT CHANGE UP (ref 6–8.3)
PROTHROM AB SERPL-ACNC: 13.3 SEC — HIGH (ref 10–12.9)
RBC # BLD: 3.43 M/UL — LOW (ref 4.2–5.8)
RBC # FLD: 15.7 % — HIGH (ref 10.3–14.5)
SODIUM SERPL-SCNC: 141 MMOL/L — SIGNIFICANT CHANGE UP (ref 135–145)
WBC # BLD: 6.18 K/UL — SIGNIFICANT CHANGE UP (ref 3.8–10.5)
WBC # FLD AUTO: 6.18 K/UL — SIGNIFICANT CHANGE UP (ref 3.8–10.5)

## 2019-05-14 PROCEDURE — 85027 COMPLETE CBC AUTOMATED: CPT

## 2019-05-14 PROCEDURE — 74176 CT ABD & PELVIS W/O CONTRAST: CPT

## 2019-05-14 PROCEDURE — 83735 ASSAY OF MAGNESIUM: CPT

## 2019-05-14 PROCEDURE — 36415 COLL VENOUS BLD VENIPUNCTURE: CPT

## 2019-05-14 PROCEDURE — 86850 RBC ANTIBODY SCREEN: CPT

## 2019-05-14 PROCEDURE — 84145 PROCALCITONIN (PCT): CPT

## 2019-05-14 PROCEDURE — 99284 EMERGENCY DEPT VISIT MOD MDM: CPT

## 2019-05-14 PROCEDURE — 82962 GLUCOSE BLOOD TEST: CPT

## 2019-05-14 PROCEDURE — 82607 VITAMIN B-12: CPT

## 2019-05-14 PROCEDURE — 82533 TOTAL CORTISOL: CPT

## 2019-05-14 PROCEDURE — 86900 BLOOD TYPING SEROLOGIC ABO: CPT

## 2019-05-14 PROCEDURE — 97110 THERAPEUTIC EXERCISES: CPT

## 2019-05-14 PROCEDURE — 99232 SBSQ HOSP IP/OBS MODERATE 35: CPT

## 2019-05-14 PROCEDURE — 87086 URINE CULTURE/COLONY COUNT: CPT

## 2019-05-14 PROCEDURE — 82728 ASSAY OF FERRITIN: CPT

## 2019-05-14 PROCEDURE — 84484 ASSAY OF TROPONIN QUANT: CPT

## 2019-05-14 PROCEDURE — 83605 ASSAY OF LACTIC ACID: CPT

## 2019-05-14 PROCEDURE — 80053 COMPREHEN METABOLIC PANEL: CPT

## 2019-05-14 PROCEDURE — 93306 TTE W/DOPPLER COMPLETE: CPT

## 2019-05-14 PROCEDURE — 96374 THER/PROPH/DIAG INJ IV PUSH: CPT

## 2019-05-14 PROCEDURE — 85730 THROMBOPLASTIN TIME PARTIAL: CPT

## 2019-05-14 PROCEDURE — 82746 ASSAY OF FOLIC ACID SERUM: CPT

## 2019-05-14 PROCEDURE — 83540 ASSAY OF IRON: CPT

## 2019-05-14 PROCEDURE — 86901 BLOOD TYPING SEROLOGIC RH(D): CPT

## 2019-05-14 PROCEDURE — 87040 BLOOD CULTURE FOR BACTERIA: CPT

## 2019-05-14 PROCEDURE — P9016: CPT

## 2019-05-14 PROCEDURE — 36430 TRANSFUSION BLD/BLD COMPNT: CPT

## 2019-05-14 PROCEDURE — 96376 TX/PRO/DX INJ SAME DRUG ADON: CPT

## 2019-05-14 PROCEDURE — 93970 EXTREMITY STUDY: CPT

## 2019-05-14 PROCEDURE — 97530 THERAPEUTIC ACTIVITIES: CPT

## 2019-05-14 PROCEDURE — 86923 COMPATIBILITY TEST ELECTRIC: CPT

## 2019-05-14 PROCEDURE — 81001 URINALYSIS AUTO W/SCOPE: CPT

## 2019-05-14 PROCEDURE — 93005 ELECTROCARDIOGRAM TRACING: CPT

## 2019-05-14 PROCEDURE — 99239 HOSP IP/OBS DSCHRG MGMT >30: CPT

## 2019-05-14 PROCEDURE — 83550 IRON BINDING TEST: CPT

## 2019-05-14 PROCEDURE — 80048 BASIC METABOLIC PNL TOTAL CA: CPT

## 2019-05-14 PROCEDURE — 93880 EXTRACRANIAL BILAT STUDY: CPT

## 2019-05-14 PROCEDURE — 85610 PROTHROMBIN TIME: CPT

## 2019-05-14 PROCEDURE — 99285 EMERGENCY DEPT VISIT HI MDM: CPT | Mod: 25

## 2019-05-14 PROCEDURE — 82272 OCCULT BLD FECES 1-3 TESTS: CPT

## 2019-05-14 PROCEDURE — 71045 X-RAY EXAM CHEST 1 VIEW: CPT

## 2019-05-14 PROCEDURE — 96375 TX/PRO/DX INJ NEW DRUG ADDON: CPT

## 2019-05-14 PROCEDURE — 70450 CT HEAD/BRAIN W/O DYE: CPT

## 2019-05-14 PROCEDURE — 97163 PT EVAL HIGH COMPLEX 45 MIN: CPT

## 2019-05-14 RX ORDER — SUCRALFATE 1 G
10 TABLET ORAL
Qty: 280 | Refills: 0
Start: 2019-05-14 | End: 2019-05-27

## 2019-05-14 RX ORDER — POTASSIUM CHLORIDE 20 MEQ
40 PACKET (EA) ORAL ONCE
Refills: 0 | Status: COMPLETED | OUTPATIENT
Start: 2019-05-14 | End: 2019-05-14

## 2019-05-14 RX ORDER — WARFARIN SODIUM 2.5 MG/1
1 TABLET ORAL
Qty: 0 | Refills: 0 | DISCHARGE

## 2019-05-14 RX ORDER — ONDANSETRON 8 MG/1
4 TABLET, FILM COATED ORAL ONCE
Refills: 0 | Status: COMPLETED | OUTPATIENT
Start: 2019-05-14 | End: 2019-05-14

## 2019-05-14 RX ADMIN — SERTRALINE 50 MILLIGRAM(S): 25 TABLET, FILM COATED ORAL at 11:18

## 2019-05-14 RX ADMIN — ENTACAPONE 100 MILLIGRAM(S): 200 TABLET, FILM COATED ORAL at 11:18

## 2019-05-14 RX ADMIN — ONDANSETRON 4 MILLIGRAM(S): 8 TABLET, FILM COATED ORAL at 12:59

## 2019-05-14 RX ADMIN — CARBIDOPA AND LEVODOPA 2 TABLET(S): 25; 100 TABLET ORAL at 08:35

## 2019-05-14 RX ADMIN — Medication 25 MILLIGRAM(S): at 05:44

## 2019-05-14 RX ADMIN — CARBIDOPA AND LEVODOPA 2 TABLET(S): 25; 100 TABLET ORAL at 11:18

## 2019-05-14 RX ADMIN — Medication 100 MILLIGRAM(S): at 05:44

## 2019-05-14 RX ADMIN — Medication 1 GRAM(S): at 05:44

## 2019-05-14 RX ADMIN — CARBIDOPA AND LEVODOPA 2 TABLET(S): 25; 100 TABLET ORAL at 13:00

## 2019-05-14 RX ADMIN — ENTACAPONE 100 MILLIGRAM(S): 200 TABLET, FILM COATED ORAL at 13:00

## 2019-05-14 RX ADMIN — Medication 40 MILLIEQUIVALENT(S): at 10:10

## 2019-05-14 RX ADMIN — ENTACAPONE 100 MILLIGRAM(S): 200 TABLET, FILM COATED ORAL at 08:34

## 2019-05-14 RX ADMIN — PANTOPRAZOLE SODIUM 40 MILLIGRAM(S): 20 TABLET, DELAYED RELEASE ORAL at 05:44

## 2019-05-14 NOTE — PROGRESS NOTE ADULT - PROBLEM SELECTOR PLAN 1
ct w r iliopsoas intramuscular hematoma  surgery eval noted, hold ac  no evidence of overt gib; fobt neg, hgb stable  monitor cbc, transfuse prn  cont ppi  dw wife, defers endoscopic w/u if needed  will follow, dc planning in progress

## 2019-05-14 NOTE — PROGRESS NOTE ADULT - SUBJECTIVE AND OBJECTIVE BOX
JOSÉ MIGUEL COOPER is a 78yMale , patient examined and chart reviewed.    INTERVAL HPI/ OVERNIGHT EVENTS:   More awake today. CT head negative for acute pathology.  Afebrile. Wife at bedside.    PAST MEDICAL & SURGICAL HISTORY:  Syncope  Parkinson disease  Constipation  Dementia  Urinary retention  Cerebrovascular accident  Hypertension  Atrial fibrillation  No significant past surgical history    For details regarding the patient's social history, family history, and other miscellaneous elements, please refer the initial infectious diseases consultation and/or the admitting history and physical examination for this admission.    ROS:  Unable to obtain due to : Dementia    Current inpatient medications :  MEDICATIONS  (STANDING):  carbidopa/levodopa  25/100 2 Tablet(s) Oral <User Schedule>  docusate sodium 100 milliGRAM(s) Oral two times a day  entacapone 100 milliGRAM(s) Oral <User Schedule>  metoprolol tartrate 25 milliGRAM(s) Oral two times a day  pantoprazole   Suspension 40 milliGRAM(s) Oral before breakfast  senna 2 Tablet(s) Oral at bedtime  sertraline 50 milliGRAM(s) Oral daily  sucralfate suspension 1 Gram(s) Oral two times a day  tamsulosin 0.4 milliGRAM(s) Oral at bedtime    MEDICATIONS  (PRN):  acetaminophen  Suppository .. 650 milliGRAM(s) Rectal every 6 hours PRN Temp greater or equal to 38C (100.4F)    Objective:  Vital Signs Last 24 Hrs  T(C): 36.3 (14 May 2019 11:52), Max: 37.3 (13 May 2019 20:44)  T(F): 97.4 (14 May 2019 11:52), Max: 99.1 (13 May 2019 20:44)  HR: 90 (14 May 2019 11:52) (85 - 98)  BP: 149/99 (14 May 2019 11:52) (95/65 - 160/98)  RR: 18 (14 May 2019 11:52) (18 - 18)  SpO2: 93% (14 May 2019 11:52) (93% - 100%)    Physical Exam:  General: awake  Eyes: sclera anicteric, pupils equal and reactive to light  ENMT: buccal mucosa moist, pharynx not injected  Neck: supple, trachea midline  Lungs: clear, no wheeze/rhonchi  Cardiovascular: regular rate and rhythm, S1 S2  Abdomen: soft, nontender, no organomegaly present, bowel sounds normal  Neurological: awake confused Cranial Nerves II-XII grossly intact  Skin: no increased ecchymosis/petechiae/purpura  Lymph Nodes: no palpable cervical/supraclavicular lymph nodes enlargements  Extremities: no cyanosis/clubbing/edema    LABS:                              11.2   6.18  )-----------( 279      ( 14 May 2019 07:27 )             33.4   05-14    141  |  107  |  16  ----------------------------<  97  3.4<L>   |  26  |  0.88    Ca    9.3      14 May 2019 07:27  Mg     2.3     05-13    TPro  6.9  /  Alb  2.9<L>  /  TBili  0.9  /  DBili  x   /  AST  29  /  ALT  27  /  AlkPhos  59  05-14    MICROBIOLOGY:    Culture - Blood (collected 08 May 2019 12:38)  Source: .Blood Blood-Peripheral  Preliminary Report (09 May 2019 13:01):    No growth to date.    Culture - Blood (collected 08 May 2019 12:38)  Source: .Blood Blood-Peripheral  Preliminary Report (09 May 2019 13:01):    No growth to date.    Culture - Urine (collected 08 May 2019 11:36)  Source: .Urine Clean Catch (Midstream)  Final Report (09 May 2019 14:53):    No growth      RADIOLOGY & ADDITIONAL STUDIES:    EXAM:  CT BRAIN                            PROCEDURE DATE:  05/13/2019          INTERPRETATION:  Clinical History: Altered mental status.    Noncontrast CT scan of brain is performed with axial images obtained from   skull base to vertex.    Comparison: CT scan 5/7/2019.    Motion artifact degrades image quality limiting evaluation of the   posterior fossa.    There is cerebral volume loss, without mass effect or midline shift.  There is extensive periventricular white matter low attenuation extending   into the deep subcortical white matter tracts, most compatible with   chronic microvascular ischemic disease.  There are chronic appearing lacunar infarcts right basal ganglion and   caudate nucleus, right capsular thalamic region and bilateral corona   radiata.    No acute parenchymal hemorrhage is noted.     No extra-axial fluid collection is demonstrated.    The visualized paranasal sinuses appear unremarkable.    Impression:    No acute intracranial hemorrhage or mass effect noted.    MRI is more sensitive for the evaluation of acute ischemic changes if   there are no clinical contraindications.        Assessment :  78y M PMH Afib (on coumadin), CVA (2012, 2016), Parkinson's disease, dementia, HTN, urinary retention, and multiple TIAs recent admission for hematuria just discharged 5/2/19 readmitted sec to recurrent syncopal episodes at home. Sp fever Tmax 101 Likely reactive to right iliopsoas intramuscular hematoma and right trace free retroperitoneal hemorrhage. UTI ruled out Cultures thus far ngtd. Fever resolved. H/H stable. Mental status change with obtundation resolved. CT Head with no acute pathology. No signs for infections    Plan :   Monitor off antibiotics  Trend temps and wbc  Asp precautions  Stable from ID standpoint    Continue with present regiment.  Appropriate use of antibiotics and adverse effects reviewed.      I have discussed the above plan of care with patient's family in detail. They expressed understanding of the  treatment plan . Risks, benefits and alternatives discussed in detail. I have asked if they have any questions or concerns and appropriately addressed them to the best of my ability .    > 35 minutes were spent in direct patient care reviewing notes, medications ,labs data/ imaging , discussion with multidisciplinary team.    Thank you for allowing me to participate in care of your patient .    Sanjeev Cole MD  Infectious Disease  [phone]

## 2019-05-14 NOTE — PROGRESS NOTE ADULT - ASSESSMENT
78y M PMH Afib (on coumadin), CVA (2012, 2016), Parkinson's disease, dementia, HTN, urinary retention, and multiple TIAs presents with syncope likely vasovagal due to anemia with possible GI bleed vs dehydration vs orthostatics due urinary medications and supratherapeutic INR.  He has been found to have Right iliopsoas intramuscular hematoma and right trace free retroperitoneal hemorrhage.    Syncope  - The etiology of his syncope could be from blood loss  - CT abdomen showed right ilipsoas intramuscular hematoma and right trace RPB  - Continue to hold Coumadin    - No clear evidence of acute ischemia, trops negative x 2.  - No acute changes on EKG compared to previous  - Continue Flomax as he is negative orthostatic  - TTE shows 11/2017 with borderline LVH, EF 65%, mild AS/ MR, normal pulmonary pressures.     Anemia   -S/P PRBC. CT abdomen/pelvis noted  - Monitor H/H closely.  Transfuse as needed to keep Hgb>7  -Vascular Surgery consult pending    Chronic Atrial Fibrillation  - Presented with supratherapeutic INR.  Now subtherapeutic, however, continue to hold Coumadin in setting of iliopsoas hematoma and RBP  - Afib rate controlled.  Can discontinue telemetry   - Continue low dose Metoprolol with hold parameters    HLD  - Continue zetia     DVT ppx  - PAS    - Further cardiac workup will depend on clinical course.  To follow up with Dr. Grey as outpatient.  He will need a repeat CT abdomen/pelvis as outpatient  - Will continue to follow    Dali Carl NP  Cardiology 78y M PMH Afib (on coumadin), CVA (2012, 2016), Parkinson's disease, dementia, HTN, urinary retention, and multiple TIAs presents with syncope likely vasovagal due to anemia with possible GI bleed vs dehydration vs orthostatics due urinary medications and supratherapeutic INR.  He has been found to have Right iliopsoas intramuscular hematoma and right trace free retroperitoneal hemorrhage.    Syncope  - The etiology of his syncope could be from blood loss  - CT abdomen showed right ilipsoas intramuscular hematoma and right trace RPB  - Continue to hold Coumadin.  Decision to be made whether to resume or not as outpatient after repeat CT abdomen/pelvis to reassess hematoma    - No clear evidence of acute ischemia, trops negative x 2.  - No acute changes on EKG compared to previous  - Continue Flomax as he is negative orthostatic  - TTE shows 11/2017 with borderline LVH, EF 65%, mild AS/ MR, normal pulmonary pressures.     Anemia   - S/P PRBC. CT abdomen/pelvis noted  - Monitor H/H closely.  Transfuse as needed to keep Hgb>7  - Vascular Surgery consult pending    Chronic Atrial Fibrillation  - Presented with supratherapeutic INR.  Now subtherapeutic, however, continue to hold Coumadin in setting of iliopsoas hematoma and RBP  - Afib rate controlled.  Can discontinue telemetry   - Continue low dose Metoprolol with hold parameters    HLD  - Continue zetia     DVT ppx  - PAS    - Further cardiac workup will depend on clinical course.  To follow up with Dr. Grey as outpatient.  He will need a repeat CT abdomen/pelvis as outpatient  - Will continue to follow    Dali Carl NP  Cardiology

## 2019-05-14 NOTE — DISCHARGE NOTE PROVIDER - CARE PROVIDERS DIRECT ADDRESSES
[email or web address] iPhone [email or web address] Android,DirectAddress_Unknown,DirectAddress_Unknown

## 2019-05-14 NOTE — PROGRESS NOTE ADULT - SUBJECTIVE AND OBJECTIVE BOX
Neurology follow up note  COVERING DR ROLY COOPER78yMale      Interval History:    Patient feels ok  seen with spouse     MEDICATIONS    acetaminophen  Suppository .. 650 milliGRAM(s) Rectal every 6 hours PRN  carbidopa/levodopa  25/100 2 Tablet(s) Oral <User Schedule>  docusate sodium 100 milliGRAM(s) Oral two times a day  entacapone 100 milliGRAM(s) Oral <User Schedule>  metoprolol tartrate 25 milliGRAM(s) Oral two times a day  pantoprazole   Suspension 40 milliGRAM(s) Oral before breakfast  senna 2 Tablet(s) Oral at bedtime  sertraline 50 milliGRAM(s) Oral daily  sucralfate suspension 1 Gram(s) Oral two times a day  tamsulosin 0.4 milliGRAM(s) Oral at bedtime      Allergies    No Known Allergies    Intolerances            Vital Signs Last 24 Hrs  T(C): 36.3 (14 May 2019 11:52), Max: 37.3 (13 May 2019 20:44)  T(F): 97.4 (14 May 2019 11:52), Max: 99.1 (13 May 2019 20:44)  HR: 90 (14 May 2019 11:52) (85 - 98)  BP: 149/99 (14 May 2019 11:52) (95/65 - 160/98)  BP(mean): --  RR: 18 (14 May 2019 11:52) (18 - 18)  SpO2: 93% (14 May 2019 11:52) (93% - 100%)      REVIEW OF SYSTEMS:  Limit or unable to obtain secondary to patient's poor mental status.        On Neurological Examination:    Mental Status - Patient is alert, awake,   .       Follow simple commands      Speech    Dysarthria                        Cranial Nerves - Pupils 3 mm equal and reactive to light,   extraocular eye movements intact.   smile symmetric  intact bilateral NLF    Motor Exam -    4/5 of UE  LE 4/5, EXCEPT R, HIP.    cogwheel rigidity    Muscle tone - is increased all over.     GENERAL Exam: Nontoxic , No Acute Distress   	  HEENT:  normocephalic, atraumatic  		  LUNGS: Decreased bilaterally  	  HEART: Normal S1S2   No murmur RRR        	  GI/ ABDOMEN:  Soft  Non tender    EXTREMITIES:   No Edema  No Clubbing  No Cyanosis   	   SKIN: Normal  No Ecchymosis               LABS:  CBC Full  -  ( 14 May 2019 07:27 )  WBC Count : 6.18 K/uL  RBC Count : 3.43 M/uL  Hemoglobin : 11.2 g/dL  Hematocrit : 33.4 %  Platelet Count - Automated : 279 K/uL  Mean Cell Volume : 97.4 fl  Mean Cell Hemoglobin : 32.7 pg  Mean Cell Hemoglobin Concentration : 33.5 gm/dL  Auto Neutrophil # : 4.28 K/uL  Auto Lymphocyte # : 1.07 K/uL  Auto Monocyte # : 0.63 K/uL  Auto Eosinophil # : 0.13 K/uL  Auto Basophil # : 0.03 K/uL  Auto Neutrophil % : 69.3 %  Auto Lymphocyte % : 17.3 %  Auto Monocyte % : 10.2 %  Auto Eosinophil % : 2.1 %  Auto Basophil % : 0.5 %      05-14    141  |  107  |  16  ----------------------------<  97  3.4<L>   |  26  |  0.88    Ca    9.3      14 May 2019 07:27  Mg     2.3     05-13    TPro  6.9  /  Alb  2.9<L>  /  TBili  0.9  /  DBili  x   /  AST  29  /  ALT  27  /  AlkPhos  59  05-14    Hemoglobin A1C:     LIVER FUNCTIONS - ( 14 May 2019 07:27 )  Alb: 2.9 g/dL / Pro: 6.9 g/dL / ALK PHOS: 59 U/L / ALT: 27 U/L / AST: 29 U/L / GGT: x           Vitamin B12   PT/INR - ( 14 May 2019 07:27 )   PT: 13.3 sec;   INR: 1.16 ratio         PTT - ( 14 May 2019 07:27 )  PTT:30.2 sec      RADIOLOGY    ASSESSMENT AND PLAN:      SYNCOPE.  tele eval  monitor sbp    HO OF PD.  home medications     spoke to spouse at bedside     Physical therapy evaluation.  OOB to chair/ambulation with assistance only.  Advanced care planning was discussed with family.    30 minutes spent on total encounter; more than 50% of the visit was spent counseling and/or coordinating care by the attending physician.

## 2019-05-14 NOTE — PROGRESS NOTE ADULT - SUBJECTIVE AND OBJECTIVE BOX
INTERVAL HPI/OVERNIGHT EVENTS:  pt seen and examined  awake alert  denies abd pain      MEDICATIONS  (STANDING):  carbidopa/levodopa  25/100 2 Tablet(s) Oral <User Schedule>  docusate sodium 100 milliGRAM(s) Oral two times a day  entacapone 100 milliGRAM(s) Oral <User Schedule>  metoprolol tartrate 25 milliGRAM(s) Oral two times a day  ondansetron Injectable 4 milliGRAM(s) IV Push once  pantoprazole   Suspension 40 milliGRAM(s) Oral before breakfast  senna 2 Tablet(s) Oral at bedtime  sertraline 50 milliGRAM(s) Oral daily  sucralfate suspension 1 Gram(s) Oral two times a day  tamsulosin 0.4 milliGRAM(s) Oral at bedtime    MEDICATIONS  (PRN):  acetaminophen  Suppository .. 650 milliGRAM(s) Rectal every 6 hours PRN Temp greater or equal to 38C (100.4F)      Allergies    No Known Allergies    Intolerances        Review of Systems:    see above unable to obtain in entirety    Vital Signs Last 24 Hrs  T(C): 36.3 (14 May 2019 11:52), Max: 37.3 (13 May 2019 20:44)  T(F): 97.4 (14 May 2019 11:52), Max: 99.1 (13 May 2019 20:44)  HR: 90 (14 May 2019 11:52) (85 - 98)  BP: 149/99 (14 May 2019 11:52) (95/65 - 160/98)  BP(mean): --  RR: 18 (14 May 2019 11:52) (18 - 18)  SpO2: 93% (14 May 2019 11:52) (93% - 100%)    PHYSICAL EXAM:    General:  nad  HEENT:  NC/AT  Chest:  dec bs  Cardiovascular:  Regular rhythm, S1, S2  Abdomen:  Soft, non-tender, non-distended  Extremities:  no edema  Skin:  No rash  Neuro/Psych:  awake alert        LABS:                        11.2   6.18  )-----------( 279      ( 14 May 2019 07:27 )             33.4     05-14    141  |  107  |  16  ----------------------------<  97  3.4<L>   |  26  |  0.88    Ca    9.3      14 May 2019 07:27  Mg     2.3     05-13    TPro  6.9  /  Alb  2.9<L>  /  TBili  0.9  /  DBili  x   /  AST  29  /  ALT  27  /  AlkPhos  59  05-14    PT/INR - ( 14 May 2019 07:27 )   PT: 13.3 sec;   INR: 1.16 ratio         PTT - ( 14 May 2019 07:27 )  PTT:30.2 sec      RADIOLOGY & ADDITIONAL TESTS:

## 2019-05-14 NOTE — DISCHARGE NOTE PROVIDER - HOSPITAL COURSE
78y M PMH Afib (on coumadin), CVA (2012, 2016), Parkinson's disease, dementia, HTN, urinary retention, and multiple TIAs presents s/p syncope. Patient is a poor historian due to dementia. Per wife, patient has been having multiple syncopal episodes over past 3 days. No precipitating events or triggers. Episodes are not positional or associated with movement or activity. Today, patient was at home when he had an episode. Wife then tried to wake him up and noticed he was unresponsive. Patient was back to baseline once awake with no residual symptoms. Patient is not ambulatory and typically sits in a wheel-chair unless participating in PT. PO intake has not changed compared to baseline but wife notes it is difficulty to hydrate given he is on a nectar thickened diet ROS not obtainable secondary to mental status but wife denies recent fevers, chills, vomiting, diarrhea.          In the ED: Vitals T(F): 96.6F HR: 106 BP: 122/72 RR: 18 SpO2: 96% RA. HGB 9.4, HCT 27.9, INR 6.11, Cl 112, Glucose 110, Albumin 3.0, AST 93, ALT 9, CT Head: No acute intracranial hemorrhage or calvarial fracture within the limitations of the exam. CXR: No active pulmonary disease. No change. Seen by Cardio: TTE ordered, check orthostatics, continue to trend troponin.        The patient was admitted to telemetry for a syncopal episode. The patient was seen by cardiology, Dr. Cabrera. There was no clear evidence of ischemia and no changes from prior EKGs. The patient was seen by neurology, Dr. Nance was consulted. He was continued on Sinemet for his history of dementia. ID, Dr. Cole was consulted to r/o possible infectious cause. The patient was given IV Zosyn. The patient also was found to have a fever of 101.4F. Fever workup was performed, which was negative. Antibiotics were stopped. GI, Dr. Hearn was consulted for the patient's anemia. FOBT was negative. Iron studies showed anemia of chronic disease. CT abdomen and pelvis was ordered. Results showed a right iliopsoas hematoma as well as a right trace free retroperitoneal hemorrhage. The patient was seen by vascular surgery, Dr. Keane. No intervention was recommended. The patient was stopped from anticoagulation for his history of AFib. CBC was monitored for possible acute anemia 2/2 to bleed. The patient likely had a syncopal episode and fever 2/2 to blood loss from the iliopsoas hematoma. The patient was evaluated by physical therapy recommended DEMET[facility]. The patient's wife did not agree. The patient will return home with home PT. The patient is stable for discharge. 78y M PMH Afib (on coumadin), CVA (2012, 2016), Parkinson's disease, dementia, HTN, urinary retention, and multiple TIAs presents s/p syncope. Patient is a poor historian due to dementia. Per wife, patient has been having multiple syncopal episodes over past 3 days. No precipitating events or triggers. Episodes are not positional or associated with movement or activity. Today, patient was at home when he had an episode. Wife then tried to wake him up and noticed he was unresponsive. Patient was back to baseline once awake with no residual symptoms. Patient is not ambulatory and typically sits in a wheel-chair unless participating in PT. PO intake has not changed compared to baseline but wife notes it is difficulty to hydrate given he is on a nectar thickened diet ROS not obtainable secondary to mental status but wife denies recent fevers, chills, vomiting, diarrhea.          In the ED: Vitals T(F): 96.6F HR: 106 BP: 122/72 RR: 18 SpO2: 96% RA. HGB 9.4, HCT 27.9, INR 6.11, Cl 112, Glucose 110, Albumin 3.0, AST 93, ALT 9, CT Head: No acute intracranial hemorrhage or calvarial fracture within the limitations of the exam. CXR: No active pulmonary disease. No change. Seen by Cardio: TTE ordered, check orthostatics, continue to trend troponin.        The patient was admitted to telemetry for a syncopal episode. The patient was seen by cardiology, Dr. Cabrera. There was no clear evidence of ischemia and no changes from prior EKGs. The patient was seen by neurology, Dr. Nance was consulted. He was continued on Sinemet for his history of Parkinsons. ID, Dr. Cole was consulted to r/o possible infectious cause. The patient was given IV Zosyn. The patient also was found to have a fever of 101.4F. Fever workup was performed, which was negative. Antibiotics were stopped. GI, Dr. Hearn was consulted for the patient's anemia. FOBT was negative. Iron studies showed anemia of chronic disease. CT abdomen and pelvis was ordered. Results showed a right iliopsoas hematoma as well as a right trace free retroperitoneal hemorrhage. The patient was seen by vascular surgery, Dr. Keane. No intervention was recommended. The patient was stopped from anticoagulation for his history of AFib. CBC was monitored for possible acute anemia 2/2 to bleed. The patient likely had a syncopal episode and fever 2/2 to blood loss from the iliopsoas hematoma. The patient was evaluated by physical therapy recommended DEMETRI[facility]. The patient's wife did not agree to rehab. The patient will return home with home PT. The patient is stable for discharge.         On day of discharge patient is in no distress.  Afebrile and hemodynamically stable.          Completion of discharge in 35 minutes.

## 2019-05-14 NOTE — PROGRESS NOTE ADULT - SUBJECTIVE AND OBJECTIVE BOX
[facility] -- Rogelio Robb, Flor, Sandi, Rick Holloway Savella  Office # [phone]    Follow Up:  Afib on Coumadin    Subjective/Observations: Seen and evaluated, asleep but opens eyes upon shaking.  Per spouse, this is his baseline due to his dementia.  Not in any respiratory discomfort while on RA.  No evidence of pain    REVIEW OF SYSTEMS: All other review of systems is negative unless indicated above    PAST MEDICAL & SURGICAL HISTORY:  Syncope  Parkinson disease  Constipation  Dementia  Urinary retention  Cerebrovascular accident  Hypertension  Atrial fibrillation  No significant past surgical history    MEDICATIONS  (STANDING):  carbidopa/levodopa  25/100 2 Tablet(s) Oral <User Schedule>  docusate sodium 100 milliGRAM(s) Oral two times a day  entacapone 100 milliGRAM(s) Oral <User Schedule>  metoprolol tartrate 25 milliGRAM(s) Oral two times a day  pantoprazole   Suspension 40 milliGRAM(s) Oral before breakfast  potassium chloride    Tablet ER 40 milliEquivalent(s) Oral once  senna 2 Tablet(s) Oral at bedtime  sertraline 50 milliGRAM(s) Oral daily  sucralfate suspension 1 Gram(s) Oral two times a day  tamsulosin 0.4 milliGRAM(s) Oral at bedtime    MEDICATIONS  (PRN):  acetaminophen  Suppository .. 650 milliGRAM(s) Rectal every 6 hours PRN Temp greater or equal to 38C (100.4F)    Allergies    No Known Allergies    Intolerances    Vital Signs Last 24 Hrs  T(C): 36.9 (14 May 2019 08:16), Max: 37.3 (13 May 2019 20:44)  T(F): 98.4 (14 May 2019 08:16), Max: 99.1 (13 May 2019 20:44)  HR: 85 (14 May 2019 08:16) (77 - 98)  BP: 160/98 (14 May 2019 08:16) (95/65 - 160/98)  BP(mean): --  RR: 18 (14 May 2019 08:16) (18 - 18)  SpO2: 100% (14 May 2019 08:16) (97% - 100%)    I&O's Summary      PHYSICAL EXAM:  TELE: Afib  Constitutional: NAD, asleep   HEENT: Moist Mucous Membranes, Anicteric  Pulmonary: Decreased breath sounds b/l. No rales, crackles or wheeze appreciated.   Cardiovascular: Irregularly irregular, S1 and S2, No murmurs, rubs, gallops or clicks  Gastrointestinal: Bowel Sounds present, soft, nontender.   Lymph: No peripheral edema. No lymphadenopathy.  Skin: No visible rashes or ulcers.  Psych: Unable to assess at this time    LABS: All Labs Reviewed:                        11.2   6.18  )-----------( 279      ( 14 May 2019 07:27 )             33.4                         9.8    5.65  )-----------( 255      ( 13 May 2019 07:15 )             28.7                         9.9    5.77  )-----------( 229      ( 12 May 2019 07:29 )             29.9     14 May 2019 07:27    141    |  107    |  16     ----------------------------<  97     3.4     |  26     |  0.88   13 May 2019 07:15    142    |  110    |  16     ----------------------------<  100    3.5     |  25     |  0.82   12 May 2019 07:29    141    |  110    |  14     ----------------------------<  78     4.2     |  25     |  0.74     Ca    9.3        14 May 2019 07:27  Ca    8.9        13 May 2019 07:15  Ca    9.0        12 May 2019 07:29  Mg     2.3       13 May 2019 07:15  Mg     2.2       12 May 2019 07:29    TPro  6.9    /  Alb  2.9    /  TBili  0.9    /  DBili  x      /  AST  29     /  ALT  27     /  AlkPhos  59     14 May 2019 07:27    PT/INR - ( 14 May 2019 07:27 )   PT: 13.3 sec;   INR: 1.16 ratio     PTT - ( 14 May 2019 07:27 )  PTT:30.2 sec    < from: CT Head No Cont (05.13.19 @ 13:19) >    EXAM:  CT BRAIN                            PROCEDURE DATE:  05/13/2019          INTERPRETATION:  Clinical History: Altered mental status.    Noncontrast CT scan of brain is performed with axial images obtained from   skull base to vertex.    Comparison: CT scan 5/7/2019.    Motion artifact degrades image quality limiting evaluation of the   posterior fossa.    There is cerebral volume loss, without mass effect or midline shift.  There is extensive periventricular white matter low attenuation extending   into the deep subcortical white matter tracts, most compatible with   chronic microvascular ischemic disease.  There are chronic appearing lacunar infarcts right basal ganglion and   caudate nucleus, right capsular thalamic region and bilateral corona   radiata.    No acute parenchymal hemorrhage is noted.     No extra-axial fluid collection is demonstrated.    The visualized paranasal sinuses appear unremarkable.    Impression:    No acute intracranial hemorrhage or mass effect noted.    MRI is more sensitive for the evaluation of acute ischemic changes if   there are no clinical contraindications.    BECCA SWARTZ M.D., ATTENDING RADIOLOGIST  This document has been electronically signed. May 13 2019  1:40PM      < end of copied text >    < from: TTE Echo Doppler w/o Cont (05.08.19 @ 09:47) >     EXAM:  ECHO TTE WO CON COMP W DOPPLR         PROCEDURE DATE:  05/08/2019        INTERPRETATION:  INDICATION: Syncope    Blood Pressure 99/68    Height 176 cm     Weight 72 kg       BSA        Dimensions:    LA 4.4       Normal Values: 2.0 - 4.0 cm   Ao 3.3        Normal Values: 2.0 - 3.8 cm  SEPTUM 0.9       Normal Values: 0.6 - 1.2 cm  PWT 0.9       Normal Values: 0.6 - 1.1 cm  LVIDd 4.9         Normal Values: 3.0 - 5.6 cm  LVIDs 2.4         Normal Values: 1.8 - 4.0 cm      OBSERVATIONS:    Mitral Valve: Thickened leaflets, mild MR.  Aortic Valve/Aorta: Calcified trileaflet aortic valve with decreased   opening. Mild aortic stenosis, peak aortic valve gradient is 15.2 mmHg   with a mean gradient of 8.2 mmHg. Aortic valve area calculated by   continuity equation is 1.23 sq cm  Tricuspid Valve: normal with trace TR.  Pulmonic Valve: normal  Left Atrium: Dilated  Right Atrium: normal  Left Ventricle: normal LV size and systolic function, estimated LVEF of   65%.  Right Ventricle: normalsize and systolic function.  Pericardium/Pleura: normal, no significant pericardial effusion.      Conclusion:     Normal left ventricular internal dimensions and systolic function,   estimated LVEF of 65%.    Calcified trileaflet aortic valve with decreased opening. Mild aortic   stenosis, peak aortic valve gradient is 15.2 mmHg with a mean gradient of   8.2 mmHg. Aortic valve area calculated by continuity equation is 1.23 sq   cm  Mitral valve with thickened leaflets, mild mitral regurgitation  Normal RV size and systolic function.   Trace physiologic TR.      No significant pericardial effusion.     SKYLA MATAMOROS   This document has been electronically signed. May  9 2019  8:45AM      < end of copied text >    < from: Xray Chest 1 View AP/PA (05.07.19 @ 17:36) >    EXAM:  XR CHEST AP OR PA 1V                            PROCEDURE DATE:  05/07/2019          INTERPRETATION:  History: Chest pain    Semiupright portable chest x-ray is compared to 5/5/2019.    The study is slightly limited by rotation. The heart isnot enlarged. The   lungs are clear. There is osteopenia and degenerative change of the bony   structures.    Impression: No active pulmonary disease. No change.    MICHELLE LOUIS M.D.,ATTENDING RADIOLOGIST  This document has been electronically signed. May  7 2019  6:42PM      < end of copied text >    < from: CT Abdomen and Pelvis No Cont (05.10.19 @ 13:58) >    EXAM:  CT ABDOMEN AND PELVIS                            PROCEDURE DATE:  05/10/2019          INTERPRETATION:  CLINICAL INFORMATION: Anemia, rule out retroperitoneal   bleed.    COMPARISON: 5/12/2014    PROCEDURE:   CT of the Abdomen and Pelvis was performed without intravenous contrast.   Intravenous contrast: None.  Oral contrast: None.  Sagittal and coronal reformats were performed.    FINDINGS:    LOWER CHEST: Small bilateral pleural effusions. Bibasilar atelectasis,   left greater than right. Coronary and aortic atherosclerosis. Mild   gynecomastia.    LIVER: Hepatic cysts and additional hypodensities too small to   characterize. Scattered coarse calcifications.  BILE DUCTS: Normal caliber.  GALLBLADDER: Cholelithiasis.  SPLEEN: Within normal limits.  PANCREAS: Within normal limits.  ADRENALS: Within normal limits.  KIDNEYS/URETERS: Multifocal areas of scarring within the left kidney with   dystrophic calcifications. No hydronephrosis or obstructing stones.    BLADDER: Within normallimits.  REPRODUCTIVE ORGANS: Mildly enlarged prostate.    BOWEL: No bowel obstruction. Appendix within normal limits.  PERITONEUM: No ascites.  VESSELS:  Atherosclerotic calcifications of the aorta and branch vessels.   Infrarenal IVC filter.  LYMPHNODES: No lymphadenopathy.    ABDOMINAL WALL: Small fat-containing umbilical hernia. Small   fat-containing right inguinal hernia.  BONES: No suspicious osseous lesion. Diffuse osteopenia. Degenerative   changes within the spine and pelvis.  OTHER:  Marked asymmetric enlargement of the right iliacus muscle with   internal hyperdensity compatible with hematoma. Moderate asymmetric   enlargement of the right psoas muscle with heterogeneous attenuation also   suggestive of hematoma. Trace free hemorrhagic fluid and stranding within   the right retroperitoneal space.    IMPRESSION: Right iliopsoas intramuscular hematoma and right trace free   retroperitoneal hemorrhage. Findings were discussed with Dr. Ramirez at   2:15PM on 5/11/19.    GERALDO SAMANIEGO M.D., ATTENDING RADIOLOGIST  This document has been electronically signed. May 10 2019  2:19PM      < end of copied text >

## 2019-05-14 NOTE — DISCHARGE NOTE PROVIDER - NSDCFUADDAPPT_GEN_ALL_CORE_FT
Please follow up with your cardiologist within one week of discharge to continue management of your blood thinners.  Please follow up with your primary care doctor, Dr. Trimble in order to follow up on your anemia.

## 2019-05-14 NOTE — PROGRESS NOTE ADULT - PROBLEM SELECTOR PLAN 2
care per primary
Continue Sinemet, Entacapone at home dose  Neurology consulted, awaiting recs
Continue Sinemet, Entacapone at home dose  Neurology consulted, recs appreciated
care per primary
Continue Sinemet, Entacapone at home dose  Neurology consulted, awaiting recs

## 2019-05-14 NOTE — DISCHARGE NOTE PROVIDER - CARE PROVIDER_API CALL
Lisa Trimble)  Internal Medicine  [address] [place] [address]  Phone: [phone]  Fax: [phone]  Follow Up Time: 1 week    Lcuila Leo)  Cardiovascular Disease; Internal Medicine  [address] [place] [address]  Phone: [phone]  Fax: [phone]  Follow Up Time: 1 week    Carlo Nance)  Neurology  [address] [place] [address]  Phone: [phone]  Fax: [phone]  Follow Up Time: Lisa Trimble)  Internal Medicine  [address] [place] [address]  Phone: [phone]  Fax: [phone]  Follow Up Time: 1 week    Carlo Nance)  Neurology  [address] [place] [address]  Phone: [phone]  Fax: [phone]  Follow Up Time:     Rod Grey)  Internal Medicine  [address] [place], [place] [id number]  Phone: [phone]  Fax: [phone]  Follow Up Time:

## 2019-05-14 NOTE — DISCHARGE NOTE PROVIDER - NSDCACTIVITY_GEN_ALL_CORE
Do not make important decisions/Showering allowed/Bathing allowed/Do not drive or operate machinery/Walking - Outdoors allowed/Walking - Indoors allowed/No heavy lifting/straining

## 2019-05-14 NOTE — PROGRESS NOTE ADULT - ATTENDING COMMENTS
Seen/examined. agree with above.
I personally saw and examined the patient in detail.  I have spoken to the above provider regarding the assessment and plan of care.  I reviewed the above assessment and plan of care, and agree.  I have made changes in the body of the note where appropriate.
Pt. seen and evaluated for syncope in setting of acute blood loss anemia.  Pt. was awake and being fed breakfast during our rounds in the morning.  Wife had reported concern for lethargy later today.  Repeat CT head unremarkable.  Physical examination as above.      Plan:  -Syncope: Likely 2/2 acute blood loss anemia/RPB.  Echo with EF 65%.    Cardiology and Neurology f/u  -Fever:  Blood cx and urine cx negative.  2/2 hematoma.  Off IV Zosyn.  ID f/u  -Acute blood loss anemia:  Stool OB negative.  CT A/P with Right iliopsoas intramuscular hematoma and right trace free   retroperitoneal hemorrhage. s/p 1 unit PRBC transfusion.  Hbg stable.  Holding coumadin.  PRBC transfusions PRN.  Vascular surgery input appreciated.  -Parkinson's disease:  continue  Sinemet and Comtan.  -Urinary retention:  continue Flomax 0.4mg PO QHS  -HTN:  continue metoprolol 25mg PO BID.   -Afib:  continue metoprolol 25mg PO BID.  Holding coumadin 2/2 RPB and hematoma.    -VTE ppx:  SCD
The patient was personally seen and examined, in addition to being examined and evaluated by NP.  All elements of the note were edited where appropriate.
seen/examined. agree with above.
I saw and examined the patient personally. Spoke with above provider regarding this case. I reviewed the above findings completely.  I agree with the above history, physical, and plan which I have edited where appropriate.
seen/examined. agree with above.
Chart reviewed    Patient seen and examined    Agree with plan as outlined above
Advanced care planning was discussed with patient and family.  Advanced care planning forms were reviewed and discussed.  Risks, benefits and alternatives of gastroenterologic procedures were discussed in detail and all questions were answered.    30 minutes spent.
Pt. seen and evaluated for syncope.  Pt. noted to have fever 101.4 at midnight.  Tolerating IV antibiotic.  Lethargic this morning.  Physical examination as above.    Plan:  -Syncope:  Telemetry monitoring and check echo.  Trial of IV fluid.  Cardiology and Neurology f/u  -Fever:  Continue empiric Ceftriaxone 1gm IV daily.  Check urine and blood cx.  ID consult  -Anemia:  stool OB negative.  PRBC transfusion today.  Check iron studies, folate, and B12 levels  -Parkinson's disease:  continue  Sinemet and comtan.  -Urinary retention:  cotninue Flomax 0.4mg PO QHS  -HTN:  continue metoprolol 25mg PO BID.   -Afib:  continue metoprolol 25mg PO BID.  Holding coumadin for supra-therapeutic INR  -VTE ppx:  supra-therapeutic INR.
Pt. seen and evaluated for syncope and anemia.  Pt. is more awake today.  Eating breakfast.  Physical examination as above.  Tolerating IV antibiotic.     Plan:  -Syncope:  Telemetry monitoring.  Echo with EF 65%.    Cardiology and Neurology f/u  -Fever:  Blood cx NGTD.  Check urine cx.  Continue Zosyn.  ID f/u  -Anemia of chronic disease:  stool OB negative.  s/p 1 unit PRBC transfusion.  GI consult.  -Parkinson's disease:  continue  Sinemet and comtan.  -Urinary retention:  continue Flomax 0.4mg PO QHS  -HTN:  continue metoprolol 25mg PO BID.   -Afib:  continue metoprolol 25mg PO BID.  Coumadin 1mg PO tonight.  -VTE ppx:  therapeutic INR
Pt. seen and evaluated for syncope.  Pt. was lethargic this morning during our rounds.  Was awake and responsive earlier in AM.  Hbg stable.  Now off IV antibiotics with negative cultures.  Physical examination as above.     Plan:  -Syncope:  Telemetry monitoring.  Echo with EF 65%.    Cardiology and Neurology f/u  -Fever:  Blood cx and urine cx negative.  Will discontinue Zosyn.  ID f/u  -Anemia of chronic disease:  stool OB negative.  s/p 1 unit PRBC transfusion.  GI consulted for possible bleed in setting of supra-therapeutic INR   -Parkinson's disease:  continue  Sinemet and comtan.  -Urinary retention:  continue Flomax 0.4mg PO QHS  -HTN:  continue metoprolol 25mg PO BID.   -Afib:  continue metoprolol 25mg PO BID.  Coumadin 1mg PO tonight.  -VTE ppx:  therapeutic INR
yes

## 2019-05-14 NOTE — PROGRESS NOTE ADULT - REASON FOR ADMISSION
syncope

## 2019-05-14 NOTE — DISCHARGE NOTE PROVIDER - PROVIDER TOKENS
PROVIDER:[TOKEN:[[id number]],FOLLOWUP:[1 week]],PROVIDER:[TOKEN:[[id number]],FOLLOWUP:[1 week]],PROVIDER:[TOKEN:[[id number]]] PROVIDER:[TOKEN:[[id number]],FOLLOWUP:[1 week]],PROVIDER:[TOKEN:[[id number]]],PROVIDER:[TOKEN:[[id number]]]

## 2019-05-14 NOTE — DISCHARGE NOTE PROVIDER - NSDCCPCAREPLAN_GEN_ALL_CORE_FT
PRINCIPAL DISCHARGE DIAGNOSIS  Diagnosis: Syncope  Assessment and Plan of Treatment: You were admitted for a syncopal episode. This was likely secondary to bleeding in your abdomen which has since stopped. You were found to have a iliopsoas hematoma on CT imaging. You were stopped from your blood thinner for now. Please follow up with your cardiologist within 1 week of discharge to discuss the risk vs benefits of anticoagulation. You will return home with home PT.      SECONDARY DISCHARGE DIAGNOSES  Diagnosis: Hematoma of right iliopsoas muscle  Assessment and Plan of Treatment: CT abdomen pelvis showed that you a right iliopsoas hematoma as well as small right sided retroperitoneal bleed. You were stopped from your blood thinner. Please do not resume until you have seen your cardiologist.    Diagnosis: Atrial fibrillation  Assessment and Plan of Treatment: You have a history of atrial fibrillation. Please continue your current medications except for warfarin/coumadin. Please follow up with your cardiologist.    Diagnosis: Anemia  Assessment and Plan of Treatment: You were found to have anemia during your hospitalization. Iron studies were performed showing anemia of chronic disease. You were also found to have an iliopsoas hematoma as well as a retroperitoneal bleed. Your anemia is likely due to this blood loss. Please follow up with your primary care doctor within 1 week of discharge for a repeat CBC in order to monitor your anemia.

## 2019-05-14 NOTE — PROGRESS NOTE ADULT - PROVIDER SPECIALTY LIST ADULT
Cardiology
Gastroenterology
Hospitalist
Infectious Disease
Neurology
Infectious Disease
Infectious Disease
Neurology
Gastroenterology

## 2019-05-15 ENCOUNTER — APPOINTMENT (OUTPATIENT)
Dept: HOME HEALTH SERVICES | Facility: HOME HEALTH | Age: 79
End: 2019-05-15

## 2019-05-15 VITALS
OXYGEN SATURATION: 96 % | RESPIRATION RATE: 16 BRPM | SYSTOLIC BLOOD PRESSURE: 116 MMHG | DIASTOLIC BLOOD PRESSURE: 72 MMHG | TEMPERATURE: 98.2 F | HEART RATE: 76 BPM

## 2019-05-17 RX ORDER — WARFARIN SODIUM 3 MG/1
3 TABLET ORAL
Qty: 90 | Refills: 1 | Status: DISCONTINUED | COMMUNITY
Start: 2018-11-30 | End: 2019-05-17

## 2019-05-21 ENCOUNTER — LABORATORY RESULT (OUTPATIENT)
Age: 79
End: 2019-05-21

## 2019-05-22 ENCOUNTER — OTHER (OUTPATIENT)
Age: 79
End: 2019-05-22

## 2019-05-22 LAB
BASOPHILS # BLD AUTO: 0.06 K/UL
BASOPHILS NFR BLD AUTO: 0.9 %
EOSINOPHIL # BLD AUTO: 0.21 K/UL
EOSINOPHIL NFR BLD AUTO: 3.3 %
HCT VFR BLD CALC: 34.3 %
HGB BLD-MCNC: 11 G/DL
IMM GRANULOCYTES NFR BLD AUTO: 0.5 %
LYMPHOCYTES # BLD AUTO: 1.78 K/UL
LYMPHOCYTES NFR BLD AUTO: 28.1 %
MAN DIFF?: NORMAL
MCHC RBC-ENTMCNC: 32.1 GM/DL
MCHC RBC-ENTMCNC: 32.3 PG
MCV RBC AUTO: 100.6 FL
MONOCYTES # BLD AUTO: 0.52 K/UL
MONOCYTES NFR BLD AUTO: 8.2 %
NEUTROPHILS # BLD AUTO: 3.73 K/UL
NEUTROPHILS NFR BLD AUTO: 59 %
PLATELET # BLD AUTO: 296 K/UL
RBC # BLD: 3.41 M/UL
RBC # FLD: 15 %
WBC # FLD AUTO: 6.33 K/UL

## 2019-05-24 ENCOUNTER — APPOINTMENT (OUTPATIENT)
Dept: HOME HEALTH SERVICES | Facility: HOME HEALTH | Age: 79
End: 2019-05-24
Payer: COMMERCIAL

## 2019-05-24 VITALS
TEMPERATURE: 98 F | SYSTOLIC BLOOD PRESSURE: 94 MMHG | RESPIRATION RATE: 14 BRPM | HEART RATE: 76 BPM | DIASTOLIC BLOOD PRESSURE: 68 MMHG | OXYGEN SATURATION: 97 %

## 2019-05-24 DIAGNOSIS — R41.82 ALTERED MENTAL STATUS, UNSPECIFIED: ICD-10-CM

## 2019-05-24 DIAGNOSIS — Z87.898 PERSONAL HISTORY OF OTHER SPECIFIED CONDITIONS: ICD-10-CM

## 2019-05-24 DIAGNOSIS — T14.8XXA OTHER INJURY OF UNSPECIFIED BODY REGION, INITIAL ENCOUNTER: ICD-10-CM

## 2019-05-24 DIAGNOSIS — N39.0 URINARY TRACT INFECTION, SITE NOT SPECIFIED: ICD-10-CM

## 2019-05-24 DIAGNOSIS — Z86.59 PERSONAL HISTORY OF OTHER MENTAL AND BEHAVIORAL DISORDERS: ICD-10-CM

## 2019-05-24 DIAGNOSIS — R53.2 FUNCTIONAL QUADRIPLEGIA: ICD-10-CM

## 2019-05-24 DIAGNOSIS — R58 HEMORRHAGE, NOT ELSEWHERE CLASSIFIED: ICD-10-CM

## 2019-05-24 DIAGNOSIS — I48.91 UNSPECIFIED ATRIAL FIBRILLATION: ICD-10-CM

## 2019-05-24 PROCEDURE — 99495 TRANSJ CARE MGMT MOD F2F 14D: CPT

## 2019-05-24 RX ORDER — WARFARIN SODIUM 2 MG/1
2 TABLET ORAL
Qty: 90 | Refills: 0 | Status: DISCONTINUED | COMMUNITY
Start: 2019-01-07

## 2019-05-24 RX ORDER — PHYTONADIONE 5 MG/1
5 TABLET ORAL
Qty: 1 | Refills: 0 | Status: DISCONTINUED | COMMUNITY
Start: 2019-04-15 | End: 2019-05-24

## 2019-05-24 RX ORDER — QUETIAPINE FUMARATE 25 MG/1
25 TABLET ORAL
Qty: 30 | Refills: 0 | Status: DISCONTINUED | COMMUNITY
Start: 2019-03-28

## 2019-05-24 RX ORDER — RISPERIDONE 0.25 MG/1
0.25 TABLET, FILM COATED ORAL
Qty: 60 | Refills: 0 | Status: DISCONTINUED | COMMUNITY
Start: 2019-04-10

## 2019-05-24 RX ORDER — SUCRALFATE 1 G/10ML
1 SUSPENSION ORAL
Qty: 280 | Refills: 0 | Status: DISCONTINUED | COMMUNITY
Start: 2019-05-14

## 2019-05-24 NOTE — COUNSELING
[Normal Weight (BMI <25)] : normal weight - BMI <25 [Hypertension self management education material provided] : Hypertension self management education material provided [Non - Smoker] : non-smoker [Not Indicated] : not indicated [Decrease hospital use] : decrease hospital use [Minimize unnecessary interventions] : minimize unnecessary interventions [Maintain functional ability] : maintain functional ability [Discussed disease trajectory with patient/caregiver] : discussed disease trajectory with patient/caregiver [Likely to achieve goals/desired outcomes] : likely to achieve goals/desired outcomes [Patient/Caregiver has ___ understanding of disease process] : patient/caregiver has [unfilled] understanding of disease process [Completed DNR] : completed DNR [Completed Healthcare Proxy] : completed healthcare proxy [DNR] : Code Status: DNR [Completed Medical Orders for Life-Sustaining Treatment] : completed medical orders for life-sustaining treatment [DNI] : Intubation: DNI [Limited] : Treatment Guidelines: Limited [Last Verification Date: _____] [facility] Completion/last verification date: [unfilled] [_____] : HCP: [unfilled] [FreeTextEntry5] : does not take immunizations

## 2019-05-24 NOTE — REASON FOR VISIT
[Spouse] : spouse [Pre-Visit Preparation] : pre-visit preparation was done [Intercurrent Specialty/Sub-specialty Visits] : the patient has intercurrent specialty/sub-specialty visits [Post Hospitalization] : a post hospitalization visit [FreeTextEntry1] : for (R) iliopsoas hematoma and trace free retroperitoneal bleed-  and follow up of chronic conditions [FreeTextEntry3] : Urology, Neurology

## 2019-05-24 NOTE — HISTORY OF PRESENT ILLNESS
[Spouse] : spouse [FreeTextEntry1] : Dementia  [FreeTextEntry2] : PMH: Parkinson's Disease, Dementia, Anxiety,  Afib (on Coumadin), HTN, HLD, Aortic Ectasia, Coronary Artery Calcification, BPH, Chronic UTI's, Kidney Stones, Dysphagia, Vasovagal Syncope, S/P CVA x2, S/P DVT with [place] Filter in place, \par \par Interval events: admitted to [facility] from 4/25 - 5/2 for UTI; returned to [facility] & admitted from 5/7  - 5/14 for \par syncopal episode and hypercoagulation secondary to (R) iliopsoas hematoma and trace free retroperitoneal bleed- received 1 unit PRBC & Coumadin on hold until repeat CT and follow with cardiology\par \par Pt quiet today sitting in wheelchair for visit - wife reports has been more verbal \par Appetite returning per wife after hospitalizations; no longer refusing medications since wife crushing medications and giving with agave nectar- appears thinner today \par Incontinent of bowel and bladder- doing well on senna and colace\par WIfe denies recent fever, chills, SOB\par \par Vascular Dementia- s/p CVA 2012 & 2016, no longer on Seroquel or risperdal \par \par BPH- on finasteride, tamsulosin- has chronic UTI's as above, still using  D-Mannose\par \par Afib/DVT- off  Coumadin as above- has IVC filter in place\par \par HTN/HLD/CAD- has been hypotensive controlled with metoprolol, on Zetia \par \par Parkinsons- on Sinemet,entacapone as above- managed by neurology\par \par Vasovagal syncope- no recent episodes- had five day EEG in March 2018 in [facility] which did not reveal any seizure activity\par \par Dysphagia- on regular consistency food with nectar thickened liquids- has had no recent episodes of aspiration per wife, \par \par \par \par \par \par \par

## 2019-05-24 NOTE — PHYSICAL EXAM
[No Acute Distress] : no acute distress [Well Nourished] : well nourished [Well Developed] : well developed [Normal Sclera/Conjunctiva] : normal sclera/conjunctiva [PERRL] : pupils equal, round and reactive to light [Normal Oropharynx] : the oropharynx was normal [Normal Outer Ear/Nose] : the ears and nose were normal in appearance [No JVD] : no jugular venous distention [No LAD] : no lymphadenopathy [Supple] : the neck was supple [No Respiratory Distress] : no respiratory distress [Clear to Auscultation] : lungs were clear to auscultation bilaterally [No Accessory Muscle Use] : no accessory muscle use [Regular Rhythm] : with a regular rhythm [Normal Rate] : heart rate was normal  [Normal S1, S2] : normal S1 and S2 [No Murmurs] : no murmurs heard [No Edema] : there was no peripheral edema [Normal Bowel Sounds] : normal bowel sounds [Non Tender] : non-tender [Soft] : abdomen soft [Not Distended] : not distended [No Hernias] : no hernia was discovered [Normal Anterior Cervical Nodes] : no anterior cervical lymphadenopathy [Normal Post Cervical Nodes] : no posterior cervical lymphadenopathy [No Joint Swelling] : no joint swelling seen [No Clubbing, Cyanosis] : no clubbing  or cyanosis of the fingernails [No Rash] : no rash [No Skin Lesions] : no skin lesions [Kyphosis] : no kyphosis present [Scoliosis] : scoliosis not present [de-identified] : frail, non- verbal, holding head up again [de-identified] : alert to name,  [de-identified] : unable to assess secondary to dementia

## 2019-05-24 NOTE — REVIEW OF SYSTEMS
[Negative] : Heme/Lymph [Constipation] : no constipation [FreeTextEntry2] : as noted in HPI [FreeTextEntry5] : as noted iin HPI [FreeTextEntry8] : as noted in HPI

## 2019-05-28 ENCOUNTER — EMERGENCY (EMERGENCY)
Facility: HOSPITAL | Age: 79
LOS: 1 days | Discharge: HOME | End: 2019-05-28
Attending: EMERGENCY MEDICINE | Admitting: EMERGENCY MEDICINE
Payer: MEDICARE

## 2019-05-28 VITALS
SYSTOLIC BLOOD PRESSURE: 115 MMHG | TEMPERATURE: 98 F | HEART RATE: 78 BPM | OXYGEN SATURATION: 96 % | RESPIRATION RATE: 16 BRPM | DIASTOLIC BLOOD PRESSURE: 68 MMHG

## 2019-05-28 VITALS
OXYGEN SATURATION: 99 % | RESPIRATION RATE: 18 BRPM | HEART RATE: 75 BPM | DIASTOLIC BLOOD PRESSURE: 85 MMHG | SYSTOLIC BLOOD PRESSURE: 121 MMHG | WEIGHT: 143.08 LBS

## 2019-05-28 PROCEDURE — 72125 CT NECK SPINE W/O DYE: CPT

## 2019-05-28 PROCEDURE — 99284 EMERGENCY DEPT VISIT MOD MDM: CPT | Mod: 25

## 2019-05-28 PROCEDURE — 70450 CT HEAD/BRAIN W/O DYE: CPT

## 2019-05-28 PROCEDURE — 90715 TDAP VACCINE 7 YRS/> IM: CPT

## 2019-05-28 PROCEDURE — 72125 CT NECK SPINE W/O DYE: CPT | Mod: 26

## 2019-05-28 PROCEDURE — 90471 IMMUNIZATION ADMIN: CPT

## 2019-05-28 PROCEDURE — 70450 CT HEAD/BRAIN W/O DYE: CPT | Mod: 26

## 2019-05-28 PROCEDURE — 99284 EMERGENCY DEPT VISIT MOD MDM: CPT

## 2019-05-28 RX ORDER — TETANUS TOXOID, REDUCED DIPHTHERIA TOXOID AND ACELLULAR PERTUSSIS VACCINE, ADSORBED 5; 2.5; 8; 8; 2.5 [IU]/.5ML; [IU]/.5ML; UG/.5ML; UG/.5ML; UG/.5ML
0.5 SUSPENSION INTRAMUSCULAR ONCE
Refills: 0 | Status: COMPLETED | OUTPATIENT
Start: 2019-05-28 | End: 2019-05-28

## 2019-05-28 RX ADMIN — TETANUS TOXOID, REDUCED DIPHTHERIA TOXOID AND ACELLULAR PERTUSSIS VACCINE, ADSORBED 0.5 MILLILITER(S): 5; 2.5; 8; 8; 2.5 SUSPENSION INTRAMUSCULAR at 19:05

## 2019-05-28 NOTE — ED PROVIDER NOTE - OBJECTIVE STATEMENT
78 y male BIBA, accompanied to ED by wife, states she was downstairs, left her  upstairs in his wheelchair, she heard a "thumb", she found him on the floor by a cabinet with an abrasion on his right scalp, alert and awake,  fell out of wheelchair.  no vomiting, wife states he was taken off his coumadin last time he was in hospital.  states patient does not ambulate often.  needs tetanus.  PMD NP Lynda Altamirano,  [facility] 78 y male BIBA, accompanied to ED by wife, states she was downstairs, left her  upstairs in his wheelchair, she heard a "thump", she found him on the floor by a cabinet with an abrasion on his right scalp, alert and awake,  fell out of wheelchair.  no vomiting, wife states he was taken off his coumadin last time he was in hospital.  states patient does not ambulate often.  needs tetanus.  PMD NP Lynda Altamirano,  [facility]

## 2019-05-28 NOTE — ED PROVIDER NOTE - CARE PLAN
Principal Discharge DX:	Injury of head, initial encounter  Secondary Diagnosis:	Scalp abrasion, initial encounter

## 2019-05-28 NOTE — ED PROVIDER NOTE - PROGRESS NOTE DETAILS
discussed with wife ct head, cervical spine , no bleed, no fx, + degenerative changes. copy of result given, advised bacitracin , dsd to scalp abrasion.  follow up with pmd, any concerns return to ED

## 2019-05-28 NOTE — ED CLERICAL - NS ED CLERK NOTE PRE-ARRIVAL INFORMATION; ADDITIONAL PRE-ARRIVAL INFORMATION

## 2019-05-28 NOTE — ED ADULT NURSE NOTE - OBJECTIVE STATEMENT
Pts wife heard a loud thump downstairs -found pt on floor who fell out of wheelchair- abrasion noted to right scalp- pt denies LOC-

## 2019-05-28 NOTE — ED PROVIDER NOTE - ATTENDING CONTRIBUTION TO CARE
Fell from wheelchair at home here for evaluation. No LOC and no hip or back pains. Exam revealed elderly white male in NAD with abrasion to right parietal scalp. I agree with plan and management outlined by PA.

## 2019-05-29 ENCOUNTER — APPOINTMENT (OUTPATIENT)
Dept: HOME HEALTH SERVICES | Facility: HOME HEALTH | Age: 79
End: 2019-05-29

## 2019-05-29 VITALS
OXYGEN SATURATION: 98 % | DIASTOLIC BLOOD PRESSURE: 68 MMHG | RESPIRATION RATE: 16 BRPM | TEMPERATURE: 97.2 F | HEART RATE: 88 BPM | SYSTOLIC BLOOD PRESSURE: 108 MMHG

## 2019-05-29 RX ORDER — ESOMEPRAZOLE MAGNESIUM 20 MG/1
20 CAPSULE, DELAYED RELEASE ORAL TWICE DAILY
Qty: 60 | Refills: 3 | Status: DISCONTINUED | COMMUNITY
Start: 2019-05-24 | End: 2019-05-29

## 2019-06-04 LAB
BASOPHILS # BLD AUTO: 0.04 K/UL
BASOPHILS NFR BLD AUTO: 0.7 %
EOSINOPHIL # BLD AUTO: 0.14 K/UL
EOSINOPHIL NFR BLD AUTO: 2.4 %
HCT VFR BLD CALC: 34.1 %
HGB BLD-MCNC: 10.8 G/DL
IMM GRANULOCYTES NFR BLD AUTO: 0.3 %
LYMPHOCYTES # BLD AUTO: 1.73 K/UL
LYMPHOCYTES NFR BLD AUTO: 29.1 %
MAN DIFF?: NORMAL
MCHC RBC-ENTMCNC: 31.7 GM/DL
MCHC RBC-ENTMCNC: 31.7 PG
MCV RBC AUTO: 100 FL
MONOCYTES # BLD AUTO: 0.42 K/UL
MONOCYTES NFR BLD AUTO: 7.1 %
NEUTROPHILS # BLD AUTO: 3.6 K/UL
NEUTROPHILS NFR BLD AUTO: 60.4 %
PLATELET # BLD AUTO: 173 K/UL
RBC # BLD: 3.41 M/UL
RBC # FLD: 15.7 %
WBC # FLD AUTO: 5.95 K/UL

## 2019-06-11 ENCOUNTER — APPOINTMENT (OUTPATIENT)
Age: 79
End: 2019-06-11

## 2019-06-11 VITALS
DIASTOLIC BLOOD PRESSURE: 70 MMHG | SYSTOLIC BLOOD PRESSURE: 108 MMHG | RESPIRATION RATE: 14 BRPM | HEART RATE: 87 BPM | OXYGEN SATURATION: 92 % | TEMPERATURE: 97.3 F

## 2019-06-11 RX ORDER — DOCUSATE SODIUM 100 MG/1
100 CAPSULE, LIQUID FILLED ORAL
Qty: 90 | Refills: 3 | Status: ACTIVE | COMMUNITY
Start: 2019-01-25

## 2019-06-15 ENCOUNTER — INPATIENT (INPATIENT)
Facility: HOSPITAL | Age: 79
LOS: 3 days | Discharge: HOME | DRG: 299 | End: 2019-06-19
Attending: INTERNAL MEDICINE | Admitting: HOSPITALIST
Payer: MEDICARE

## 2019-06-15 VITALS
SYSTOLIC BLOOD PRESSURE: 118 MMHG | WEIGHT: 149.91 LBS | TEMPERATURE: 98 F | HEART RATE: 115 BPM | RESPIRATION RATE: 18 BRPM | DIASTOLIC BLOOD PRESSURE: 77 MMHG | HEIGHT: 70 IN

## 2019-06-15 DIAGNOSIS — I82.412 ACUTE EMBOLISM AND THROMBOSIS OF LEFT FEMORAL VEIN: ICD-10-CM

## 2019-06-15 DIAGNOSIS — E87.0 HYPEROSMOLALITY AND HYPERNATREMIA: ICD-10-CM

## 2019-06-15 DIAGNOSIS — F03.90 UNSPECIFIED DEMENTIA WITHOUT BEHAVIORAL DISTURBANCE: ICD-10-CM

## 2019-06-15 DIAGNOSIS — R33.9 RETENTION OF URINE, UNSPECIFIED: ICD-10-CM

## 2019-06-15 DIAGNOSIS — I48.91 UNSPECIFIED ATRIAL FIBRILLATION: ICD-10-CM

## 2019-06-15 DIAGNOSIS — I80.10 PHLEBITIS AND THROMBOPHLEBITIS OF UNSPECIFIED FEMORAL VEIN: ICD-10-CM

## 2019-06-15 DIAGNOSIS — G20 PARKINSON'S DISEASE: ICD-10-CM

## 2019-06-15 DIAGNOSIS — Z29.9 ENCOUNTER FOR PROPHYLACTIC MEASURES, UNSPECIFIED: ICD-10-CM

## 2019-06-15 LAB
ALBUMIN SERPL ELPH-MCNC: 3.3 G/DL — SIGNIFICANT CHANGE UP (ref 3.3–5)
ALP SERPL-CCNC: 69 U/L — SIGNIFICANT CHANGE UP (ref 40–120)
ALT FLD-CCNC: 16 U/L — SIGNIFICANT CHANGE UP (ref 12–78)
ANION GAP SERPL CALC-SCNC: 6 MMOL/L — SIGNIFICANT CHANGE UP (ref 5–17)
AST SERPL-CCNC: 22 U/L — SIGNIFICANT CHANGE UP (ref 15–37)
BILIRUB SERPL-MCNC: 0.9 MG/DL — SIGNIFICANT CHANGE UP (ref 0.2–1.2)
BUN SERPL-MCNC: 20 MG/DL — SIGNIFICANT CHANGE UP (ref 7–23)
CALCIUM SERPL-MCNC: 9.2 MG/DL — SIGNIFICANT CHANGE UP (ref 8.5–10.1)
CHLORIDE SERPL-SCNC: 116 MMOL/L — HIGH (ref 96–108)
CO2 SERPL-SCNC: 26 MMOL/L — SIGNIFICANT CHANGE UP (ref 22–31)
CREAT SERPL-MCNC: 0.81 MG/DL — SIGNIFICANT CHANGE UP (ref 0.5–1.3)
GLUCOSE SERPL-MCNC: 101 MG/DL — HIGH (ref 70–99)
HCT VFR BLD CALC: 33.8 % — LOW (ref 39–50)
HGB BLD-MCNC: 11.4 G/DL — LOW (ref 13–17)
INR BLD: 1.33 RATIO — HIGH (ref 0.88–1.16)
MCHC RBC-ENTMCNC: 32 PG — SIGNIFICANT CHANGE UP (ref 27–34)
MCHC RBC-ENTMCNC: 33.7 GM/DL — SIGNIFICANT CHANGE UP (ref 32–36)
MCV RBC AUTO: 94.9 FL — SIGNIFICANT CHANGE UP (ref 80–100)
NRBC # BLD: 0 /100 WBCS — SIGNIFICANT CHANGE UP (ref 0–0)
PLATELET # BLD AUTO: 162 K/UL — SIGNIFICANT CHANGE UP (ref 150–400)
POTASSIUM SERPL-MCNC: 2.8 MMOL/L — CRITICAL LOW (ref 3.5–5.3)
POTASSIUM SERPL-SCNC: 2.8 MMOL/L — CRITICAL LOW (ref 3.5–5.3)
PROT SERPL-MCNC: 6.6 G/DL — SIGNIFICANT CHANGE UP (ref 6–8.3)
PROTHROM AB SERPL-ACNC: 15.2 SEC — HIGH (ref 10–12.9)
RBC # BLD: 3.56 M/UL — LOW (ref 4.2–5.8)
RBC # FLD: 14.6 % — HIGH (ref 10.3–14.5)
SODIUM SERPL-SCNC: 148 MMOL/L — HIGH (ref 135–145)
WBC # BLD: 8.65 K/UL — SIGNIFICANT CHANGE UP (ref 3.8–10.5)
WBC # FLD AUTO: 8.65 K/UL — SIGNIFICANT CHANGE UP (ref 3.8–10.5)

## 2019-06-15 PROCEDURE — 74176 CT ABD & PELVIS W/O CONTRAST: CPT | Mod: 26

## 2019-06-15 PROCEDURE — 71045 X-RAY EXAM CHEST 1 VIEW: CPT | Mod: 26

## 2019-06-15 PROCEDURE — 99223 1ST HOSP IP/OBS HIGH 75: CPT | Mod: GC

## 2019-06-15 PROCEDURE — 93970 EXTREMITY STUDY: CPT | Mod: 26

## 2019-06-15 PROCEDURE — 93010 ELECTROCARDIOGRAM REPORT: CPT

## 2019-06-15 PROCEDURE — 99221 1ST HOSP IP/OBS SF/LOW 40: CPT

## 2019-06-15 PROCEDURE — 99285 EMERGENCY DEPT VISIT HI MDM: CPT

## 2019-06-15 RX ORDER — CARBIDOPA AND LEVODOPA 25; 100 MG/1; MG/1
1 TABLET ORAL ONCE
Refills: 0 | Status: COMPLETED | OUTPATIENT
Start: 2019-06-15 | End: 2019-06-15

## 2019-06-15 RX ORDER — TAMSULOSIN HYDROCHLORIDE 0.4 MG/1
0.4 CAPSULE ORAL AT BEDTIME
Refills: 0 | Status: DISCONTINUED | OUTPATIENT
Start: 2019-06-15 | End: 2019-06-16

## 2019-06-15 RX ORDER — FINASTERIDE 5 MG/1
5 TABLET, FILM COATED ORAL DAILY
Refills: 0 | Status: DISCONTINUED | OUTPATIENT
Start: 2019-06-15 | End: 2019-06-16

## 2019-06-15 RX ORDER — CARBIDOPA AND LEVODOPA 25; 100 MG/1; MG/1
2 TABLET ORAL
Refills: 0 | Status: DISCONTINUED | OUTPATIENT
Start: 2019-06-15 | End: 2019-06-16

## 2019-06-15 RX ORDER — SENNA PLUS 8.6 MG/1
1 TABLET ORAL DAILY
Refills: 0 | Status: DISCONTINUED | OUTPATIENT
Start: 2019-06-15 | End: 2019-06-16

## 2019-06-15 RX ORDER — METOPROLOL TARTRATE 50 MG
25 TABLET ORAL
Refills: 0 | Status: DISCONTINUED | OUTPATIENT
Start: 2019-06-15 | End: 2019-06-16

## 2019-06-15 RX ORDER — APIXABAN 2.5 MG/1
2.5 TABLET, FILM COATED ORAL EVERY 12 HOURS
Refills: 0 | Status: DISCONTINUED | OUTPATIENT
Start: 2019-06-15 | End: 2019-06-19

## 2019-06-15 RX ORDER — POTASSIUM CHLORIDE 20 MEQ
40 PACKET (EA) ORAL ONCE
Refills: 0 | Status: COMPLETED | OUTPATIENT
Start: 2019-06-15 | End: 2019-06-15

## 2019-06-15 RX ORDER — SODIUM CHLORIDE 9 MG/ML
1000 INJECTION INTRAMUSCULAR; INTRAVENOUS; SUBCUTANEOUS
Refills: 0 | Status: DISCONTINUED | OUTPATIENT
Start: 2019-06-15 | End: 2019-06-16

## 2019-06-15 RX ORDER — SODIUM CHLORIDE 9 MG/ML
1000 INJECTION INTRAMUSCULAR; INTRAVENOUS; SUBCUTANEOUS ONCE
Refills: 0 | Status: COMPLETED | OUTPATIENT
Start: 2019-06-15 | End: 2019-06-15

## 2019-06-15 RX ORDER — SERTRALINE 25 MG/1
50 TABLET, FILM COATED ORAL DAILY
Refills: 0 | Status: DISCONTINUED | OUTPATIENT
Start: 2019-06-15 | End: 2019-06-16

## 2019-06-15 RX ORDER — DOCUSATE SODIUM 100 MG
100 CAPSULE ORAL
Refills: 0 | Status: DISCONTINUED | OUTPATIENT
Start: 2019-06-15 | End: 2019-06-16

## 2019-06-15 RX ORDER — ENTACAPONE 200 MG/1
100 TABLET, FILM COATED ORAL ONCE
Refills: 0 | Status: COMPLETED | OUTPATIENT
Start: 2019-06-15 | End: 2019-06-15

## 2019-06-15 RX ORDER — ENTACAPONE 200 MG/1
200 TABLET, FILM COATED ORAL
Refills: 0 | Status: DISCONTINUED | OUTPATIENT
Start: 2019-06-15 | End: 2019-06-16

## 2019-06-15 RX ORDER — ENTACAPONE 200 MG/1
100 TABLET, FILM COATED ORAL
Refills: 0 | Status: DISCONTINUED | OUTPATIENT
Start: 2019-06-15 | End: 2019-06-16

## 2019-06-15 RX ORDER — POTASSIUM CHLORIDE 20 MEQ
40 PACKET (EA) ORAL ONCE
Refills: 0 | Status: DISCONTINUED | OUTPATIENT
Start: 2019-06-15 | End: 2019-06-15

## 2019-06-15 RX ORDER — METOPROLOL TARTRATE 50 MG
25 TABLET ORAL ONCE
Refills: 0 | Status: COMPLETED | OUTPATIENT
Start: 2019-06-15 | End: 2019-06-15

## 2019-06-15 RX ADMIN — Medication 40 MILLIEQUIVALENT(S): at 23:45

## 2019-06-15 RX ADMIN — ENTACAPONE 100 MILLIGRAM(S): 200 TABLET, FILM COATED ORAL at 16:29

## 2019-06-15 RX ADMIN — CARBIDOPA AND LEVODOPA 1 TABLET(S): 25; 100 TABLET ORAL at 16:30

## 2019-06-15 RX ADMIN — Medication 40 MILLIEQUIVALENT(S): at 11:11

## 2019-06-15 RX ADMIN — APIXABAN 2.5 MILLIGRAM(S): 2.5 TABLET, FILM COATED ORAL at 23:46

## 2019-06-15 RX ADMIN — TAMSULOSIN HYDROCHLORIDE 0.4 MILLIGRAM(S): 0.4 CAPSULE ORAL at 23:46

## 2019-06-15 RX ADMIN — SODIUM CHLORIDE 1000 MILLILITER(S): 9 INJECTION INTRAMUSCULAR; INTRAVENOUS; SUBCUTANEOUS at 07:10

## 2019-06-15 RX ADMIN — Medication 25 MILLIGRAM(S): at 16:30

## 2019-06-15 NOTE — ED PROVIDER NOTE - OBJECTIVE STATEMENT
lower left leg swelling, warm to touch in calf.  abrasion to shin  swelling of lower extremities 80 y/o WM h/o dementia, parkinsonism C/C lower left leg swelling x 1 evening, no CP, no SOB, no fever, no chills, no abdominal pain, no N/V/D, no urinary symptoms,  no h/o trauma, no acute stroke symptoms . He was scheduled to get home IV fluids due to his increasing anorexia and advancing dementia.

## 2019-06-15 NOTE — ED ADULT NURSE NOTE - OBJECTIVE STATEMENT
78 y/o M patient PMH dementia and Parkinsons presents to ED from home c/o left leg swelling. As per patient's wife she noticed increased swelling and redness in left knee and calf last night. Patient's wife reports patient had DVT in left leg years prior. Patient A&Ox0. scattered bruising noted to left and right lower extremities. abdomen soft, non tender, non distended. Non verbal indicators of pain not present. Safety and comfort measures provided and maintained. Wife bedside.

## 2019-06-15 NOTE — H&P ADULT - PROBLEM SELECTOR PLAN 5
-c/w zolofot qhs  -wife states that gives patient xanax 0.25mg qhs PRN however not seen in pharm list.  Will hold off for now, and give PRN if called for agitation

## 2019-06-15 NOTE — ED PROVIDER NOTE - CARE PLAN
Principal Discharge DX:	Calf swelling  Goal:	r/o DVT  Secondary Diagnosis:	Anorexia  Secondary Diagnosis:	Dehydration Principal Discharge DX:	Acute deep vein thrombosis (DVT) of femoral vein of left lower extremity  Goal:	r/o DVT  Secondary Diagnosis:	Anorexia  Secondary Diagnosis:	Dehydration

## 2019-06-15 NOTE — H&P ADULT - PROBLEM SELECTOR PLAN 2
-afib with RVR in the ED, now rate controlled  -c/w metoprolol 25mg BID  -started on Eliquis 2.5mg BID

## 2019-06-15 NOTE — H&P ADULT - NSHPREVIEWOFSYSTEMS_GEN_ALL_CORE
Review of Systems  General: no fever, chills  Eyes: no vision changes  ENT: no hearing changes, nasal congestions, or sore throat  CV: no chest pain or palpitations  Pulm: no SOB, wheezing, cough, or hemoptysis  Abd/GI: no nausea, vomiting, diarrhea, constipation, abd pain  : no dysuria, hematuria, urinary frequency  MSK: no joint pain or myalgias  Neuro: no syncope, dizziness, focal weakness  Psych: no anxiety or depression  Endo: no heat or cold intolerance Review of Systems- unable to obtain due to mental status (below is as per wife)  General: no fever, chills  Pulm: no SOB, wheezing, cough  Abd/GI: no nausea, vomiting, diarrhea, constipation  Neuro: increased immobility

## 2019-06-15 NOTE — H&P ADULT - NSHPSOCIALHISTORY_GEN_ALL_CORE
Pt lives with wife, uses wheelchair at baseline, is able to ambulate with walker  Dependent on all ADLs  No tobacco, alcohol or drug use Pt lives with wife, uses wheelchair at baseline, is able to ambulate with walker and assistance  Dependent on all ADLs  No tobacco, alcohol or drug use

## 2019-06-15 NOTE — H&P ADULT - PROBLEM SELECTOR PLAN 4
-c/w sinemet and entacapone (home doses)  -wife states patient is able to eat regular food, will c/w regular diet with thickened fluids

## 2019-06-15 NOTE — ED PROVIDER NOTE - PROGRESS NOTE DETAILS
Patient now has an acute proximal DVT. Has [place] Filter. Was on coumadin but had intra-abdominal bleed so removed from it. Will consult with Dr. DAVID Cabrera. Dr. DAVID Cabrera wants patient admitted for inpatient anticoagulation with Eliquis.

## 2019-06-15 NOTE — H&P ADULT - NSICDXPASTMEDICALHX_GEN_ALL_CORE_FT
PAST MEDICAL HISTORY:  Atrial fibrillation     Cerebrovascular accident     Constipation     Dementia     Hematoma of right iliopsoas muscle, sequela     Hypertension     Parkinson disease     Syncope     Urinary retention

## 2019-06-15 NOTE — H&P ADULT - PROBLEM SELECTOR PLAN 3
-Na 148 in the ED, s/p 1L NS bolus  -Started maintenance IVF -Na 148 in the ED, s/p 1L NS bolus  -Started maintenance IVF  repeat levels in AM

## 2019-06-15 NOTE — H&P ADULT - PROBLEM SELECTOR PLAN 1
-admit to [facility]  -patient has had increased immobility since last admission and wife has increased entacapone  -coumadin stopped on last admission due to R iliopsoas hematoma, and has hx of DVT  -seen by cardio in the ED, risks and benefits discussed with wife regarding restarting AC, wife is agreeable  -start Eliquis 2.5mg BID  -repeat CT abd and pelvis showed hematoma was smaller, trace retroperitoneal bleed no longer visible  -Cardio, [facility] following, recs appreciated -admit to [facility]  -patient has had increased immobility since last admission and wife has increased entacapone  -coumadin stopped on last admission due to R iliopsoas hematoma, and has hx of DVT with IVC filter placed 20 years ago  -seen by cardio in the ED, risks and benefits discussed with wife regarding restarting AC, wife is agreeable  -start Eliquis 2.5mg BID  -repeat CT abd and pelvis showed hematoma was smaller, trace retroperitoneal bleed no longer visible  -little concern for PE at this time given that patient is saturating well on RA, no dyspnea.  Will hold off on CTA chest  -[facility] following, recs appreciated

## 2019-06-15 NOTE — H&P ADULT - HISTORY OF PRESENT ILLNESS
80 yo M PMH Afib (on coumadin), CVA (2012, 2016), hx of DVT s/p IVC filter, Parkinson's disease, dementia, HTN, urinary retention, and multiple TIAs presents    Of note, patient was admitted on 5/7/19 for syncope likely 2/2 hematoma of R iliopsoas muscle and R trace free retroperitoneal hemorrhage at which time patient's AC was held (was on coumadin).    In the ED, T 97.9, HR 96, /87 --> 131/65, RR 18, SpO2 96%.  Labs showed H/H 11.4/33.8 (baseline H around 11.5).  PT/INR 15.2/1.33.  Na 148, K 2.8.  In the ED, patient received sinemet x1, comtan x1, lopressor 25mg PO x1, KCl powder 40mEq x1, 1L bolus NS.    EKG:  Afib with RVR  Chest Xray: no active pulmonary disease.  US doppler b/l LE: Deep venous thrombosis left common femoral vein including greater saphenous common femoral junction, extending to the left femoral vein.  CT abd/ pelvis: Interval decrease in size in the right iliopsoas intramuscular hematoma.  No localized retroperitoneal hematoma noted. 80 yo M PMH Afib (on coumadin), CVA (2012, 2016), hx of DVT s/p IVC filter 20 years ago, Parkinson's disease, dementia, HTN, urinary retention, and multiple TIAs presents for LLE swelling noted last night.  Patient is poor historian and non-verbal at baseline, follows some commands, wife at bedside to provide information.  States that last night she noted patient had swollen LLE, from toes to knee associated with some purple discoloration and felt tight.  Patient had DVT in LLE in the past and wife was aware of symptoms, was going to have doppler done today but this morning noted swelling and discoloration to be worse and brought him to the ED.  Patient has not had any associated dyspnea, difficulty breathing, but wife states that his temperature has been increased for a few days past week (usually runs around 97F but was at 99F 2 days last week) and noted to be flushed.  Patient otherwise baseline status.      Of note, patient was admitted on 5/7/19 for syncope likely 2/2 hematoma of R iliopsoas muscle and R trace free retroperitoneal hemorrhage at which time patient's AC was held (was on coumadin).  Patient went home and was receiving PT, however wife noted that patient was having increased immobility and therefore started to increase his entacapone doses after discussing with PMD.      In the ED, T 97.9, HR 96, /87 --> 131/65, RR 18, SpO2 96%.  Labs showed H/H 11.4/33.8 (baseline H around 11.5).  PT/INR 15.2/1.33.  Na 148, K 2.8.  In the ED, patient received sinemet x1, comtan x1, lopressor 25mg PO x1, KCl powder 40mEq x1, 1L bolus NS.    EKG:  Afib with RVR  Chest Xray: no active pulmonary disease.  US doppler b/l LE: Deep venous thrombosis left common femoral vein including greater saphenous common femoral junction, extending to the left femoral vein.  CT abd/ pelvis: Interval decrease in size in the right iliopsoas intramuscular hematoma.  No localized retroperitoneal hematoma noted.

## 2019-06-15 NOTE — CONSULT NOTE ADULT - ASSESSMENT
79   M PMH Afib previously on AC, CVA (2012, 2016), Parkinson's disease, dementia, HTN, urinary retention, hx of dvt s/p IVC filter, and multiple TIAs recently was admitted with syncope and found to have Right iliopsoas intramuscular hematoma and right trace free retroperitoneal hemorrhage who now presents from home for a left leg swelling with left leg proximal DVT.     - Appear dry. Cont IVF.   - Needs K repletion     - Af is rate controlled   - Cont metoprolol    - It appears that he has a new DVT likely from greater immobility  - He has an IVC filter which will be partially protective.   - Though I think that given his immobility he will be at high risk for recurrent VTE  - His last admission he had a right iliopsoas bleed that was likely from supratheraputic INR  - I had a lengthy discussion w wife. He is at elevated CVA and DVT risk.   - I would repeat CT a/p to assess for resolution of hematoma. If resolved it may be reasonable to admit pt and start on AC.   - If wife is not amenable to AC I think that he can followup as an outpt.

## 2019-06-15 NOTE — ED ADULT NURSE NOTE - NSIMPLEMENTINTERV_GEN_ALL_ED
Implemented All Fall with Harm Risk Interventions:  [place] to call system. Call bell, personal items and telephone within reach. Instruct patient to call for assistance. Room bathroom lighting operational. Non-slip footwear when patient is off stretcher. Physically safe environment: no spills, clutter or unnecessary equipment. Stretcher in lowest position, wheels locked, appropriate side rails in place. Provide visual cue, wrist band, yellow gown, etc. Monitor gait and stability. Monitor for mental status changes and reorient to person, place, and time. Review medications for side effects contributing to fall risk. Reinforce activity limits and safety measures with patient and family. Provide visual clues: red socks.

## 2019-06-15 NOTE — H&P ADULT - ASSESSMENT
80 yo M PMH Afib (on coumadin), CVA (2012, 2016), hx of DVT s/p IVC filter, Parkinson's disease, dementia, HTN, urinary retention, and multiple TIAs presents    -admit to [facility]  - 78 yo M PMH Afib (on coumadin), CVA (2012, 2016), hx of DVT s/p IVC filter, Parkinson's disease, dementia, HTN, urinary retention, and multiple TIAs presents with LLE femoral DVT.

## 2019-06-15 NOTE — H&P ADULT - NSHPPHYSICALEXAM_GEN_ALL_CORE
Physical Exam  General: No apparent distress  Head: normocephalic, atraumatic  Eyes: EOMI, anicteric  ENT: moist mucous membranes, no pharyngeal exudates  Heart: rrr, S1, S2, no murmurs  Chest: CTA b/l, no rales, rhonchi, or wheezes  Abd: BS+, soft, NT, ND  Back: no CVA tenderness  Extr: no edema or cyanosis  Neuro: AA&Ox3, no focal weakness, sensation to light touch intact  Psych: normal affect Physical Exam  General: No apparent distress, nonverbal, pleasant, follows some commands  Head: normocephalic, atraumatic  Eyes: PEERL  ENT: dry mucous membranes  Heart: irregularly irregular, no murmurs  Chest: CTA b/l, no rales, rhonchi, or wheezes, limited breath sounds b/l 2/2 poor inspiratory effort  Abd: BS+, soft, NT, ND  Extr: LLE edema (nonpitting),   Neuro: AA&Ox3, no focal weakness, sensation to light touch intact  Psych: normal affect Physical Exam  General: No apparent distress, nonverbal, pleasant, follows some commands  Head: normocephalic, atraumatic  Eyes: PEERL  ENT: dry mucous membranes  Heart: irregularly irregular, no murmurs  Chest: CTA b/l, no rales, rhonchi, or wheezes, limited breath sounds b/l 2/2 poor inspiratory effort  Abd: BS+, soft, NT, ND  Extr: LLE edema (nonpitting), purple discoloration noted on LLE toes, >2 secs cap refill, LLE firm, no ecchymosis noted on b/l hips, abrasions noted on b/l LE (from home)

## 2019-06-15 NOTE — ED CLERICAL - NS ED CLERK NOTE PRE-ARRIVAL INFORMATION; ADDITIONAL PRE-ARRIVAL INFORMATION

## 2019-06-16 LAB
ALBUMIN SERPL ELPH-MCNC: 2.8 G/DL — LOW (ref 3.3–5)
ALP SERPL-CCNC: 62 U/L — SIGNIFICANT CHANGE UP (ref 40–120)
ALT FLD-CCNC: 7 U/L — LOW (ref 12–78)
ANION GAP SERPL CALC-SCNC: 5 MMOL/L — SIGNIFICANT CHANGE UP (ref 5–17)
AST SERPL-CCNC: 21 U/L — SIGNIFICANT CHANGE UP (ref 15–37)
BILIRUB SERPL-MCNC: 0.6 MG/DL — SIGNIFICANT CHANGE UP (ref 0.2–1.2)
BUN SERPL-MCNC: 19 MG/DL — SIGNIFICANT CHANGE UP (ref 7–23)
CALCIUM SERPL-MCNC: 9.2 MG/DL — SIGNIFICANT CHANGE UP (ref 8.5–10.1)
CHLORIDE SERPL-SCNC: 120 MMOL/L — HIGH (ref 96–108)
CO2 SERPL-SCNC: 27 MMOL/L — SIGNIFICANT CHANGE UP (ref 22–31)
CREAT SERPL-MCNC: 0.79 MG/DL — SIGNIFICANT CHANGE UP (ref 0.5–1.3)
GLUCOSE SERPL-MCNC: 110 MG/DL — HIGH (ref 70–99)
HCT VFR BLD CALC: 29.7 % — LOW (ref 39–50)
HGB BLD-MCNC: 9.8 G/DL — LOW (ref 13–17)
MCHC RBC-ENTMCNC: 32.5 PG — SIGNIFICANT CHANGE UP (ref 27–34)
MCHC RBC-ENTMCNC: 33 GM/DL — SIGNIFICANT CHANGE UP (ref 32–36)
MCV RBC AUTO: 98.3 FL — SIGNIFICANT CHANGE UP (ref 80–100)
NRBC # BLD: 0 /100 WBCS — SIGNIFICANT CHANGE UP (ref 0–0)
PLATELET # BLD AUTO: 108 K/UL — LOW (ref 150–400)
POTASSIUM SERPL-MCNC: 4 MMOL/L — SIGNIFICANT CHANGE UP (ref 3.5–5.3)
POTASSIUM SERPL-SCNC: 4 MMOL/L — SIGNIFICANT CHANGE UP (ref 3.5–5.3)
PROT SERPL-MCNC: 6 G/DL — SIGNIFICANT CHANGE UP (ref 6–8.3)
RBC # BLD: 3.02 M/UL — LOW (ref 4.2–5.8)
RBC # FLD: 15.4 % — HIGH (ref 10.3–14.5)
SODIUM SERPL-SCNC: 152 MMOL/L — HIGH (ref 135–145)
WBC # BLD: 6.33 K/UL — SIGNIFICANT CHANGE UP (ref 3.8–10.5)
WBC # FLD AUTO: 6.33 K/UL — SIGNIFICANT CHANGE UP (ref 3.8–10.5)

## 2019-06-16 PROCEDURE — 99232 SBSQ HOSP IP/OBS MODERATE 35: CPT

## 2019-06-16 PROCEDURE — 99233 SBSQ HOSP IP/OBS HIGH 50: CPT

## 2019-06-16 RX ORDER — ENTACAPONE 200 MG/1
200 TABLET, FILM COATED ORAL
Refills: 0 | Status: DISCONTINUED | OUTPATIENT
Start: 2019-06-16 | End: 2019-06-19

## 2019-06-16 RX ORDER — SERTRALINE 25 MG/1
50 TABLET, FILM COATED ORAL
Refills: 0 | Status: DISCONTINUED | OUTPATIENT
Start: 2019-06-16 | End: 2019-06-19

## 2019-06-16 RX ORDER — SENNA PLUS 8.6 MG/1
3 TABLET ORAL
Refills: 0 | Status: DISCONTINUED | OUTPATIENT
Start: 2019-06-16 | End: 2019-06-19

## 2019-06-16 RX ORDER — DOCUSATE SODIUM 100 MG
1 CAPSULE ORAL
Qty: 0 | Refills: 0 | DISCHARGE

## 2019-06-16 RX ORDER — SODIUM CHLORIDE 9 MG/ML
1000 INJECTION, SOLUTION INTRAVENOUS
Refills: 0 | Status: DISCONTINUED | OUTPATIENT
Start: 2019-06-16 | End: 2019-06-17

## 2019-06-16 RX ORDER — ENTACAPONE 200 MG/1
100 TABLET, FILM COATED ORAL
Refills: 0 | Status: DISCONTINUED | OUTPATIENT
Start: 2019-06-16 | End: 2019-06-19

## 2019-06-16 RX ORDER — ALPRAZOLAM 0.25 MG
0.25 TABLET ORAL
Refills: 0 | Status: DISCONTINUED | OUTPATIENT
Start: 2019-06-16 | End: 2019-06-19

## 2019-06-16 RX ORDER — CARBIDOPA AND LEVODOPA 25; 100 MG/1; MG/1
2 TABLET ORAL
Refills: 0 | Status: DISCONTINUED | OUTPATIENT
Start: 2019-06-16 | End: 2019-06-19

## 2019-06-16 RX ORDER — METOPROLOL TARTRATE 50 MG
12.5 TABLET ORAL
Refills: 0 | Status: DISCONTINUED | OUTPATIENT
Start: 2019-06-16 | End: 2019-06-17

## 2019-06-16 RX ORDER — DOCUSATE SODIUM 100 MG
300 CAPSULE ORAL
Refills: 0 | Status: DISCONTINUED | OUTPATIENT
Start: 2019-06-16 | End: 2019-06-19

## 2019-06-16 RX ORDER — METOPROLOL TARTRATE 50 MG
1 TABLET ORAL
Qty: 0 | Refills: 0 | DISCHARGE

## 2019-06-16 RX ORDER — SENNA PLUS 8.6 MG/1
1 TABLET ORAL
Qty: 0 | Refills: 0 | DISCHARGE

## 2019-06-16 RX ORDER — ENTACAPONE 200 MG/1
0.5 TABLET, FILM COATED ORAL
Qty: 0 | Refills: 0 | DISCHARGE

## 2019-06-16 RX ORDER — FINASTERIDE 5 MG/1
5 TABLET, FILM COATED ORAL
Refills: 0 | Status: DISCONTINUED | OUTPATIENT
Start: 2019-06-16 | End: 2019-06-19

## 2019-06-16 RX ORDER — TAMSULOSIN HYDROCHLORIDE 0.4 MG/1
0.4 CAPSULE ORAL
Refills: 0 | Status: DISCONTINUED | OUTPATIENT
Start: 2019-06-16 | End: 2019-06-19

## 2019-06-16 RX ADMIN — CARBIDOPA AND LEVODOPA 2 TABLET(S): 25; 100 TABLET ORAL at 08:54

## 2019-06-16 RX ADMIN — CARBIDOPA AND LEVODOPA 2 TABLET(S): 25; 100 TABLET ORAL at 13:49

## 2019-06-16 RX ADMIN — FINASTERIDE 5 MILLIGRAM(S): 5 TABLET, FILM COATED ORAL at 11:17

## 2019-06-16 RX ADMIN — Medication 0.25 MILLIGRAM(S): at 17:24

## 2019-06-16 RX ADMIN — CARBIDOPA AND LEVODOPA 2 TABLET(S): 25; 100 TABLET ORAL at 11:14

## 2019-06-16 RX ADMIN — SODIUM CHLORIDE 75 MILLILITER(S): 9 INJECTION INTRAMUSCULAR; INTRAVENOUS; SUBCUTANEOUS at 01:52

## 2019-06-16 RX ADMIN — ENTACAPONE 200 MILLIGRAM(S): 200 TABLET, FILM COATED ORAL at 08:54

## 2019-06-16 RX ADMIN — ENTACAPONE 100 MILLIGRAM(S): 200 TABLET, FILM COATED ORAL at 13:49

## 2019-06-16 RX ADMIN — CARBIDOPA AND LEVODOPA 2 TABLET(S): 25; 100 TABLET ORAL at 17:24

## 2019-06-16 RX ADMIN — Medication 25 MILLIGRAM(S): at 06:46

## 2019-06-16 RX ADMIN — CARBIDOPA AND LEVODOPA 2 TABLET(S): 25; 100 TABLET ORAL at 14:57

## 2019-06-16 RX ADMIN — SERTRALINE 50 MILLIGRAM(S): 25 TABLET, FILM COATED ORAL at 11:18

## 2019-06-16 RX ADMIN — APIXABAN 2.5 MILLIGRAM(S): 2.5 TABLET, FILM COATED ORAL at 06:46

## 2019-06-16 RX ADMIN — APIXABAN 2.5 MILLIGRAM(S): 2.5 TABLET, FILM COATED ORAL at 17:24

## 2019-06-16 RX ADMIN — SODIUM CHLORIDE 50 MILLILITER(S): 9 INJECTION, SOLUTION INTRAVENOUS at 17:29

## 2019-06-16 RX ADMIN — Medication 300 MILLIGRAM(S): at 17:24

## 2019-06-16 RX ADMIN — TAMSULOSIN HYDROCHLORIDE 0.4 MILLIGRAM(S): 0.4 CAPSULE ORAL at 18:32

## 2019-06-16 RX ADMIN — Medication 100 MILLIGRAM(S): at 06:46

## 2019-06-16 RX ADMIN — SERTRALINE 50 MILLIGRAM(S): 25 TABLET, FILM COATED ORAL at 17:24

## 2019-06-16 RX ADMIN — Medication 12.5 MILLIGRAM(S): at 17:24

## 2019-06-16 RX ADMIN — ENTACAPONE 100 MILLIGRAM(S): 200 TABLET, FILM COATED ORAL at 14:57

## 2019-06-16 RX ADMIN — SENNA PLUS 3 TABLET(S): 8.6 TABLET ORAL at 17:24

## 2019-06-16 RX ADMIN — ENTACAPONE 200 MILLIGRAM(S): 200 TABLET, FILM COATED ORAL at 11:14

## 2019-06-16 RX ADMIN — SENNA PLUS 1 TABLET(S): 8.6 TABLET ORAL at 11:18

## 2019-06-16 NOTE — PATIENT PROFILE ADULT - NSPROGENOTHERPROVIDER_GEN_A_NUR
medical specialist/NP [facility] house calls [phone]  Prior to this admit pt was on [facility] homecare services/home care

## 2019-06-16 NOTE — PATIENT PROFILE ADULT - VISION (WITH CORRECTIVE LENSES IF THE PATIENT USUALLY WEARS THEM):
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

## 2019-06-16 NOTE — PHARMACOTHERAPY INTERVENTION NOTE - NS PHARM COM OUTCOMES
Patient with hx of bleeding (Right iliopsoas intramuscular hematoma and right trace free retroperitoneal hemorrhage) so physician would like to continue at lower dose.

## 2019-06-16 NOTE — PATIENT PROFILE ADULT - NSPROMUTINFOINDIVIDFT_GEN_A_NUR
per spouse "needs nectar thick liquids, as night progresses pt will chew & chew food but forget to swallow, hallucinates at night sundowns"

## 2019-06-16 NOTE — PROGRESS NOTE ADULT - SUBJECTIVE AND OBJECTIVE BOX
[facility] -- Rogelio Robb, Flor, Sandi, Rick Holloway Savella  Office # [phone]      Follow Up:  AF    Subjective/Observations: Patient seen and examined. Events noted. Resting comfortably in bed. No complaints of chest pain, dyspnea, or palpitations reported. No signs of orthopnea or PND. More awake      REVIEW OF SYSTEMS: All other review of systems is negative unless indicated above    PAST MEDICAL & SURGICAL HISTORY:  Hematoma of right iliopsoas muscle, sequela  Syncope  Parkinson disease  Constipation  Dementia  Urinary retention  Cerebrovascular accident  Hypertension  Atrial fibrillation  No significant past surgical history      MEDICATIONS  (STANDING):  apixaban 2.5 milliGRAM(s) Oral every 12 hours  carbidopa/levodopa  25/100 2 Tablet(s) Oral <User Schedule>  docusate sodium 100 milliGRAM(s) Oral two times a day  entacapone 200 milliGRAM(s) Oral <User Schedule>  entacapone 100 milliGRAM(s) Oral <User Schedule>  finasteride 5 milliGRAM(s) Oral daily  metoprolol tartrate 25 milliGRAM(s) Oral two times a day  senna 1 Tablet(s) Oral daily  sertraline 50 milliGRAM(s) Oral daily  sodium chloride 0.9%. 1000 milliLiter(s) (75 mL/Hr) IV Continuous <Continuous>  tamsulosin 0.4 milliGRAM(s) Oral at bedtime    MEDICATIONS  (PRN):      Allergies    No Known Allergies    Intolerances            Vital Signs Last 24 Hrs  T(C): 36.7 (15 Prashanth 2019 23:44), Max: 36.7 (15 Prashanth 2019 23:44)  T(F): 98 (15 Prashanth 2019 23:44), Max: 98 (15 Prashanth 2019 23:44)  HR: 73 (16 Jun 2019 06:40) (73 - 96)  BP: 116/69 (16 Jun 2019 06:40) (106/87 - 131/65)  BP(mean): --  RR: 18 (16 Jun 2019 06:40) (17 - 18)  SpO2: 98% (16 Jun 2019 06:40) (92% - 98%)    I&O's Summary        PHYSICAL EXAM:     Constitutional: NAD, awake    HEENT: Moist Mucous Membranes, Anicteric  Pulmonary: Decreased breath sounds b/l. No rales, crackles or wheeze appreciated.   Cardiovascular: IRRR, S1 and S2, 2/6 SM  Gastrointestinal: Bowel Sounds present, soft, nontender.   Lymph: No peripheral edema. No lymphadenopathy.  Skin: No visible rashes or ulcers.  Psych:  flat affect    LABS: All Labs Reviewed:                        11.4   8.65  )-----------( 162      ( 15 Prashanth 2019 07:38 )             33.8     15 Prashanth 2019 09:28    148    |  116    |  20     ----------------------------<  101    2.8     |  26     |  0.81     Ca    9.2        15 Prashanth 2019 09:28    TPro  6.6    /  Alb  3.3    /  TBili  0.9    /  DBili  x      /  AST  22     /  ALT  16     /  AlkPhos  69     15 Prashanth 2019 09:28    PT/INR - ( 15 Prashanth 2019 09:28 )   PT: 15.2 sec;   INR: 1.33 ratio             < from: CT Abdomen and Pelvis No Cont (06.15.19 @ 11:35) >    EXAM:  CT ABDOMEN AND PELVIS                            PROCEDURE DATE:  06/15/2019          INTERPRETATION:  CLINICAL INFORMATION: Left leg swelling. Deep venous   thrombosis. Recent right iliopsoas intramuscular hematoma.  Evaluate for retroperitoneal bleed.    COMPARISON: CT scan abdomen pelvis 5/10/2019.    PROCEDURE:   CT of the Abdomen and Pelvis was performed without intravenous contrast.   Intravenous contrast: None.  Oral contrast: None.  Sagittal and coronal reformats were performed.    FINDINGS:    LOWER CHEST: Coronary artery calcifications.  Scarring/atelectasis visualized left lung base.    Streak artifact related to patient's arms degrades image quality limiting   evaluation.      The evaluation of solid organ parenchyma is limited without intravenous   contrast.    LIVER: Numerous hepatic cysts and small indeterminate hypodense hepatic   lesions, stable in appearance.  Scattered coarse peripheral calcifications.    BILE DUCTS: Normal caliber.  GALLBLADDER: Dependent cholelithiasis with moderate distention of the   gallbladder.  SPLEEN: Within normal limits.  PANCREAS: Within normal limits.  ADRENALS: Within normal limits.  KIDNEYS/URETERS:     Left renal scarring with nonobstructing intrarenal/intrapelvic   calcifications.  No obstructing ureteral calculus noted.    BLADDER: Probable mild bladder wall thickening.  REPRODUCTIVE ORGANS: Enlarged prostate gland with coarse calcifications.    BOWEL: Probable small hiatal hernia. Evaluation of the stomach is limited   without distention.  Moderate fecal retention within the rectum.  No bowel obstruction noted.     Appendix normal-appearing.  PERITONEUM: No ascites.  VESSELS:  Atherosclerotic calcification of the abdominal aorta.  Infrarenal IVC filter.  RETROPERITONEUM: No lymphadenopathy.  Interval decrease in size in the intramuscular hematoma involving the   right iliopsoas muscle. Mild stranding within the right retroperitoneal   soft tissues.  No localized retroperitoneal hematoma noted.  ABDOMINAL WALL: Small fat-containing periumbilical hernia.  Small fat-containing right inguinal hernia.  BONES: Multilevel degenerative changes of the spine. Degenerative changes   at the hip joints.    IMPRESSION:     Interval decrease in size in the right iliopsoasintramuscular hematoma.                    BECCA SWARTZ M.D., ATTENDING RADIOLOGIST  This document has been electronically signed. Prashanth 15 2019 12:12PM                < end of copied text >

## 2019-06-16 NOTE — PROVIDER CONTACT NOTE (OTHER) - ACTION/TREATMENT ORDERED:
Per MD ok for O2 orders with nc above 92% . Per Md remove orders for compression device pt on Eliquis at this time. No new orders for pulse rate at this time.

## 2019-06-16 NOTE — PATIENT PROFILE ADULT - FUNCTIONAL SCREEN CURRENT LEVEL: SWALLOWING (IF SCORE 2 OR MORE FOR ANY ITEM, CONSULT REHAB SERVICES), MLM)
uses nectar thickened liquids at home, as the night progresses he chews and chews but forgets to swallow per spouse/2 = difficulty swallowing liquids/foods

## 2019-06-16 NOTE — PROGRESS NOTE ADULT - ASSESSMENT
80 yo M PMH Afib (on coumadin), CVA (2012, 2016), hx of DVT s/p IVC filter, Parkinson's disease, dementia, HTN, urinary retention, and multiple TIAs presents with LLE femoral DVT.

## 2019-06-16 NOTE — PROVIDER CONTACT NOTE (OTHER) - ACTION/TREATMENT ORDERED:
Per MD if pulse rate goes into 120's give MD a call for push B/P medications. Give Metoprolol time to work.

## 2019-06-16 NOTE — PATIENT PROFILE ADULT - STATED REASON FOR ADMISSION
Thursday he vomited & diarrhea, then  left lower leg swelling and purple toes & calf hard as a rock stated pt

## 2019-06-16 NOTE — PROGRESS NOTE ADULT - PROBLEM SELECTOR PLAN 1
-patient has had increased immobility since last admission and wife has increased entacapone  -coumadin stopped on last admission due to R iliopsoas hematoma, and has hx of DVT with IVC filter placed 20 years ago  -seen by cardio in the ED, risks and benefits discussed with wife regarding restarting AC, wife is agreeable  -started Eliquis 2.5mg BID  -repeat CT abd and pelvis showed hematoma was smaller, trace retroperitoneal bleed no longer visible  -Has no dyspnea, but did have desaturation today. CTA would not  (is on Eliquis), so will defer for now.   -Supplemental oxygen as needed. Monitor respiratory status. Will start remote tele with continuous pulse ox.  -Cardio, [facility] following, recs appreciated

## 2019-06-16 NOTE — PROGRESS NOTE ADULT - SUBJECTIVE AND OBJECTIVE BOX
ADMISSION HPI:  78 yo M PMH Afib (on coumadin), CVA (2012, 2016), hx of DVT s/p IVC filter 20 years ago, Parkinson's disease, dementia, HTN, urinary retention, and multiple TIAs presents for LLE swelling noted last night.  Patient is poor historian and non-verbal at baseline, follows some commands, wife at bedside to provide information.  States that last night she noted patient had swollen LLE, from toes to knee associated with some purple discoloration and felt tight.  Patient had DVT in LLE in the past and wife was aware of symptoms, was going to have doppler done today but this morning noted swelling and discoloration to be worse and brought him to the ED.  Patient has not had any associated dyspnea, difficulty breathing, but wife states that his temperature has been increased for a few days past week (usually runs around 97F but was at 99F 2 days last week) and noted to be flushed.  Patient otherwise baseline status.      Of note, patient was admitted on 5/7/19 for syncope likely 2/2 hematoma of R iliopsoas muscle and R trace free retroperitoneal hemorrhage at which time patient's AC was held (was on coumadin).  Patient went home and was receiving PT, however wife noted that patient was having increased immobility and therefore started to increase his entacapone doses after discussing with PMD.      In the ED, T 97.9, HR 96, /87 --> 131/65, RR 18, SpO2 96%.  Labs showed H/H 11.4/33.8 (baseline H around 11.5).  PT/INR 15.2/1.33.  Na 148, K 2.8.  In the ED, patient received sinemet x1, comtan x1, lopressor 25mg PO x1, KCl powder 40mEq x1, 1L bolus NS.    EKG:  Afib with RVR  Chest Xray: no active pulmonary disease.  US doppler b/l LE: Deep venous thrombosis left common femoral vein including greater saphenous common femoral junction, extending to the left femoral vein.  CT abd/ pelvis: Interval decrease in size in the right iliopsoas intramuscular hematoma.  No localized retroperitoneal hematoma noted. (15 Prashanth 2019 19:27)    INTERVAL HPI:  Patient seen and examined. Wife at bedside. Per wife, leg swelling has improved. He does not appear in pain. He is an unreliable historian, but he denies difficulty breathing, chest pain, or pain in his leg.     REVIEW OF SYSTEMS:  Patient unable to provide    VITAL SIGNS:  Vital Signs Last 24 Hrs  T(C): 36.7 (06-16-19 @ 15:37), Max: 36.7 (06-15-19 @ 23:44)  T(F): 98.1 (06-16-19 @ 15:37), Max: 98.1 (06-16-19 @ 14:01)  HR: 108 (06-16-19 @ 15:37) (73 - 108)  BP: 103/73 (06-16-19 @ 15:37) (103/73 - 131/65)  BP(mean): --  RR: 18 (06-16-19 @ 15:37) (15 - 18)  SpO2: 87% (06-16-19 @ 15:37) (87% - 100%)      PHYSICAL EXAM:     GENERAL: fraily elderly male, no acute distress  HEENT: NC/AT, EOMI, neck supple, MMM  RESPIRATORY: LCTAB/L, no rhonchi, rales, or wheezing  CARDIOVASCULAR: RRR, no murmurs, gallops, rubs  ABDOMINAL: soft, non-tender, non-distended, positive bowel sounds   EXTREMITIES: no clubbing, cyanosis, +left LE edema noted  NEUROLOGICAL: awake and alert, confused, moves all 4 extremities  SKIN: warm, dry, intact  MUSCULOSKELETAL: no gross joint deformity                          9.8    6.33  )-----------( 108      ( 16 Jun 2019 11:32 )             29.7     06-16    152<H>  |  120<H>  |  19  ----------------------------<  110<H>  4.0   |  27  |  0.79    Ca    9.2      16 Jun 2019 11:32    TPro  6.0  /  Alb  2.8<L>  /  TBili  0.6  /  DBili  x   /  AST  21  /  ALT  7<L>  /  AlkPhos  62  06-16    PT/INR - ( 15 Prashanth 2019 09:28 )   PT: 15.2 sec;   INR: 1.33 ratio           MEDICATIONS  (STANDING):  ALPRAZolam 0.25 milliGRAM(s) Oral <User Schedule>  apixaban 2.5 milliGRAM(s) Oral every 12 hours  carbidopa/levodopa  25/100 2 Tablet(s) Oral <User Schedule>  docusate sodium 300 milliGRAM(s) Oral <User Schedule>  entacapone 200 milliGRAM(s) Oral <User Schedule>  entacapone 100 milliGRAM(s) Oral <User Schedule>  finasteride 5 milliGRAM(s) Oral <User Schedule>  metoprolol tartrate 12.5 milliGRAM(s) Oral <User Schedule>  senna 3 Tablet(s) Oral <User Schedule>  sertraline 50 milliGRAM(s) Oral <User Schedule>  sodium chloride 0.9%. 1000 milliLiter(s) (75 mL/Hr) IV Continuous <Continuous>  tamsulosin 0.4 milliGRAM(s) Oral <User Schedule>    MEDICATIONS  (PRN):

## 2019-06-16 NOTE — PROVIDER CONTACT NOTE (OTHER) - ACTION/TREATMENT ORDERED:
Per Md give pt dose of metoprolol as ordered at this time. he will monitor pt in the evening. No other new orders.

## 2019-06-16 NOTE — PROGRESS NOTE ADULT - ASSESSMENT
79   M PMH Afib previously on AC, CVA (2012, 2016), Parkinson's disease, dementia, HTN, urinary retention, hx of dvt s/p IVC filter, and multiple TIAs recently was admitted with syncope and found to have Right iliopsoas intramuscular hematoma and right trace free retroperitoneal hemorrhage who now presents from home for a left leg swelling with left leg proximal DVT.     - Volume status improved. Cont IVF  - Recheck lytes today.      - Af is rate controlled   - Cont metoprolol    - It appears that he has a new DVT likely from greater immobility  - He has an IVC filter which will be partially protective.   - Though I think that given his immobility he will be at high risk for recurrent VTE  - His last admission he had a right iliopsoas bleed that was likely from supratheraputic INR  - His repeat CT showed interval decrease.   - After length discussions with wife it was decided given his elevated CVA and DVT risk to start him on AC  - Cont Eliquis 2.5mg q12.   - monitor h.h and for clinical signs of bleeding  - Further cardiac workup will depend on clinical course.   - All other workup per primary team. Will followup.

## 2019-06-16 NOTE — PROGRESS NOTE ADULT - PROBLEM SELECTOR PLAN 2
-afib with RVR in the ED, now rate controlled  -c/w metoprolol 25mg BID  -started on Eliquis 2.5mg BID this admission.

## 2019-06-16 NOTE — PHARMACOTHERAPY INTERVENTION NOTE - COMMENTS
Patient admitted and put on Eliquis 2.5mg BID for history of AF and previous DVTs (previously on warfarin). Patient would be eligible for 5mg BID for Hx of AF, but was found to have acute DVT this admission so spoke with physician regarding treatment dosing of Eliquis for DVT treatment.

## 2019-06-16 NOTE — PROVIDER CONTACT NOTE (OTHER) - ASSESSMENT
Pt on remote tele per parisa in tele pt in ib between 111-130's .  B/P 121/75 rr 16  temp 97.4. Pt awake and alert, no signs of distress noted at this time. O2 nc on pt as ordered.

## 2019-06-17 ENCOUNTER — APPOINTMENT (OUTPATIENT)
Dept: HOME HEALTH SERVICES | Facility: HOME HEALTH | Age: 79
End: 2019-06-17

## 2019-06-17 PROBLEM — S70.11XS: Chronic | Status: ACTIVE | Noted: 2019-06-15

## 2019-06-17 LAB
ANION GAP SERPL CALC-SCNC: 8 MMOL/L — SIGNIFICANT CHANGE UP (ref 5–17)
BUN SERPL-MCNC: 12 MG/DL — SIGNIFICANT CHANGE UP (ref 7–23)
CALCIUM SERPL-MCNC: 9.4 MG/DL — SIGNIFICANT CHANGE UP (ref 8.5–10.1)
CHLORIDE SERPL-SCNC: 118 MMOL/L — HIGH (ref 96–108)
CO2 SERPL-SCNC: 23 MMOL/L — SIGNIFICANT CHANGE UP (ref 22–31)
CREAT SERPL-MCNC: 0.78 MG/DL — SIGNIFICANT CHANGE UP (ref 0.5–1.3)
GLUCOSE SERPL-MCNC: 99 MG/DL — SIGNIFICANT CHANGE UP (ref 70–99)
HCT VFR BLD CALC: 30.7 % — LOW (ref 39–50)
HGB BLD-MCNC: 10.2 G/DL — LOW (ref 13–17)
MCHC RBC-ENTMCNC: 32.1 PG — SIGNIFICANT CHANGE UP (ref 27–34)
MCHC RBC-ENTMCNC: 33.2 GM/DL — SIGNIFICANT CHANGE UP (ref 32–36)
MCV RBC AUTO: 96.5 FL — SIGNIFICANT CHANGE UP (ref 80–100)
NRBC # BLD: 0 /100 WBCS — SIGNIFICANT CHANGE UP (ref 0–0)
PLATELET # BLD AUTO: 128 K/UL — LOW (ref 150–400)
POTASSIUM SERPL-MCNC: 3.1 MMOL/L — LOW (ref 3.5–5.3)
POTASSIUM SERPL-SCNC: 3.1 MMOL/L — LOW (ref 3.5–5.3)
RBC # BLD: 3.18 M/UL — LOW (ref 4.2–5.8)
RBC # FLD: 14.6 % — HIGH (ref 10.3–14.5)
SODIUM SERPL-SCNC: 149 MMOL/L — HIGH (ref 135–145)
WBC # BLD: 6.21 K/UL — SIGNIFICANT CHANGE UP (ref 3.8–10.5)
WBC # FLD AUTO: 6.21 K/UL — SIGNIFICANT CHANGE UP (ref 3.8–10.5)

## 2019-06-17 PROCEDURE — 99233 SBSQ HOSP IP/OBS HIGH 50: CPT | Mod: GC

## 2019-06-17 PROCEDURE — 99232 SBSQ HOSP IP/OBS MODERATE 35: CPT

## 2019-06-17 RX ORDER — POTASSIUM CHLORIDE 20 MEQ
40 PACKET (EA) ORAL ONCE
Refills: 0 | Status: COMPLETED | OUTPATIENT
Start: 2019-06-17 | End: 2019-06-17

## 2019-06-17 RX ORDER — METOPROLOL TARTRATE 50 MG
25 TABLET ORAL
Refills: 0 | Status: DISCONTINUED | OUTPATIENT
Start: 2019-06-17 | End: 2019-06-19

## 2019-06-17 RX ORDER — METOPROLOL TARTRATE 50 MG
25 TABLET ORAL ONCE
Refills: 0 | Status: COMPLETED | OUTPATIENT
Start: 2019-06-17 | End: 2019-06-17

## 2019-06-17 RX ORDER — SODIUM CHLORIDE 9 MG/ML
1000 INJECTION, SOLUTION INTRAVENOUS
Refills: 0 | Status: DISCONTINUED | OUTPATIENT
Start: 2019-06-17 | End: 2019-06-19

## 2019-06-17 RX ADMIN — ENTACAPONE 100 MILLIGRAM(S): 200 TABLET, FILM COATED ORAL at 16:05

## 2019-06-17 RX ADMIN — Medication 300 MILLIGRAM(S): at 08:33

## 2019-06-17 RX ADMIN — CARBIDOPA AND LEVODOPA 2 TABLET(S): 25; 100 TABLET ORAL at 16:05

## 2019-06-17 RX ADMIN — Medication 300 MILLIGRAM(S): at 17:45

## 2019-06-17 RX ADMIN — CARBIDOPA AND LEVODOPA 2 TABLET(S): 25; 100 TABLET ORAL at 12:57

## 2019-06-17 RX ADMIN — SENNA PLUS 3 TABLET(S): 8.6 TABLET ORAL at 08:34

## 2019-06-17 RX ADMIN — ENTACAPONE 100 MILLIGRAM(S): 200 TABLET, FILM COATED ORAL at 12:57

## 2019-06-17 RX ADMIN — Medication 12.5 MILLIGRAM(S): at 08:33

## 2019-06-17 RX ADMIN — ENTACAPONE 200 MILLIGRAM(S): 200 TABLET, FILM COATED ORAL at 11:19

## 2019-06-17 RX ADMIN — Medication 0.25 MILLIGRAM(S): at 17:45

## 2019-06-17 RX ADMIN — SODIUM CHLORIDE 50 MILLILITER(S): 9 INJECTION, SOLUTION INTRAVENOUS at 11:20

## 2019-06-17 RX ADMIN — SENNA PLUS 3 TABLET(S): 8.6 TABLET ORAL at 17:46

## 2019-06-17 RX ADMIN — CARBIDOPA AND LEVODOPA 2 TABLET(S): 25; 100 TABLET ORAL at 11:19

## 2019-06-17 RX ADMIN — CARBIDOPA AND LEVODOPA 2 TABLET(S): 25; 100 TABLET ORAL at 17:46

## 2019-06-17 RX ADMIN — APIXABAN 2.5 MILLIGRAM(S): 2.5 TABLET, FILM COATED ORAL at 05:12

## 2019-06-17 RX ADMIN — ENTACAPONE 200 MILLIGRAM(S): 200 TABLET, FILM COATED ORAL at 08:33

## 2019-06-17 RX ADMIN — CARBIDOPA AND LEVODOPA 2 TABLET(S): 25; 100 TABLET ORAL at 08:33

## 2019-06-17 RX ADMIN — TAMSULOSIN HYDROCHLORIDE 0.4 MILLIGRAM(S): 0.4 CAPSULE ORAL at 17:46

## 2019-06-17 RX ADMIN — APIXABAN 2.5 MILLIGRAM(S): 2.5 TABLET, FILM COATED ORAL at 17:46

## 2019-06-17 RX ADMIN — Medication 25 MILLIGRAM(S): at 17:50

## 2019-06-17 RX ADMIN — Medication 40 MILLIEQUIVALENT(S): at 11:19

## 2019-06-17 RX ADMIN — SERTRALINE 50 MILLIGRAM(S): 25 TABLET, FILM COATED ORAL at 17:46

## 2019-06-17 RX ADMIN — FINASTERIDE 5 MILLIGRAM(S): 5 TABLET, FILM COATED ORAL at 08:35

## 2019-06-17 NOTE — DIETITIAN INITIAL EVALUATION ADULT. - OTHER INFO
wife not sure of wt loss but pt does seem to have lost additional wt. Dislikes ensure, willing to trial [employer] instant breakfast. Wife states pt can tolerate mechanical soft foods, nectar liquids, but prefers puree at night as pt sundowns and doesn't swallow food well at dinner.

## 2019-06-17 NOTE — CHART NOTE - NSCHARTNOTEFT_GEN_A_CORE
Upon Nutritional Assessment by the Registered Dietitian your patient was determined to meet criteria / has evidence of the following diagnosis/diagnoses:          [ ]  Mild Protein Calorie Malnutrition        [ ]  Moderate Protein Calorie Malnutrition        [x ] Severe Protein Calorie Malnutrition chronic        [ ] Unspecified Protein Calorie Malnutrition        [ ] Underweight / BMI <19        [ ] Morbid Obesity / BMI > 40      Findings as based on:  •  Comprehensive nutrition assessment and consultation  •  Calorie counts (nutrient intake analysis)  •  Food acceptance and intake status from observations by staff  •  Follow up  •  Patient education  •  Intervention secondary to interdisciplinary rounds  •   concerns    severe muscle/fat loss    Treatment:    The following diet has been recommended:   [employer] instant breakfast supplements TID, snacks added to meals    PROVIDER Section:     By signing this assessment you are acknowledging and agree with the diagnosis/diagnoses assigned by the Registered Dietitian    Comments:

## 2019-06-17 NOTE — DIETITIAN INITIAL EVALUATION ADULT. - ENERGY NEEDS
Pt seen last admission, identified with malnutrition, still meets criteria with more fat loss noted. Severe temporal ,deltoid, quad, scapula muscle loss, severe fat loss triceps.

## 2019-06-17 NOTE — PROGRESS NOTE ADULT - ASSESSMENT
79 M PMH Afib previously on AC, CVA (2012, 2016), Parkinson's disease, dementia, HTN, urinary retention, hx of dvt s/p IVC filter, and multiple TIAs recently was admitted with syncope and found to have Right iliopsoas intramuscular hematoma and right trace free retroperitoneal hemorrhage who now presents from home for a left leg swelling with left leg proximal DVT.     - Volume status improved, now off IVF  - Remains hypokalemic. Replete to K>4, Mg>2    - Af is generally rate controlled   - Cont metoprolol    - It appears that he has a new DVT likely from greater immobility  - He has an IVC filter which will be partially protective.   - Though I think that given his immobility he will be at high risk for recurrent VTE  - His last admission he had a right iliopsoas bleed that was likely from supratheraputic INR  - His repeat CT showed interval decrease.   - After length discussions with wife it was decided given his elevated CVA and DVT risk to start him on AC  - Cont Eliquis 2.5mg q12 (though not ideal dose) and trend Hb/hct for clinical signs of bleeding  - Further cardiac workup will depend on clinical course.   - All other workup per primary team. Will followup.

## 2019-06-17 NOTE — DIETITIAN INITIAL EVALUATION ADULT. - PROBLEM SELECTOR PLAN 7
-started on Eliquis 2.5mg BID    IMPROVE VTE Individual Risk Assessment    RISK                                                                Points  [ X ] Previous VTE                                                  3  [  ] Thrombophilia                                               2  [  ] Lower limb paralysis                                      2        (unable to hold up >15 seconds)    [  ] Current Cancer                                              2         (within 6 months)  [ X ] Immobilization > 24 hrs                                1  [  ] ICU/CCU stay > 24 hours                              1  [ X ] Age > 60                                                      1  IMPROVE VTE Score ____5_____  IMPROVE Score 0-1: Low Risk, No VTE prophylaxis required for most patients, encourage ambulation.   IMPROVE Score 2-3: At risk, pharmacologic VTE prophylaxis is indicated for most patients (in the absence of a contraindication)  IMPROVE Score > or = 4: High Risk, pharmacologic VTE prophylaxis is indicated for most patients (in the absence of a contraindication)

## 2019-06-17 NOTE — PROGRESS NOTE ADULT - PROBLEM SELECTOR PLAN 1
Patient has had increased immobility since last admission and wife has increased entacapone  -coumadin stopped on last admission due to R iliopsoas hematoma, and has hx of DVT with IVC filter placed 20 years ago  -seen by cardio in the ED, risks and benefits discussed with wife regarding restarting AC, wife is agreeable  -continue Eliquis 2.5mg BID (started on 6/15)  -repeat CT abd and pelvis showed hematoma was smaller, trace retroperitoneal bleed no longer visible  -Has no dyspnea, but did have desaturation today. CTA would not  (is on Eliquis), so will defer for now.   -Supplemental oxygen as needed, SpO2 stable on RA today. Continue to monitor resp status.  -[facility] following, recs appreciated Patient has had increased immobility since last admission and wife has increased entacapone  -coumadin stopped on last admission due to R iliopsoas hematoma, and has hx of DVT with IVC filter placed 20 years ago  -seen by cardio in the ED, risks and benefits discussed with wife regarding restarting AC, wife is agreeable  -continue Eliquis 2.5mg BID (started on 6/15)  -repeat CT abd and pelvis showed hematoma was smaller, trace retroperitoneal bleed no longer visible  -Has no dyspnea, but did have desaturation yesterday; CTA would not  (is on Eliquis), so deferred for now.   -Supplemental oxygen as needed, SpO2 stable on RA today. Continue to monitor resp status.  -Cardio, [facility] following, recs appreciated

## 2019-06-17 NOTE — PROGRESS NOTE ADULT - SUBJECTIVE AND OBJECTIVE BOX
ADMISSION HPI:  80 yo M PMH Afib (on coumadin), CVA (2012, 2016), hx of DVT s/p IVC filter 20 years ago, Parkinson's disease, dementia, HTN, urinary retention, and multiple TIAs presents for LLE swelling noted last night.  Patient is poor historian and non-verbal at baseline, follows some commands, wife at bedside to provide information.  States that last night she noted patient had swollen LLE, from toes to knee associated with some purple discoloration and felt tight.  Patient had DVT in LLE in the past and wife was aware of symptoms, was going to have doppler done today but this morning noted swelling and discoloration to be worse and brought him to the ED.  Patient has not had any associated dyspnea, difficulty breathing, but wife states that his temperature has been increased for a few days past week (usually runs around 97F but was at 99F 2 days last week) and noted to be flushed.  Patient otherwise baseline status.    Of note, patient was admitted on 5/7/19 for syncope likely 2/2 hematoma of R iliopsoas muscle and R trace free retroperitoneal hemorrhage at which time patient's AC was held (was on coumadin).  Patient went home and was receiving PT, however wife noted that patient was having increased immobility and therefore started to increase his entacapone doses after discussing with PMD.    In the ED, T 97.9, HR 96, /87 --> 131/65, RR 18, SpO2 96%.  Labs showed H/H 11.4/33.8 (baseline H around 11.5).  PT/INR 15.2/1.33.  Na 148, K 2.8.  In the ED, patient received sinemet x1, comtan x1, lopressor 25mg PO x1, KCl powder 40mEq x1, 1L bolus NS.  EKG:  Afib with RVR  Chest Xray: no active pulmonary disease.  US doppler b/l LE: Deep venous thrombosis left common femoral vein including greater saphenous common femoral junction, extending to the left femoral vein.  CT abd/ pelvis: Interval decrease in size in the right iliopsoas intramuscular hematoma.  No localized retroperitoneal hematoma noted. (15 Prashanth 2019 19:27)    INTERVAL HPI:  Patient seen and examined with wife at bedside this morning. Patient is an unreliable historian, but he denies difficulty breathing, chest pain, or pain in his leg.     REVIEW OF SYSTEMS:  Patient unable to provide    Vital Signs Last 24 Hrs  T(C): 36.3 (17 Jun 2019 13:16), Max: 37.3 (17 Jun 2019 05:07)  T(F): 97.3 (17 Jun 2019 13:16), Max: 99.2 (17 Jun 2019 05:07)  HR: 98 (17 Jun 2019 13:16) (87 - 107)  BP: 103/68 (17 Jun 2019 13:16) (103/68 - 151/90)  BP(mean): --  RR: 18 (17 Jun 2019 13:16) (17 - 18)  SpO2: 99% (17 Jun 2019 13:16) (97% - 100%)    PHYSICAL EXAM:   GENERAL: fraily elderly male, no acute distress  HEENT: NC/AT, EOMI, neck supple, MMM  RESPIRATORY: LCTAB/L, no rhonchi, rales, or wheezing  CARDIOVASCULAR: RRR, no murmurs, gallops, rubs  ABDOMINAL: soft, non-tender, non-distended, positive bowel sounds   EXTREMITIES: no clubbing, cyanosis, +left LE edema improved  NEUROLOGICAL: awake and alert, confused, moves all 4 extremities  SKIN: warm, dry, intact  MUSCULOSKELETAL: no gross joint deformity                          10.2   6.21  )-----------( 128      ( 17 Jun 2019 08:21 )             30.7     17 Jun 2019 08:21    149    |  118    |  12     ----------------------------<  99     3.1     |  23     |  0.78     Ca    9.4        17 Jun 2019 08:21  Phos  1.4       17 Jun 2019 08:21  Mg     2.0       17 Jun 2019 08:21    TPro  6.0    /  Alb  2.8    /  TBili  0.6    /  DBili  x      /  AST  21     /  ALT  7      /  AlkPhos  62     16 Jun 2019 11:32    LIVER FUNCTIONS - ( 16 Jun 2019 11:32 )  Alb: 2.8 g/dL / Pro: 6.0 g/dL / ALK PHOS: 62 U/L / ALT: 7 U/L / AST: 21 U/L / GGT: x             MEDICATIONS  (STANDING):  ALPRAZolam 0.25 milliGRAM(s) Oral <User Schedule>  apixaban 2.5 milliGRAM(s) Oral every 12 hours  carbidopa/levodopa  25/100 2 Tablet(s) Oral <User Schedule>  dextrose 5%. 1000 milliLiter(s) (50 mL/Hr) IV Continuous <Continuous>  docusate sodium 300 milliGRAM(s) Oral <User Schedule>  entacapone 200 milliGRAM(s) Oral <User Schedule>  entacapone 100 milliGRAM(s) Oral <User Schedule>  finasteride 5 milliGRAM(s) Oral <User Schedule>  metoprolol tartrate 25 milliGRAM(s) Oral <User Schedule>  senna 3 Tablet(s) Oral <User Schedule>  sertraline 50 milliGRAM(s) Oral <User Schedule>  tamsulosin 0.4 milliGRAM(s) Oral <User Schedule>    MEDICATIONS  (PRN):

## 2019-06-17 NOTE — PROGRESS NOTE ADULT - SUBJECTIVE AND OBJECTIVE BOX
[facility] - Rogelio Robb, Flor, Sandi, Amie, Matilda Cabrera  Office Number:  [phone]    Patient resting comfortably in bed in NAD.  Laying flat with no respiratory distress.  Sleeping comfortably.    ROS: negative unless otherwise mentioned.    Telemetry:  off    MEDICATIONS  (STANDING):  ALPRAZolam 0.25 milliGRAM(s) Oral <User Schedule>  apixaban 2.5 milliGRAM(s) Oral every 12 hours  carbidopa/levodopa  25/100 2 Tablet(s) Oral <User Schedule>  dextrose 5%. 1000 milliLiter(s) (50 mL/Hr) IV Continuous <Continuous>  docusate sodium 300 milliGRAM(s) Oral <User Schedule>  entacapone 200 milliGRAM(s) Oral <User Schedule>  entacapone 100 milliGRAM(s) Oral <User Schedule>  finasteride 5 milliGRAM(s) Oral <User Schedule>  metoprolol tartrate 25 milliGRAM(s) Oral <User Schedule>  senna 3 Tablet(s) Oral <User Schedule>  sertraline 50 milliGRAM(s) Oral <User Schedule>  tamsulosin 0.4 milliGRAM(s) Oral <User Schedule>    MEDICATIONS  (PRN):      Allergies    No Known Allergies    Intolerances        Vital Signs Last 24 Hrs  T(C): 36.4 (17 Jun 2019 11:16), Max: 37.3 (17 Jun 2019 05:07)  T(F): 97.5 (17 Jun 2019 11:16), Max: 99.2 (17 Jun 2019 05:07)  HR: 87 (17 Jun 2019 11:16) (87 - 108)  BP: 105/70 (17 Jun 2019 11:16) (103/73 - 151/90)  BP(mean): --  RR: 18 (17 Jun 2019 11:16) (15 - 18)  SpO2: 100% (17 Jun 2019 11:16) (87% - 100%)    I&O's Summary    16 Jun 2019 07:01  -  17 Jun 2019 07:00  --------------------------------------------------------  IN: 100 mL / OUT: 0 mL / NET: 100 mL        ON EXAM:    Constitutional: NAD, awake    HEENT: Moist Mucous Membranes, Anicteric  Pulmonary: Decreased breath sounds b/l. No rales, crackles or wheeze appreciated.   Cardiovascular: IRRR, S1 and S2, 2/6 SM  Gastrointestinal: Bowel Sounds present, soft, nontender.   Lymph: No peripheral edema. No lymphadenopathy.  Skin: No visible rashes or ulcers.  Psych:  flat affect    LABS: All Labs Reviewed:                        10.2   6.21  )-----------( 128      ( 17 Jun 2019 08:21 )             30.7                         9.8    6.33  )-----------( 108      ( 16 Jun 2019 11:32 )             29.7                         11.4   8.65  )-----------( 162      ( 15 Prashanth 2019 07:38 )             33.8     17 Jun 2019 08:21    149    |  118    |  12     ----------------------------<  99     3.1     |  23     |  0.78   16 Jun 2019 11:32    152    |  120    |  19     ----------------------------<  110    4.0     |  27     |  0.79   15 Prashanth 2019 09:28    148    |  116    |  20     ----------------------------<  101    2.8     |  26     |  0.81     Ca    9.4        17 Jun 2019 08:21  Ca    9.2        16 Jun 2019 11:32  Ca    9.2        15 Prashanth 2019 09:28  Phos  1.4       17 Jun 2019 08:21  Mg     2.0       17 Jun 2019 08:21    TPro  6.0    /  Alb  2.8    /  TBili  0.6    /  DBili  x      /  AST  21     /  ALT  7      /  AlkPhos  62     16 Jun 2019 11:32  TPro  6.6    /  Alb  3.3    /  TBili  0.9    /  DBili  x      /  AST  22     /  ALT  16     /  AlkPhos  69     15 Prashanth 2019 09:28          Blood Culture:

## 2019-06-17 NOTE — PROGRESS NOTE ADULT - PROBLEM SELECTOR PLAN 2
-afib with RVR in the ED, elevated overnight  -increased metoprolol from 25 to 50mg BID  -continue Eliquis 2.5mg BID, started on this admission. -afib with RVR in the ED, elevated overnight  -increased metoprolol from 12.5 to 25 mg BID  -continue Eliquis 2.5mg BID, started on this admission.

## 2019-06-17 NOTE — PROGRESS NOTE ADULT - ASSESSMENT
80 yo M PMH Afib (on coumadin), CVA (2012, 2016), hx of DVT s/p IVC filter, Parkinson's disease, dementia, HTN, urinary retention, and multiple TIAs admitted with LLE femoral DVT.

## 2019-06-17 NOTE — DIETITIAN INITIAL EVALUATION ADULT. - PROBLEM SELECTOR PLAN 1
-admit to [facility]  -patient has had increased immobility since last admission and wife has increased entacapone  -coumadin stopped on last admission due to R iliopsoas hematoma, and has hx of DVT with IVC filter placed 20 years ago  -seen by cardio in the ED, risks and benefits discussed with wife regarding restarting AC, wife is agreeable  -start Eliquis 2.5mg BID  -repeat CT abd and pelvis showed hematoma was smaller, trace retroperitoneal bleed no longer visible  -little concern for PE at this time given that patient is saturating well on RA, no dyspnea.  Will hold off on CTA chest  -[facility] following, recs appreciated

## 2019-06-18 ENCOUNTER — TRANSCRIPTION ENCOUNTER (OUTPATIENT)
Age: 79
End: 2019-06-18

## 2019-06-18 DIAGNOSIS — R53.1 WEAKNESS: ICD-10-CM

## 2019-06-18 LAB
ANION GAP SERPL CALC-SCNC: 7 MMOL/L — SIGNIFICANT CHANGE UP (ref 5–17)
BUN SERPL-MCNC: 9 MG/DL — SIGNIFICANT CHANGE UP (ref 7–23)
CALCIUM SERPL-MCNC: 9.1 MG/DL — SIGNIFICANT CHANGE UP (ref 8.5–10.1)
CHLORIDE SERPL-SCNC: 116 MMOL/L — HIGH (ref 96–108)
CO2 SERPL-SCNC: 24 MMOL/L — SIGNIFICANT CHANGE UP (ref 22–31)
CREAT SERPL-MCNC: 0.6 MG/DL — SIGNIFICANT CHANGE UP (ref 0.5–1.3)
GLUCOSE SERPL-MCNC: 87 MG/DL — SIGNIFICANT CHANGE UP (ref 70–99)
HCT VFR BLD CALC: 30.9 % — LOW (ref 39–50)
HGB BLD-MCNC: 10.1 G/DL — LOW (ref 13–17)
MCHC RBC-ENTMCNC: 31.6 PG — SIGNIFICANT CHANGE UP (ref 27–34)
MCHC RBC-ENTMCNC: 32.7 GM/DL — SIGNIFICANT CHANGE UP (ref 32–36)
MCV RBC AUTO: 96.6 FL — SIGNIFICANT CHANGE UP (ref 80–100)
NRBC # BLD: 0 /100 WBCS — SIGNIFICANT CHANGE UP (ref 0–0)
PLATELET # BLD AUTO: 126 K/UL — LOW (ref 150–400)
POTASSIUM SERPL-MCNC: 3.5 MMOL/L — SIGNIFICANT CHANGE UP (ref 3.5–5.3)
POTASSIUM SERPL-SCNC: 3.5 MMOL/L — SIGNIFICANT CHANGE UP (ref 3.5–5.3)
RBC # BLD: 3.2 M/UL — LOW (ref 4.2–5.8)
RBC # FLD: 14.7 % — HIGH (ref 10.3–14.5)
SODIUM SERPL-SCNC: 147 MMOL/L — HIGH (ref 135–145)
WBC # BLD: 6.78 K/UL — SIGNIFICANT CHANGE UP (ref 3.8–10.5)
WBC # FLD AUTO: 6.78 K/UL — SIGNIFICANT CHANGE UP (ref 3.8–10.5)

## 2019-06-18 PROCEDURE — 99233 SBSQ HOSP IP/OBS HIGH 50: CPT | Mod: GC

## 2019-06-18 PROCEDURE — 99232 SBSQ HOSP IP/OBS MODERATE 35: CPT

## 2019-06-18 RX ORDER — METOPROLOL TARTRATE 50 MG
1 TABLET ORAL
Qty: 60 | Refills: 0
Start: 2019-06-18 | End: 2019-07-17

## 2019-06-18 RX ORDER — EZETIMIBE 10 MG/1
1 TABLET ORAL
Qty: 0 | Refills: 0 | DISCHARGE

## 2019-06-18 RX ORDER — CARBIDOPA AND LEVODOPA 25; 100 MG/1; MG/1
2 TABLET ORAL
Qty: 0 | Refills: 0 | DISCHARGE

## 2019-06-18 RX ORDER — DOCUSATE SODIUM 100 MG
3 CAPSULE ORAL
Qty: 0 | Refills: 0 | DISCHARGE

## 2019-06-18 RX ORDER — APIXABAN 2.5 MG/1
1 TABLET, FILM COATED ORAL
Qty: 60 | Refills: 0
Start: 2019-06-18 | End: 2019-07-17

## 2019-06-18 RX ORDER — SERTRALINE 25 MG/1
1 TABLET, FILM COATED ORAL
Qty: 0 | Refills: 0 | DISCHARGE

## 2019-06-18 RX ORDER — SODIUM,POTASSIUM PHOSPHATES 278-250MG
1 POWDER IN PACKET (EA) ORAL
Refills: 0 | Status: DISCONTINUED | OUTPATIENT
Start: 2019-06-18 | End: 2019-06-19

## 2019-06-18 RX ORDER — APIXABAN 2.5 MG/1
1 TABLET, FILM COATED ORAL
Qty: 0 | Refills: 0 | DISCHARGE
Start: 2019-06-18

## 2019-06-18 RX ORDER — SENNA PLUS 8.6 MG/1
3 TABLET ORAL
Qty: 0 | Refills: 0 | DISCHARGE

## 2019-06-18 RX ORDER — ENTACAPONE 200 MG/1
1 TABLET, FILM COATED ORAL
Qty: 0 | Refills: 0 | DISCHARGE

## 2019-06-18 RX ORDER — ENTACAPONE 200 MG/1
0.5 TABLET, FILM COATED ORAL
Qty: 0 | Refills: 0 | DISCHARGE

## 2019-06-18 RX ORDER — ALPRAZOLAM 0.25 MG
1 TABLET ORAL
Qty: 0 | Refills: 0 | DISCHARGE

## 2019-06-18 RX ORDER — METOPROLOL TARTRATE 50 MG
0.5 TABLET ORAL
Qty: 30 | Refills: 0
Start: 2019-06-18 | End: 2019-07-17

## 2019-06-18 RX ORDER — METOPROLOL TARTRATE 50 MG
0.5 TABLET ORAL
Qty: 0 | Refills: 0 | DISCHARGE

## 2019-06-18 RX ADMIN — CARBIDOPA AND LEVODOPA 2 TABLET(S): 25; 100 TABLET ORAL at 17:31

## 2019-06-18 RX ADMIN — Medication 0.25 MILLIGRAM(S): at 17:32

## 2019-06-18 RX ADMIN — ENTACAPONE 200 MILLIGRAM(S): 200 TABLET, FILM COATED ORAL at 11:02

## 2019-06-18 RX ADMIN — ENTACAPONE 200 MILLIGRAM(S): 200 TABLET, FILM COATED ORAL at 08:32

## 2019-06-18 RX ADMIN — SENNA PLUS 3 TABLET(S): 8.6 TABLET ORAL at 08:32

## 2019-06-18 RX ADMIN — ENTACAPONE 100 MILLIGRAM(S): 200 TABLET, FILM COATED ORAL at 15:02

## 2019-06-18 RX ADMIN — CARBIDOPA AND LEVODOPA 2 TABLET(S): 25; 100 TABLET ORAL at 11:02

## 2019-06-18 RX ADMIN — ENTACAPONE 100 MILLIGRAM(S): 200 TABLET, FILM COATED ORAL at 13:03

## 2019-06-18 RX ADMIN — TAMSULOSIN HYDROCHLORIDE 0.4 MILLIGRAM(S): 0.4 CAPSULE ORAL at 17:31

## 2019-06-18 RX ADMIN — FINASTERIDE 5 MILLIGRAM(S): 5 TABLET, FILM COATED ORAL at 08:32

## 2019-06-18 RX ADMIN — Medication 300 MILLIGRAM(S): at 17:30

## 2019-06-18 RX ADMIN — Medication 300 MILLIGRAM(S): at 08:31

## 2019-06-18 RX ADMIN — SENNA PLUS 3 TABLET(S): 8.6 TABLET ORAL at 17:31

## 2019-06-18 RX ADMIN — Medication 25 MILLIGRAM(S): at 17:31

## 2019-06-18 RX ADMIN — CARBIDOPA AND LEVODOPA 2 TABLET(S): 25; 100 TABLET ORAL at 15:02

## 2019-06-18 RX ADMIN — SERTRALINE 50 MILLIGRAM(S): 25 TABLET, FILM COATED ORAL at 17:31

## 2019-06-18 RX ADMIN — CARBIDOPA AND LEVODOPA 2 TABLET(S): 25; 100 TABLET ORAL at 13:03

## 2019-06-18 RX ADMIN — APIXABAN 2.5 MILLIGRAM(S): 2.5 TABLET, FILM COATED ORAL at 17:31

## 2019-06-18 RX ADMIN — CARBIDOPA AND LEVODOPA 2 TABLET(S): 25; 100 TABLET ORAL at 08:32

## 2019-06-18 RX ADMIN — Medication 1 TABLET(S): at 22:28

## 2019-06-18 RX ADMIN — APIXABAN 2.5 MILLIGRAM(S): 2.5 TABLET, FILM COATED ORAL at 05:16

## 2019-06-18 RX ADMIN — Medication 1 TABLET(S): at 17:31

## 2019-06-18 RX ADMIN — Medication 25 MILLIGRAM(S): at 08:32

## 2019-06-18 NOTE — PROGRESS NOTE ADULT - PROBLEM SELECTOR PLAN 5
-c/w zoloft qhs  -wife states that gives patient xanax 0.25mg qhs PRN
-c/w sinemet and entacapone (home doses)  -wife states patient is able to eat regular food, will c/w regular diet with thickened fluids
-c/w zoloft qhs  -wife states that gives patient xanax 0.25mg qhs PRN

## 2019-06-18 NOTE — PROGRESS NOTE ADULT - PROBLEM SELECTOR PLAN 7
-continue Eliquis 2.5mg BID    IMPROVE VTE Individual Risk Assessment    RISK                                                                Points  [ X ] Previous VTE                                                  3  [  ] Thrombophilia                                               2  [  ] Lower limb paralysis                                      2        (unable to hold up >15 seconds)    [  ] Current Cancer                                              2         (within 6 months)  [ X ] Immobilization > 24 hrs                                1  [  ] ICU/CCU stay > 24 hours                              1  [ X ] Age > 60                                                      1  IMPROVE VTE Score ____5_____  IMPROVE Score 0-1: Low Risk, No VTE prophylaxis required for most patients, encourage ambulation.   IMPROVE Score 2-3: At risk, pharmacologic VTE prophylaxis is indicated for most patients (in the absence of a contraindication)  IMPROVE Score > or = 4: High Risk, pharmacologic VTE prophylaxis is indicated for most patients (in the absence of a contraindication)
-c/w flomax and finasteride
-started on Eliquis 2.5mg BID    IMPROVE VTE Individual Risk Assessment    RISK                                                                Points  [ X ] Previous VTE                                                  3  [  ] Thrombophilia                                               2  [  ] Lower limb paralysis                                      2        (unable to hold up >15 seconds)    [  ] Current Cancer                                              2         (within 6 months)  [ X ] Immobilization > 24 hrs                                1  [  ] ICU/CCU stay > 24 hours                              1  [ X ] Age > 60                                                      1  IMPROVE VTE Score ____5_____  IMPROVE Score 0-1: Low Risk, No VTE prophylaxis required for most patients, encourage ambulation.   IMPROVE Score 2-3: At risk, pharmacologic VTE prophylaxis is indicated for most patients (in the absence of a contraindication)  IMPROVE Score > or = 4: High Risk, pharmacologic VTE prophylaxis is indicated for most patients (in the absence of a contraindication)

## 2019-06-18 NOTE — PROGRESS NOTE ADULT - PROBLEM SELECTOR PLAN 1
Patient has had increased immobility since last admission and wife has increased entacapone  -coumadin stopped on last admission due to R iliopsoas hematoma, and has hx of DVT with IVC filter placed 20 years ago  -continue Eliquis 2.5mg BID (started on 6/15 after risks and benefits discussed with wife regarding restarting AC, wife agreeable)  -repeat CT abd and pelvis showed hematoma was smaller, trace retroperitoneal bleed no longer visible  -Previously had desaturation but SpO2 stable on RA since yesterday. Supplemental oxygen as needed, SpO2 stable on RA today. Continue to monitor resp status.  -Cardio, [facility] following, recs appreciated

## 2019-06-18 NOTE — PROGRESS NOTE ADULT - NSHPATTENDINGPLANDISCUSS_GEN_ALL_CORE
patients wife at length, including medication scheduling/dosing, anticoagulation, anticipated hospital course, discharge planning
pt / nurse / wife / resident
patients wife re: monitoring sodium, increasing metoprolol for rapid heart rate, anticipated hospital course, discharge planning

## 2019-06-18 NOTE — PHYSICAL THERAPY INITIAL EVALUATION ADULT - ADDITIONAL COMMENTS
History obtained from wife. Pt's wife states she is able to get him sitting edge of bed. She assists him with sit to stands using rolling walker. She helps dress him and bathes him, and transfers to the chair with an Aide, Pt has a power lift at home. Otherwise pt mostly in the wheel chair. Pt does have an aide in past. Pt owns rolling walker, commode, wheelchair, shower chair and a ramp to the house.

## 2019-06-18 NOTE — PROGRESS NOTE ADULT - PROBLEM SELECTOR PLAN 3
Slowly improving, 149 today  -s/p D5W for 12 hours yesterday, reordered D5W today  -encourage po intake  -repeat levels in AM
-Will give D5W for 12 hours and encourage po intake.   -repeat levels in AM
-afib with RVR in the ED  -continue metoprolol 25mg BID (dose was increased due to elevated HR), tolerating new dose  -continue Eliquis 2.5mg BID, started on this admission.

## 2019-06-18 NOTE — PROGRESS NOTE ADULT - PROBLEM SELECTOR PLAN 8
-continue Eliquis 2.5mg BID    IMPROVE VTE Individual Risk Assessment    RISK                                                                Points  [ X ] Previous VTE                                                  3  [  ] Thrombophilia                                               2  [  ] Lower limb paralysis                                      2        (unable to hold up >15 seconds)    [  ] Current Cancer                                              2         (within 6 months)  [ X ] Immobilization > 24 hrs                                1  [  ] ICU/CCU stay > 24 hours                              1  [ X ] Age > 60                                                      1  IMPROVE VTE Score ____5_____  IMPROVE Score 0-1: Low Risk, No VTE prophylaxis required for most patients, encourage ambulation.   IMPROVE Score 2-3: At risk, pharmacologic VTE prophylaxis is indicated for most patients (in the absence of a contraindication)  IMPROVE Score > or = 4: High Risk, pharmacologic VTE prophylaxis is indicated for most patients (in the absence of a contraindication)

## 2019-06-18 NOTE — DISCHARGE NOTE PROVIDER - PROVIDER TOKENS
PROVIDER:[[id number]]],PROVIDER:[[id number]]],PROVIDER:[TOKEN:[[id number]]]

## 2019-06-18 NOTE — DISCHARGE NOTE PROVIDER - HOSPITAL COURSE
HPI:    80 yo M PMH Afib (on coumadin), CVA (2012, 2016), hx of DVT s/p IVC filter 20 years ago, Parkinson's disease, dementia, HTN, urinary retention, and multiple TIAs presents for LLE swelling noted last night.  Patient is poor historian and non-verbal at baseline, follows some commands, wife at bedside to provide information.  States that last night she noted patient had swollen LLE, from toes to knee associated with some purple discoloration and felt tight.  Patient had DVT in LLE in the past and wife was aware of symptoms, was going to have doppler done today but this morning noted swelling and discoloration to be worse and brought him to the ED.  Patient has not had any associated dyspnea, difficulty breathing, but wife states that his temperature has been increased for a few days past week (usually runs around 97F but was at 99F 2 days last week) and noted to be flushed.  Patient otherwise baseline status.      Of note, patient was admitted on 5/7/19 for syncope likely 2/2 hematoma of R iliopsoas muscle and R trace free retroperitoneal hemorrhage at which time patient's AC was held (was on coumadin). Patient went home and was receiving PT, however wife noted that patient was having increased immobility and therefore started to increase his entacapone doses after discussing with PMD.      In the ED, T 97.9, HR 96, /87 --> 131/65, RR 18, SpO2 96%.  Labs showed H/H 11.4/33.8 (baseline H around 11.5).  PT/INR 15.2/1.33.  Na 148, K 2.8.  In the ED, patient received sinemet x1, comtan x1, lopressor 25mg PO x1, KCl powder 40mEq x1, 1L bolus NS. EKG: Afib with RVR. Chest Xray: no active pulmonary disease. US doppler b/l LE: Deep venous thrombosis left common femoral vein including greater saphenous common femoral junction, extending to the left femoral vein. CT abd/ pelvis: Interval decrease in size in the right iliopsoas intramuscular hematoma.  No localized retroperitoneal hematoma noted. (15 Prashanth 2019 19:27)        Patient was admitted with left lower extremity femoral vein DVT. Patient was evaluated by cardiology (Dr Robb's group). He was started on Eliquis 2.5 mg BID on 6/15. Repeat CT abd and pelvis showed hematoma was smaller, trace retroperitoneal bleed no longer visible. Patient had hypernatremia which improved with D5W. Patient to continue Eliquis upon discharge.        Patient responded well to medical treatment during hospitalization. Patient was seen and examined on the day of discharge and is medically optimized for discharge, with close outpatient follow up.        Vital Signs Last 24 Hrs    T(C): 36.5 (18 Jun 2019 05:14), Max: 36.8 (17 Jun 2019 22:10)    T(F): 97.7 (18 Jun 2019 05:14), Max: 98.2 (17 Jun 2019 22:10)    HR: 93 (18 Jun 2019 08:30) (93 - 101)    BP: 121/89 (18 Jun 2019 08:26) (103/68 - 123/80)    BP(mean): --    RR: 18 (18 Jun 2019 05:14) (18 - 18)    SpO2: 100% (18 Jun 2019 05:14) (92% - 100%)        PHYSICAL EXAM:     GENERAL: frail elderly male, no acute distress    HEENT: NC/AT, EOMI, neck supple, MMM    RESPIRATORY: lungs clear to auscultation bilaterally, no rhonchi, rales, or wheezing    CARDIOVASCULAR: RRR, no murmurs, gallops, rubs    ABDOMINAL: soft, non-tender, non-distended, positive bowel sounds     EXTREMITIES: no clubbing, cyanosis, trace left LE edema    NEUROLOGICAL: awake and alert, confused, moves all 4 extremities    SKIN: warm, dry, intact    MUSCULOSKELETAL: no gross joint deformity HPI:    78 yo M PMH Afib (on coumadin), CVA (2012, 2016), hx of DVT s/p IVC filter 20 years ago, Parkinson's disease, dementia, HTN, urinary retention, and multiple TIAs presents for LLE swelling noted last night.  Patient is poor historian and non-verbal at baseline, follows some commands, wife at bedside to provide information.  States that last night she noted patient had swollen LLE, from toes to knee associated with some purple discoloration and felt tight.  Patient had DVT in LLE in the past and wife was aware of symptoms, was going to have doppler done today but this morning noted swelling and discoloration to be worse and brought him to the ED.  Patient has not had any associated dyspnea, difficulty breathing, but wife states that his temperature has been increased for a few days past week (usually runs around 97F but was at 99F 2 days last week) and noted to be flushed.  Patient otherwise baseline status.      Of note, patient was admitted on 5/7/19 for syncope likely 2/2 hematoma of R iliopsoas muscle and R trace free retroperitoneal hemorrhage at which time patient's AC was held (was on coumadin). Patient went home and was receiving PT, however wife noted that patient was having increased immobility and therefore started to increase his entacapone doses after discussing with PMD.      In the ED, T 97.9, HR 96, /87 --> 131/65, RR 18, SpO2 96%.  Labs showed H/H 11.4/33.8 (baseline H around 11.5).  PT/INR 15.2/1.33.  Na 148, K 2.8.  In the ED, patient received sinemet x1, comtan x1, lopressor 25mg PO x1, KCl powder 40mEq x1, 1L bolus NS. EKG: Afib with RVR. Chest Xray: no active pulmonary disease. US doppler b/l LE: Deep venous thrombosis left common femoral vein including greater saphenous common femoral junction, extending to the left femoral vein. CT abd/ pelvis: Interval decrease in size in the right iliopsoas intramuscular hematoma.  No localized retroperitoneal hematoma noted. (15 Prashanth 2019 19:27)        Patient was admitted with left lower extremity femoral vein DVT. Patient was evaluated by cardiology (Dr Robb's group). He was started on Eliquis 2.5 mg BID on 6/15. Metoprolol dose was increased due to elevated HR. Repeat CT abd and pelvis showed hematoma was smaller, trace retroperitoneal bleed no longer visible. Patient had hypernatremia which improved with D5W.         Patient responded well to medical treatment during hospitalization. Patient was seen and examined on the day of discharge and is medically optimized for discharge, with close outpatient follow up. Patient to continue Eliquis, new dose of Metoprolol, and rest of home regimen upon discharge.        Vital Signs Last 24 Hrs    T(C): 36.5 (18 Jun 2019 05:14), Max: 36.8 (17 Jun 2019 22:10)    T(F): 97.7 (18 Jun 2019 05:14), Max: 98.2 (17 Jun 2019 22:10)    HR: 93 (18 Jun 2019 08:30) (93 - 101)    BP: 121/89 (18 Jun 2019 08:26) (103/68 - 123/80)    BP(mean): --    RR: 18 (18 Jun 2019 05:14) (18 - 18)    SpO2: 100% (18 Jun 2019 05:14) (92% - 100%)        PHYSICAL EXAM:     GENERAL: frail elderly male, no acute distress    HEENT: NC/AT, EOMI, neck supple, MMM    RESPIRATORY: lungs clear to auscultation bilaterally, no rhonchi, rales, or wheezing    CARDIOVASCULAR: RRR, no murmurs, gallops, rubs    ABDOMINAL: soft, non-tender, non-distended, positive bowel sounds     EXTREMITIES: no clubbing, cyanosis, trace left LE edema    NEUROLOGICAL: awake and alert, confused, moves all 4 extremities    SKIN: warm, dry, intact    MUSCULOSKELETAL: no gross joint deformity HPI:    78 yo M PMH Afib (on coumadin), CVA (2012, 2016), hx of DVT s/p IVC filter 20 years ago, Parkinson's disease, dementia, HTN, urinary retention, and multiple TIAs presents for LLE swelling noted last night.  Patient is poor historian and non-verbal at baseline, follows some commands, wife at bedside to provide information.  States that last night she noted patient had swollen LLE, from toes to knee associated with some purple discoloration and felt tight.  Patient had DVT in LLE in the past and wife was aware of symptoms, was going to have doppler done today but this morning noted swelling and discoloration to be worse and brought him to the ED.  Patient has not had any associated dyspnea, difficulty breathing, but wife states that his temperature has been increased for a few days past week (usually runs around 97F but was at 99F 2 days last week) and noted to be flushed.  Patient otherwise baseline status.      Of note, patient was admitted on 5/7/19 for syncope likely 2/2 hematoma of R iliopsoas muscle and R trace free retroperitoneal hemorrhage at which time patient's AC was held (was on coumadin). Patient went home and was receiving PT, however wife noted that patient was having increased immobility and therefore started to increase his entacapone doses after discussing with PMD.      In the ED, T 97.9, HR 96, /87 --> 131/65, RR 18, SpO2 96%.  Labs showed H/H 11.4/33.8 (baseline H around 11.5).  PT/INR 15.2/1.33.  Na 148, K 2.8.  In the ED, patient received sinemet x1, comtan x1, lopressor 25mg PO x1, KCl powder 40mEq x1, 1L bolus NS. EKG: Afib with RVR. Chest Xray: no active pulmonary disease. US doppler b/l LE: Deep venous thrombosis left common femoral vein including greater saphenous common femoral junction, extending to the left femoral vein. CT abd/ pelvis: Interval decrease in size in the right iliopsoas intramuscular hematoma.  No localized retroperitoneal hematoma noted. (15 Prashanth 2019 19:27)        Patient was admitted with new  left lower extremity femoral vein DVT. Patient was evaluated by cardiology (Dr Robb's group). He was started on Eliquis 2.5 mg BID on 6/15. Metoprolol dose was increased due to elevated HR  hx afib . Repeat CT abd and pelvis showed hematoma was smaller, trace retroperitoneal bleed no longer visible. Patient had hypernatremia  sec to dehydration with  improved with D5W. phosphatemia replaced .    Patient responded well to medical treatment during hospitalization. Patient was seen and examined on the day of discharge and is medically optimized for discharge, with close outpatient follow up. Patient to continue Eliquis, new dose of Metoprolol, and rest of home regimen upon discharge.    pt - hx parkinson's dis with physical decondition - [facility]  wife bedside aware want home physical therapy. hypophosphatemia - replaced today fu phosphorus level . HPI:    80 yo M PMH Afib (on coumadin), CVA (2012, 2016), hx of DVT s/p IVC filter 20 years ago, Parkinson's disease, dementia, HTN, urinary retention, and multiple TIAs presents for LLE swelling noted last night.  Patient is poor historian and non-verbal at baseline, follows some commands, wife at bedside to provide information.  States that last night she noted patient had swollen LLE, from toes to knee associated with some purple discoloration and felt tight.  Patient had DVT in LLE in the past and wife was aware of symptoms, was going to have doppler done today but this morning noted swelling and discoloration to be worse and brought him to the ED.  Patient has not had any associated dyspnea, difficulty breathing, but wife states that his temperature has been increased for a few days past week (usually runs around 97F but was at 99F 2 days last week) and noted to be flushed.  Patient otherwise baseline status.      Of note, patient was admitted on 5/7/19 for syncope likely 2/2 hematoma of R iliopsoas muscle and R trace free retroperitoneal hemorrhage at which time patient's AC was held (was on coumadin). Patient went home and was receiving PT, however wife noted that patient was having increased immobility and therefore started to increase his entacapone doses after discussing with PMD.      In the ED, T 97.9, HR 96, /87 --> 131/65, RR 18, SpO2 96%.  Labs showed H/H 11.4/33.8 (baseline H around 11.5).  PT/INR 15.2/1.33.  Na 148, K 2.8.  In the ED, patient received sinemet x1, comtan x1, lopressor 25mg PO x1, KCl powder 40mEq x1, 1L bolus NS. EKG: Afib with RVR. Chest Xray: no active pulmonary disease. US doppler b/l LE: Deep venous thrombosis left common femoral vein including greater saphenous common femoral junction, extending to the left femoral vein. CT abd/ pelvis: Interval decrease in size in the right iliopsoas intramuscular hematoma.  No localized retroperitoneal hematoma noted. (15 Prashanth 2019 19:27)        Patient was admitted with new left lower extremity femoral vein DVT. Patient was evaluated by cardiology (Dr Robb's group). He was started on Eliquis 2.5 mg BID on 6/15. Metoprolol dose was increased due to elevated HR with hx of Afib. Repeat CT abd and pelvis showed hematoma was smaller, trace retroperitoneal bleed no longer visible. Patient had hypernatremia secondary to dehydration which improved with D5W. Hypophosphatemia replaced.    Patient responded well to medical treatment during hospitalization. PT evaluated patient for physical deconditioning with hx of Parkinson's and recommended DEMETRIO, however wife refused DEMETRIO and requested home PT. Patient was seen and examined on the day of discharge and is medically optimized for discharge, with close outpatient follow up.

## 2019-06-18 NOTE — PROGRESS NOTE ADULT - PROBLEM SELECTOR PLAN 6
-c/w flomax and finasteride
-c/w flomax and finasteride
-c/w zoloft qhs  -wife states that gives patient xanax 0.25mg qhs PRN

## 2019-06-18 NOTE — DISCHARGE NOTE PROVIDER - INSTRUCTIONS
Dysphagia diet with pureed food, nectar consistency fluid. Adequate intake of water (with thickener).

## 2019-06-18 NOTE — DISCHARGE NOTE PROVIDER - CARE PROVIDERS DIRECT ADDRESSES
[email or web address]

## 2019-06-18 NOTE — PROGRESS NOTE ADULT - SUBJECTIVE AND OBJECTIVE BOX
[facility] -- Rogelio Robb, Flor, Sandi, Rick Holloway Savella  Office # [phone]    Follow Up:  DVT    Subjective/Observations: Awake, comfortable on RA.  Non-verbal and unable to follow commands.  Spouse at bedside.  No events    REVIEW OF SYSTEMS: All other review of systems is negative unless indicated above  PAST MEDICAL & SURGICAL HISTORY:  Hematoma of right iliopsoas muscle, sequela  Syncope  Parkinson disease  Constipation  Dementia  Urinary retention  Cerebrovascular accident  Hypertension  Atrial fibrillation  No significant past surgical history    MEDICATIONS  (STANDING):  ALPRAZolam 0.25 milliGRAM(s) Oral <User Schedule>  apixaban 2.5 milliGRAM(s) Oral every 12 hours  carbidopa/levodopa  25/100 2 Tablet(s) Oral <User Schedule>  dextrose 5%. 1000 milliLiter(s) (50 mL/Hr) IV Continuous <Continuous>  docusate sodium 300 milliGRAM(s) Oral <User Schedule>  entacapone 200 milliGRAM(s) Oral <User Schedule>  entacapone 100 milliGRAM(s) Oral <User Schedule>  finasteride 5 milliGRAM(s) Oral <User Schedule>  metoprolol tartrate 25 milliGRAM(s) Oral <User Schedule>  potassium acid phosphate/sodium acid phosphate tablet (K-PHOS No. 2) 1 Tablet(s) Oral four times a day with meals  senna 3 Tablet(s) Oral <User Schedule>  sertraline 50 milliGRAM(s) Oral <User Schedule>  tamsulosin 0.4 milliGRAM(s) Oral <User Schedule>    MEDICATIONS  (PRN):    Allergies    No Known Allergies    Intolerances    Vital Signs Last 24 Hrs  T(C): 36.3 (18 Jun 2019 13:58), Max: 36.8 (17 Jun 2019 22:10)  T(F): 97.4 (18 Jun 2019 13:58), Max: 98.2 (17 Jun 2019 22:10)  HR: 93 (18 Jun 2019 13:58) (93 - 101)  BP: 104/66 (18 Jun 2019 13:58) (104/66 - 123/80)  BP(mean): --  RR: 18 (18 Jun 2019 13:58) (18 - 18)  SpO2: 95% (18 Jun 2019 13:58) (92% - 100%)  I&O's Summary    17 Jun 2019 07:01  -  18 Jun 2019 07:00  --------------------------------------------------------  IN: 600 mL / OUT: 0 mL / NET: 600 mL      PHYSICAL EXAM:  TELE: Not on tele  Constitutional: NAD, awake and alert, well-developed  HEENT: Moist Mucous Membranes, Anicteric  Pulmonary: Non-labored, breath sounds are clear bilaterally, No wheezing, rales or rhonchi  Cardiovascular: Irregularly irregular, S1 and S2, No murmurs, rubs, gallops or clicks  Gastrointestinal: Bowel Sounds present, soft, nontender.   Lymph: No peripheral edema. No lymphadenopathy.  Skin: No visible rashes or ulcers.  Psych:  Mood & affect flat, non-verbal  LABS: All Labs Reviewed:                        10.1   6.78  )-----------( 126      ( 18 Jun 2019 08:56 )             30.9                         10.2   6.21  )-----------( 128      ( 17 Jun 2019 08:21 )             30.7                         9.8    6.33  )-----------( 108      ( 16 Jun 2019 11:32 )             29.7     18 Jun 2019 08:56    147    |  116    |  9      ----------------------------<  87     3.5     |  24     |  0.60   17 Jun 2019 08:21    149    |  118    |  12     ----------------------------<  99     3.1     |  23     |  0.78   16 Jun 2019 11:32    152    |  120    |  19     ----------------------------<  110    4.0     |  27     |  0.79     Ca    9.1        18 Jun 2019 08:56  Ca    9.4        17 Jun 2019 08:21  Ca    9.2        16 Jun 2019 11:32  Phos  1.4       17 Jun 2019 08:21  Mg     2.0       17 Jun 2019 08:21    TPro  6.0    /  Alb  2.8    /  TBili  0.6    /  DBili  x      /  AST  21     /  ALT  7      /  AlkPhos  62     16 Jun 2019 11:32     < from: TTE Echo Doppler w/o Cont (05.08.19 @ 09:47) >     EXAM:  ECHO TTE WO CON COMP W DOPPLR      PROCEDURE DATE:  05/08/2019      INTERPRETATION:  INDICATION: Syncope    Blood Pressure 99/68    Height 176 cm     Weight 72 kg       BSA        Dimensions:    LA 4.4       Normal Values: 2.0 - 4.0 cm   Ao 3.3        Normal Values: 2.0 - 3.8 cm  SEPTUM 0.9       Normal Values: 0.6 - 1.2 cm  PWT 0.9       Normal Values: 0.6 - 1.1 cm  LVIDd 4.9         Normal Values: 3.0 - 5.6 cm  LVIDs 2.4         Normal Values: 1.8 - 4.0 cm      OBSERVATIONS:    Mitral Valve: Thickened leaflets, mild MR.  Aortic Valve/Aorta: Calcified trileaflet aortic valve with decreased   opening. Mild aortic stenosis, peak aortic valve gradient is 15.2 mmHg   with a mean gradient of 8.2 mmHg. Aortic valve area calculated by   continuity equation is 1.23 sq cm  Tricuspid Valve: normal with trace TR.  Pulmonic Valve: normal  Left Atrium: Dilated  Right Atrium: normal  Left Ventricle: normal LV size and systolic function, estimated LVEF of   65%.  Right Ventricle: normalsize and systolic function.  Pericardium/Pleura: normal, no significant pericardial effusion.    Conclusion:     Normal left ventricular internal dimensions and systolic function,   estimated LVEF of 65%.    Calcified trileaflet aortic valve with decreased opening. Mild aortic   stenosis, peak aortic valve gradient is 15.2 mmHg with a mean gradient of   8.2 mmHg. Aortic valve area calculated by continuity equation is 1.23 sq   cm  Mitral valve with thickened leaflets, mild mitral regurgitation  Normal RV size and systolic function.   Trace physiologic TR.      No significant pericardial effusion.      SKYLA MATAMOROS   This document has been electronically signed. May  9 2019  8:45AM      < end of copied text >    < from: CT Abdomen and Pelvis No Cont (06.15.19 @ 11:35) >    EXAM:  CT ABDOMEN AND PELVIS                            PROCEDURE DATE:  06/15/2019          INTERPRETATION:  CLINICAL INFORMATION: Left leg swelling. Deep venous   thrombosis. Recent right iliopsoas intramuscular hematoma.  Evaluate for retroperitoneal bleed.    COMPARISON: CT scan abdomen pelvis 5/10/2019.    PROCEDURE:   CT of the Abdomen and Pelvis was performed without intravenous contrast.   Intravenous contrast: None.  Oral contrast: None.  Sagittal and coronal reformats were performed.    FINDINGS:    LOWER CHEST: Coronary artery calcifications.  Scarring/atelectasis visualized left lung base.    Streak artifact related to patient's arms degrades image quality limiting   evaluation.      The evaluation of solid organ parenchyma is limited without intravenous   contrast.    LIVER: Numerous hepatic cysts and small indeterminate hypodense hepatic   lesions, stable in appearance.  Scattered coarse peripheral calcifications.    BILE DUCTS: Normal caliber.  GALLBLADDER: Dependent cholelithiasis with moderate distention of the   gallbladder.  SPLEEN: Within normal limits.  PANCREAS: Within normal limits.  ADRENALS: Within normal limits.  KIDNEYS/URETERS:     Left renal scarring with nonobstructing intrarenal/intrapelvic   calcifications.  No obstructing ureteral calculus noted.    BLADDER: Probable mild bladder wall thickening.  REPRODUCTIVE ORGANS: Enlarged prostate gland with coarse calcifications.    BOWEL: Probable small hiatal hernia. Evaluation of the stomach is limited   without distention.  Moderate fecal retention within the rectum.  No bowel obstruction noted.     Appendix normal-appearing.  PERITONEUM: No ascites.  VESSELS:  Atherosclerotic calcification of the abdominal aorta.  Infrarenal IVC filter.  RETROPERITONEUM: No lymphadenopathy.  Interval decrease in size in the intramuscular hematoma involving the   right iliopsoas muscle. Mild stranding within the right retroperitoneal   soft tissues.  No localized retroperitoneal hematoma noted.  ABDOMINAL WALL: Small fat-containing periumbilical hernia.  Small fat-containing right inguinal hernia.  BONES: Multilevel degenerative changes of the spine. Degenerative changes   at the hip joints.    IMPRESSION:     Interval decrease in size in the right iliopsoasintramuscular hematoma.    BECCA SWARTZ M.D., ATTENDING RADIOLOGIST  This document has been electronically signed. Prashanth 15 2019 12:12PM      < end of copied text >    < from: Xray Chest 1 View-PORTABLE IMMEDIATE (06.15.19 @ 07:14) >    EXAM:  XR CHEST PORTABLE IMMED 1V                            PROCEDURE DATE:  06/15/2019      INTERPRETATION:  Chest radiograph (one view)     CPT 12527    CLINICAL INFORMATION:  Patient is unable to communicate. Weakness.  Short   of breath.     TECHNIQUE:  Single frontal view of the chest was obtained.    FINDINGS:  Prior study dated 5/7/2019 was available for review.    The lungs are clear.  No pleural abnormality is seen.      The heart and mediastinum appear intact.    IMPRESSION: Noevidence of active chest disease.    SKYLA PEACOCK M.D., ATTENDING RADIOLOGIST  This document has been electronically signed. Prashanth 15 2019  9:22AM      < end of copied text >

## 2019-06-18 NOTE — PHYSICAL THERAPY INITIAL EVALUATION ADULT - SITTING BALANCE: DYNAMIC
Pt req mod A with sitting edge of bed for knee extension AROM. Denied dizziness with sitting./poor balance

## 2019-06-18 NOTE — PHYSICAL THERAPY INITIAL EVALUATION ADULT - PERTINENT HX OF CURRENT PROBLEM, REHAB EVAL
As per H&P:"78y M PMH Afib (on coumadin), CVA (2012, 2016), Parkinson's disease, dementia, HTN, urinary retention, and multiple TIAs presents s/p syncope. Patient is a poor historian due to dementia. Per wife, patient has been having multiple syncopal episodes over past 3 days. No precipitating events or triggers. Episodes are not positional or associated with movement or activity. Today, patient was at home when he had an episode. Wife then tried to wake him up and noticed he was unresponsive."

## 2019-06-18 NOTE — PROGRESS NOTE ADULT - ASSESSMENT
78 yo M PMH Afib (on coumadin), CVA (2012, 2016), hx of DVT s/p IVC filter, Parkinson's disease, dementia, HTN, urinary retention, and multiple TIAs admitted with LLE femoral DVT.

## 2019-06-18 NOTE — PHYSICAL THERAPY INITIAL EVALUATION ADULT - IMPAIRMENTS FOUND, PT EVAL
tone/poor safety awareness/decreased midline orientation/aerobic capacity/endurance/arousal, attention, and cognition/gross motor/muscle strength/posture/ROM/cognitive impairment/gait, locomotion, and balance

## 2019-06-18 NOTE — DISCHARGE NOTE PROVIDER - NSDCCPCAREPLAN_GEN_ALL_CORE_FT
PRINCIPAL DISCHARGE DIAGNOSIS  Diagnosis: Acute deep vein thrombosis (DVT) of femoral vein of left lower extremity  Assessment and Plan of Treatment: You were found to have a DVT in your left lower extremity. You were started on an anticoagulant (blood thinner) Eliquis. Continue taking Eliquis 2.5 mg twice a day (every 12 hours). Follow up with your primary medical doctor within 1 week of discharge for further management.      SECONDARY DISCHARGE DIAGNOSES  Diagnosis: Dehydration  Assessment and Plan of Treatment:     Diagnosis: Anorexia  Assessment and Plan of Treatment: PRINCIPAL DISCHARGE DIAGNOSIS  Diagnosis: Acute deep vein thrombosis (DVT) of femoral vein of left lower extremity  Assessment and Plan of Treatment: You were found to have a DVT in your left lower extremity. You were started on an anticoagulant (blood thinner) Eliquis. Continue taking Eliquis 2.5 mg twice a day (every 12 hours). Follow up with your primary medical doctor within 1 week of discharge for further management.      SECONDARY DISCHARGE DIAGNOSES  Diagnosis: Hypernatremia  Assessment and Plan of Treatment: You received IV fluid hydration and your sodium level improved. Continue to stay adequately hydrated with nectar consistency water. Follow up with your primary medical doctor for repeat lab work within 1 week of discharge for further management.    Diagnosis: Atrial fibrillation  Assessment and Plan of Treatment: Your Metoprolol dose was changed from 25 mg to 50 mg due to elevated heart rate. Continue taking Metoprolol 50 mg twice a day. Follow up with your cardiologist within 1 week of discharge.    Diagnosis: Parkinson disease  Assessment and Plan of Treatment: Continue your home regimen and follow up with your neurologist for further management. PRINCIPAL DISCHARGE DIAGNOSIS  Diagnosis: Acute deep vein thrombosis (DVT) of femoral vein of left lower extremity  Assessment and Plan of Treatment: You were found to have a DVT in your left lower extremity. You were started on an anticoagulant (blood thinner) Eliquis. Continue taking Eliquis 2.5 mg twice a day (every 12 hours). Follow up with your primary medical doctor within 1 week of discharge for further management.      SECONDARY DISCHARGE DIAGNOSES  Diagnosis: Hypernatremia  Assessment and Plan of Treatment: You received IV fluid hydration and your sodium level improved. Continue to stay adequately hydrated with nectar consistency water. Follow up with your primary medical doctor for repeat lab work within 1 week of discharge for further management.    Diagnosis: Atrial fibrillation  Assessment and Plan of Treatment: Your Metoprolol dose was increased due to elevated heart rate. Continue taking Metoprolol 25 mg twice a day. Follow up with your cardiologist within 1 week of discharge.    Diagnosis: Parkinson disease  Assessment and Plan of Treatment: Continue your home regimen and follow up with your neurologist for further management.

## 2019-06-18 NOTE — PROGRESS NOTE ADULT - PROBLEM SELECTOR PLAN 4
Slowly improving, 147 today  -s/p D5W for 12 hours yesterday, reordered D5W today  -encourage po intake  -repeat levels in AM sec to dehydration Slowly improving, 147 today  -s/p D5W for 12 hours yesterday, reordered D5W today  -encourage po intake  -repeat levels in AM

## 2019-06-18 NOTE — PROGRESS NOTE ADULT - ASSESSMENT
79 M PMH Afib previously on AC, CVA (2012, 2016), Parkinson's disease, dementia, HTN, urinary retention, hx of dvt s/p IVC filter, and multiple TIAs recently was admitted with syncope and found to have Right iliopsoas intramuscular hematoma and right trace free retroperitoneal hemorrhage who now presents from home for a left leg swelling with left leg proximal DVT.       - Volume status improved, now off IVF  - Remains hypokalemic. Replete to K>4, Mg>2    - Af is generally rate controlled   - Cont metoprolol    - It appears that he has a new DVT likely from greater immobility  - He has an IVC filter which will be partially protective.   - Though I think that given his immobility he will be at high risk for recurrent VTE  - His last admission he had a right iliopsoas bleed that was likely from supratheraputic INR  - His repeat CT showed interval decrease.   - After length discussions with wife it was decided given his elevated CVA and DVT risk to start him on AC  - Cont Eliquis 2.5mg q12 (though not ideal dose) and trend Hb/hct for clinical signs of bleeding  - Further cardiac workup will depend on clinical course.   - All other workup per primary team. Will followup.       Dali Carl [facility]  Cardiology   Spectra #[phone]

## 2019-06-18 NOTE — DISCHARGE NOTE PROVIDER - NSDCFUADDINST_GEN_ALL_CORE_FT
Follow up with your PMD Dr Trimble, cardiologist Dr Leo and neurologist Dr Nance within 1 week of discharge.

## 2019-06-18 NOTE — PROGRESS NOTE ADULT - PROBLEM SELECTOR PLAN 2
Weakness with underlying Parkinson and deconditioning  - Patient cannot stand with PT eval today, plan for discharge home tomorrow after PT will see patient again in morning

## 2019-06-18 NOTE — PHYSICAL THERAPY INITIAL EVALUATION ADULT - PASSIVE RANGE OF MOTION EXAMINATION, REHAB EVAL
deficits as listed below/bilateral upper extremity Passive ROM was WFL (within functional limits)/bilateral lower extremity Passive ROM was WFL (within functional limits)/Limitations in active knee extension; about 1/2 through range. Unable to assess further secondary to limitations with following commands. and /c hypertonicity, passively WFL and thorough functional mobility assessment

## 2019-06-18 NOTE — PROGRESS NOTE ADULT - SUBJECTIVE AND OBJECTIVE BOX
ADMISSION HPI:  80 yo M PMH Afib (on coumadin), CVA (2012, 2016), hx of DVT s/p IVC filter 20 years ago, Parkinson's disease, dementia, HTN, urinary retention, and multiple TIAs presents for LLE swelling noted last night.  Patient is poor historian and non-verbal at baseline, follows some commands, wife at bedside to provide information.  States that last night she noted patient had swollen LLE, from toes to knee associated with some purple discoloration and felt tight.  Patient had DVT in LLE in the past and wife was aware of symptoms, was going to have doppler done today but this morning noted swelling and discoloration to be worse and brought him to the ED.  Patient has not had any associated dyspnea, difficulty breathing, but wife states that his temperature has been increased for a few days past week (usually runs around 97F but was at 99F 2 days last week) and noted to be flushed.  Patient otherwise baseline status.    Of note, patient was admitted on 5/7/19 for syncope likely 2/2 hematoma of R iliopsoas muscle and R trace free retroperitoneal hemorrhage at which time patient's AC was held (was on coumadin).  Patient went home and was receiving PT, however wife noted that patient was having increased immobility and therefore started to increase his entacapone doses after discussing with PMD.    In the ED, T 97.9, HR 96, /87 --> 131/65, RR 18, SpO2 96%.  Labs showed H/H 11.4/33.8 (baseline H around 11.5).  PT/INR 15.2/1.33.  Na 148, K 2.8.  In the ED, patient received sinemet x1, comtan x1, lopressor 25mg PO x1, KCl powder 40mEq x1, 1L bolus NS. EKG:  Afib with RVR. Chest Xray: no active pulmonary disease. US doppler b/l LE: Deep venous thrombosis left common femoral vein including greater saphenous common femoral junction, extending to the left femoral vein. CT abd/ pelvis: Interval decrease in size in the right iliopsoas intramuscular hematoma.  No localized retroperitoneal hematoma noted. (15 Prashanth 2019 19:27)    Admitted for LLE femoral vein DVT.    INTERVAL HPI/OVERNIGHT EVENTS:  Patient seen and examined at bedside this morning. Patient is an unreliable historian, but he denies difficulty breathing, chest pain.     T(C): 36.5 (06-18-19 @ 05:14), Max: 36.8 (06-17-19 @ 22:10)  HR: 93 (06-18-19 @ 08:30) (93 - 101)  BP: 121/89 (06-18-19 @ 08:26) (103/68 - 123/80)  RR: 18 (06-18-19 @ 05:14) (18 - 18)  SpO2: 100% (06-18-19 @ 05:14) (92% - 100%)  Wt(kg): --    I&O's Summary    17 Jun 2019 07:01  -  18 Jun 2019 07:00  --------------------------------------------------------  IN: 600 mL / OUT: 0 mL / NET: 600 mL      REVIEW OF SYSTEMS: Unable to obtain due to patient's clinical status    PHYSICAL EXAM:  GENERAL: fraily elderly male, NAD  HEAD: Atraumatic, Normocephalic  EYES: EOMI, PERRL, conjunctiva and sclera clear  ENMT: No tonsillar erythema, exudates, or enlargement; Moist mucous membranes  NECK: Supple, No JVD  NERVOUS SYSTEM: Awake and alert, confused, moves all 4 extremities  CHEST/LUNG: Clear to percussion bilaterally; No rales, rhonchi, wheezing, or rubs  HEART: Regular rate and rhythm; No murmurs, rubs, or gallops  ABDOMEN: Soft, Nontender, Nondistended; Bowel sounds present  EXTREMITIES:  2+ Peripheral Pulses, No clubbing, cyanosis; +LLE edema improved  SKIN: Warm, dry, well-perfused    MEDICATIONS  (STANDING):  ALPRAZolam 0.25 milliGRAM(s) Oral <User Schedule>  apixaban 2.5 milliGRAM(s) Oral every 12 hours  carbidopa/levodopa  25/100 2 Tablet(s) Oral <User Schedule>  dextrose 5%. 1000 milliLiter(s) (50 mL/Hr) IV Continuous <Continuous>  docusate sodium 300 milliGRAM(s) Oral <User Schedule>  entacapone 200 milliGRAM(s) Oral <User Schedule>  entacapone 100 milliGRAM(s) Oral <User Schedule>  finasteride 5 milliGRAM(s) Oral <User Schedule>  metoprolol tartrate 25 milliGRAM(s) Oral <User Schedule>  potassium acid phosphate/sodium acid phosphate tablet (K-PHOS No. 2) 1 Tablet(s) Oral four times a day with meals  senna 3 Tablet(s) Oral <User Schedule>  sertraline 50 milliGRAM(s) Oral <User Schedule>  tamsulosin 0.4 milliGRAM(s) Oral <User Schedule>    MEDICATIONS  (PRN):      LABS:                        10.1   6.78  )-----------( 126      ( 18 Jun 2019 08:56 )             30.9     06-18    147<H>  |  116<H>  |  9   ----------------------------<  87  3.5   |  24  |  0.60    Ca    9.1      18 Jun 2019 08:56  Phos  1.4     06-17  Mg     2.0     06-17      RADIOLOGY & ADDITIONAL TESTS:  Imaging Personally Reviewed: No new test    Advance Directives: Full code    Palliative Care: No ADMISSION HPI:  78 yo M PMH Afib (on coumadin), CVA (2012, 2016), hx of DVT s/p IVC filter 20 years ago, Parkinson's disease, dementia, HTN, urinary retention, and multiple TIAs presents for LLE swelling noted last night.  Patient is poor historian and non-verbal at baseline, follows some commands, wife at bedside to provide information.  States that last night she noted patient had swollen LLE, from toes to knee associated with some purple discoloration and felt tight.  Patient had DVT in LLE in the past and wife was aware of symptoms, was going to have doppler done today but this morning noted swelling and discoloration to be worse and brought him to the ED.  Patient has not had any associated dyspnea, difficulty breathing, but wife states that his temperature has been increased for a few days past week (usually runs around 97F but was at 99F 2 days last week) and noted to be flushed.  Patient otherwise baseline status.    Of note, patient was admitted on 5/7/19 for syncope likely 2/2 hematoma of R iliopsoas muscle and R trace free retroperitoneal hemorrhage at which time patient's AC was held (was on coumadin).  Patient went home and was receiving PT, however wife noted that patient was having increased immobility and therefore started to increase his entacapone doses after discussing with PMD.    In the ED, T 97.9, HR 96, /87 --> 131/65, RR 18, SpO2 96%.  Labs showed H/H 11.4/33.8 (baseline H around 11.5).  PT/INR 15.2/1.33.  Na 148, K 2.8.  In the ED, patient received sinemet x1, comtan x1, lopressor 25mg PO x1, KCl powder 40mEq x1, 1L bolus NS. EKG:  Afib with RVR. Chest Xray: no active pulmonary disease. US doppler b/l LE: Deep venous thrombosis left common femoral vein including greater saphenous common femoral junction, extending to the left femoral vein. CT abd/ pelvis: Interval decrease in size in the right iliopsoas intramuscular hematoma.  No localized retroperitoneal hematoma noted. (15 Prashanth 2019 19:27)    Admitted for LLE femoral vein DVT.    INTERVAL HPI/OVERNIGHT EVENTS:  Patient seen and examined at bedside this morning. wife bedside    physical decondition   unable  to ambulate , Patient is an unreliable historian sec to dementia , but he denies difficulty breathing, chest pain.     T(C): 36.5 (06-18-19 @ 05:14), Max: 36.8 (06-17-19 @ 22:10)  HR: 93 (06-18-19 @ 08:30) (93 - 101)  BP: 121/89 (06-18-19 @ 08:26) (103/68 - 123/80)  RR: 18 (06-18-19 @ 05:14) (18 - 18)  SpO2: 100% (06-18-19 @ 05:14) (92% - 100%)  Wt(kg): --    I&O's Summary    17 Jun 2019 07:01  -  18 Jun 2019 07:00  --------------------------------------------------------  IN: 600 mL / OUT: 0 mL / NET: 600 mL      REVIEW OF SYSTEMS: Unable to obtain due to dementia     PHYSICAL EXAM:  GENERAL: fraily elderly male, NAD  HEAD: Atraumatic, Normocephalic  EYES: EOMI, PERRL, conjunctiva and sclera clear  ENMT:   Moist mucous membranes  NECK: Supple, No JVD  NERVOUS SYSTEM: Awake and alert / 0 , confused, moves all 4 extremities motor 5/5 upper lower 4/5   CHEST/LUNG: Clear to percussion bilaterally; No rales, rhonchi, wheezing,   HEART: irRegular rate and rhythm; No murmurs, no tachy   ABDOMEN: Soft, Nontender, Nondistended; Bowel sounds present  EXTREMITIES:  2+ Peripheral Pulses, No clubbing, cyanosis; +LLE edema improved  SKIN: Warm, dry, well-perfused    MEDICATIONS  (STANDING):  ALPRAZolam 0.25 milliGRAM(s) Oral <User Schedule>  apixaban 2.5 milliGRAM(s) Oral every 12 hours  carbidopa/levodopa  25/100 2 Tablet(s) Oral <User Schedule>  dextrose 5%. 1000 milliLiter(s) (50 mL/Hr) IV Continuous <Continuous>  docusate sodium 300 milliGRAM(s) Oral <User Schedule>  entacapone 200 milliGRAM(s) Oral <User Schedule>  entacapone 100 milliGRAM(s) Oral <User Schedule>  finasteride 5 milliGRAM(s) Oral <User Schedule>  metoprolol tartrate 25 milliGRAM(s) Oral <User Schedule>  potassium acid phosphate/sodium acid phosphate tablet (K-PHOS No. 2) 1 Tablet(s) Oral four times a day with meals  senna 3 Tablet(s) Oral <User Schedule>  sertraline 50 milliGRAM(s) Oral <User Schedule>  tamsulosin 0.4 milliGRAM(s) Oral <User Schedule>    MEDICATIONS  (PRN):      LABS:                        10.1   6.78  )-----------( 126      ( 18 Jun 2019 08:56 )             30.9     06-18    147<H>  |  116<H>  |  9   ----------------------------<  87  3.5   |  24  |  0.60    Ca    9.1      18 Jun 2019 08:56  Phos  1.4     06-17  Mg     2.0     06-17      RADIOLOGY & ADDITIONAL TESTS:  Imaging Personally Reviewed: No new test    Advance Directives: Full code    Palliative Care: No

## 2019-06-19 ENCOUNTER — TRANSCRIPTION ENCOUNTER (OUTPATIENT)
Age: 79
End: 2019-06-19

## 2019-06-19 VITALS
HEART RATE: 98 BPM | DIASTOLIC BLOOD PRESSURE: 66 MMHG | SYSTOLIC BLOOD PRESSURE: 104 MMHG | RESPIRATION RATE: 17 BRPM | TEMPERATURE: 98 F | OXYGEN SATURATION: 98 %

## 2019-06-19 LAB
ANION GAP SERPL CALC-SCNC: 8 MMOL/L — SIGNIFICANT CHANGE UP (ref 5–17)
BUN SERPL-MCNC: 11 MG/DL — SIGNIFICANT CHANGE UP (ref 7–23)
CALCIUM SERPL-MCNC: 9 MG/DL — SIGNIFICANT CHANGE UP (ref 8.5–10.1)
CHLORIDE SERPL-SCNC: 113 MMOL/L — HIGH (ref 96–108)
CO2 SERPL-SCNC: 24 MMOL/L — SIGNIFICANT CHANGE UP (ref 22–31)
CREAT SERPL-MCNC: 0.74 MG/DL — SIGNIFICANT CHANGE UP (ref 0.5–1.3)
GLUCOSE SERPL-MCNC: 86 MG/DL — SIGNIFICANT CHANGE UP (ref 70–99)
PHOSPHATE SERPL-MCNC: 2.8 MG/DL — SIGNIFICANT CHANGE UP (ref 2.5–4.5)
POTASSIUM SERPL-MCNC: 3.7 MMOL/L — SIGNIFICANT CHANGE UP (ref 3.5–5.3)
POTASSIUM SERPL-SCNC: 3.7 MMOL/L — SIGNIFICANT CHANGE UP (ref 3.5–5.3)
SODIUM SERPL-SCNC: 145 MMOL/L — SIGNIFICANT CHANGE UP (ref 135–145)

## 2019-06-19 PROCEDURE — 99285 EMERGENCY DEPT VISIT HI MDM: CPT | Mod: 25

## 2019-06-19 PROCEDURE — 97161 PT EVAL LOW COMPLEX 20 MIN: CPT

## 2019-06-19 PROCEDURE — 99239 HOSP IP/OBS DSCHRG MGMT >30: CPT

## 2019-06-19 PROCEDURE — 80048 BASIC METABOLIC PNL TOTAL CA: CPT

## 2019-06-19 PROCEDURE — 71045 X-RAY EXAM CHEST 1 VIEW: CPT

## 2019-06-19 PROCEDURE — 85027 COMPLETE CBC AUTOMATED: CPT

## 2019-06-19 PROCEDURE — 83735 ASSAY OF MAGNESIUM: CPT

## 2019-06-19 PROCEDURE — 84100 ASSAY OF PHOSPHORUS: CPT

## 2019-06-19 PROCEDURE — 80053 COMPREHEN METABOLIC PANEL: CPT

## 2019-06-19 PROCEDURE — 36415 COLL VENOUS BLD VENIPUNCTURE: CPT

## 2019-06-19 PROCEDURE — 93970 EXTREMITY STUDY: CPT

## 2019-06-19 PROCEDURE — 74176 CT ABD & PELVIS W/O CONTRAST: CPT

## 2019-06-19 PROCEDURE — 85610 PROTHROMBIN TIME: CPT

## 2019-06-19 PROCEDURE — 99232 SBSQ HOSP IP/OBS MODERATE 35: CPT

## 2019-06-19 PROCEDURE — 93005 ELECTROCARDIOGRAM TRACING: CPT

## 2019-06-19 RX ADMIN — ENTACAPONE 100 MILLIGRAM(S): 200 TABLET, FILM COATED ORAL at 12:44

## 2019-06-19 RX ADMIN — Medication 1 TABLET(S): at 12:44

## 2019-06-19 RX ADMIN — Medication 300 MILLIGRAM(S): at 11:05

## 2019-06-19 RX ADMIN — Medication 25 MILLIGRAM(S): at 11:05

## 2019-06-19 RX ADMIN — APIXABAN 2.5 MILLIGRAM(S): 2.5 TABLET, FILM COATED ORAL at 05:16

## 2019-06-19 RX ADMIN — FINASTERIDE 5 MILLIGRAM(S): 5 TABLET, FILM COATED ORAL at 11:05

## 2019-06-19 RX ADMIN — CARBIDOPA AND LEVODOPA 2 TABLET(S): 25; 100 TABLET ORAL at 12:44

## 2019-06-19 RX ADMIN — SENNA PLUS 3 TABLET(S): 8.6 TABLET ORAL at 11:04

## 2019-06-19 RX ADMIN — ENTACAPONE 200 MILLIGRAM(S): 200 TABLET, FILM COATED ORAL at 11:05

## 2019-06-19 RX ADMIN — CARBIDOPA AND LEVODOPA 2 TABLET(S): 25; 100 TABLET ORAL at 11:03

## 2019-06-19 RX ADMIN — ENTACAPONE 100 MILLIGRAM(S): 200 TABLET, FILM COATED ORAL at 15:26

## 2019-06-19 RX ADMIN — CARBIDOPA AND LEVODOPA 2 TABLET(S): 25; 100 TABLET ORAL at 15:26

## 2019-06-19 NOTE — PROGRESS NOTE ADULT - ASSESSMENT
79 M PMH Afib previously on AC, CVA (2012, 2016), Parkinson's disease, dementia, HTN, urinary retention, hx of dvt s/p IVC filter, and multiple TIAs recently was admitted with syncope and found to have Right iliopsoas intramuscular hematoma and right trace free retroperitoneal hemorrhage who now presents from home for a left leg swelling with left leg proximal DVT.       - Volume status improved, now off IVF  - Remains hypokalemic. Replete to K>4, Mg>2    - Af is generally rate controlled   - Cont metoprolol    - It appears that he has a new DVT likely from greater immobility  - He has an IVC filter which will be partially protective.   - Though I think that given his immobility he will be at high risk for recurrent VTE  - His last admission he had a right iliopsoas bleed that was likely from supratheraputic INR  - His repeat CT showed interval decrease.   - After length discussions with wife it was decided given his elevated CVA and DVT risk to start him on AC  - Cont Eliquis 2.5mg q12 (though not ideal dose) and trend Hb/hct for clinical signs of bleeding  - Further cardiac workup will depend on clinical course.   - All other workup per primary team. Will followup.       Rakel Rollins DNP, ANP-c  Cardiology 79 M PMH Afib previously on AC, CVA (2012, 2016), Parkinson's disease, dementia, HTN, urinary retention, hx of dvt s/p IVC filter, and multiple TIAs recently was admitted with syncope and found to have Right iliopsoas intramuscular hematoma and right trace free retroperitoneal hemorrhage who now presents from home for a left leg swelling with left leg proximal DVT. - Though I think that given his immobility he will be at high risk for recurrent VTE. His last admission he had a right iliopsoas bleed that was likely from supratheraputic INR. After length discussions with wife it was decided given his elevated CVA and DVT risk to start him on AC      - His repeat CT showed interval decrease.   - Cont Eliquis 2.5mg q12 (though not ideal dose) and trend Hb/hct for clinical signs of bleeding   -Volume status improved, now off IVF  - Potassium today 3.7 Replete to K>4, Mg>2  - Af is generally rate controlled HR , continue  metoprolol  - Further cardiac workup will depend on clinical course.   - All other workup per primary team.   - Will continue to follow-up.         -      Rakel Rollins DNP, ANP-c  Cardiology

## 2019-06-19 NOTE — PROGRESS NOTE ADULT - SUBJECTIVE AND OBJECTIVE BOX
NP Progress Note     [facility] -- Rogelio Robb, Flor, Sandi, Rick Holloway Savella  Office # [phone]          HPI:  78 yo M PMH Afib (on coumadin), CVA (2012, 2016), hx of DVT s/p IVC filter 20 years ago, Parkinson's disease, dementia, HTN, urinary retention, and multiple TIAs presents for LLE swelling noted last night.  Patient is poor historian and non-verbal at baseline, follows some commands, wife at bedside to provide information.  States that last night she noted patient had swollen LLE, from toes to knee associated with some purple discoloration and felt tight.  Patient had DVT in LLE in the past and wife was aware of symptoms, was going to have doppler done today but this morning noted swelling and discoloration to be worse and brought him to the ED.  Patient has not had any associated dyspnea, difficulty breathing, but wife states that his temperature has been increased for a few days past week (usually runs around 97F but was at 99F 2 days last week) and noted to be flushed.  Patient otherwise baseline status.      Of note, patient was admitted on 5/7/19 for syncope likely 2/2 hematoma of R iliopsoas muscle and R trace free retroperitoneal hemorrhage at which time patient's AC was held (was on coumadin).  Patient went home and was receiving PT, however wife noted that patient was having increased immobility and therefore started to increase his entacapone doses after discussing with PMD.      In the ED, T 97.9, HR 96, /87 --> 131/65, RR 18, SpO2 96%.  Labs showed H/H 11.4/33.8 (baseline H around 11.5).  PT/INR 15.2/1.33.  Na 148, K 2.8.  In the ED, patient received sinemet x1, comtan x1, lopressor 25mg PO x1, KCl powder 40mEq x1, 1L bolus NS.    EKG:  Afib with RVR  Chest Xray: no active pulmonary disease.  US doppler b/l LE: Deep venous thrombosis left common femoral vein including greater saphenous common femoral junction, extending to the left femoral vein.  CT abd/ pelvis: Interval decrease in size in the right iliopsoas intramuscular hematoma.  No localized retroperitoneal hematoma noted. (15 Prashanth 2019 19:27)        Subjective/Observations: Pt. seen and examined and evaluated. Pt. resting comfortably in bed in NAD, with no respiratory distress, no chest pain, dyspnea, palpitations, PND, or orthopnea.      REVIEW OF SYSTEMS: All other review of systems is negative unless indicated above    PAST MEDICAL & SURGICAL HISTORY:  Hematoma of right iliopsoas muscle, sequela  Syncope  Parkinson disease  Constipation  Dementia  Urinary retention  Cerebrovascular accident  Hypertension  Atrial fibrillation  No significant past surgical history      MEDICATIONS  (STANDING):  ALPRAZolam 0.25 milliGRAM(s) Oral <User Schedule>  apixaban 2.5 milliGRAM(s) Oral every 12 hours  carbidopa/levodopa  25/100 2 Tablet(s) Oral <User Schedule>  dextrose 5%. 1000 milliLiter(s) (50 mL/Hr) IV Continuous <Continuous>  docusate sodium 300 milliGRAM(s) Oral <User Schedule>  entacapone 200 milliGRAM(s) Oral <User Schedule>  entacapone 100 milliGRAM(s) Oral <User Schedule>  finasteride 5 milliGRAM(s) Oral <User Schedule>  metoprolol tartrate 25 milliGRAM(s) Oral <User Schedule>  potassium acid phosphate/sodium acid phosphate tablet (K-PHOS No. 2) 1 Tablet(s) Oral four times a day with meals  senna 3 Tablet(s) Oral <User Schedule>  sertraline 50 milliGRAM(s) Oral <User Schedule>  tamsulosin 0.4 milliGRAM(s) Oral <User Schedule>    MEDICATIONS  (PRN):      Allergies: No Known Allergies      Vital Signs Last 24 Hrs  T(C): 36.6 (19 Jun 2019 05:16), Max: 36.6 (19 Jun 2019 05:16)  T(F): 97.9 (19 Jun 2019 05:16), Max: 97.9 (19 Jun 2019 05:16)  HR: 94 (19 Jun 2019 05:16) (92 - 98)  BP: 128/92 (19 Jun 2019 05:16) (104/66 - 128/92)  BP(mean): --  RR: 17 (19 Jun 2019 05:16) (16 - 18)  SpO2: 99% (19 Jun 2019 05:16) (94% - 99%)    I&O's Summary    17 Jun 2019 07:01  -  18 Jun 2019 07:00  --------------------------------------------------------  IN: 600 mL / OUT: 0 mL / NET: 600 mL    18 Jun 2019 07:01  -  19 Jun 2019 06:40  --------------------------------------------------------  IN: 480 mL / OUT: 600 mL / NET: -120 mL              LABS: All Labs Reviewed:             Echo:    < from: TTE Echo Doppler w/o Cont (05.08.19 @ 09:47) >  OBSERVATIONS:    Mitral Valve: Thickened leaflets, mild MR.  Aortic Valve/Aorta: Calcified trileaflet aortic valve with decreased   opening. Mild aortic stenosis, peak aortic valve gradient is 15.2 mmHg   with a mean gradient of 8.2 mmHg. Aortic valve area calculated by   continuity equation is 1.23 sq cm  Tricuspid Valve: normal with trace TR.  Pulmonic Valve: normal  Left Atrium: Dilated  Right Atrium: normal  Left Ventricle: normal LV size and systolic function, estimated LVEF of   65%.  Right Ventricle: normalsize and systolic function.  Pericardium/Pleura: normal, no significant pericardial effusion.      Conclusion:     Normal left ventricular internal dimensions and systolic function,   estimated LVEF of 65%.    Calcified trileaflet aortic valve with decreased opening. Mild aortic   stenosis, peak aortic valve gradient is 15.2 mmHg with a mean gradient of   8.2 mmHg. Aortic valve area calculated by continuity equation is 1.23 sq   cm  Mitral valve with thickened leaflets, mild mitral regurgitation  Normal RV size and systolic function.   Trace physiologic TR.      No significant pericardial effusion.            Interpretation of Telemetry:      Physical Exam:  Appearance: [ ] Normal  [ ] abnormal [ ] NAD   Eyes: [ ] PERRL [ ] EOMI  HEENT: [ ] Normal [ ] Abnormal oral mucosa [ ]NC/AT  Cardiovascular: [ ] S1 [ ] S2 [ ] RRR [ ] m/r/g [ ]edema [ ] JVP  Procedural Access Site: [ ]  hematoma [ ] tender to palpation [ ] 2+ pulse [ ] bruit [ ] Ecchymosis  Respiratory: [ ] Clear to auscultation bilaterally  Gastrointestinal: [ ] Soft [ ] tenderness[ ] distension [ ] BS  Musculoskeletal: [ ] clubbing [ ] joint deformity   Neurologic: [ ] Non-focal  Lymphatic: [ ] lymphadenopathy  Psychiatry: [ ] AAOx3  [ ] confused [ ] disoriented [ ] Mood & affect appropriate  Skin: [ ]  rashes [ ] ecchymoses [ ] cyanosis NP Progress Note     [facility] -- Rogelio Robb, Flor, Sandi, Rick Holloway Savella  Office # [phone]      Follow-up DVT    HPI:  78 yo M PMH Afib (on coumadin), CVA (2012, 2016), hx of DVT s/p IVC filter 20 years ago, Parkinson's disease, dementia, HTN, urinary retention, and multiple TIAs presents for LLE swelling noted last night.  Patient is poor historian and non-verbal at baseline, follows some commands, wife at bedside to provide information.  States that last night she noted patient had swollen LLE, from toes to knee associated with some purple discoloration and felt tight.  Patient had DVT in LLE in the past and wife was aware of symptoms, was going to have doppler done today but this morning noted swelling and discoloration to be worse and brought him to the ED.  Patient has not had any associated dyspnea, difficulty breathing, but wife states that his temperature has been increased for a few days past week (usually runs around 97F but was at 99F 2 days last week) and noted to be flushed.  Patient otherwise baseline status.      Of note, patient was admitted on 5/7/19 for syncope likely 2/2 hematoma of R iliopsoas muscle and R trace free retroperitoneal hemorrhage at which time patient's AC was held (was on coumadin).  Patient went home and was receiving PT, however wife noted that patient was having increased immobility and therefore started to increase his entacapone doses after discussing with PMD.      In the ED, T 97.9, HR 96, /87 --> 131/65, RR 18, SpO2 96%.  Labs showed H/H 11.4/33.8 (baseline H around 11.5).  PT/INR 15.2/1.33.  Na 148, K 2.8.  In the ED, patient received sinemet x1, comtan x1, lopressor 25mg PO x1, KCl powder 40mEq x1, 1L bolus NS.    EKG:  Afib with RVR  Chest Xray: no active pulmonary disease.  US doppler b/l LE: Deep venous thrombosis left common femoral vein including greater saphenous common femoral junction, extending to the left femoral vein.  CT abd/ pelvis: Interval decrease in size in the right iliopsoas intramuscular hematoma.  No localized retroperitoneal hematoma noted. (15 Prashanth 2019 19:27)        Subjective/Observations: Pt. seen and examined and evaluated. Pt. resting comfortably in bed in NAD, with no respiratory distress, no chest pain, dyspnea, palpitations, PND, or orthopnea. 1:1 at bedside       REVIEW OF SYSTEMS: All other review of systems is negative unless indicated above    PAST MEDICAL & SURGICAL HISTORY:  Hematoma of right iliopsoas muscle, sequela  Syncope  Parkinson disease  Constipation  Dementia  Urinary retention  Cerebrovascular accident  Hypertension  Atrial fibrillation  No significant past surgical history      MEDICATIONS  (STANDING):  ALPRAZolam 0.25 milliGRAM(s) Oral <User Schedule>  apixaban 2.5 milliGRAM(s) Oral every 12 hours  carbidopa/levodopa  25/100 2 Tablet(s) Oral <User Schedule>  dextrose 5%. 1000 milliLiter(s) (50 mL/Hr) IV Continuous <Continuous>  docusate sodium 300 milliGRAM(s) Oral <User Schedule>  entacapone 200 milliGRAM(s) Oral <User Schedule>  entacapone 100 milliGRAM(s) Oral <User Schedule>  finasteride 5 milliGRAM(s) Oral <User Schedule>  metoprolol tartrate 25 milliGRAM(s) Oral <User Schedule>  potassium acid phosphate/sodium acid phosphate tablet (K-PHOS No. 2) 1 Tablet(s) Oral four times a day with meals  senna 3 Tablet(s) Oral <User Schedule>  sertraline 50 milliGRAM(s) Oral <User Schedule>  tamsulosin 0.4 milliGRAM(s) Oral <User Schedule>    MEDICATIONS  (PRN):      Allergies: No Known Allergies      Vital Signs Last 24 Hrs  T(C): 36.6 (19 Jun 2019 05:16), Max: 36.6 (19 Jun 2019 05:16)  T(F): 97.9 (19 Jun 2019 05:16), Max: 97.9 (19 Jun 2019 05:16)  HR: 94 (19 Jun 2019 05:16) (92 - 98)  BP: 128/92 (19 Jun 2019 05:16) (104/66 - 128/92)  BP(mean): --  RR: 17 (19 Jun 2019 05:16) (16 - 18)  SpO2: 99% (19 Jun 2019 05:16) (94% - 99%)    I&O's Summary    17 Jun 2019 07:01  -  18 Jun 2019 07:00  --------------------------------------------------------  IN: 600 mL / OUT: 0 mL / NET: 600 mL    18 Jun 2019 07:01  -  19 Jun 2019 06:40  --------------------------------------------------------  IN: 480 mL / OUT: 600 mL / NET: -120 mL       LABS: All Labs Reviewed:                        Echo:    < from: TTE Echo Doppler w/o Cont (05.08.19 @ 09:47) >  OBSERVATIONS:    Mitral Valve: Thickened leaflets, mild MR.  Aortic Valve/Aorta: Calcified trileaflet aortic valve with decreased   opening. Mild aortic stenosis, peak aortic valve gradient is 15.2 mmHg   with a mean gradient of 8.2 mmHg. Aortic valve area calculated by   continuity equation is 1.23 sq cm  Tricuspid Valve: normal with trace TR.  Pulmonic Valve: normal  Left Atrium: Dilated  Right Atrium: normal  Left Ventricle: normal LV size and systolic function, estimated LVEF of   65%.  Right Ventricle: normalsize and systolic function.  Pericardium/Pleura: normal, no significant pericardial effusion.      Conclusion:     Normal left ventricular internal dimensions and systolic function,   estimated LVEF of 65%.    Calcified trileaflet aortic valve with decreased opening. Mild aortic   stenosis, peak aortic valve gradient is 15.2 mmHg with a mean gradient of   8.2 mmHg. Aortic valve area calculated by continuity equation is 1.23 sq   cm  Mitral valve with thickened leaflets, mild mitral regurgitation  Normal RV size and systolic function.   Trace physiologic TR.      No significant pericardial effusion.            Interpretation of Telemetry: Overnight on telemetry afib 90's       Physical Exam:  Appearance: [ ] Normal  [ ] abnormal [X ] NAD   Eyes: [ ] PERRL [ ] EOMI  HEENT: [ ] Normal [ ] Abnormal oral mucosa [ ]NC/AT  Cardiovascular: [X ] S1 [X ] S2 [ ] RRR [ ] m/r/g [ ]edema [ ] JVP  Procedural Access Site: [ ]  hematoma [ ] tender to palpation [ ] 2+ pulse [ ] bruit [ ] Ecchymosis  Respiratory: [X ] Clear to auscultation bilaterally  Gastrointestinal: [ ] Soft [ ] tenderness[ ] distension [ ] BS  Musculoskeletal: [ ] clubbing [ ] joint deformity   Neurologic: [ ] Non-focal  Lymphatic: [ ] lymphadenopathy  Psychiatry: [ ] AAOx3  [X ] confused [ ] disoriented [ ] Mood & affect appropriate  Skin: [ ]  rashes [ ] ecchymoses [ ] cyanosis

## 2019-06-19 NOTE — PROGRESS NOTE ADULT - ATTENDING COMMENTS
The patient was personally seen and examined, in addition to being examined and evaluated by NP.  All elements of the note were edited where appropriate.
Chart reviewed    Patient seen and examined    Agree with plans outlined
pt seen and examine see above plan -  hypo phosphatemia replaced - fu repeat phos level. physical decondition sec to parkinson's dis wife refused [facility] / want home pt in am  dc plan .

## 2019-06-19 NOTE — DISCHARGE NOTE NURSING/CASE MANAGEMENT/SOCIAL WORK - [email or web address]_GEN_ALL_CORE
You can access the [email or web address], offered by [facility], by registering with the following website: [email or web address]

## 2019-06-20 ENCOUNTER — APPOINTMENT (OUTPATIENT)
Dept: CARDIOLOGY | Facility: CLINIC | Age: 79
End: 2019-06-20

## 2019-06-21 ENCOUNTER — APPOINTMENT (OUTPATIENT)
Dept: HOME HEALTH SERVICES | Facility: HOME HEALTH | Age: 79
End: 2019-06-21
Payer: MEDICARE

## 2019-06-21 VITALS
HEART RATE: 68 BPM | DIASTOLIC BLOOD PRESSURE: 60 MMHG | TEMPERATURE: 97.6 F | RESPIRATION RATE: 14 BRPM | SYSTOLIC BLOOD PRESSURE: 102 MMHG

## 2019-06-21 DIAGNOSIS — S70.10XS: ICD-10-CM

## 2019-06-21 PROCEDURE — 99497 ADVNCD CARE PLAN 30 MIN: CPT

## 2019-06-21 PROCEDURE — 99350 HOME/RES VST EST HIGH MDM 60: CPT | Mod: 25

## 2019-06-21 RX ORDER — CARBIDOPA AND LEVODOPA 25; 100 MG/1; MG/1
25-100 TABLET ORAL
Qty: 300 | Refills: 3 | Status: ACTIVE | COMMUNITY
Start: 2018-11-30

## 2019-06-21 RX ORDER — ALPRAZOLAM 0.25 MG/1
0.25 TABLET ORAL
Qty: 90 | Refills: 0 | Status: ACTIVE | COMMUNITY
Start: 2019-05-29

## 2019-06-21 RX ORDER — TAMSULOSIN HYDROCHLORIDE 0.4 MG/1
0.4 CAPSULE ORAL
Qty: 90 | Refills: 3 | Status: ACTIVE | COMMUNITY
Start: 2018-11-30

## 2019-06-21 RX ORDER — APIXABAN 2.5 MG/1
2.5 TABLET, FILM COATED ORAL
Qty: 60 | Refills: 0 | Status: ACTIVE | COMMUNITY
Start: 2019-06-18

## 2019-06-21 RX ORDER — EZETIMIBE 10 MG/1
10 TABLET ORAL
Qty: 90 | Refills: 3 | Status: ACTIVE | COMMUNITY
Start: 2018-11-30

## 2019-06-21 RX ORDER — AMPICILLIN TRIHYDRATE 500 MG
450 CAPSULE ORAL
Refills: 0 | Status: ACTIVE | COMMUNITY
Start: 2018-12-01

## 2019-06-21 RX ORDER — METOPROLOL TARTRATE 25 MG/1
25 TABLET, FILM COATED ORAL
Qty: 90 | Refills: 3 | Status: ACTIVE | COMMUNITY
Start: 2018-11-30

## 2019-06-21 RX ORDER — SENNOSIDES 8.6 MG TABLETS 8.6 MG/1
8.6 TABLET ORAL
Refills: 2 | Status: ACTIVE | COMMUNITY
Start: 2019-01-25

## 2019-06-21 RX ORDER — FINASTERIDE 5 MG/1
5 TABLET, FILM COATED ORAL DAILY
Qty: 90 | Refills: 3 | Status: ACTIVE | COMMUNITY
Start: 2018-11-30

## 2019-06-21 RX ORDER — ENTACAPONE 200 MG/1
200 TABLET, FILM COATED ORAL 4 TIMES DAILY
Qty: 360 | Refills: 0 | Status: ACTIVE | COMMUNITY
Start: 2019-02-19

## 2019-06-21 RX ORDER — SERTRALINE HYDROCHLORIDE 50 MG/1
50 TABLET, FILM COATED ORAL DAILY
Qty: 90 | Refills: 3 | Status: ACTIVE | COMMUNITY
Start: 2018-11-30

## 2019-06-21 RX ORDER — PANTOPRAZOLE SODIUM 40 MG/1
40 GRANULE, DELAYED RELEASE ORAL
Qty: 3 | Refills: 3 | Status: ACTIVE | COMMUNITY
Start: 2019-05-29

## 2019-06-21 RX ORDER — D-MANNOSE 99 %
POWDER (GRAM) MISCELLANEOUS
Refills: 11 | Status: ACTIVE | COMMUNITY
Start: 2019-05-24

## 2019-06-25 ENCOUNTER — APPOINTMENT (OUTPATIENT)
Dept: CARDIOLOGY | Facility: CLINIC | Age: 79
End: 2019-06-25

## 2019-06-26 PROBLEM — S70.10XS: Status: ACTIVE | Noted: 2019-05-24

## 2019-06-26 NOTE — ASSESSMENT
[FreeTextEntry1] : Spoke with wifer during visit about pt general decline, most likely related to dementia and Parkinsons disease- also discussed hospice, wife would like pt to have physical therapy in Tuesday to see how he responds and then make decision

## 2019-06-26 NOTE — REVIEW OF SYSTEMS
[Negative] : Heme/Lymph [Constipation] : no constipation [FreeTextEntry2] : as noted in HPI [FreeTextEntry5] : as noted in HPI [FreeTextEntry8] : as noted in HPI

## 2019-06-26 NOTE — HISTORY OF PRESENT ILLNESS
[Spouse] : spouse [FreeTextEntry1] : Dementia  [FreeTextEntry2] : PMH: Parkinson's Disease, Dementia, Anxiety,  Afib (on Coumadin), HTN, HLD, Aortic Ectasia, Coronary Artery Calcification, BPH, Chronic UTI's, Kidney Stones, Dysphagia, Vasovagal Syncope, S/P CVA x2, S/P DVT with [place] Filter in place, \par \par Interval events: admitted to [place] from 6/15 - 6/19 for LLE started on Eliquis\par \par Pt sitting in wheelchair for visit - wife reports pt has syncopal episode yesterday with vomiting x2 and  did not urinate at all yesterday; had wet diaper this am; also reports pt combative in hospital\par \par Iliopsoas Hematoma- from last hospitalization in May- Coumadin was stopped at that time\par \par Vascular Dementia- s/p CVA 2012 & 2016, no longer on Seroquel or risperdal \par \par BPH- on finasteride, tamsulosin- has chronic UTI's, still using  D-Mannose\par \par Afib/DVT- off  Coumadin as above- has IVC filter in place\par \par HTN/HLD/CAD- controlled with metoprolol which was increased back to full tab in hospital, on Zetia \par \par Parkinsons- on Sinemet,entacapone being increased- managed by neurology\par \par Vasovagal syncope- as above had five day EEG in March 2018 in [facility] [place] which did not reveal any seizure activity\par \par Dysphagia- on regular consistency food with nectar thickened liquids- has had no recent episodes of aspiration per wife, \par \par \par \par \par \par \par

## 2019-06-26 NOTE — PHYSICAL EXAM
[No Acute Distress] : no acute distress [Well Nourished] : well nourished [Well Developed] : well developed [Normal Sclera/Conjunctiva] : normal sclera/conjunctiva [PERRL] : pupils equal, round and reactive to light [Normal Outer Ear/Nose] : the ears and nose were normal in appearance [Normal Oropharynx] : the oropharynx was normal [No JVD] : no jugular venous distention [Supple] : the neck was supple [No LAD] : no lymphadenopathy [No Respiratory Distress] : no respiratory distress [Clear to Auscultation] : lungs were clear to auscultation bilaterally [No Accessory Muscle Use] : no accessory muscle use [Normal Rate] : heart rate was normal  [Regular Rhythm] : with a regular rhythm [Normal S1, S2] : normal S1 and S2 [No Murmurs] : no murmurs heard [No Edema] : there was no peripheral edema [Normal Bowel Sounds] : normal bowel sounds [Non Tender] : non-tender [Soft] : abdomen soft [Not Distended] : not distended [No Hernias] : no hernia was discovered [Normal Post Cervical Nodes] : no posterior cervical lymphadenopathy [Normal Anterior Cervical Nodes] : no anterior cervical lymphadenopathy [No Joint Swelling] : no joint swelling seen [No Rash] : no rash [No Clubbing, Cyanosis] : no clubbing  or cyanosis of the fingernails [No Skin Lesions] : no skin lesions [Kyphosis] : no kyphosis present [Scoliosis] : scoliosis not present [de-identified] : frail, non- verbal, holding head up today [de-identified] : l [de-identified] : unable to assess secondary to dementia [de-identified] : alert to name,

## 2019-07-01 ENCOUNTER — CLINICAL ADVICE (OUTPATIENT)
Age: 79
End: 2019-07-01

## 2019-07-01 DIAGNOSIS — E87.6 HYPOKALEMIA: ICD-10-CM

## 2019-07-01 LAB
ALBUMIN SERPL ELPH-MCNC: 3.2 G/DL
ANION GAP SERPL CALC-SCNC: 10 MMOL/L
BASOPHILS # BLD AUTO: 0.01 K/UL
BASOPHILS NFR BLD AUTO: 0.2 %
BUN SERPL-MCNC: 13 MG/DL
CALCIUM SERPL-MCNC: 9.4 MG/DL
CHLORIDE SERPL-SCNC: 112 MMOL/L
CO2 SERPL-SCNC: 24 MMOL/L
CREAT SERPL-MCNC: 0.79 MG/DL
EOSINOPHIL # BLD AUTO: 0.04 K/UL
EOSINOPHIL NFR BLD AUTO: 0.9 %
GLUCOSE SERPL-MCNC: 80 MG/DL
HCT VFR BLD CALC: 31 %
HGB BLD-MCNC: 9.8 G/DL
IMM GRANULOCYTES NFR BLD AUTO: 0.2 %
LYMPHOCYTES # BLD AUTO: 1.09 K/UL
LYMPHOCYTES NFR BLD AUTO: 25.1 %
MAN DIFF?: NORMAL
MCHC RBC-ENTMCNC: 31.6 GM/DL
MCHC RBC-ENTMCNC: 31.7 PG
MCV RBC AUTO: 100.3 FL
MONOCYTES # BLD AUTO: 0.44 K/UL
MONOCYTES NFR BLD AUTO: 10.1 %
NEUTROPHILS # BLD AUTO: 2.75 K/UL
NEUTROPHILS NFR BLD AUTO: 63.5 %
PHOSPHATE SERPL-MCNC: 2.9 MG/DL
PLATELET # BLD AUTO: 166 K/UL
POTASSIUM SERPL-SCNC: 3.2 MMOL/L
RBC # BLD: 3.09 M/UL
RBC # FLD: 14.8 %
SODIUM SERPL-SCNC: 146 MMOL/L
WBC # FLD AUTO: 4.34 K/UL

## 2019-07-01 RX ORDER — POTASSIUM CHLORIDE 750 MG/1
10 TABLET, FILM COATED, EXTENDED RELEASE ORAL TWICE DAILY
Qty: 60 | Refills: 3 | Status: ACTIVE | COMMUNITY
Start: 2019-07-01 | End: 1900-01-01

## 2019-07-05 LAB
ALBUMIN SERPL ELPH-MCNC: 3.6 G/DL
ANION GAP SERPL CALC-SCNC: 16 MMOL/L
BASOPHILS # BLD AUTO: 0.03 K/UL
BASOPHILS NFR BLD AUTO: 0.5 %
BUN SERPL-MCNC: 14 MG/DL
CALCIUM SERPL-MCNC: 10.1 MG/DL
CHLORIDE SERPL-SCNC: 110 MMOL/L
CO2 SERPL-SCNC: 22 MMOL/L
CREAT SERPL-MCNC: 0.92 MG/DL
EOSINOPHIL # BLD AUTO: 0.04 K/UL
EOSINOPHIL NFR BLD AUTO: 0.6 %
GLUCOSE SERPL-MCNC: 66 MG/DL
HCT VFR BLD CALC: 37.3 %
HGB BLD-MCNC: 11.8 G/DL
IMM GRANULOCYTES NFR BLD AUTO: 0.2 %
LYMPHOCYTES # BLD AUTO: 1.53 K/UL
LYMPHOCYTES NFR BLD AUTO: 24.8 %
MAN DIFF?: NORMAL
MCHC RBC-ENTMCNC: 31.6 GM/DL
MCHC RBC-ENTMCNC: 31.6 PG
MCV RBC AUTO: 99.7 FL
MONOCYTES # BLD AUTO: 0.47 K/UL
MONOCYTES NFR BLD AUTO: 7.6 %
NEUTROPHILS # BLD AUTO: 4.08 K/UL
NEUTROPHILS NFR BLD AUTO: 66.3 %
PHOSPHATE SERPL-MCNC: 3.1 MG/DL
PLATELET # BLD AUTO: 227 K/UL
POTASSIUM SERPL-SCNC: 3.1 MMOL/L
RBC # BLD: 3.74 M/UL
RBC # FLD: 14.5 %
SODIUM SERPL-SCNC: 148 MMOL/L
WBC # FLD AUTO: 6.16 K/UL

## 2019-07-19 ENCOUNTER — APPOINTMENT (OUTPATIENT)
Dept: HOME HEALTH SERVICES | Facility: HOME HEALTH | Age: 79
End: 2019-07-19
Payer: MEDICARE

## 2019-07-19 VITALS — SYSTOLIC BLOOD PRESSURE: 104 MMHG | HEART RATE: 64 BPM | TEMPERATURE: 95.3 F | DIASTOLIC BLOOD PRESSURE: 50 MMHG

## 2019-07-19 DIAGNOSIS — Z51.5 ENCOUNTER FOR PALLIATIVE CARE: ICD-10-CM

## 2019-07-19 DIAGNOSIS — Z95.828 PRESENCE OF OTHER VASCULAR IMPLANTS AND GRAFTS: ICD-10-CM

## 2019-07-19 DIAGNOSIS — F01.50 VASCULAR DEMENTIA W/OUT BEHAVIORAL DISTURBANCE: ICD-10-CM

## 2019-07-19 DIAGNOSIS — I70.0 ATHEROSCLEROSIS OF AORTA: ICD-10-CM

## 2019-07-19 DIAGNOSIS — I82.402 ACUTE EMBOLISM AND THROMBOSIS OF UNSPECIFIED DEEP VEINS OF LEFT LOWER EXTREMITY: ICD-10-CM

## 2019-07-19 DIAGNOSIS — G20 PARKINSON'S DISEASE: ICD-10-CM

## 2019-07-19 DIAGNOSIS — R32 UNSPECIFIED URINARY INCONTINENCE: ICD-10-CM

## 2019-07-19 DIAGNOSIS — R15.9 UNSPECIFIED URINARY INCONTINENCE: ICD-10-CM

## 2019-07-19 PROCEDURE — 99350 HOME/RES VST EST HIGH MDM 60: CPT | Mod: GV

## 2019-07-25 PROBLEM — G20 PARKINSON DISEASE: Status: ACTIVE | Noted: 2018-11-30

## 2019-07-25 PROBLEM — F01.50 VASCULAR DEMENTIA WITHOUT BEHAVIORAL DISTURBANCE: Status: ACTIVE | Noted: 2018-11-30

## 2019-07-25 PROBLEM — I82.402 ACUTE DEEP VEIN THROMBOSIS (DVT) OF LEFT LOWER EXTREMITY, UNSPECIFIED VEIN: Status: ACTIVE | Noted: 2019-06-16

## 2019-07-25 PROBLEM — Z95.828 GREENFIELD FILTER IN PLACE: Status: ACTIVE | Noted: 2018-11-30

## 2019-07-25 PROBLEM — I70.0 AORTIC ATHEROSCLEROSIS: Status: ACTIVE | Noted: 2019-06-21

## 2019-07-25 PROBLEM — R32 BOWEL AND BLADDER INCONTINENCE: Status: ACTIVE | Noted: 2018-12-12

## 2019-07-25 NOTE — HISTORY OF PRESENT ILLNESS
[Spouse] : spouse [FreeTextEntry1] : Dementia  [FreeTextEntry2] : PMH: Parkinson's Disease, Dementia, Anxiety,  Afib (on Coumadin), HTN, HLD, Aortic Ectasia, Coronary Artery Calcification, BPH, Chronic UTI's, Kidney Stones, Dysphagia, Vasovagal Syncope, S/P CVA x2, S/P DVT with [place] Filter in place, \par \par Interval events: now on hospice for senile degeneration of the brain \par \par Pt quiet today sitting in chair for visit appears even thinner then previous visits - wife reports has been quiet- to start PT today \par Appetite declining as above\par Incontinent of bowel and bladder- doing well on senna and colace\par WIfe denies recent fever, chills, SOB\par \par Vascular Dementia- s/p CVA 2012 & 2016, no longer on Seroquel or risperdal \par \par BPH- on finasteride, tamsulosin- has chronic UTI's as above, still using  D-Mannose\par \par Afib/DVT-  now on Eliquis since new DVT in June; has IVC filter in place\par \par HTN/HLD/CAD- has been hypotensive in past -  controlled with metoprolol 1/2 tab, on Zetia \par \par Parkinsons- on Sinemet,entacapone \par \par Vasovagal syncope- no recent episodes- had five day EEG in March 2018 in [facility] which did not reveal any seizure activity\par \par Dysphagia- on regular consistency food with nectar thickened liquids- has had no recent episodes of aspiration per wife, \par \par \par \par \par \par \par

## 2019-07-25 NOTE — COUNSELING
[Underweight - ( BMI  <18.5 )] : underweight - ( BMI  <18.5 ) [Continue diet as tolerated] : continue diet as tolerated based on goals of care [Use assistive device to avoid falls] : use assistive device to avoid falls [Remove clutter and unsafe carpeting to avoid falls] : remove clutter and unsafe carpeting to avoid falls [] : diabetic screening [Decrease hospital use] : decrease hospital use [Minimize unnecessary interventions] : minimize unnecessary interventions [Maintain functional ability] : maintain functional ability [Discussed disease trajectory with patient/caregiver] : discussed disease trajectory with patient/caregiver [Likely to achieve goals/desired outcomes] : likely to achieve goals/desired outcomes [Patient/Caregiver has ___ understanding of disease process] : patient/caregiver has [unfilled] understanding of disease process [Completed Healthcare Proxy] : completed healthcare proxy [Completed DNR] : completed DNR [Completed Medical Orders for Life-Sustaining Treatment] : completed medical orders for life-sustaining treatment [DNR] : Code Status: DNR [Limited] : Treatment Guidelines: Limited [DNI] : Intubation: DNI [[facility]: _____] [facility] [unfilled] [_____] : HCP: [unfilled]

## 2019-07-25 NOTE — CHRONIC CARE ASSESSMENT
[PPS Score: ____] : Palliative Performance Scale (PPS) Score: [unfilled] [FAST Score: ____] : Functional Assessment Scale (FAST) Score: [unfilled] no

## 2019-07-25 NOTE — PHYSICAL EXAM
[No Acute Distress] : no acute distress [Well Nourished] : well nourished [Well Developed] : well developed [Normal Sclera/Conjunctiva] : normal sclera/conjunctiva [PERRL] : pupils equal, round and reactive to light [Normal Outer Ear/Nose] : the ears and nose were normal in appearance [Normal Oropharynx] : the oropharynx was normal [No JVD] : no jugular venous distention [Supple] : the neck was supple [No LAD] : no lymphadenopathy [No Respiratory Distress] : no respiratory distress [Clear to Auscultation] : lungs were clear to auscultation bilaterally [No Accessory Muscle Use] : no accessory muscle use [Normal Rate] : heart rate was normal  [Regular Rhythm] : with a regular rhythm [Normal S1, S2] : normal S1 and S2 [No Murmurs] : no murmurs heard [No Edema] : there was no peripheral edema [Normal Bowel Sounds] : normal bowel sounds [Non Tender] : non-tender [Soft] : abdomen soft [Not Distended] : not distended [No Hernias] : no hernia was discovered [Normal Post Cervical Nodes] : no posterior cervical lymphadenopathy [Normal Anterior Cervical Nodes] : no anterior cervical lymphadenopathy [No Joint Swelling] : no joint swelling seen [No Clubbing, Cyanosis] : no clubbing  or cyanosis of the fingernails [No Rash] : no rash [No Skin Lesions] : no skin lesions [Kyphosis] : no kyphosis present [Scoliosis] : scoliosis not present [de-identified] : frail, thinner then previous visits, able to answer simple yes/no questions [de-identified] : unable to assess secondary to dementia [de-identified] : alert to name,

## 2019-12-16 NOTE — H&P ADULT. - PROBLEM SELECTOR PROBLEM 1
Total Joint Surgery Preoperative Chart Review      Patient ID:  Israel Cancino  [id number]  76 y.o.  1952  Surgeon: Dr. Griffith Spearing  Date of Surgery: 12/17/2019  Procedure: Total Left Knee Arthroplasty  Primary Care Physician: Nasim Lacy [phone]  Specialty Physician(s):      Subjective:   Israel Cancino is a 75 y.o. WHITE OR  male who presents for preoperative evaluation for Total Left Knee arthroplasty. This is a preoperative chart review note based on data collected by the nurse at the surgical Pre-Assessment visit. Past Medical History:   Diagnosis Date    Arthritis     osteo    BMI 33.0-33.9,adult     CAD (coronary artery disease)     minimal nonobstructive CAD  cath 8/06 at [facility]; followed by [facility]     Chronic pain     left knee    COPD     rescue inhaler (uses about 2-3 times per month); nebulizer daily; last hospitalized 02/2018; [facility])     pre-diabetes; A1C-6.2 on 12/02/19    Dyslipidemia     controlled with med    Former smoker     Heart failure ([address]     dilated cardiomyopathy, nonischemic.      History of echocardiogram 12/14/2018    EF=50-54%, focal aortic valve calcification, mild tricuspid valve regurgitation     Hypertension     controlled with meds    Lung cancer ([address]     treated with radiation     N&V (nausea and vomiting) 04/23/2015    denies (noted 12/02/19)    Unspecified sleep apnea     uses c pap- to bring DOS      Past Surgical History:   Procedure Laterality Date    CARDIAC SURG PROCEDURE UNLIST  2006    at [facility] (told normal per pt)    HX CATARACT REMOVAL Left 2015    HX COLONOSCOPY  2017    HX KNEE ARTHROSCOPY Left 2014    x 2    HX KNEE REPLACEMENT Left 04/21/2015    HX ORTHOPAEDIC Left 1975    ankle    HX RETINAL DETACHMENT REPAIR Left     HX SHOULDER ARTHROSCOPY Left 2004    HX SHOULDER ARTHROSCOPY Right 2010     Family History   Problem Relation Age of Onset    Heart Disease Maternal Uncle     Heart Disease Paternal Uncle     Heart Disease Maternal Grandfather     Heart Disease Paternal Grandfather     Diabetes Mother     Cancer Father     Hypertension Father     Diabetes Maternal Grandmother       Social History     Tobacco Use    Smoking status: Former Smoker     Packs/day: 1.00     Years: 48.00     Pack years: 48.00     Last attempt to quit: 2017     Years since quittin.9    Smokeless tobacco: Never Used   Substance Use Topics    Alcohol use: Yes     Comment: occasional       Prior to Admission medications    Medication Sig Start Date End Date Taking? Authorizing Provider   losartan (COZAAR) 100 mg tablet Take 100 mg by mouth daily. Indications: high blood pressure   Yes Provider, Historical   atorvastatin (LIPITOR) 40 mg tablet Take 40 mg by mouth nightly. Indications: high cholesterol   Yes Provider, Historical   aspirin delayed-release 81 mg tablet Take 81 mg by mouth daily. Take / use AM day of surgery  per anesthesia protocols. Yes Provider, Historical   revefenacin (YUPELRI) 175 mcg/3 mL nebu nebulizer solution 175 mcg by Nebulization route daily (after lunch). Indications: a chronic lung disease where the airways become partially blocked with mucus called COPD   Yes Provider, Historical   budesonide (PULMICORT) 0.5 mg/2 mL nbsp 500 mcg by Nebulization route every twelve (12) hours. Take / use AM day of surgery  per anesthesia protocols. Indications: worsening of debilitating chronic lung disease called COPD   Yes Provider, Historical   formoterol (PERFOROMIST) 20 mcg/2 mL nebu neb solution 20 mcg by Nebulization route every twelve (12) hours. Take / use AM day of surgery  per anesthesia protocols. Yes Provider, Historical   multivitamin (ONE A DAY) tablet Take 1 Tab by mouth daily.    Yes Provider, Historical   albuterol (PROVENTIL HFA, VENTOLIN HFA) 90 mcg/actuation inhaler Take 2 Puffs by inhalation every four (4) hours as needed for Wheezing or Shortness of Breath. Take if needed DOS per anesthesia protocol. Bring DOS  Indications: CHRONIC OBSTRUCTIVE PULMONARY DISEASE   Yes Provider, Historical   carvedilol (COREG) 3.125 mg tablet Take 3.125 mg by mouth every twelve (12) hours. Take / use AM day of surgery  per anesthesia protocols. Yes Provider, Historical     Allergies   Allergen Reactions    Latex Rash    Adhesive Other (comments)     Dressing on shoulder, following surgery 2010, caused redness    Lortab [Hydrocodone-Acetaminophen] Nausea and Vomiting and Other (comments)     hallucinations    Oxycontin [Oxycodone] Nausea and Vomiting and Other (comments)     hallucinations          Objective:     Physical Exam:   No data found. ECG:    EKG Results     Procedure 720 Value Units Date/Time    EKG, 12 LEAD, INITIAL [[id number]] Collected:  12/02/19 1436    Order Status:  Completed Updated:  12/02/19 2135     Ventricular Rate 45 BPM      Atrial Rate 45 BPM      P-R Interval 136 ms      QRS Duration 100 ms      Q-T Interval 456 ms      QTC Calculation (Bezet) 394 ms      Calculated P Axis 76 degrees      Calculated R Axis 72 degrees      Calculated T Axis 74 degrees      Diagnosis --     Sinus bradycardia  Cannot rule out Anterior infarct , age undetermined  Abnormal ECG  When compared with ECG of 23-APR-2015 08:55,  Premature supraventricular complexes are no longer Present  Vent. rate has decreased BY  30 BPM  QT has shortened  Confirmed by ST HELGA NGUYEN MD (), RAPHAEL NO ([id number]) on 12/2/2019 9:35:36 PM            Data Review:   Labs:   Results for Radha Oviedo (MRN [id number]) as of 12/16/2019 10:46   Ref.  Range 12/2/2019 12:40   Sodium Latest Ref Range: 136 - 145 mmol/L 139   Potassium Latest Ref Range: 3.5 - 5.1 mmol/L 4.4   Chloride Latest Ref Range: 98 - 107 mmol/L 107   CO2 Latest Ref Range: 21 - 32 mmol/L 28   Anion gap Latest Ref Range: 7 - 16 mmol/L 4 (L)   Glucose Latest Ref Range: 65 - 100 mg/dL 101 (H)   BUN Latest Ref Range: 8 - 23 MG/DL 13   Creatinine Latest Ref Range: 0.8 - 1.5 MG/DL 1.11   Calcium Latest Ref Range: 8.3 - 10.4 MG/DL 9.4   GFR est non-AA Latest Ref Range: >60 ml/min/1.73m2 >60   GFR est AA Latest Ref Range: >60 ml/min/1.73m2 >60   Hemoglobin A1c, (calculated) Latest Units: % 6.2   Est. average glucose Latest Units: mg/dL 131   C-Reactive protein Latest Ref Range: 0.0 - 0.9 mg/dL 1.0 (H)         Problem List:  )  Patient Active Problem List   Diagnosis Code    CAD (coronary artery disease) I25.10    Dyslipidemia E78.5    Hypertension I10    Unspecified sleep apnea G47.30    COPD [facility]chronic obstructive pulmonary disease) [facility] J44.9    Arthritis M19.90    Diabetes ([facility] E11.9    Osteoarthritis M19.90    S/P total knee arthroplasty Z96.659    N&V (nausea and vomiting) R11.2    Epigastric abdominal pain R10.13       Total Joint Surgery Pre-Assessment Recommendations:           Patient is to wear home CPAP during hospitalization. Albuterol BID during hospitalization.    Signed By: Kiki Mondragon, NP-C    December 16, 2019 Sepsis, due to unspecified organism

## 2020-01-01 NOTE — PROGRESS NOTE ADULT - PROBLEM/PLAN-5
DISPLAY PLAN FREE TEXT
DISPLAY PLAN FREE TEXT
Principal Discharge DX:	Liveborn infant, of melendez pregnancy, born in hospital by vaginal delivery

## 2020-04-02 PROBLEM — Z51.5 HOSPICE CARE PATIENT: Status: ACTIVE | Noted: 2019-07-25

## 2020-06-02 NOTE — DIETITIAN INITIAL EVALUATION ADULT. - PERTINENT LABORATORY DATA
Na 157, cl 123 Advancement Flap (Single) Text: The defect edges were debeveled with a #15 scalpel blade.  Given the location of the defect and the proximity to free margins a single advancement flap was deemed most appropriate.  Using a sterile surgical marker, an appropriate advancement flap was drawn incorporating the defect and placing the expected incisions within the relaxed skin tension lines where possible.    The area thus outlined was incised deep to adipose tissue with a #15 scalpel blade.  The skin margins were undermined to an appropriate distance in all directions utilizing iris scissors.

## 2020-09-27 NOTE — ED PROVIDER NOTE - EYES, MLM
Pt seen and examined.   No acute events.    Exam:  AVSS. NAD.   Right posterior leg incision c/d/i.    Drain 70mL/24 hours   Flap and skin grafts intact and healthy.   No evidence of infection.    Clear bilaterally, pupils equal, round and reactive to light.

## 2020-12-19 NOTE — ED ADULT NURSE NOTE - ED CARDIAC RHYTHM
Pt. Is over due for appt. And labs - pleas have pt.  Schedule - approved meds b/c of holidays irregular/afib

## 2021-02-18 NOTE — PATIENT PROFILE ADULT. - ASSIST WITH
[facility] Susan Carrasco MD  2/18/2021   standing/walking/toileting

## 2021-07-07 NOTE — DISCHARGE NOTE PROVIDER - CARE PROVIDER_API CALL
Addended by: MORTEZA PENA on: 7/7/2021 02:18 PM     Modules accepted: Orders     Lisa Trimble)  Internal Medicine  [address] [place] [address]  Phone: [phone]  Fax: [phone]  Follow Up Time:     Lucila Leo)  Cardiovascular Disease; Internal Medicine  [address] [place] [address]  Phone: [phone]  Fax: [phone]  Follow Up Time:     Carlo Nance)  Neurology  [address] [place] [address]  Phone: [phone]  Fax: [phone]  Follow Up Time:

## 2021-07-13 NOTE — ED ADULT NURSE NOTE - NS ED NURSE REPORT GIVEN TO FT
Patient is asking to speak with Francisca. Stating it is regarding her visit yesterday. Refuses to go into detail with me. AIDE Jacobson

## 2021-08-19 NOTE — ED ADULT TRIAGE NOTE - HEART RATE (BEATS/MIN)
Spoke with AlfrescoJULIETH ALEXIS PRIME-SPEC-FL   Was told teriparatide is under further review and no longer available.       117

## 2021-11-11 NOTE — H&P ADULT - NSHPREVIEWOFSYSTEMS_GEN_ALL_CORE
Problem: Mobility Impaired (Adult and Pediatric)  Goal: *Acute Goals and Plan of Care (Insert Text)  Description: FUNCTIONAL STATUS PRIOR TO ADMISSION: Patient was independent and active without use of DME.    HOME SUPPORT PRIOR TO ADMISSION: Patient normally lives in [place] with daughter. Does not require an assistive device, Independent with all ADL's, and was driving. Currently here in [place] visiting sister and friend. She is staying with friend    Physical Therapy Goals  Initiated 11/8/2021  1. Patient will move from supine to sit and sit to supine  in bed with minimal assistance/contact guard assist within 7 day(s). 2.  Patient will transfer from bed to chair and chair to bed with minimal assistance/contact guard assist using the least restrictive device within 7 day(s). 3.  Patient will perform sit to stand with minimal assistance/contact guard assist within 7 day(s). 4.  Patient will ambulate with minimal assistance/contact guard assist for 150 feet with the least restrictive device within 7 day(s). Outcome: Progressing Towards Goal   PHYSICAL THERAPY TREATMENT  Patient: Salabdor Burt (62 y.o. female)  Date: 11/11/2021  Diagnosis: Right sided weakness [R53.1]  CVA (cerebral vascular accident) [facility]) [I63.9]   Right sided weakness      Precautions: Fall  Chart, physical therapy assessment, plan of care and goals were reviewed. ASSESSMENT  Patient continues with skilled PT services and is progressing towards goals. Barriers to indep with functional mobility include R hemiparesis, impaired balance, decreased coordination, gait dysfunction, increased risk for fall. Pt received in chair, eager to mobilize. Required increased time to respond to questions at times with mild word finding difficulty. Pt tolerated standing trial with focus on lateral wt shift and small amplitude marching to increase motor control R LE.  Gait training initially performed without device due to pt c/o R UE pain; required mod A for balance and SBA/ CGA of another for safety. After seated rest, gait training performed with RW and min/ mod A x 1. Noted step-to pattern and decreased stance time R, however overall stability and safety improved with use of device. Pt demo good response to cues for looking up/ posture with amb. Provided light tactile cues to R quads to assist with motor control R knee in midstance. Pt remains below functional baseline with strong motivation to participate in therapy and excellent rehab potential. Recommend follow up IPR at d/c to facilitate pt return to max level of functional independence. Current Level of Function Impacting Discharge (mobility/balance): sit to stand min/ mod A, amb with RW min/ mod A    Other factors to consider for discharge: indep baseline, good family support         PLAN :  Patient continues to benefit from skilled intervention to address the above impairments. Continue treatment per established plan of care. to address goals. Recommendation for discharge: (in order for the patient to meet his/her long term goals)  Therapy 3 hours per day 5-7 days per week    This discharge recommendation:  Has been made in collaboration with the attending provider and/or case management    IF patient discharges home will need the following DME: to be determined (TBD)       SUBJECTIVE:   Patient stated I'm not a quitter, I want to get better.     OBJECTIVE DATA SUMMARY:   Critical Behavior:  Neurologic State: Alert  Orientation Level: Oriented X4  Cognition: Appropriate decision making, Appropriate safety awareness  Safety/Judgement: Awareness of environment  Functional Mobility Training:  Bed Mobility:      Not observed              Transfers:  Sit to Stand: Minimum assistance; Moderate assistance; Assist x2  Stand to Sit: Assist x2; Minimum assistance        Bed to Chair: Moderate assistance; Assist x1                    Balance:  Standing: Impaired;  Without support  Standing - Static: Fair  Standing - Dynamic : Fair  Ambulation/Gait Training:  Distance (ft): 20 Feet (ft) (also 12')  Assistive Device: Gait belt; Walker, rolling  Ambulation - Level of Assistance: Minimal assistance; Moderate assistance; Assist x2 (progressed during session, improved stability w/ use RW)        Gait Abnormalities: Decreased step clearance; Step to gait        Base of Support: Narrowed  Stance: Right decreased  Speed/Zhane: Slow  Step Length: Left shortened; Right shortened     Pain Rating:  Pt reports R UE pain, states concern for gout flare    Activity Tolerance:   Good    After treatment patient left in no apparent distress:   Sitting in chair, Heels elevated for pressure relief, and Call bell within reach    COMMUNICATION/COLLABORATION:   The patients plan of care was discussed with: Occupational therapist and Registered nurse.      Estephanie Ross, PT   Time Calculation: 29 mins Unable to obtain meaningful ROS secondary to dementia

## 2022-02-02 NOTE — ED PROVIDER NOTE - NSCAREINITIATED _GEN_ER
Please call mother to discuss lingering COVID symptoms that the patient is experiencing.    Angel Choudhary(Attending)

## 2022-02-09 NOTE — PATIENT PROFILE ADULT. - FUNCTIONAL SCREEN CURRENT LEVEL: TOILETING, MLM
Pap up to date  Continue with depo for the next year with RN  Update nonfasting labs  Check stds.  Work on weight loss.     (2) assistive person

## 2022-02-16 NOTE — PATIENT PROFILE ADULT - PATIENT REPRESENTATIVE: ( YOU CAN CHOOSE ANY PERSON THAT CAN ASSIST YOU WITH YOUR HEALTH CARE PREFERENCES, DOES NOT HAVE TO BE A SPOUSE, IMMEDIATE FAMILY OR SIGNIFICANT OTHER/PARTNER)
[facility]  [address]  22 [facility]  [phone] fax    Plan of Care/ Statement of Necessity for Physical Therapy Services    Patient name: Soha Dc Start of Care: 2022   Referral source: Sarabjit Cai MD : 1951    Medical Diagnosis: Other low back pain [M54.59]  Payor: [address] [place] [address] / Plan: [address] / Product Type: [employer] /  Onset Date:aggravated about several weeks ago    Treatment Diagnosis: LBP/SIJ pain, weakness   Prior Hospitalization: see medical history Provider#: [id number]   Medications: Verified on Patient summary List    Comorbidities: heart disease, arthritis, diabetes, pacemaker, HTN, latex allergy, strok, gall bladder surgery, Right heel surgery   Prior Level of Function: mod ind with ADLs, using WC since 2018      The Plan of Care and following information is based on the information from the initial evaluation. Assessment/ bassett information:  Ms. Oliver Shay is a 70 y/o, F pt with CC of LBP/Right SI joint region. She denies numbness/tingling but reports a lot of cramping with B hands/UEs. Pt has been WC bound since 2018 after a fall. Pt present with poor strength and flexibility of B LEs, worst with post chain and B ankle/feet. Also noted TTP along L3-5 and sacrum. Pt demonstrates independence with WC <-> transfer. She's unable to perform sit to stand from elevated bed. Pt would benefit from skilled PT to address these deficits above for pain free functional mobility. Evaluation Complexity History HIGH Complexity :3+ comorbidities / personal factors will impact the outcome/ POC ; Examination MEDIUM Complexity : 3 Standardized tests and measures addressing body structure, function, activity limitation and / or participation in recreation  ;Presentation MEDIUM Complexity : Evolving with changing characteristics  ; Clinical Decision Making HIGH Complexity : FOTO score of 1- 25   Overall Complexity Rating: MEDIUM  Problem List: pain affecting function, decrease ROM, decrease strength, edema affecting function, impaired gait/ balance, decrease ADL/ functional abilitiies, decrease activity tolerance, decrease flexibility/ joint mobility and decrease transfer abilities   Treatment Plan may include any combination of the following: Therapeutic exercise, Therapeutic activities, Neuromuscular re-education, Physical agent/modality, Gait/balance training, Manual therapy, Patient education, Self Care training, Functional mobility training, Home safety training and Stair training  Patient / Family readiness to learn indicated by: asking questions, trying to perform skills and interest  Persons(s) to be included in education: patient (P)  Barriers to Learning/Limitations: None  Patient Goal (s): to walk with or without assistance, pain mangement  Patient Self Reported Health Status: good  Rehabilitation Potential: fair    Short term goals: To be accomplished within 1 week  1. Pt will be independent with HEP to maintain progression. Eval status: given and reviewed HEP    Long term goals: To be accomplished within 4 weeks  1. Pt will improve FOTO score by 21 points to 37/100 to show improvement with functional mobility performance. Eval status: 16     2. Pt will report at least 50% improvement with back pain to improve her QOL. Eval status: \"10/10\"     3. Pt will be able to perform sit to stand mod Ind to improve ease with ADLs/transfer. Eval status: unable to perform due to weakness and fear of falling     4. Pt will be able to amb 50ft at least with RW, AFO and min A to improve ease with ADLs/transfer. Eval status: WC bound since 2018     Frequency / Duration: Patient to be seen 2-3 times per week for 4 weeks.     Patient/ Caregiver education and instruction: Diagnosis, prognosis, self care, activity modification, brace/ splint application and exercises   [x]  Plan of care has been reviewed with PTA    Certification Period: 2-16-22 to 3-18-22    Demetrius Sindhu 2/16/2022 9:14 AM    ________________________________________________________________________    I certify that the above Therapy Services are being furnished while the patient is under my care. I agree with the treatment plan and certify that this therapy is necessary.     [de-identified] Signature:____________Date:_________TIME:________     Codrelia Macias MD  ** Signature, Date and Time must be completed for valid certification **    Please sign and return to [facility] [address]  [address]  [phone] fax yes

## 2022-04-12 NOTE — ED ADULT NURSE NOTE - GLASGOW COMA SCALE: EYE OPENING
Plan: Patient applied cream for a second time x 12 days and got more reaction around the original area on the right distal calf. \\nRecommend- applying Fluorouracil cream for another 2-3 weeks. \\nT/C ED&C if persist. \\nRTO -\\nRx refilled thru [facility] Level: Simple (E3) to speech

## 2022-04-28 NOTE — PROGRESS NOTE ADULT - PROBLEM/PLAN-3
Patient presented with CADD pump connected. Reservoir empty. Disconnected CADD pump, flushed port with 0.9% Normal Saline and established blood return in port. Flushed with 500 units of Heparin and de-accessed port. Gauze and paper tape applied to port site.      Denies complaints/ concerns, feeling well overall  Reviewed symptom mgmt in general, call MD/ triage RN if needed - she agreed    Discharged stable to home with future appts  Gait steady, indep
DISPLAY PLAN FREE TEXT

## 2022-07-12 NOTE — ED ADULT NURSE NOTE - NS ED NOTE  TALK SOMEONE YN
GYN [id number]: c-diff  Annotation - [id number]: No

## 2022-10-01 NOTE — ED PROVIDER NOTE - TEMPLATE, MLM
Patient : Brian Washington Age: 63 year old Sex: male   MRN: [id number] Encounter Date: 9/30/2022      History     Chief Complaint   Patient presents with   • Rectal Bleeding     HPI     Patient is primarily Serbian-speaking.  Would like son to bring .  Formal  offered to patient, but he declined.    Patient is 63-year-old male with a past medical history of fatty liver, repaired hemorrhoids 5 years ago during surgery for perirectal abscess, s/p herniated disc surgery 12 years ago presenting due to rectal bleeding.    Patient states that he was at work and felt a sudden urge to have a bowel movement.  States that it was mostly blood with small hard pellets of stool.  States that he was not straining to go.  States that it was about half a cup.  Then that back to work but states that it happened again.  At that time it was also about half a cup blood.  Patient denies any diarrhea.  Patient states that after this he continued to feel like something was leaking from his rectum.  Began to feel lightheaded.  Called family members.  Became very lightheaded and was presyncopal but did not pass out.  He states that his extremities became cool to the touch.  Patient denies that this is ever happened to him before.     Of note patient takes ketorolac and diclofenac p.o. daily for back and hip pain.  States that he does not take any other medications daily aside from occasionally allergy medicine has received most of his care in [place].  Denies taking any anticoagulation.  States that he has never had a colonoscopy.  Patient states that he has been feeling more lightheaded over the past 5 months.  States it is worse with head position changes.  Denies vertigo.  Patient denies a cardiac history including hypertension, hyperlipidemia, MI, CHF.    Per family patient used to drink alcohol often.  Now only drinks maybe once a week.  Denies tobacco use.  Denies illicit drug use.  Denies any  allergies    No Known Allergies    Current Discharge Medication List      Prior to Admission Medications    Details   OMEPRAZOLE PO       DICLOFENAC SODIUM PO              No past medical history on file.    No past surgical history on file.    No family history on file.         Review of Systems   Constitutional: Positive for fatigue. Negative for activity change, appetite change, chills, diaphoresis and fever.   HENT: Negative for congestion and rhinorrhea.    Eyes: Negative for visual disturbance.   Respiratory: Negative for cough and shortness of breath.    Cardiovascular: Negative for chest pain, palpitations and leg swelling.   Gastrointestinal: Positive for anal bleeding and blood in stool. Negative for abdominal distention, abdominal pain, diarrhea, nausea and vomiting.   Skin: Negative for pallor and wound.   Neurological: Positive for dizziness, weakness and light-headedness. Negative for tremors, seizures, syncope and numbness.    10 point review of systems performed is negative aside from what is stated above in HPI    Physical Exam     ED Triage Vitals   ED Triage Vitals Group      Temp 09/30/22 1827 96.8 °F (36 °C)      Heart Rate 09/30/22 1827 64      Resp 09/30/22 1827 16      BP 09/30/22 1827 100/50      SpO2 09/30/22 1827 98 %      EtCO2 mmHg --       Height 09/30/22 1855 5' 6.14\" (1.68 m)      Weight 09/30/22 1855 160 lb 15 oz (73 kg)      Weight Scale Used --       BMI (Calculated) 09/30/22 1855 25.86      IBW/kg (Calculated) 09/30/22 1855 64.13       Physical Exam  Constitutional:       General: He is not in acute distress.     Appearance: He is normal weight.   HENT:      Head: Normocephalic and atraumatic.      Nose: Nose normal.      Mouth/Throat:      Mouth: Mucous membranes are moist.      Neck: Normal range of motion.   Eyes:      General:         Right eye: No discharge.         Left eye: No discharge.      Extraocular Movements: Extraocular movements intact.      Conjunctiva/sclera:  Conjunctivae normal.   Cardiovascular:      Rate and Rhythm: Normal rate and regular rhythm.      Heart sounds: Normal heart sounds.   Pulmonary:      Effort: Pulmonary effort is normal. No respiratory distress.      Breath sounds: Normal breath sounds. No wheezing.   Abdominal:      General: Bowel sounds are normal. There is no distension.      Palpations: Abdomen is soft. There is no mass.      Tenderness: There is no abdominal tenderness. There is no guarding or rebound.      Hernia: No hernia is present.   Genitourinary:     Comments: Possible hemorrhoids presents.  Blood present at rectal opening.  No masses felt on rectal exam.  Musculoskeletal:      Right lower leg: No edema.      Left lower leg: No edema.   Skin:     General: Skin is warm and dry.   Neurological:      General: No focal deficit present.      Mental Status: He is alert and oriented to person, place, and time.   Psychiatric:         Mood and Affect: Mood normal.         ED Course     Procedures    Lab Results     Results for orders placed or performed during the hospital encounter of 09/30/22   Comprehensive Metabolic Panel   Result Value Ref Range    Fasting Status      Sodium 139 135 - 145 mmol/L    Potassium 4.4 3.4 - 5.1 mmol/L    Chloride 111 (H) 97 - 110 mmol/L    Carbon Dioxide 25 21 - 32 mmol/L    Anion Gap 7 7 - 19 mmol/L    Glucose 107 (H) 70 - 99 mg/dL    BUN 19 6 - 20 mg/dL    Creatinine 1.06 0.67 - 1.17 mg/dL    Glomerular Filtration Rate 78 >=60    BUN/ Creatinine Ratio 18 7 - 25    Calcium 7.8 (L) 8.4 - 10.2 mg/dL    Bilirubin, Total 0.2 0.2 - 1.0 mg/dL    GOT/AST 22 <=37 Units/L    GPT/ALT 23 <64 Units/L    Alkaline Phosphatase 76 45 - 117 Units/L    Albumin 2.8 (L) 3.6 - 5.1 g/dL    Protein, Total 6.5 6.4 - 8.2 g/dL    Globulin 3.7 2.0 - 4.0 g/dL    A/G Ratio 0.8 (L) 1.0 - 2.4   TROPONIN I, HIGH SENSITIVITY   Result Value Ref Range    Troponin I, High Sensitivity 6 <77 ng/L   CBC with Automated Differential (performable only)    Result Value Ref Range    WBC 16.1 (H) 4.2 - 11.0 K/mcL    RBC 4.04 (L) 4.50 - 5.90 mil/mcL    HGB 10.5 (L) 13.0 - 17.0 g/dL    HCT 33.2 (L) 39.0 - 51.0 %    MCV 82.2 78.0 - 100.0 fl    MCH 26.0 26.0 - 34.0 pg    MCHC 31.6 (L) 32.0 - 36.5 g/dL    RDW-CV 16.8 (H) 11.0 - 15.0 %    RDW-SD 49.7 39.0 - 50.0 fL     140 - 450 K/mcL    NRBC 0 <=0 /100 WBC    Neutrophil, Percent 77 %    Lymphocytes, Percent 10 %    Mono, Percent 7 %    Eosinophils, Percent 4 %    Basophils, Percent 1 %    Immature Granulocytes 1 %    Absolute Neutrophils 12.6 (H) 1.8 - 7.7 K/mcL    Absolute Lymphocytes 1.6 1.0 - 4.0 K/mcL    Absolute Monocytes 1.1 (H) 0.3 - 0.9 K/mcL    Absolute Eosinophils  0.6 (H) 0.0 - 0.5 K/mcL    Absolute Basophils 0.1 0.0 - 0.3 K/mcL    Absolute Immmature Granulocytes 0.1 0.0 - 0.2 K/mcL   COVID/Flu/RSV panel   Result Value Ref Range    Rapid SARS-COV-2 by PCR Not Detected Not Detected / Detected / Presumptive Positive / Inhibitors present    Influenza A by PCR Not Detected Not Detected    Influenza B by PCR Not Detected Not Detected    RSV BY PCR Not Detected Not Detected    Isolation Guidelines      Procedural Comment     TYPE/SCREEN   Result Value Ref Range    ABO/RH(D) B Rh Positive     ANTIBODY SCREEN Negative     TYPE AND SCREEN EXPIRATION DATE 10/03/2022 23:59        EKG Results     N/A    Radiology Results     Imaging Results    None         ED Medication Orders (From admission, onward)    None               MDM  Number of Diagnoses or Management Options  Diagnosis management comments: 63-year-old male with possible history of perirectal abscess and hemorrhoids presenting due to 2 episodes of GI bleeding today with lightheadedness, cold extremities, hypotension.  Initially volume resuscitated with fluids.  Hemoglobin slightly low with a leukocytosis.  Exam nonspecific, possible hemorrhoid but cannot exclude other source of bleeding.  Patient is taking Toradol and diclofenac daily for chronic pain.  Has  General never had a colonoscopy in the past.  Due to GI symptoms, hypotension, leukocytosis and history of possible rectal abscess plan to admit patient.  GI consulted.  Plan to do colonoscopy in the a.m.      Clinical Impression     No diagnosis found.    Disposition        Admit 9/30/2022  8:59 PM  Telemetry Bed?: No  Admitting Physician: LISA MAYO [[id number]]  Is this a telephone or verbal order?: This is a telephone order from the admitting physician  Transferring Patient to? Only adjust for transfers between [facility] [[id number]]                     Roman Bar MD  Resident  09/30/22 [id number]       Roman Bar MD  Resident  09/30/22 [id number]

## 2022-10-12 NOTE — ED PROVIDER NOTE - CLINICAL SUMMARY MEDICAL DECISION MAKING FREE TEXT BOX
Shift assessment completed. Pt is a/o X 4. VSS. Medications administered, see MAR. POC discussed and all questions answered. Pt provided with fresh ice water. Pt has belongings and call light in reach. Bed is locked and in lowest position. Pt denies further needs, will continue to monitor. h/o dementia, parkinsonism, anorexia with left leg swelling  IVF labs Leg doppler

## 2022-11-23 NOTE — PHYSICAL THERAPY INITIAL EVALUATION ADULT - REFERRING PHYSICIAN, REHAB EVAL
1. \"Have you been to the ER, urgent care clinic since your last visit? Hospitalized since your last visit? \" No    2. \"Have you seen or consulted any other health care providers outside of the [address] [place] [address] since your last visit? \" No     3. For patients aged 39-70: Has the patient had a colonoscopy / FIT/ Cologuard? Yes - no Care Gap present      If the patient is female:    4. For patients aged 41-77: Has the patient had a mammogram within the past 2 years? Yes - no Care Gap present      5. For patients aged 21-65: Has the patient had a pap smear?  NA - based on age or sex Brenda Angulo (Resident)

## 2022-11-30 NOTE — DISCHARGE NOTE ADULT - WARFARIN/COUMADIN - FOLLOW - UP MONITORING
PHYSICAL THERAPY TREATMENT NOTE - INPATIENT     Room Number: 405/405-A       Presenting Problem: Fall resulting in closed fracture of L hip requiring IM nail fixation on 22    Problem List  Principal Problem:    Closed fracture of left hip, initial encounter ([address]      PHYSICAL THERAPY ASSESSMENT   Chart reviewed. RN Sydni Gongora  approved participation in physical therapy. PPE worn by therapist: mask, gloves and goggles. Patient was wearing a mask during session. Patient presented in bed with 6/10 pain. Patient with good  progress towards goals during this session. Education provided on Physical therapy plan of care and physiological benefits of out of bed mobility. Patient with good carryover. Bed mobility: Max assist  Transfers: NT  Gait Assistance: Not tested                   Pt seen daily. Max a for bed mobility. Pt refused OOB;constant cuing to calm down,Pt educated on deep breathing. Pt refused EOB. BLE ther ex in supine. Patient was left in bed at end of session with all needs in reach. The patient's Approx Degree of Impairment: 86.62% has been calculated based on documentation in the [facility] '6 clicks' Inpatient Basic Mobility Short Form. Research supports that patients with this level of impairment may benefit from Subacute Rehab. RN aware of patient status post session. DISCHARGE RECOMMENDATIONS  PT Discharge Recommendations: Sub-acute rehabilitation     PLAN  PT Treatment Plan: Bed mobility; Endurance; Patient education    SUBJECTIVE  Limited participation    OBJECTIVE  Precautions: Hard of hearing;Limb alert - left    WEIGHT BEARING RESTRICTION  Weight Bearing Restriction: L lower extremity           L Lower Extremity: Weight Bearing as Tolerated    PAIN ASSESSMENT   Ratin  Location: LLE with activity  Management Techniques: Activity promotion; Body mechanics;Repositioning    BALANCE Static Sitting: Poor  Dynamic Sitting: Poor -           Static Standing: Not tested  Dynamic Standing: Not tested    ACTIVITY TOLERANCE                         O2 WALK       AM-PAC '6-Clicks' INPATIENT SHORT FORM - BASIC MOBILITY  How much difficulty does the patient currently have. .. Patient Difficulty: Turning over in bed (including adjusting bedclothes, sheets and blankets)?: A Lot   Patient Difficulty: Sitting down on and standing up from a chair with arms (e.g., wheelchair, bedside commode, etc.): Unable   Patient Difficulty: Moving from lying on back to sitting on the side of the bed?: A Lot   How much help from another person does the patient currently need. .. Help from Another: Moving to and from a bed to a chair (including a wheelchair)?: Total   Help from Another: Need to walk in hospital room?: Total   Help from Another: Climbing 3-5 steps with a railing?: Total     AM-PAC Score:  Raw Score: 8   Approx Degree of Impairment: 86.62%   Standardized Score (AM-PAC Scale): 28.58   CMS Modifier (G-Code): CM      Additional information:     THERAPEUTIC EXERCISES  Lower Extremity Ankle pumps  Glut sets  Quad sets     Position Supine       Patient End of Session: In bed;Call light within reach;RN aware of session/findings;Bracing education provided per handout; All patient questions and concerns addressed    CURRENT GOALS     Patient Goal Patient's self-stated goal is: return to PLOF   Goal #1 Patient is able to demonstrate supine - sit EOB @ level: moderate assistance     Goal #1   Current Status Max a   Goal #2 Patient will tolerate sitting EOB for 10 minutes requiring SBA to maintain balance to improve sitting tolerance and prepare for future transfers   Goal #2  Current Status  NT    Goal #3 Patient is able to demonstrate transfers EOB to/from Chair/Wheelchair at assistance level: maximum assistance with walker - rolling     Goal #3   Current Status NT   Goal #4 Patient to demonstrate independence with home activity/exercise instructions provided to patient in preparation for discharge.    Goal #4   Current Status In progress Statement Selected

## 2022-12-06 NOTE — ED PROVIDER NOTE - CADM POA CENTRAL LINE
All good  
CMP and Lipid order placed. Any additional labs you would like? Please advise.  
Patient has a lab appointment on 12-8-22, please enter orders. Thank you.   
No

## 2023-03-20 NOTE — DISCHARGE NOTE PROVIDER - NSDCCAREPROVSEEN_GEN_ALL_CORE_FT
Refill protocol met.     Last refill date reviewed. yes    Refill sent to preferred pharmacy.     
Claudia Dewitt

## 2023-08-09 NOTE — ED PROVIDER NOTE - EKG ADDITIONAL QUESTION - PERFORMED INDEPENDENT VISUALIZATION
Advised of below. Offered appt with NP tomorrow. Pt declined.   Advised she can go to IC for eval. Yes

## 2023-09-06 NOTE — PATIENT PROFILE ADULT. - NS TRANSFER PATIENT BELONGINGS
Psychiatric Follow Up Progress Note    Patient: Lillie Oneill Date: 2023   : 1973 Attending: No att. providers found   50 year old female      Patient presented today in person to clinic.    Prep-Time, Appointment Duration, and Post-appointment documentation time: 22 minutes    Chief complaint: \"I'm more forgetful and can't stay focused.\"     Interval History: Lillie is a 50 year old White  female with diagnoses of Bipolar disorder, MICAH, and insomnia who presents today reporting symptoms of poor concentration, easily distracted, poor task completion, and forgetful. Patient is currently on Abilify 20 mg daily, Clonidine 0.4 mg nightly, Olanzapine 7.5 mg nightly, Trazodone 200 mg nightly, and Mirtazapine 45 mg nightly. Reports that she tolerated the lower dose of Olanzapine with return of symptoms. We had discussed the possiblity of ADHD and had started Clonidine in the past-more for anxiety, but to also help with her focus; neither antipsychotic nor Clonidine help with her ability to focus; she drinks a lot of coffee every morning.      ROS:Denies chest pain or SOB.     EXAM:              Vitals: There were no vitals taken for this visit.               Mental Status Exam:  Appearance: Patient appears stated age, friendly, clean, well groomed, good eye contact. Behavior: Normal, well-coordinated, no tics or tremors, no foot tapping, occasional hand wringing.  Speech: Normal; no slurring or stuttering.  Orientation: Person, Place, and Time.  Mood: Appropriate to situation.  Thought Process: Coherent, logical, able to recall both past and present history clearly.  Thought Content: Consistent with reality, no obsessions, no delusions, no pressured speech, no ideas of reference, no hallucinations.  Cognition: appropriate to age.  Patient demonstrated good insight judgment. Risk Assessment:  Low. Patient denies thoughts of suicide or self harm behavior.    Recent EKG:  Results for orders placed or  performed during the hospital encounter of 08/22/23   Electrocardiogram 12-Lead   Result Value Ref Range    Systolic Blood Pressure 128     Diastolic Blood Pressure 78     Ventricular Rate EKG/Min (BPM) 93     Atrial Rate (BPM) 93     ID-Interval (MSEC) 122     QRS-Interval (MSEC) 80     QT-Interval (MSEC) 350     QTc 435     P Axis (Degrees) 80     R Axis (Degrees) 89     T Axis (Degrees) 79     REPORT TEXT       Normal sinus rhythm  Nonspecific ST abnormality  Abnormal ECG  Confirmed by JAMES WADE DO ([id number]) on 8/30/2023 7:33:18 AM          Recent Lab study results:    CBC with differential  Lab Results   Component Value Date    WBC 12.6 (H) 08/22/2023    WBC 6.0 01/27/2020    RBC 4.23 08/22/2023    RBC 3.68 (L) 01/27/2020    HGB 13.1 08/22/2023    HGB 13.4 01/27/2020    HCT 39.3 08/22/2023    HCT 38.1 01/27/2020     08/22/2023     01/27/2020     12/16/2013    SEG 81 08/19/2023    SEG 43 01/27/2020    PMON 6 08/19/2023    PMON 9 01/27/2020    PEOS 0 08/22/2023    PEOS 0 08/19/2023    PEOS 3 01/27/2020    PBASO 1 08/19/2023    PBASO 1 01/27/2020    ANEUT 9.6 (H) 08/19/2023    ANEUT 2.6 01/27/2020    ALYMS 1.1 08/19/2023    ALYMS 2.6 01/27/2020    LINH 0.7 08/19/2023    LINH 0.5 01/27/2020    AEOS 0.0 08/19/2023    AEOS 0.2 01/27/2020    ABASO 0.1 08/19/2023    ABASO 0.1 01/27/2020       Comprehensive metabolic panel  Lab Results   Component Value Date    SODIUM 139 08/22/2023    POTASSIUM 4.4 08/22/2023    CHLORIDE 102 08/22/2023    CO2 31 08/22/2023    GLUCOSE 154 (H) 08/22/2023    BUN 21 (H) 08/22/2023    CREATININE 0.60 08/22/2023    CALCIUM 8.6 08/22/2023    ALBUMIN 2.5 (L) 08/22/2023    MG 1.7 08/20/2023    BILIRUBIN 0.1 (L) 08/22/2023    ALKPT 171 (H) 08/22/2023    AST 13 08/22/2023    GPT 27 08/22/2023    PHOS 3.2 08/19/2023     No results for input(s): \"GGTP\" in the last 72 hours.  Lab Results   Component Value Date    MG 1.7 08/20/2023     No results found. However, due to the size  of the patient record, not all encounters were searched. Please check Results Review for a complete set of results.    Thyroid function tests  No results for input(s): \"TSH\", \"T4FREE\", \"T3FREE\" in the last 72 hours.    CPK (Units/L)   Date Value   03/18/2013 138     CK (Units/L)   Date Value   06/28/2020 12 (L)   06/25/2020 26     Hemoglobin A1C (%)   Date Value   04/24/2023 5.6       LIPID PANEL  Cholesterol (mg/dL)   Date Value   04/24/2023 196      HDL (mg/dL)   Date Value   04/24/2023 66      Cholesterol/ HDL Ratio (no units)   Date Value   04/24/2023 3.0      Triglycerides (mg/dL)   Date Value   04/24/2023 148      LDL (mg/dL)   Date Value   04/24/2023 100     No results found. However, due to the size of the patient record, not all encounters were searched. Please check Results Review for a complete set of results.     Assessment: Attention deficit hyperactivity disorder (ADHD), predominantly inattentive type  (primary encounter diagnosis)  Bipolar disorder, current episode mixed, moderate (CMD)  Generalized anxiety disorder  Insomnia due to mental disorder    Medical Decision Making/Plan of Treatment:   Patient presenting today with controlled symptoms of Bipolar disorder, anxiety, and insomnia, but now persistent symptoms of ADHD .  We discussed treatment options and agreed to try Adderall IR 10 mg daily; continue all other medications as listed above.  Patient provided with 15 minutes of: solution-focus brief therapy and motivational interviewing.  Education provided on purpose, timing, possible side effects, risks and benefits for the above medications, as well as, sleep hygiene, diet, exercise and activity to promote mood.  Follow up in 4 weeks.  Orders Placed This Encounter   • amphetamine-dextroamphetamine (ADDERALL) 10 MG tablet     Sig: Take 1 tablet by mouth daily.     Dispense:  30 tablet     Refill:  0   • OLANZapine (ZyPREXA) 7.5 MG tablet     Sig: Take 1 tablet by mouth nightly.     Dispense:  30  tablet     Refill:  3       This information is confidential and disclosure without patient consent or statutory authorization is prohibited by law.    IDALIA Cantu     None

## 2023-09-26 NOTE — ED PROVIDER NOTE - CPE EDP EYES NORM
Patient attended the Joint Replacement Education Class at [facility]. The content of the class was presented using a power point presentation specific for patients undergoing hip and knee replacement surgery. The [address] [place] [address] Joint Replacement Education Handbook was given to the patient. Preparing for surgery, day of surgery routine and expectations, discharge process and help at home expectations, nutrition,medications, infection control, pain management, DVT prevention, ice therapy and safety were reviewed in class. Opportunity for questions provided, patient verbalized understanding of instructions. Prehab completed during PAT visit on 9/25/2023. Patient reports has RW for after surgery, will bring to hospital on DOS 10/2/2023. normal...

## 2023-10-13 NOTE — ED PROVIDER NOTE - OBJECTIVE STATEMENT
----- Message from Jerica Fuentes sent at 10/12/2023 12:41 PM EDT -----  Subject: Referral Request    Reason for referral request? Patient's daughter is requesting a referral   to [facility], so that the patient can receive home health. She states   she spoke with [facility] program and they will only follow up   with home health care if the patient was just discharged from the   hospital.  Provider patient wants to be referred to(if known):     Provider Phone Number(if known):     Additional Information for Provider?   ---------------------------------------------------------------------------  --------------  Robbi Simon INFO    [phone]; OK to leave message on voicemail  ---------------------------------------------------------------------------  --------------
EZE on 10/13/23 @ 0841 to inform pt's daughter and to request that she call back to schedule pt an in office visit if she would like to proceed with requesting a referral to home health.
75 yo male BIBEMS from [facility] for period of unresponsiveness, lethargy, found here in the ED to have rectal temp of 101, wife here at bedside states he had a stroke recently and unable to verbalize/aphasic.

## 2024-02-08 NOTE — PROGRESS NOTE ADULT - PROBLEM SELECTOR PLAN 3
- Per wife, patient with dark brown urine at home.  - Now with bright red blood and clots from meatus at this point.   -Urology consulted: can hold off on CBI for now and observe. Monitor for retention as he may need catheter.   - Monitor H/H. Negative

## 2024-02-13 NOTE — ED ADULT NURSE NOTE - PRIVACY CONCERNS
Subjective   Patient ID: Beto Kaur is a 47 y.o. male who presents for No chief complaint on file..  HPI  Left 3rd finger started swelling 3 days ago  + redness  + painful  Got a little bit of clear coming out of it  No fever, chills  No numbness, weakness    Had Drawing Salve on it    Current Outpatient Medications:     escitalopram (Lexapro) 10 mg tablet, Take 1 tablet (10 mg) by mouth once daily., Disp: 90 tablet, Rfl: 3    clindamycin (Cleocin) 300 mg capsule, Take 1 capsule (300 mg) by mouth 2 times a day for 10 days., Disp: 20 capsule, Rfl: 0   Past Surgical History:   Procedure Laterality Date    APPENDECTOMY  05/04/2016    Appendectomy    HAND SURGERY  05/04/2016    Hand Surgery                                                                                                                                                          KNEE ARTHROSCOPY W/ DEBRIDEMENT  05/04/2016    Arthroscopy Knee      Past Medical History:   Diagnosis Date    Bilateral impacted cerumen 01/12/2024    Olecranon bursitis of right elbow 01/12/2024    Other fatigue 01/12/2024    Umbilical hernia without obstruction and without gangrene 01/12/2024     Social History     Tobacco Use    Smoking status: Every Day     Packs/day: 1.5     Types: Cigarettes    Smokeless tobacco: Never   Substance Use Topics    Drug use: Never      No family history on file.   Review of Systems    Objective   /70   Pulse 78   Temp 37.2 °C (99 °F)   Wt 78.9 kg (174 lb)   SpO2 98%   BMI 23.60 kg/m²    Physical Exam  Vitals and nursing note reviewed.   Constitutional:       General: He is not in acute distress.     Appearance: Normal appearance. He is not ill-appearing.   Cardiovascular:      Rate and Rhythm: Normal rate and regular rhythm.      Pulses: Normal pulses.   Skin:     Capillary Refill: Capillary refill takes less than 2 seconds.      Comments: Erythema and swelling proximal to left 3rd fingernail- slight fluctuance and TTP    Neurological:      General: No focal deficit present.      Mental Status: He is alert and oriented to person, place, and time.      Sensory: No sensory deficit.      Motor: No weakness.   Psychiatric:         Mood and Affect: Mood normal.         Behavior: Behavior normal.         Assessment/Plan   Problem List Items Addressed This Visit    None  Visit Diagnoses       Abscess of left middle finger    -  Primary    Relevant Medications    clindamycin (Cleocin) 300 mg capsule        Epson salt soaks    Stab incision made to drain some pus/blood out of the abscess    Call if does not improve in 2 days    Patient understands and agrees with treatment plan    Giancarlo Johnson, DO    No

## 2024-03-18 NOTE — ED ADULT NURSE NOTE - PAIN RATING/NUMBER SCALE (0-10): REST
Critical Care Consult     Assessment / Plan:  Acute on chronic respiratory failure - due to AECOPD, influenza and encephalopathy   - ABG reviewed. AVAPS adjusted. Patient is lethargic, but protecting her airway  AECOPD  - IV steroids  - scheduled bronchodilators  PNA - RPP positive for influenza  - out of the window for Tamiflu  - empiric ceftriaxone and azithromycin for now  Abnormal CT chest - likely has lung cancer  - outpatient PET-CT  Tobacco use  - smoking cessation counseling once appropriate  Encephalopathy - due to TME  - supportive care  Dysarthria - CT brain with no acute process  - neurology following  NICM  - TTE  FEN  - NPO  Ppx  - Lovenox  Dispo  - full code; confirmed with   - ICU     updated over the phone    Discussed with nurse and respiratory therapist    Critical care time: 75 minutes    Leonard Cassidy MD  Pulmonary and Critical Care Medicine      History of Present Illness:   Ms. Daniels is a 62 year old smoker with history of COPD and CHF who we are consulted for acute on chronic respiratory failure. History taken per chart review as patient is unable to provide due to lethargy. Noted to have flu like symptoms one week prior to admission. She then developed confusion. She presented to the hospital and was admitted and started on treatment for AECOPD and pneumonia. Overnight she developed worsening confusion and hypercapnia and was transferred to the ICU for further management.    12 point ROS negative except per HPI.    Past Medical History:   Diagnosis Date    Cardiomyopathy (HCC)     CHF (congestive heart failure) (HCC)     COPD (chronic obstructive pulmonary disease) (HCC)     Defibrillator discharge     Essential hypertension        History reviewed. No pertinent surgical history.    Medications Prior to Admission   Medication Sig Dispense Refill Last Dose    Albuterol Sulfate HFA (PROVENTIL HFA) 108 (90 Base) MCG/ACT Inhalation Aero Soln Inhale 1 puff into the lungs every  4 (four) hours as needed for Wheezing. 1 Inhaler 0 3/17/2024    carvedilol 6.25 MG Oral Tab Take 1 tablet (6.25 mg total) by mouth 2 (two) times daily with meals.   3/17/2024    furosemide 40 MG Oral Tab Take 1 tablet (40 mg total) by mouth daily.   3/17/2024    Enalapril Maleate 2.5 MG Oral Tab Take 1 tablet (2.5 mg total) by mouth 2 (two) times daily.   3/17/2024    loratadine 10 MG Oral Tab Take 1 tablet (10 mg total) by mouth daily.   3/17/2024    omeprazole 20 MG Oral Capsule Delayed Release Take 1 capsule (20 mg total) by mouth every morning.   3/17/2024    Potassium Chloride ER 10 MEQ Oral Tab CR Take 2 tablets (20 mEq total) by mouth daily.   Past Month     Outpatient Medications Marked as Taking for the 3/17/24 encounter (Hospital Encounter)   Medication Sig Dispense Refill    Albuterol Sulfate HFA (PROVENTIL HFA) 108 (90 Base) MCG/ACT Inhalation Aero Soln Inhale 1 puff into the lungs every 4 (four) hours as needed for Wheezing. 1 Inhaler 0    carvedilol 6.25 MG Oral Tab Take 1 tablet (6.25 mg total) by mouth 2 (two) times daily with meals.      furosemide 40 MG Oral Tab Take 1 tablet (40 mg total) by mouth daily.      Enalapril Maleate 2.5 MG Oral Tab Take 1 tablet (2.5 mg total) by mouth 2 (two) times daily.      loratadine 10 MG Oral Tab Take 1 tablet (10 mg total) by mouth daily.      omeprazole 20 MG Oral Capsule Delayed Release Take 1 capsule (20 mg total) by mouth every morning.      Potassium Chloride ER 10 MEQ Oral Tab CR Take 2 tablets (20 mEq total) by mouth daily.        No Known Allergies   Social History     Socioeconomic History    Marital status:    Tobacco Use    Smoking status: Some Days    Smokeless tobacco: Never   Substance and Sexual Activity    Alcohol use: No    Drug use: No     Social Determinants of Health     Food Insecurity: No Food Insecurity (3/17/2024)    Food Insecurity     Food Insecurity: Never true   Transportation Needs: No Transportation Needs (3/17/2024)     Transportation Needs     Lack of Transportation: No   Housing Stability: Low Risk  (3/17/2024)    Housing Stability     Housing Instability: No       No family history on file.      Exam:  Vitals:    03/18/24 0400 03/18/24 0500 03/18/24 0600 03/18/24 0700   BP: 102/52 108/61 108/64 93/61   BP Location: Left arm Left arm Left arm Left arm   Pulse: 63 62 66 63   Resp: 16 16 17 16   Temp: 96.9 °F (36.1 °C)      TempSrc: Temporal      SpO2: 91% 92% 91% 92%   Weight:       Height:         General: on AVAPS  Skin: no rash, ulcers or subcutaneous nodules  Eyes: anicteric sclerae, moist conjunctivae  Head, ears, nose, throat: atraumatic, oropharynx clear with moist mucous membranes  Neck: trachea midline with no thyromegaly  Heart: regular rate and rhythm, no murmurs / rubs / gallops  Lungs: clear bilaterally  Abdomen: soft, nontender, nondistended  Extremities: no edema or cyanosis  Psych: opens eyes to voice    Labs:  Reviewed in EMR    Inpatient Medications:  Reviewed in EMR    Imaging:   Chest imaging reviewed     0

## 2024-04-26 NOTE — CONSULT NOTE ADULT - CONSULT REQUESTED BY NAME
Gynecologic Oncology   [facility]   Discharge Summary    Date of Admission: 4/25/2024    Date of Discharge: 4/26/2024     Admission Diagnoses:    Pelvic mass [R19.00]    Discharge Diagnoses:   Borderline ovarian neoplasm       Hospital Course:  Martha Randhawa is a 52 y.o. female who presented with a large pelvic mass and normal tumor markers.     On 4/25/2024 she was admitted and underwent robot assisted laparoscopic bilateral salpingo-oophorectomy, peritoneal biopsies and omental biopsy.  Please refer to dictated operative note for further details.  Post-operatively she did well.  Her diet was advanced.  Nonsurgical medical illnesses were appropriately managed during her hospital stay.  Her POD1 labs showed stable H&H, normal renal function.  Chemistries were unremarkable.  By POD1 she was tolerating a general diet, voiding spontaneously, having her pain controlled with oral medications, and otherwise meeting criteria for discharge and she was discharged home in stable condition.    Physical Exam at Time of Discharge   GENERAL: Alert, well-appearing female in no apparent distress.    CARDIOVASCULAR: Normal rate, regular rhythm, no murmurs, rubs, or gallops.    RESPIRATORY: Clear to auscultation bilaterally, normal respiratory effort  GASTROINTESTINAL:  Soft, appropriately tender, non-distended, no rebound or guarding.  Positive bowel sounds.  Incisions c/d/i.  GENITOURINARY: Barber previously removed.    SKIN:  Warm, dry, well-perfused.    PSYCHIATRIC: AO x3, with appropriate affect, normal thought processes  EXREMITIES: Symmetric. No peripheral edema.    Discharge Medications:     Discharge Medications        New Medications        Instructions Start Date   acetaminophen 500 MG tablet  Commonly known as: TYLENOL   500 mg, Oral, Every 6 Hours      ondansetron ODT 4 MG disintegrating tablet  Commonly known as: ZOFRAN-ODT   4 mg, Translingual, Every 8 Hours PRN      oxyCODONE 5 MG immediate release 
Dr. Chung
Dr. Schrader
tablet  Commonly known as: Roxicodone   5 mg, Oral, Every 4 Hours PRN      sennosides-docusate 8.6-50 MG per tablet  Commonly known as: PERICOLACE   1 tablet, Oral, Daily             Changes to Medications        Instructions Start Date   naloxone 4 MG/0.1ML nasal spray  Commonly known as: NARCAN  What changed:   how much to take  how to take this  when to take this  reasons to take this   Call 911. Don't prime. Ulen in 1 nostril for overdose. Repeat in 2-3 minutes in other nostril if no or minimal breathing/responsiveness.             Continue These Medications        Instructions Start Date   albuterol sulfate  (90 Base) MCG/ACT inhaler  Commonly known as: PROVENTIL HFA;VENTOLIN HFA;PROAIR HFA   INHALE 2 PUFFS BY MOUTH EVERY FOUR HOURS AS NEEDED FOR WHEEZING OR SHORTNESS OF AIR      calcium carbonate 600 MG tablet  Commonly known as: OS-AURE   600 mg, Oral, 2 Times Daily With Meals      cholecalciferol 250 MCG (19048 UT) capsule  Commonly known as: VITAMIN D3   10,000 Units, Oral, Daily      DULoxetine 60 MG capsule  Commonly known as: CYMBALTA   60 mg, Oral, Daily      hydroCHLOROthiazide 25 MG tablet   25 mg, Oral, Daily PRN      IRON PO   1 tablet, Oral, Daily      levETIRAcetam 100 MG/ML solution  Commonly known as: KEPPRA   3-6 mL, Oral, 2 Times Daily PRN, 3-6 ml bid prn       LORazepam 1 MG tablet  Commonly known as: ATIVAN   1 mg, Oral, 2 Times Daily PRN      losartan 50 MG tablet  Commonly known as: Cozaar   50 mg, Oral, Daily      multivitamin capsule   1 capsule, Oral, Daily      nystatin 688235 UNIT/GM powder  Commonly known as: MYCOSTATIN   Topical, 3 Times Daily      omeprazole 40 MG capsule  Commonly known as: priLOSEC   40 mg, Oral, Daily      ondansetron 4 MG tablet  Commonly known as: Zofran   4 mg, Oral, Every 4 Hours PRN      tiZANidine 4 MG tablet  Commonly known as: ZANAFLEX   4 mg, Oral, Every 8 Hours PRN      VITAMIN B-1 PO   1 tablet, Oral, Daily      VITAMIN B-12 PO   1 tablet, 
yvan
Oral, Daily      vitamin C 500 MG tablet  Commonly known as: ASCORBIC ACID   500 mg, Oral, Daily             Stop These Medications      HYDROcodone-acetaminophen 7.5-325 MG per tablet  Commonly known as: NORCO              Discharge Instructions:  She was instructed to call or return to medical attention for fever, severe pain, persistent nausea and vomiting, questions regarding medications, concerns regarding her incisions, excessive vaginal bleeding or discharge, or for any other acute concerns.  She was also instructed not to drive while taking narcotic pain medications, to abide by pelvic rest, avoid tub baths, and to avoid heavy lifting for at least 6 weeks.  Patient was educated regarding constipation prevention.      Condition at Discharge: Stable    Discharge Destination: Home    Results pending at time of discharge: Final surgical pathology     Follow Up: 3 weeks     Nitesh Brown MD  Resident PGY-3, Gynecology   I saw and evaluated the patient. I agree with the findings and the plan of care as documented in the note.    Britany Ford MD  04/26/24  16:19 EDT      
Dr Eddie Schrader

## 2024-05-06 NOTE — PHYSICAL THERAPY INITIAL EVALUATION ADULT - SITTING BALANCE: STATIC
Instructions for your surgery at [facility]      Today's Date:  5/6/2024      Patient's Name:  Erick Arnold           Surgery Date:  5/23/2024              Please enter the main entrance of the hospital and check-in at the  located in the lobby. Once checked in at the , you will take the elevators to the second floor, and report to the waiting room on the left. The room will say Procedure Registration.    Do NOT eat or drink anything, including candy, gum, or ice chips after midnight prior to your surgery, unless you have specific instructions from your surgeon or anesthesia provider to do so.  Brush your teeth before coming to the hospital. You may swish with water, but do not swallow.  No smoking/Vaping/E-Cigarettes 24 hours prior to the day of surgery.  No alcohol 24 hours prior to the day of surgery.  No recreational drugs for one week prior to the day of surgery.  Bring Photo ID, Insurance information, and Co-pay if required on day of surgery.  Bring in pertinent legal documents, such as, Medical Power of , DNR, Advance Directive, etc.  Leave all valuables, including money/purse, at home.  Remove all jewelry, including ALL body piercings, nail polish, acrylic nails, and makeup (including mascara); no lotions, powders, deodorant, or perfume/cologne/after shave on the skin.  Follow instruction for Hibiclens washes and CHG wipes from surgeon's office.   Glasses and dentures may be worn to the hospital. They must be removed prior to surgery. Please bring case/container for glasses or dentures.   Contact lenses should not be worn on day of surgery.   Call your doctor's office if symptoms of a cold or illness develop within 24-48 hours prior to your surgery.  Call your doctor's office if you have any questions concerning insurance or co-pays.  15. AN ADULT (relative or friend 18 years or older) MUST DRIVE YOU HOME AFTER YOUR SURGERY.  16. Please make  poor plus

## 2024-08-23 NOTE — SWALLOW BEDSIDE ASSESSMENT ADULT - ADDITIONAL RECOMMENDATIONS
[de-identified] : TONNY GARCIA  is a 42 year old right handed male with PMH Meningioma- the lesion was identified upon workup for symptoms of headache right after New year's Day 2017, that lasted about 3-4 days. He had had a few beers over [place]. It was severe, not improved with Tylenol, 1000 mg po bid, so he went to the ER. No n/v, vision changes. CT scan was done that identified a brain mass, and he had an MRI. He was referred to neurosurgery from there. He was in a methadone rehab program, and tapered off. 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    TODAY 08/21/2024 presents as a new patient to re-establish care with neurosurgery. Last imaging completed 7/21/2017-homogenously enhancing extra-axial mass along sylvian fissure anteriorly 2.5cm AP 1.5cm TR 2.4cm CC. Likely meningioma. Needed repeat imaging in 2018- loss of follow-up since 2017.. Today denies headache, changes in speech/vision, nausea/vomiting, numbness/tingling/weakness in extremities, abnormal movement of extremities.   This department to continue to follow during this admission as schedule permits.

## 2024-09-19 NOTE — PROGRESS NOTE ADULT - PROBLEM/PLAN-6
Discussed in clinic with Dorota who suggests we call the patient to see if she can clarify her messages and triage her if needed for any possible crisis situations.    Attempted to contact patient. No answer. No voicemail available on mobile number listed.  
See nurse triage 9/13/24.   
DISPLAY PLAN FREE TEXT
DISPLAY PLAN FREE TEXT

## 2024-09-27 NOTE — ED ADULT NURSE NOTE - CAS TRG GEN SKIN COLOR
GENERAL PRE-PROCEDURE:   Procedure:  Soft tissue lesion biopsy  Date/Time:  9/27/2024 10:03 AM    Verbal consent obtained?: Yes    Written consent obtained?: Yes    Risks and benefits: Risks, benefits and alternatives were discussed    Consent given by:  Patient  Patient states understanding of procedure being performed: Yes    Patient's understanding of procedure matches consent: Yes    Procedure consent matches procedure scheduled: Yes    Expected level of sedation:  Moderate  Appropriately NPO:  Yes  ASA Class:  2  Mallampati  :  Grade 1- soft palate, uvula, tonsillar pillars, and posterior pharyngeal wall visible  Lungs:  Lungs clear with good breath sounds bilaterally  Heart:  Normal heart sounds and rate  History & Physical reviewed:  History and physical reviewed and no updates needed  Statement of review:  I have reviewed the lab findings, diagnostic data, medications, and the plan for sedation     Normal for race History of pulmonary embolism

## 2024-10-25 NOTE — PATIENT PROFILE ADULT. - ABILITY TO HEAR (WITH HEARING AID OR HEARING APPLIANCE IF NORMALLY USED):
Team Note Due:  Thursday    Assessment/Intervention/Current Symtoms and Care Coordination:  Chart review and met with team, discussed pt progress, symptomology, and response to treatment.  Discussed the discharge plan and any potential impediments to discharge.    Team Discussion  Pt is here for housing. He will be given a few more days to figure out his plan.  Discussed that malingering has been placed on the pt's problem list from other hospitals also.  Focus on working on pt's satisfaction with medication regimen. Pharmacy staff was going to approach him.      II approached the pt who was in bed mid-morning.  I asked him to sign ELLEN and he refused to do so. He said it wouldn't help find a place. I explained that the hospital would not be find housing for him that the hospital can arrange shelter bed such as [facility] or [facility] or treatment, I explained that the hospital will give him a few days to figure this out.    I prepared AVS in case the pt decides to leave on the weekend.  MD informed me that he told the pthe would need to leave on Monday and go to a crisis home.       Discharge Plan or Goal:      Shelter or return to his apartment     Barriers to Discharge:    Pt is refusing to go back to his apartment.     Referral Status:      Primary Care Provider:  Name/Clinic:    Number:            [facility] [phone]         [facility]  Staff could not gain entry to apartment and phone did not work        Medication Management/Psychiatry:  Name/Clinic: [facility]    Number:      Legal Status:  Voluntary         Contacts (include ELLEN status):      Pt refuses to sign ELLEN for housing provider.      Upcoming Meetings and Dates/Important Information and next steps:      Find psychiatry and therapy                             Adequate: hears normal conversation without difficulty

## 2025-01-13 NOTE — DISCHARGE NOTE PROVIDER - PROVIDER RX CONTACT NUMBER
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      Headache Calendar  Please maintain a headache calendar  Consider using phone applications such as Migraine Geraldo or Migraine Diary    Headache/migraine treatment:     Rescue medications (for immediate treatment of a headache):   It is ok to take ibuprofen, acetaminophen or naproxen (Advil, Tylenol,  Aleve, Excedrin) if they help your headaches you should limit these to No more than 3 times a week to avoid medication overuse/rebound headaches.     Prescription preventive medications for headaches/migraines   (to take every day to help prevent headaches - not to take at the time of headache):  - Would advise continuation of propranolol 20 mg, take 2 tablets by mouth once 30 to 60 minutes prior to sexual intercourse for prevention of primary headache associated with sexual activity.    *Typically these types of medications take time until you see the benefit, although some may see improvement in days, often it may take weeks, especially if the medication is being titrated up to a beneficial level. Please contact us if there are any concerns or questions regarding the medication.     Over the counter preventive supplements for headaches/migraines (if you try, try for 3 months straight)  (to take every day to help prevent headaches - not to take at the time of headache):  There are combo pills online of these - none of which regulated by FDA and double check dosing - take appropriate dose only once a day- prevent a migraine, migravent, mind ease, migrelief   [] Magnesium 400mg daily (If any diarrhea or upset stomach, decrease dose  as tolerated)  [] Riboflavin (Vitamin B2) 400mg daily (may make your urine bright/neon yellow)  - All supplements can be purchased online    Lifestyle Recommendations:  [x] SLEEP - Maintain a regular sleep schedule: Adults need at least 7-8 hours of uninterrupted  a night. Maintain good sleep hygiene:  Going to bed and waking up at consistent times, avoiding excessive daytime naps, avoiding caffeinated beverages in the evening, avoid excessive stimulation in the evening and generally using bed primarily for sleeping.  One hour before bedtime would recommend turning lights down lower, decreasing your activity (may read quietly, listen to music at a low volume). When you get into bed, should eliminate all technology (no texting, emailing, playing with your phone, iPad or tablet in bed).  [x] HYDRATION - Maintain good hydration.  Drink  2L of fluid a day (4 typical small water bottles)  [x] DIET - Maintain good nutrition. In particular don't skip meals and try and eat healthy balanced meals regularly.  [x] TRIGGERS - Look for other triggers and avoid them: Limit caffeine to 1-2 cups a day or less. Avoid dietary triggers that you have noticed bring on your headaches (this could include aged cheese, peanuts, MSG, aspartame and nitrates).  [x] EXERCISE - physical exercise as we all know is good for you in many ways, and not only is good for your heart, but also is beneficial for your mental health, cognitive health and  chronic pain/headaches. I would encourage at the least 5 days of physical exercise weekly for at least 30 minutes.     Education and Follow-up  [x] Please call with any questions or concerns. Of course if any new concerning symptoms go to the emergency department.  [x] Follow up in 6 months with Russell LONDON    [phone]

## 2025-01-17 NOTE — ED ADULT NURSE NOTE - DOES PATIENT HAVE ADVANCE DIRECTIVE
[Home] : at home, [unfilled] , at the time of the visit. [Medical Office: ([facility])___] : at the medical office located in  [Other:____] : [unfilled] [Verbal consent obtained from patient] : the patient, [unfilled] [[email or web address]] : Pt became dizzy yesterday and was nauseas, she even vomited once.  BP was 160/90 last night, BP was better today at 130/70s.  She denied recent URI symptoms.  She had similar symptoms in the past and has a medication given by MD in [facility]3] that helped her, but does not remember the name of meds.  She had URI recently and went to urgent care about 1-2 months ago and was given ABx, she was better after a few days. unk

## 2025-02-10 NOTE — PROVIDER CONTACT NOTE (OTHER) - ASSESSMENT
Problem: PAIN - ADULT  Goal: Verbalizes/displays adequate comfort level or baseline comfort level  Description: Interventions:  - Encourage patient to monitor pain and request assistance  - Assess pain using appropriate pain scale  - Administer analgesics based on type and severity of pain and evaluate response  - Implement non-pharmacological measures as appropriate and evaluate response  - Consider cultural and social influences on pain and pain management  - Notify physician/advanced practitioner if interventions unsuccessful or patient reports new pain  Outcome: Progressing     Problem: INFECTION - ADULT  Goal: Absence or prevention of progression during hospitalization  Description: INTERVENTIONS:  - Assess and monitor for signs and symptoms of infection  - Monitor lab/diagnostic results  - Monitor all insertion sites, i.e. indwelling lines, tubes, and drains  - Monitor endotracheal if appropriate and nasal secretions for changes in amount and color  - Bronson appropriate cooling/warming therapies per order  - Administer medications as ordered  - Instruct and encourage patient and family to use good hand hygiene technique  - Identify and instruct in appropriate isolation precautions for identified infection/condition  Outcome: Progressing  Goal: Absence of fever/infection during neutropenic period  Description: INTERVENTIONS:  - Monitor WBC    Outcome: Progressing     Problem: SAFETY ADULT  Goal: Patient will remain free of falls  Description: INTERVENTIONS:  - Educate patient/family on patient safety including physical limitations  - Instruct patient to call for assistance with activity   - Consult OT/PT to assist with strengthening/mobility   - Keep Call bell within reach  - Keep bed low and locked with side rails adjusted as appropriate  - Keep care items and personal belongings within reach  - Initiate and maintain comfort rounds  - Make Fall Risk Sign visible to staff  - Offer Toileting every 2 Hours,  in advance of need  - Initiate/Maintain bed alarm  - Obtain necessary fall risk management equipment  - Apply yellow socks and bracelet for high fall risk patients  - Consider moving patient to room near nurses station  Outcome: Progressing  Goal: Maintain or return to baseline ADL function  Description: INTERVENTIONS:  -  Assess patient's ability to carry out ADLs; assess patient's baseline for ADL function and identify physical deficits which impact ability to perform ADLs (bathing, care of mouth/teeth, toileting, grooming, dressing, etc.)  - Assess/evaluate cause of self-care deficits   - Assess range of motion  - Assess patient's mobility; develop plan if impaired  - Assess patient's need for assistive devices and provide as appropriate  - Encourage maximum independence but intervene and supervise when necessary  - Involve family in performance of ADLs  - Assess for home care needs following discharge   - Consider OT consult to assist with ADL evaluation and planning for discharge  - Provide patient education as appropriate  Outcome: Progressing  Goal: Maintains/Returns to pre admission functional level  Description: INTERVENTIONS:  - Perform AM-PAC 6 Click Basic Mobility/ Daily Activity assessment daily.  - Set and communicate daily mobility goal to care team and patient/family/caregiver.   - Collaborate with rehabilitation services on mobility goals if consulted  - Perform Range of Motion 3 times a day.  - Reposition patient every 2 hours.  - Dangle patient 3 times a day  - Stand patient 3 times a day  - Ambulate patient 3 times a day  - Out of bed to chair 3 times a day   - Out of bed for meals 3 times a day  - Out of bed for toileting  - Record patient progress and toleration of activity level   Outcome: Progressing     Problem: DISCHARGE PLANNING  Goal: Discharge to home or other facility with appropriate resources  Description: INTERVENTIONS:  - Identify barriers to discharge w/patient and caregiver  -  Arrange for needed discharge resources and transportation as appropriate  - Identify discharge learning needs (meds, wound care, etc.)  - Arrange for interpretive services to assist at discharge as needed  - Refer to Case Management Department for coordinating discharge planning if the patient needs post-hospital services based on physician/advanced practitioner order or complex needs related to functional status, cognitive ability, or social support system  Outcome: Progressing     Problem: Knowledge Deficit  Goal: Patient/family/caregiver demonstrates understanding of disease process, treatment plan, medications, and discharge instructions  Description: Complete learning assessment and assess knowledge base.  Interventions:  - Provide teaching at level of understanding  - Provide teaching via preferred learning methods  Outcome: Progressing     Problem: CARDIOVASCULAR - ADULT  Goal: Maintains optimal cardiac output and hemodynamic stability  Description: INTERVENTIONS:  - Monitor I/O, vital signs and rhythm  - Monitor for S/S and trends of decreased cardiac output  - Administer and titrate ordered vasoactive medications to optimize hemodynamic stability  - Assess quality of pulses, skin color and temperature  - Assess for signs of decreased coronary artery perfusion  - Instruct patient to report change in severity of symptoms  Outcome: Progressing  Goal: Absence of cardiac dysrhythmias or at baseline rhythm  Description: INTERVENTIONS:  - Continuous cardiac monitoring, vital signs, obtain 12 lead EKG if ordered  - Administer antiarrhythmic and heart rate control medications as ordered  - Monitor electrolytes and administer replacement therapy as ordered  Outcome: Progressing     Problem: GASTROINTESTINAL - ADULT  Goal: Minimal or absence of nausea and/or vomiting  Description: INTERVENTIONS:  - Administer IV fluids if ordered to ensure adequate hydration  - Maintain NPO status until nausea and vomiting are  Pt on remote tele in Afib 115 . Pt resting comfortably no signs of distress or SOB noted. Metoprolol given  at 17:24. resolved  - Nasogastric tube if ordered  - Administer ordered antiemetic medications as needed  - Provide nonpharmacologic comfort measures as appropriate  - Advance diet as tolerated, if ordered  - Consider nutrition services referral to assist patient with adequate nutrition and appropriate food choices  Outcome: Progressing  Goal: Maintains or returns to baseline bowel function  Description: INTERVENTIONS:  - Assess bowel function  - Encourage oral fluids to ensure adequate hydration  - Administer IV fluids if ordered to ensure adequate hydration  - Administer ordered medications as needed  - Encourage mobilization and activity  - Consider nutritional services referral to assist patient with adequate nutrition and appropriate food choices  Outcome: Progressing  Goal: Maintains adequate nutritional intake  Description: INTERVENTIONS:  - Monitor percentage of each meal consumed  - Identify factors contributing to decreased intake, treat as appropriate  - Assist with meals as needed  - Monitor I&O, weight, and lab values if indicated  - Obtain nutrition services referral as needed  Outcome: Progressing  Goal: Establish and maintain optimal ostomy function  Description: INTERVENTIONS:  - Assess bowel function  - Encourage oral fluids to ensure adequate hydration  - Administer IV fluids if ordered to ensure adequate hydration   - Administer ordered medications as needed  - Encourage mobilization and activity  - Nutrition services referral to assist patient with appropriate food choices  - Assess stoma site  - Consider wound care consult   Outcome: Progressing  Goal: Oral mucous membranes remain intact  Description: INTERVENTIONS  - Assess oral mucosa and hygiene practices  - Implement preventative oral hygiene regimen  - Implement oral medicated treatments as ordered  - Initiate Nutrition services referral as needed  Outcome: Progressing     Problem: METABOLIC, FLUID AND ELECTROLYTES - ADULT  Goal: Electrolytes  maintained within normal limits  Description: INTERVENTIONS:  - Monitor labs and assess patient for signs and symptoms of electrolyte imbalances  - Administer electrolyte replacement as ordered  - Monitor response to electrolyte replacements, including repeat lab results as appropriate  - Instruct patient on fluid and nutrition as appropriate  Outcome: Progressing  Goal: Fluid balance maintained  Description: INTERVENTIONS:  - Monitor labs   - Monitor I/O and WT  - Instruct patient on fluid and nutrition as appropriate  - Assess for signs & symptoms of volume excess or deficit  Outcome: Progressing  Goal: Glucose maintained within target range  Description: INTERVENTIONS:  - Monitor Blood Glucose as ordered  - Assess for signs and symptoms of hyperglycemia and hypoglycemia  - Administer ordered medications to maintain glucose within target range  - Assess nutritional intake and initiate nutrition service referral as needed  Outcome: Progressing     Problem: SKIN/TISSUE INTEGRITY - ADULT  Goal: Skin Integrity remains intact(Skin Breakdown Prevention)  Description: Assess:  -Perform Bryan assessment every shift  -Clean and moisturize skin   -Inspect skin when repositioning, toileting, and assisting with ADLS  -Assess under medical devices   -Assess extremities for adequate circulation and sensation     Bed Management:  -Have minimal linens on bed & keep smooth, unwrinkled  -Change linens as needed when moist or perspiring  -Avoid sitting or lying in one position for more than 2 hours while in bed  -Keep HOB at 30 degrees     Toileting:  -Offer bedside commode  -Assess for incontinence   -Use incontinent care products after each incontinent episode    Activity:  -Mobilize patient 3 times a day  -Encourage activity and walks on unit  -Encourage or provide ROM exercises   -Turn and reposition patient every 2 Hours  -Use appropriate equipment to lift or move patient in bed  -Instruct/ Assist with weight shifting when out  of bed in chair  -Consider limitation of chair time 2 hour intervals    Skin Care:  -Avoid use of baby powder, tape, friction and shearing, hot water or constrictive clothing  -Relieve pressure over bony prominences using mepelex  -Do not massage red bony areas    Next Steps:  -Teach patient strategies to minimize risks   -Consider consults to  interdisciplinary teams   Outcome: Progressing     Problem: HEMATOLOGIC - ADULT  Goal: Maintains hematologic stability  Description: INTERVENTIONS  - Assess for signs and symptoms of bleeding or hemorrhage  - Monitor labs  - Administer supportive blood products/factors as ordered and appropriate  Outcome: Progressing     Problem: MUSCULOSKELETAL - ADULT  Goal: Maintain or return mobility to safest level of function  Description: INTERVENTIONS:  - Assess patient's ability to carry out ADLs; assess patient's baseline for ADL function and identify physical deficits which impact ability to perform ADLs (bathing, care of mouth/teeth, toileting, grooming, dressing, etc.)  - Assess/evaluate cause of self-care deficits   - Assess range of motion  - Assess patient's mobility  - Assess patient's need for assistive devices and provide as appropriate  - Encourage maximum independence but intervene and supervise when necessary  - Involve family in performance of ADLs  - Assess for home care needs following discharge   - Consider OT consult to assist with ADL evaluation and planning for discharge  - Provide patient education as appropriate  Outcome: Progressing  Goal: Maintain proper alignment of affected body part  Description: INTERVENTIONS:  - Support, maintain and protect limb and body alignment  - Provide patient/ family with appropriate education  Outcome: Progressing     Problem: Nutrition/Hydration-ADULT  Goal: Nutrient/Hydration intake appropriate for improving, restoring or maintaining nutritional needs  Description: Monitor and assess patient's nutrition/hydration status for  malnutrition. Collaborate with interdisciplinary team and initiate plan and interventions as ordered.  Monitor patient's weight and dietary intake as ordered or per policy. Utilize nutrition screening tool and intervene as necessary. Determine patient's food preferences and provide high-protein, high-caloric foods as appropriate.     INTERVENTIONS:  - Monitor oral intake, urinary output, labs, and treatment plans  - Assess nutrition and hydration status and recommend course of action  - Evaluate amount of meals eaten  - Assist patient with eating if necessary   - Allow adequate time for meals  - Recommend/ encourage appropriate diets, oral nutritional supplements, and vitamin/mineral supplements  - Order, calculate, and assess calorie counts as needed  - Recommend, monitor, and adjust tube feedings and TPN/PPN based on assessed needs  - Assess need for intravenous fluids  - Provide specific nutrition/hydration education as appropriate  - Include patient/family/caregiver in decisions related to nutrition  Outcome: Progressing     Problem: Prexisting or High Potential for Compromised Skin Integrity  Goal: Skin integrity is maintained or improved  Description: INTERVENTIONS:  - Identify patients at risk for skin breakdown  - Assess and monitor skin integrity  - Assess and monitor nutrition and hydration status  - Monitor labs   - Assess for incontinence   - Turn and reposition patient  - Assist with mobility/ambulation  - Relieve pressure over bony prominences  - Avoid friction and shearing  - Provide appropriate hygiene as needed including keeping skin clean and dry  - Evaluate need for skin moisturizer/barrier cream  - Collaborate with interdisciplinary team   - Patient/family teaching  - Consider wound care consult   Outcome: Progressing

## 2025-03-03 NOTE — H&P ADULT. - MINUTES
3/3/2025         RE: Dot Ramirez  [address] [facility] [id number]        Dear Colleague,    Thank you for referring your patient, Dot Ramirez, to the [facility] [place]. Please see a copy of my visit note below.    No notes on file     60

## 2025-05-30 NOTE — ED ADULT NURSE REASSESSMENT NOTE - NS ED NURSE REASSESS COMMENT FT1
report from previous shift patient awake alert IV right hand pulled out. IV restarted right forearm patient awake alert responsive to pain wife at bedside Principal Discharge DX:	Foreign body of throat   1